# Patient Record
Sex: FEMALE | Race: WHITE | NOT HISPANIC OR LATINO | Employment: UNEMPLOYED | URBAN - METROPOLITAN AREA
[De-identification: names, ages, dates, MRNs, and addresses within clinical notes are randomized per-mention and may not be internally consistent; named-entity substitution may affect disease eponyms.]

---

## 2017-01-30 ENCOUNTER — TELEPHONE (OUTPATIENT)
Dept: FAMILY MEDICINE | Facility: CLINIC | Age: 52
End: 2017-01-30

## 2017-01-30 NOTE — TELEPHONE ENCOUNTER
Reason for Call:  Other call back    Detailed comments: Pt would like to know if where she can get the MRI results read. Please contact pt regarding this. THanks!    Phone Number Patient can be reached at: Cell number on file:    Telephone Information:   Mobile [phone]       Best Time: Anytime    Can we leave a detailed message on this number? YES    Call taken on 1/30/2017 at 10:16 AM by Tiny Gamez

## 2017-01-30 NOTE — TELEPHONE ENCOUNTER
Writer notes last MRI 9/30/2016 - results show read and reviewed with patient - no outside orders observed in media    Unknown as to what orders patient is referring to     Writer called patient left non detailed message requesting return call to clinic and ask to speak with nurse    Laura Vázquez RN

## 2017-03-07 ENCOUNTER — OFFICE VISIT (OUTPATIENT)
Dept: FAMILY MEDICINE | Facility: CLINIC | Age: 52
End: 2017-03-07
Payer: COMMERCIAL

## 2017-03-07 VITALS
WEIGHT: 131 LBS | SYSTOLIC BLOOD PRESSURE: 102 MMHG | TEMPERATURE: 98.2 F | BODY MASS INDEX: 23.21 KG/M2 | DIASTOLIC BLOOD PRESSURE: 66 MMHG | HEIGHT: 63 IN | OXYGEN SATURATION: 96 % | HEART RATE: 75 BPM

## 2017-03-07 DIAGNOSIS — R68.89 FLU-LIKE SYMPTOMS: ICD-10-CM

## 2017-03-07 DIAGNOSIS — J10.1 INFLUENZA B: ICD-10-CM

## 2017-03-07 DIAGNOSIS — J10.1 INFLUENZA A: Primary | ICD-10-CM

## 2017-03-07 LAB
FLUAV+FLUBV AG SPEC QL: ABNORMAL
FLUAV+FLUBV AG SPEC QL: ABNORMAL
SPECIMEN SOURCE: ABNORMAL

## 2017-03-07 PROCEDURE — 87804 INFLUENZA ASSAY W/OPTIC: CPT | Performed by: FAMILY MEDICINE

## 2017-03-07 PROCEDURE — 99207 ZZC FOR CODING REVIEW: CPT | Performed by: FAMILY MEDICINE

## 2017-03-07 PROCEDURE — 87633 RESP VIRUS 12-25 TARGETS: CPT | Performed by: FAMILY MEDICINE

## 2017-03-07 PROCEDURE — 99213 OFFICE O/P EST LOW 20 MIN: CPT | Performed by: FAMILY MEDICINE

## 2017-03-07 RX ORDER — IBUPROFEN 800 MG/1
800 TABLET, FILM COATED ORAL EVERY 8 HOURS PRN
Qty: 60 TABLET | Refills: 0 | Status: CANCELLED | OUTPATIENT
Start: 2017-03-07

## 2017-03-07 RX ORDER — OSELTAMIVIR PHOSPHATE 75 MG/1
75 CAPSULE ORAL 2 TIMES DAILY
Qty: 10 CAPSULE | Refills: 0 | Status: SHIPPED | OUTPATIENT
Start: 2017-03-07 | End: 2017-05-31

## 2017-03-07 NOTE — NURSING NOTE
"Chief Complaint   Patient presents with     Flu       Initial /66 (BP Location: Left arm, Cuff Size: Adult Regular)  Pulse 75  Temp 98.2  F (36.8  C) (Oral)  Ht 5' 3\" (1.6 m)  Wt 131 lb (59.4 kg)  LMP 05/14/2008  SpO2 96%  Breastfeeding? No  BMI 23.21 kg/m2 Estimated body mass index is 23.21 kg/(m^2) as calculated from the following:    Height as of this encounter: 5' 3\" (1.6 m).    Weight as of this encounter: 131 lb (59.4 kg).  Medication Reconciliation: complete.  BEST Parr      "

## 2017-03-07 NOTE — LETTER
[facility]   [address], [place]  [address]  [phone]      March 10, 2017      Eunice SANCHEZ Gama  [address] [facility] [place] [facility] [address]              Dear Ms. Malloy,    I am writing to share your lab results from your recent visit at our office. The inconclusive test for influenza was followed up by a culture. That came back negative (no influenza). Regardless, I still recommend you complete the course of tamiflu. Hope you are starting to feel better.  Please find the results below.  If you have any questions regarding these test results let me know.    Results for orders placed or performed in visit on 03/07/17   Influenza A/B antigen   Result Value Ref Range    Influenza A/B Agn Specimen Nasopharyngeal     Influenza A Inconclusive (A) NEG    Influenza B (A) NEG     Inconclusive   Test results must be correlated with clinical data. If necessary, results   should be confirmed by a molecular assay or viral culture.     Respiratory Virus Panel by PCR   Result Value Ref Range    Respiratory Virus Source Nasopharyngeal     Influenza A Negative NEG    Influenza A, H1 Negative NEG    Influenza A, H3 Negative NEG    Influenza A 2009 H1N1 Negative NEG    Influenza B Negative NEG    Respiratory Syncytial Virus A Negative NEG    Respiratory Syncytial Virus B Negative NEG    Parainfluenza Virus 1 Negative NEG    Parainfluenza Virus 2 Negative NEG    Parainfluenza Virus 3 Negative NEG    Human Metapneumovirus Negative NEG    Human Rhinovirus Negative NEG    Adenovirus Species B/E Negative NEG    Adenovirus Species C Negative NEG    Respiratory Virus Comment       The [employer] Respiratory Viral Panel (RVP) is a qualitative, multiplex,   diagnostic test for simultaneous detection and identification of multiple   respiratory viral nucleic acids in individuals exhibiting signs and symptoms of   respiratory infection. It uses innovative eSensor technology to provide   sensitive and specific  respiratory virus detection.   The test detects Influenza A (H1 and H3), Influenza A 2009 H1N1, Influenza B,   Respiratory Syncytial Virus A, Respiratory Syncytial Virus B, Parainfluenza   Virus (1, 2 and 3), Human Metapneumovirus, Human Rhinovirus (HRV), Adenovirus   B/E and Adenovirus C.   The assay has received FDA approval for the testing of nasopharyngeal (NP)   swabs only. The Infect[facility] at [facility], [place], has validated the performance   characteristics of the [employer] Respiratory Viral Panel for NP swabs, bronchial   alveolar lavage/wash, nasopharyngeal aspirate/wash and nasal wash.             Sincerely,    Tad Vyas MD/nr

## 2017-03-07 NOTE — LETTER
[address] [address]  Phone: [phone]    March 7, 2017        Eunice Malloy  [address] [address]          To whom it may concern:    RE: Eunice Malloy    Patient was seen and treated today at our clinic. I recommend she not return to work until symptoms resolve, anticipate resolution no sooner than 3/13/2017.    Please contact me for questions or concerns.      Sincerely,        Tad Vyas MD

## 2017-03-07 NOTE — MR AVS SNAPSHOT
After Visit Summary   3/7/2017    Eunice Malloy    MRN: [id number]           Patient Information     Date Of Birth          1965        Visit Information        Provider Department      3/7/2017 2:00 PM Tad Charles MD [facility]        Today's Diagnoses     Influenza A    -  1    Influenza B        Flu-like symptoms           Follow-ups after your visit        Your next 10 appointments already scheduled     Mar 10, 2017 12:30 PM CST   LAB with  [facility]   [facility]    [address] [place] [id number] [phone]           Patient must bring picture ID.  Patient should be prepared to give a urine specimen  Please do not eat 10-12 hours before your appointment if you are coming in fasting for labs on lipids, cholesterol, or glucose (sugar).  Pregnant women should follow their Care Team instructions. Water with medications is okay. Do not drink coffee or other fluids.   If you have concerns about taking  your medications, please ask at office or if scheduling via [employer], send a message by clicking on Secure Messaging, Message Your Care Team.              Who to contact     If you have questions or need follow up information about today's clinic visit or your schedule please contact [facility] directly at [phone].  Normal or non-critical lab and imaging results will be communicated to you by MyChart, letter or phone within 4 business days after the clinic has received the results. If you do not hear from us within 7 days, please contact the clinic through [employer] or phone. If you have a critical or abnormal lab result, we will notify you by phone as soon as possible.  Submit refill requests through [employer] or call your pharmacy and they will forward the refill request to us. Please allow 3 business days for your refill to be completed.          Additional Information About Your  "Visit        [email or web address] Information     [employer] lets you send messages to your doctor, view your test results, renew your prescriptions, schedule appointments and more. To sign up, go to [email or web address] . Click on \"Log in\" on the left side of the screen, which will take you to the Welcome page. Then click on \"Sign up Now\" on the right side of the page.     You will be asked to enter the access code listed below, as well as some personal information. Please follow the directions to create your username and password.     Your access code is: [email or web address]  Expires: 2017 11:00 AM     Your access code will  in 90 days. If you need help or a new code, please call your [place] clinic or [phone].        Care EveryWhere ID     This is your Care EveryWhere ID. This could be used by other organizations to access your [place] medical records  [id number]        Your Vitals Were     Pulse Temperature Height Last Period Pulse Oximetry Breastfeeding?    75 98.2  F (36.8  C) (Oral) 5' 3\" (1.6 m) 2008 96% No    BMI (Body Mass Index)                   23.21 kg/m2            Blood Pressure from Last 3 Encounters:   17 102/66   16 116/64   16 109/67    Weight from Last 3 Encounters:   17 131 lb (59.4 kg)   16 129 lb (58.5 kg)   16 124 lb (56.2 kg)              We Performed the Following     Influenza A/B antigen     Respiratory Virus Panel by PCR          Today's Medication Changes          These changes are accurate as of: 3/7/17 11:59 PM.  If you have any questions, ask your nurse or doctor.               Start taking these medicines.        Dose/Directions    oseltamivir 75 MG capsule   Commonly known as:  TAMIFLU   Used for:  Influenza A, Influenza B   Started by:  Tad Charles MD        Dose:  75 mg   Take 1 capsule (75 mg) by mouth 2 times daily   Quantity:  10 capsule   Refills:  0            Where to get your medicines      These medications were " sent to [place], [place] - [address]  [address], [facility] [address]     Phone:  [phone]     ibuprofen 800 MG tablet         These medications were sent to [facility] - [place], [place] - [address]  [address], [place] [address]     Phone:  [phone]     oseltamivir 75 MG capsule                Primary Care Provider Office Phone # Fax #    Alyse Mcclendon [phone]       [facility] [address]  [facility] [address]        Thank you!     Thank you for choosing [facility]  for your care. Our goal is always to provide you with excellent care. Hearing back from our patients is one way we can continue to improve our services. Please take a few minutes to complete the written survey that you may receive in the mail after your visit with us. Thank you!             Your Updated Medication List - Protect others around you: Learn how to safely use, store and throw away your medicines at www.disposemymeds.org.          This list is accurate as of: 3/7/17 11:59 PM.  Always use your most recent med list.                   Brand Name Dispense Instructions for use    cyclobenzaprine 5 MG tablet    FLEXERIL    30 tablet    Take 1 tablet (5 mg) by mouth nightly as needed for muscle spasms       diphenhydrAMINE 25 MG tablet    BENADRYL    60 tablet    Take 1-2 tablets (25-50 mg) by mouth every 6 hours as needed for itching or allergies Do not take with Flexeril.       ibuprofen 800 MG tablet    ADVIL/MOTRIN    60 tablet    Take 1 tablet (800 mg) by mouth every 8 hours as needed for moderate pain       levothyroxine 100 MCG tablet    SYNTHROID/LEVOTHROID    90 tablet    Take 1 tablet by mouth every other day. Alternate between 100 mcg and 88 mcg tablets.       MULTIVITAL PO      Take 1 tablet by mouth       oseltamivir 75 MG capsule    TAMIFLU    10 capsule    Take 1 capsule (75 mg) by mouth 2 times daily

## 2017-03-07 NOTE — PROGRESS NOTES
"  SUBJECTIVE:                                                    Eunice Malloy is a 51 year old female who presents to clinic today for the following health issues:      RESPIRATORY SYMPTOMS      Duration: Started late Sunday night     Description  nasal congestion, rhinorrhea, cough, fever, headache and body aches     Severity: moderate    Accompanying signs and symptoms: None    History (predisposing factors):  none    Precipitating or alleviating factors: None    Therapies tried and outcome:  Ibuprofen      Mostly body aches and chills/fever.    Cough is not productive.    She has a new rash on her right abdomen, it is not tender - been around for a couple of weeks.    EXAM:  /66 (BP Location: Left arm, Cuff Size: Adult Regular)  Pulse 75  Temp 98.2  F (36.8  C) (Oral)  Ht 5' 3\" (1.6 m)  Wt 131 lb (59.4 kg)  LMP 05/14/2008  SpO2 96%  Breastfeeding? No  BMI 23.21 kg/m2  Constitutional: tired appearing  EENT: PERRL, TM's clear bilaterally, oropharynx without erythema, no anterior cervical lymphadenopathy   Cardiovascular: RRR. No murmurs   Respiratory: Clear to auscultation   Skin: erythematous patch on right lower abdomen, no ulceration or excoriation.    Results for orders placed or performed in visit on 03/07/17   Influenza A/B antigen   Result Value Ref Range    Influenza A/B Agn Specimen Nasopharyngeal     Influenza A Inconclusive (A) NEG    Influenza B (A) NEG     Inconclusive   Test results must be correlated with clinical data. If necessary, results   should be confirmed by a molecular assay or viral culture.         ASSESSMENT    ICD-10-CM    1. Influenza A J10.1 oseltamivir (TAMIFLU) 75 MG capsule   2. Influenza B J10.1 oseltamivir (TAMIFLU) 75 MG capsule   3. Flu-like symptoms R68.89 Influenza A/B antigen     Respiratory Virus Panel by PCR      Plan:  Anticipate improvement,  Continue symptom care.    Tad Vyas MD  Family Medicine Physician     "

## 2017-03-08 LAB
FLUAV H1 2009 PAND RNA SPEC QL NAA+PROBE: NEGATIVE
FLUAV H1 RNA SPEC QL NAA+PROBE: NEGATIVE
FLUAV H3 RNA SPEC QL NAA+PROBE: NEGATIVE
FLUAV RNA SPEC QL NAA+PROBE: NEGATIVE
FLUBV RNA SPEC QL NAA+PROBE: NEGATIVE
HADV DNA SPEC QL NAA+PROBE: NEGATIVE
HADV DNA SPEC QL NAA+PROBE: NEGATIVE
HMPV RNA SPEC QL NAA+PROBE: NEGATIVE
HPIV1 RNA SPEC QL NAA+PROBE: NEGATIVE
HPIV2 RNA SPEC QL NAA+PROBE: NEGATIVE
HPIV3 RNA SPEC QL NAA+PROBE: NEGATIVE
MICROBIOLOGIST REVIEW: NORMAL
RHINOVIRUS RNA SPEC QL NAA+PROBE: NEGATIVE
RSV RNA SPEC QL NAA+PROBE: NEGATIVE
RSV RNA SPEC QL NAA+PROBE: NEGATIVE
SPECIMEN SOURCE: NORMAL

## 2017-03-10 DIAGNOSIS — M54.50 CHRONIC BILATERAL LOW BACK PAIN WITHOUT SCIATICA: ICD-10-CM

## 2017-03-10 DIAGNOSIS — G89.29 CHRONIC BILATERAL LOW BACK PAIN WITHOUT SCIATICA: ICD-10-CM

## 2017-03-10 PROCEDURE — 84460 ALANINE AMINO (ALT) (SGPT): CPT | Performed by: FAMILY MEDICINE

## 2017-03-10 PROCEDURE — 36415 COLL VENOUS BLD VENIPUNCTURE: CPT | Performed by: FAMILY MEDICINE

## 2017-03-10 PROCEDURE — 84450 TRANSFERASE (AST) (SGOT): CPT | Performed by: FAMILY MEDICINE

## 2017-03-10 NOTE — LETTER
[facility]   [address] [place] [address]  [phone]      March 14, 2017      Eunice M Gama  [address] [facility] [address]          Dear Ms. Malloy,    The results of your recent lab tests were within normal limits. Enclosed is a copy of these results.  If you have any further questions or problems, please contact our office.    Results for orders placed or performed in visit on 03/10/17   **ALT FUTURE anytime   Result Value Ref Range    ALT 28 0 - 50 U/L   AST   Result Value Ref Range    AST 17 0 - 45 U/L         Sincerely,      Alyse Mcclendon MD/nr

## 2017-03-11 LAB
ALT SERPL W P-5'-P-CCNC: 28 U/L (ref 0–50)
AST SERPL W P-5'-P-CCNC: 17 U/L (ref 0–45)

## 2017-03-13 DIAGNOSIS — E03.9 HYPOTHYROIDISM, UNSPECIFIED TYPE: ICD-10-CM

## 2017-03-14 RX ORDER — LEVOTHYROXINE SODIUM 100 UG/1
TABLET ORAL
Qty: 30 TABLET | Refills: 0 | Status: SHIPPED | OUTPATIENT
Start: 2017-03-14 | End: 2017-03-14

## 2017-03-14 RX ORDER — LEVOTHYROXINE SODIUM 100 UG/1
100 TABLET ORAL DAILY
Qty: 30 TABLET | Refills: 0 | Status: SHIPPED | OUTPATIENT
Start: 2017-03-14 | End: 2017-04-27

## 2017-03-14 NOTE — TELEPHONE ENCOUNTER
Routing refill request to provider for review/approval because:  Labs out of range:  Last tsh was 13.8 on 9/28/2016

## 2017-03-14 NOTE — TELEPHONE ENCOUNTER
She had been off her medication and is due for TSH recheck. Please schedule. Alyse Mcclendon M.D.

## 2017-03-20 NOTE — TELEPHONE ENCOUNTER
--  --Please call patient  and ask her to schedule a lab only for thyroid labs.  A refill order for below medication  was sent to the pharmacy for limited refill because she is due for thyroid lab. Thank you.

## 2017-04-14 ENCOUNTER — TELEPHONE (OUTPATIENT)
Dept: FAMILY MEDICINE | Facility: CLINIC | Age: 52
End: 2017-04-14

## 2017-04-14 NOTE — TELEPHONE ENCOUNTER
Yest, pt got swollen glands.  Mouth -is white on inside of mouth.  Fatigued.  Fever yest.  Rec being seen in UC.  Pt agreed.  JUAN DAVID Sanchez

## 2017-04-27 ENCOUNTER — OFFICE VISIT (OUTPATIENT)
Dept: FAMILY MEDICINE | Facility: CLINIC | Age: 52
End: 2017-04-27
Payer: COMMERCIAL

## 2017-04-27 VITALS
HEART RATE: 65 BPM | OXYGEN SATURATION: 98 % | BODY MASS INDEX: 23.03 KG/M2 | DIASTOLIC BLOOD PRESSURE: 68 MMHG | SYSTOLIC BLOOD PRESSURE: 114 MMHG | RESPIRATION RATE: 12 BRPM | WEIGHT: 130 LBS

## 2017-04-27 DIAGNOSIS — E03.9 HYPOTHYROIDISM, UNSPECIFIED TYPE: ICD-10-CM

## 2017-04-27 DIAGNOSIS — D17.30 LIPOMA OF SKIN AND SUBCUTANEOUS TISSUE: Primary | ICD-10-CM

## 2017-04-27 DIAGNOSIS — M54.50 LEFT-SIDED LOW BACK PAIN WITHOUT SCIATICA, UNSPECIFIED CHRONICITY: ICD-10-CM

## 2017-04-27 DIAGNOSIS — R23.9 SKIN CHANGE: ICD-10-CM

## 2017-04-27 PROCEDURE — 99214 OFFICE O/P EST MOD 30 MIN: CPT | Performed by: FAMILY MEDICINE

## 2017-04-27 RX ORDER — LEVOTHYROXINE SODIUM 100 UG/1
100 TABLET ORAL DAILY
Qty: 90 TABLET | Refills: 1 | Status: SHIPPED | OUTPATIENT
Start: 2017-04-27 | End: 2018-02-26

## 2017-04-27 RX ORDER — HYDROCODONE BITARTRATE AND ACETAMINOPHEN 5; 325 MG/1; MG/1
1 TABLET ORAL EVERY 6 HOURS PRN
Qty: 10 TABLET | Refills: 0 | Status: SHIPPED | OUTPATIENT
Start: 2017-04-27 | End: 2018-01-07

## 2017-04-27 NOTE — MR AVS SNAPSHOT
After Visit Summary   4/27/2017    Eunice Malloy    MRN: [id number]           Patient Information     Date Of Birth          1965        Visit Information        Provider Department      4/27/2017 4:00 PM Alyse Mcclendon MD [facility]        Today's Diagnoses     Lipoma of skin and subcutaneous tissue    -  1    Hypothyroidism, unspecified type        Skin change        Left-sided low back pain without sciatica, unspecified chronicity          Care Instructions    1. Schedule with dermatology for a skin check.  2. Schedule with general surgery for lipoma removal.  3. Start taking levothyroxine 100 mcg daily.  4. Schedule a lab draw in 6-8 weeks.      When You Have Low Back Pain    Caring for Your Back  You are not alone.    Low back pain is very common. Nearly half of all adults have low back pain in any given year. The good news is that back pain is rarely a danger to your health. Most people can manage their back pain on their own and about half of them start feeling better within 2 weeks. In 9 out of 10 cases, low back pain goes away or no longer limits daily activity within 6 weeks.     Your outlook is good!    Your symptoms tell us that your low back pain is most likely not a danger to you. Most of the time we do not know the exact cause of low back pain, even if you see a doctor or have an MRI. However, treatment can still work without knowing the cause of the pain. Less than 1 in 100 people need surgery for their back pain.     What can I do about my low back pain?     There are three things you can do to ease low back pain and help it go away.    Use heat or cold packs.    Take medicine as directed.    Use positions, movements and exercises.     Using heat or cold packs    Try cold packs or gentle heat to ease your pain. Use whichever gives you the most relief. Apply the cold pack or heat for 15 minutes at a time, as often as needed.    Taking medicine      If your doctor  has prescribed medicine, be sure to follow the directions.    If you take over-the-counter medicine, read and follow the directions.    Talk to your doctor if you have any questions.     Using positions, movements and exercises    Research tells us that moving your joints and muscles can help you recover from back pain. Such activity should be simple and gentle. Use the positions in the photos as well as walking to help relieve your pain. Try taking a short walk every 3 to 4 hours during the day. Walk for a few minutes inside your home or take longer walks outside, on a treadmill or at a mall. Slowly increase the amount of time you walk. Expect discomfort when you begin, but it should lessen as your back starts to heal. When your back feels better, walk daily to keep your back and body healthy.    Finding a comfortable position    When your back pain is new, certain positions will ease your pain. Gently try each of the positions below until you find one that is helpful. Once you find a position of comfort, use it as often as you like when you are resting. You will recover faster if you combine rest with activity.         Lie on your back with your legs bent. You can do this by placing a pillow under your knees. Or you may lie on the floor and rest your lower legs on the seat of a chair.       Lie on your side with your knees bent, and place a pillow between your knees.       Lie on your stomach over pillows.      When should I call my doctor?    Your back pain should improve over the first couple of weeks. As it improves, you should be able to return to your normal activities. But call your doctor if:    You have a sudden change in your ability to control your bladder or bowels.    You feel tingling in your groin or legs.    The pain spreads down your leg and into your foot.    Your toes, feet or leg muscles feel weak.    You feel generally unwell or sick.    Your pain does not get better or gets worse.      If you  are deaf or hard of hearing, please let us know. We provide many free services including sign language interpreters,oral interpreters, TTYs, telephone amplifiers, note takers and written materials.    For informational purposes only. Not to replace the advice of your health care provider. Copyright   2013 [facility]. All rights reserved. [employer] 412453 - Rev 06/14.          Follow-ups after your visit        Additional Services     DERMATOLOGY REFERRAL       Your provider has referred you to: FMG: [facility] [place] [phone]   [email or web address]  FHN: [facility] [phone]   [email or web address]  N: [facility] [place] [phone]  [email or web address]  N: [facility] [place] [phone]   [email or web address]  [place] [phone]   [email or web address]  [facility] [place] [phone]   [email or web address]    Please be aware that coverage of these services is subject to the terms and limitations of your health insurance plan.  Call member services at your health plan with any benefit or coverage questions.      Please bring the following with you to your appointment:    (1) Any X-Rays, CTs or MRIs which have been performed.  Contact the facility where they were done to arrange for  prior to your scheduled appointment.    (2) List of current medications  (3) This referral request   (4) Any documents/labs given to you for this referral            GENERAL SURG ADULT REFERRAL       Your provider has referred you to: [facility]: [facility] [phone]   [email or web address]  [facility]: [facility] [phone]   [email or web address]    Please be aware  "that coverage of these services is subject to the terms and limitations of your health insurance plan.  Call member services at your health plan with any benefit or coverage questions.      Please bring the following with you to your appointment:    (1) Any X-Rays, CTs or MRIs which have been performed.  Contact the facility where they were done to arrange for  prior to your scheduled appointment.   (2) List of current medications   (3) This referral request   (4) Any documents/labs given to you for this referral                  Future tests that were ordered for you today     Open Future Orders        Priority Expected Expires Ordered    TSH with free T4 reflex Routine 6/8/2017 4/27/2018 4/27/2017            Who to contact     If you have questions or need follow up information about today's clinic visit or your schedule please contact [facility] directly at [phone].  Normal or non-critical lab and imaging results will be communicated to you by MyChart, letter or phone within 4 business days after the clinic has received the results. If you do not hear from us within 7 days, please contact the clinic through MyChart or phone. If you have a critical or abnormal lab result, we will notify you by phone as soon as possible.  Submit refill requests through [employer] or call your pharmacy and they will forward the refill request to us. Please allow 3 business days for your refill to be completed.          Additional Information About Your Visit        [employer] Information     [employer] lets you send messages to your doctor, view your test results, renew your prescriptions, schedule appointments and more. To sign up, go to [email or web address] . Click on \"Log in\" on the left side of the screen, which will take you to the Welcome page. Then click on \"Sign up Now\" on the right side of the page.     You will be asked to enter the access code listed below, as well as some personal information. " Please follow the directions to create your username and password.     Your access code is: [email or web address]  Expires: 2017 12:00 PM     Your access code will  in 90 days. If you need help or a new code, please call your [facility] clinic or [phone].        Care EveryWhere ID     This is your Care EveryWhere ID. This could be used by other organizations to access your [facility] medical records  [id number]        Your Vitals Were     Pulse Respirations Last Period Pulse Oximetry BMI (Body Mass Index)       65 12 2008 98% 23.03 kg/m2        Blood Pressure from Last 3 Encounters:   17 114/68   17 102/66   16 116/64    Weight from Last 3 Encounters:   17 130 lb (59 kg)   17 131 lb (59.4 kg)   16 129 lb (58.5 kg)              We Performed the Following     DERMATOLOGY REFERRAL     GENERAL SURG ADULT REFERRAL          Today's Medication Changes          These changes are accurate as of: 17  4:14 PM.  If you have any questions, ask your nurse or doctor.               Start taking these medicines.        Dose/Directions    HYDROcodone-acetaminophen 5-325 MG per tablet   Commonly known as:  NORCO   Used for:  Left-sided low back pain without sciatica, unspecified chronicity   Started by:  Alyse Mcclendon MD        Dose:  1 tablet   Take 1 tablet by mouth every 6 hours as needed   Quantity:  10 tablet   Refills:  0            Where to get your medicines      These medications were sent to [facility] [address]  [address] [facility] [address]     Phone:  [phone]     levothyroxine 100 MCG tablet         Some of these will need a paper prescription and others can be bought over the counter.  Ask your nurse if you have questions.     Bring a paper prescription for each of these medications     HYDROcodone-acetaminophen 5-325 MG per tablet                Primary Care Provider Office Phone # Fax #    Alyse Mcclendon MD  [phone]       [facility] [address]  [facility] [address]        Thank you!     Thank you for choosing [facility]  for your care. Our goal is always to provide you with excellent care. Hearing back from our patients is one way we can continue to improve our services. Please take a few minutes to complete the written survey that you may receive in the mail after your visit with us. Thank you!             Your Updated Medication List - Protect others around you: Learn how to safely use, store and throw away your medicines at www.disposemymeds.org.          This list is accurate as of: 4/27/17  4:14 PM.  Always use your most recent med list.                   Brand Name Dispense Instructions for use    cyclobenzaprine 5 MG tablet    FLEXERIL    30 tablet    Take 1 tablet (5 mg) by mouth nightly as needed for muscle spasms       diphenhydrAMINE 25 MG tablet    BENADRYL    60 tablet    Take 1-2 tablets (25-50 mg) by mouth every 6 hours as needed for itching or allergies Do not take with Flexeril.       HYDROcodone-acetaminophen 5-325 MG per tablet    NORCO    10 tablet    Take 1 tablet by mouth every 6 hours as needed       ibuprofen 800 MG tablet    ADVIL/MOTRIN    60 tablet    Take 1 tablet (800 mg) by mouth every 8 hours as needed for moderate pain       levothyroxine 100 MCG tablet    SYNTHROID/LEVOTHROID    90 tablet    Take 1 tablet (100 mcg) by mouth daily       MULTIVITAL PO      Take 1 tablet by mouth       oseltamivir 75 MG capsule    TAMIFLU    10 capsule    Take 1 capsule (75 mg) by mouth 2 times daily

## 2017-04-27 NOTE — NURSING NOTE
"Chief Complaint   Patient presents with     Thyroid Problem       Initial /68  Pulse 65  Resp 12  Wt 130 lb (59 kg)  LMP 05/14/2008  SpO2 98%  BMI 23.03 kg/m2 Estimated body mass index is 23.03 kg/(m^2) as calculated from the following:    Height as of 3/7/17: 5' 3\" (1.6 m).    Weight as of this encounter: 130 lb (59 kg).  Medication Reconciliation: complete     Micheline Jorge MA     "

## 2017-04-27 NOTE — PROGRESS NOTES
SUBJECTIVE:                                                    Eunice Malloy is a 51 year old female who presents to clinic today for the following health issues:    Hypothyroidism Follow-up      Since last visit, patient describes the following symptoms: Weight stable, no hair loss, no skin changes, no constipation, no loose stools       Amount of exercise or physical activity: 1 day/week for an average of 15-30 minutes    Problems taking medications regularly: No    Medication side effects: none    Diet: regular (no restrictions)      Patient denies any symptoms of hypothyroidism. She has been watching her diet. Has not been taking levothyroxine for roughly a year now.      Her back issues have flared up. She would like to consult general surgery for the lipomas. No longer controlled with Ibuprofen. Previously saw PT but felt it was a waste of time. She did the at home exercises and continues to do yoga. Patient has not tried massage or acupuncture. Requesting Vicodin for pain relief.     There is a spot on her abdomen that she's been monitoring with progressive enlargement.       Problem list and histories reviewed & adjusted, as indicated.  Additional history: as documented    Patient Active Problem List   Diagnosis     CARDIOVASCULAR SCREENING; LDL GOAL LESS THAN 160     Hypothyroidism     Cold sore     Anxiety     Shoulder pain     Labral tear of shoulder     Microscopic hematuria     Low back pain     Loss of hair     SI (sacroiliac) joint dysfunction and pain - BILATERAL     Abnormal finding on MRI of brain     Past Surgical History:   Procedure Laterality Date     HC EXCIS PRIMARY GANGLION WRIST       TONSILLECTOMY  as child       Social History   Substance Use Topics     Smoking status: Current Some Day Smoker     Years: 15.00     Types: Cigarettes     Smokeless tobacco: Never Used      Comment: 1 pack per week     Alcohol use 0.0 oz/week     0 Standard drinks or equivalent per week      Comment: rare      History reviewed. No pertinent family history.      Current Outpatient Prescriptions   Medication Sig Dispense Refill     levothyroxine (SYNTHROID/LEVOTHROID) 100 MCG tablet Take 1 tablet (100 mcg) by mouth daily 30 tablet 0     ibuprofen (ADVIL/MOTRIN) 800 MG tablet Take 1 tablet (800 mg) by mouth every 8 hours as needed for moderate pain 60 tablet 0     oseltamivir (TAMIFLU) 75 MG capsule Take 1 capsule (75 mg) by mouth 2 times daily 10 capsule 0     cyclobenzaprine (FLEXERIL) 5 MG tablet Take 1 tablet (5 mg) by mouth nightly as needed for muscle spasms 30 tablet 3     diphenhydrAMINE (BENADRYL) 25 MG tablet Take 1-2 tablets (25-50 mg) by mouth every 6 hours as needed for itching or allergies Do not take with Flexeril. 60 tablet 0     Multiple Vitamins-Minerals (MULTIVITAL PO) Take 1 tablet by mouth       Allergies   Allergen Reactions     Sulfa Drugs Rash     Recent Labs   Lab Test  03/10/17   1114  09/28/16   1423  07/11/16   1657  09/14/15   0420  05/04/15   1020  05/04/15   1019   07/07/11   0855   12/31/09   1210   LDL   --    --    --    --    --   99   --   148*   --   108   HDL   --    --    --    --    --   101   --   85   --   89   TRIG   --    --    --    --    --   69   --   120   --   80   ALT  28   --    --   39  41   --    < >   --    < >   --    CR   --   0.70   --   0.76  0.81   --    < >   --    < >   --    GFRESTIMATED   --   87   --   80  75   --    < >   --    < >   --    GFRESTBLACK   --   >90   GFR Calc     --   >90   GFR Calc    >90   GFR Calc     --    < >   --    < >   --    POTASSIUM   --   4.3   --   3.9  3.5   --    < >   --    < >   --    TSH   --   13.18*  24.34*   --    --   91.59*   < >  4.07   < >  4.03    < > = values in this interval not displayed.      BP Readings from Last 3 Encounters:   04/27/17 114/68   03/07/17 102/66   09/28/16 116/64    Wt Readings from Last 3 Encounters:   04/27/17 59 kg (130 lb)   03/07/17 59.4 kg (131  lb)   09/28/16 58.5 kg (129 lb)        Reviewed and updated as needed this visit by clinical staff  Reviewed and updated as needed this visit by Provider    ROS:  See above.    This document serves as a record of the services and decisions personally performed and made by Alyse Mcclendon MD. It was created on his/her behalf by Lynette Gomez, trained medical scribe. The creation of this document is based the provider's statements to the medical scribes.    Scribe Lynette Gomez, April 27, 2017  OBJECTIVE:                                                    /68  Pulse 65  Resp 12  Wt 59 kg (130 lb)  LMP 05/14/2008  SpO2 98%  BMI 23.03 kg/m2  Body mass index is 23.03 kg/(m^2).  GENERAL: healthy, alert and no distress  SKIN: small, smooth round, moveable nodules lower back      Diagnostic Test Results: none      ASSESSMENT/PLAN:                                                    1. Hypothyroidism, unspecified type  Last TSH was 13.18. She reports today that she has not taken levothyroxine in over a year. She will restart levothyroxine 100 mcg and schedule a TSH lab draw in 6-8 weeks.  - levothyroxine (SYNTHROID/LEVOTHROID) 100 MCG tablet; Take 1 tablet (100 mcg) by mouth daily  Dispense: 90 tablet; Refill: 1  - TSH with free T4 reflex; Future    2. Lipomas of skin and subcutaneous tissue  She has lipomas in her back that she feels are contributing to her back pain and would like them removed. She will schedule with general surgery for removal.  - GENERAL SURG ADULT REFERRAL    3. Skin change  She will schedule with dermatology for a routine skin check.  - DERMATOLOGY REFERRAL    4. Left-sided low back pain without sciatica, unspecified chronicity  Her back issues are flaring. She previously did PT without relief and declines seeing a physical therapist again. Ibuprofen is no longer working to control her pain and she requests Vicodin. Prescribed 10 tablets of NORCO for her to use as need for pain.  Discussed that narcotics are not indicated for long term pain control and that we need to try other measures to resolve the pain.  - HYDROcodone-acetaminophen (NORCO) 5-325 MG per tablet; Take 1 tablet by mouth every 6 hours as needed  Dispense: 10 tablet; Refill: 0      Patient Instructions   1. Schedule with dermatology for a skin check.  2. Schedule with general surgery for lipoma removal.  3. Start taking levothyroxine 100 mcg daily.  4. Schedule a lab draw in 6-8 weeks.      When You Have Low Back Pain    Caring for Your Back  You are not alone.    Low back pain is very common. Nearly half of all adults have low back pain in any given year. The good news is that back pain is rarely a danger to your health. Most people can manage their back pain on their own and about half of them start feeling better within 2 weeks. In 9 out of 10 cases, low back pain goes away or no longer limits daily activity within 6 weeks.     Your outlook is good!    Your symptoms tell us that your low back pain is most likely not a danger to you. Most of the time we do not know the exact cause of low back pain, even if you see a doctor or have an MRI. However, treatment can still work without knowing the cause of the pain. Less than 1 in 100 people need surgery for their back pain.     What can I do about my low back pain?     There are three things you can do to ease low back pain and help it go away.    Use heat or cold packs.    Take medicine as directed.    Use positions, movements and exercises.     Using heat or cold packs    Try cold packs or gentle heat to ease your pain. Use whichever gives you the most relief. Apply the cold pack or heat for 15 minutes at a time, as often as needed.    Taking medicine      If your doctor has prescribed medicine, be sure to follow the directions.    If you take over-the-counter medicine, read and follow the directions.    Talk to your doctor if you have any questions.     Using positions,  movements and exercises    Research tells us that moving your joints and muscles can help you recover from back pain. Such activity should be simple and gentle. Use the positions in the photos as well as walking to help relieve your pain. Try taking a short walk every 3 to 4 hours during the day. Walk for a few minutes inside your home or take longer walks outside, on a treadmill or at a mall. Slowly increase the amount of time you walk. Expect discomfort when you begin, but it should lessen as your back starts to heal. When your back feels better, walk daily to keep your back and body healthy.    Finding a comfortable position    When your back pain is new, certain positions will ease your pain. Gently try each of the positions below until you find one that is helpful. Once you find a position of comfort, use it as often as you like when you are resting. You will recover faster if you combine rest with activity.         Lie on your back with your legs bent. You can do this by placing a pillow under your knees. Or you may lie on the floor and rest your lower legs on the seat of a chair.       Lie on your side with your knees bent, and place a pillow between your knees.       Lie on your stomach over pillows.      When should I call my doctor?    Your back pain should improve over the first couple of weeks. As it improves, you should be able to return to your normal activities. But call your doctor if:    You have a sudden change in your ability to control your bladder or bowels.    You feel tingling in your groin or legs.    The pain spreads down your leg and into your foot.    Your toes, feet or leg muscles feel weak.    You feel generally unwell or sick.    Your pain does not get better or gets worse.      If you are deaf or hard of hearing, please let us know. We provide many free services including sign language interpreters,oral interpreters, TTYs, telephone amplifiers, note takers and written materials.    For  informational purposes only. Not to replace the advice of your health care provider. Copyright   2013 [facility]. All rights reserved. [employer] 150441 - Rev 06/14.        The information in this document, created by the medical scribe for me, accurately reflects the services I personally performed and the decisions made by me. I have reviewed and approved this document for accuracy. 04/27/17    Alyse Mcclendon MD  [facility]

## 2017-05-12 ENCOUNTER — OFFICE VISIT (OUTPATIENT)
Dept: FAMILY MEDICINE | Facility: CLINIC | Age: 52
End: 2017-05-12
Payer: COMMERCIAL

## 2017-05-12 ENCOUNTER — TELEPHONE (OUTPATIENT)
Dept: FAMILY MEDICINE | Facility: CLINIC | Age: 52
End: 2017-05-12

## 2017-05-12 VITALS
HEART RATE: 60 BPM | RESPIRATION RATE: 15 BRPM | DIASTOLIC BLOOD PRESSURE: 70 MMHG | TEMPERATURE: 98.5 F | OXYGEN SATURATION: 98 % | SYSTOLIC BLOOD PRESSURE: 102 MMHG

## 2017-05-12 DIAGNOSIS — R30.0 DYSURIA: ICD-10-CM

## 2017-05-12 DIAGNOSIS — R10.84 ABDOMINAL PAIN, GENERALIZED: Primary | ICD-10-CM

## 2017-05-12 LAB
ALBUMIN UR-MCNC: NEGATIVE MG/DL
APPEARANCE UR: CLEAR
BILIRUB UR QL STRIP: NEGATIVE
COLOR UR AUTO: YELLOW
GLUCOSE UR STRIP-MCNC: NEGATIVE MG/DL
HGB UR QL STRIP: ABNORMAL
KETONES UR STRIP-MCNC: NEGATIVE MG/DL
LEUKOCYTE ESTERASE UR QL STRIP: NEGATIVE
NITRATE UR QL: NEGATIVE
NON-SQ EPI CELLS #/AREA URNS LPF: NORMAL /LPF
PH UR STRIP: 6.5 PH (ref 5–7)
RBC #/AREA URNS AUTO: NORMAL /HPF (ref 0–2)
SP GR UR STRIP: 1.01 (ref 1–1.03)
URN SPEC COLLECT METH UR: ABNORMAL
UROBILINOGEN UR STRIP-ACNC: 0.2 EU/DL (ref 0.2–1)
WBC #/AREA URNS AUTO: NORMAL /HPF (ref 0–2)

## 2017-05-12 PROCEDURE — 99213 OFFICE O/P EST LOW 20 MIN: CPT | Performed by: PHYSICIAN ASSISTANT

## 2017-05-12 PROCEDURE — 81001 URINALYSIS AUTO W/SCOPE: CPT | Performed by: PHYSICIAN ASSISTANT

## 2017-05-12 RX ORDER — CIPROFLOXACIN 500 MG/1
500 TABLET, FILM COATED ORAL 2 TIMES DAILY
Qty: 6 TABLET | Refills: 0 | Status: SHIPPED | OUTPATIENT
Start: 2017-05-12 | End: 2017-05-31

## 2017-05-12 NOTE — MR AVS SNAPSHOT
After Visit Summary   5/12/2017    Eunice Malloy    MRN: [id number]           Patient Information     Date Of Birth          1965        Visit Information        Provider Department      5/12/2017 3:40 PM Jessica Frost PA-C [facility]        Today's Diagnoses     Abdominal pain, generalized    -  1    Dysuria          Care Instructions    Presumptive treatment for UTI with possible traveler's diarrhea (cipro two times a day x 3 days) as well sent to pharmacy.    The 'BRAT' diet is suggested: Bread, rice, applesauce, toast, ie, bland carbohydrates.  Avoid high protein, high fat (ie, meat, cheese, dairy products) fried foods and spicy foods until you are able to tolerate carbohydrates.  Frequent, small amounts; don't overdo, go slowly! Progress diet as tolerated as symptoms nikki.   Drink plenty of fluids!!!  Over the counter cold remedies that have zinc and Vitamin C may help you feel better sooner.    Recommend trial of a daily probiotic agent; some common options include: Align, Culturelle, Digestive Advantage, Florastor, Activia yogurt, or Kefir.  Choose one agent (or a comparable generic OTC formulation) that is affordable/palatable and use it daily for at least 3-6 months to determine its baseline effect.  Encouraged 20 billion units per day for probiotic dosage (5-10 billion units for children).  May use over the counter peptobismal if needed, don't be alarmed if it turns your stool black.    Trial of zantac (ranitidine), Prilosec (omeprazole), Nexium (esomeprazole) or other acid reflex type medicines encouraged as well.    Call if bloody stools, persistent diarrhea, vomiting, fever or abdominal pain.     Consider cbc and other blood work and/or abdominal sonogram if no improvement with above over the weekend.        Follow-ups after your visit        Your next 10 appointments already scheduled     Jul 14, 2017  2:30 PM CDT   LAB with HW LAB   [facility]  "[facility] ([facility])    [address] [place] [id number] [phone]           Patient must bring picture ID.  Patient should be prepared to give a urine specimen  Please do not eat 10-12 hours before your appointment if you are coming in fasting for labs on lipids, cholesterol, or glucose (sugar).  Pregnant women should follow their Care Team instructions. Water with medications is okay. Do not drink coffee or other fluids.   If you have concerns about taking  your medications, please ask at office or if scheduling via Yuanfen~Flowâ„¢, send a message by clicking on Secure Messaging, Message Your Care Team.              Who to contact     If you have questions or need follow up information about today's clinic visit or your schedule please contact [facility] directly at [phone].  Normal or non-critical lab and imaging results will be communicated to you by SecretSaleshart, letter or phone within 4 business days after the clinic has received the results. If you do not hear from us within 7 days, please contact the clinic through [employer] or phone. If you have a critical or abnormal lab result, we will notify you by phone as soon as possible.  Submit refill requests through Yuanfen~Flowâ„¢ or call your pharmacy and they will forward the refill request to us. Please allow 3 business days for your refill to be completed.          Additional Information About Your Visit        Yuanfen~Flowâ„¢ Information     Yuanfen~Flowâ„¢ lets you send messages to your doctor, view your test results, renew your prescriptions, schedule appointments and more. To sign up, go to [email or web address] . Click on \"Log in\" on the left side of the screen, which will take you to the Welcome page. Then click on \"Sign up Now\" on the right side of the page.     You will be asked to enter the access code listed below, as well as some personal information. Please follow the directions to create your username and password.     Your " access code is: [email or web address]  Expires: 2017 12:00 PM     Your access code will  in 90 days. If you need help or a new code, please call your [place] clinic or [phone].        Care EveryWhere ID     This is your Care EveryWhere ID. This could be used by other organizations to access your [place] medical records  [id number]        Your Vitals Were     Pulse Temperature Respirations Last Period Pulse Oximetry       60 98.5  F (36.9  C) (Oral) 15 2008 98%        Blood Pressure from Last 3 Encounters:   17 102/70   17 114/68   17 102/66    Weight from Last 3 Encounters:   17 130 lb (59 kg)   17 131 lb (59.4 kg)   16 129 lb (58.5 kg)              We Performed the Following     UA reflex to Microscopic and Culture     Urine Microscopic          Today's Medication Changes          These changes are accurate as of: 17  4:16 PM.  If you have any questions, ask your nurse or doctor.               Start taking these medicines.        Dose/Directions    ciprofloxacin 500 MG tablet   Commonly known as:  CIPRO   Used for:  Dysuria, Abdominal pain, generalized   Started by:  Jessica Frost PA-C        Dose:  500 mg   Take 1 tablet (500 mg) by mouth 2 times daily   Quantity:  6 tablet   Refills:  0            Where to get your medicines      These medications were sent to [facility] [address]  [address], [facility] [address]     Phone:  [phone]     ciprofloxacin 500 MG tablet                Primary Care Provider Office Phone # Fax #    Alyse Mcclendon [phone]       [facility] [address]  [facility] [address]        Thank you!     Thank you for choosing [facility]  for your care. Our goal is always to provide you with excellent care. Hearing back from our patients is one way we can continue to improve our services. Please take a few minutes to complete the  written survey that you may receive in the mail after your visit with us. Thank you!             Your Updated Medication List - Protect others around you: Learn how to safely use, store and throw away your medicines at www.disposemymeds.org.          This list is accurate as of: 5/12/17  4:16 PM.  Always use your most recent med list.                   Brand Name Dispense Instructions for use    ciprofloxacin 500 MG tablet    CIPRO    6 tablet    Take 1 tablet (500 mg) by mouth 2 times daily       cyclobenzaprine 5 MG tablet    FLEXERIL    30 tablet    Take 1 tablet (5 mg) by mouth nightly as needed for muscle spasms       diphenhydrAMINE 25 MG tablet    BENADRYL    60 tablet    Take 1-2 tablets (25-50 mg) by mouth every 6 hours as needed for itching or allergies Do not take with Flexeril.       HYDROcodone-acetaminophen 5-325 MG per tablet    NORCO    10 tablet    Take 1 tablet by mouth every 6 hours as needed       ibuprofen 800 MG tablet    ADVIL/MOTRIN    60 tablet    Take 1 tablet (800 mg) by mouth every 8 hours as needed for moderate pain       levothyroxine 100 MCG tablet    SYNTHROID/LEVOTHROID    90 tablet    Take 1 tablet (100 mcg) by mouth daily       MULTIVITAL PO      Take 1 tablet by mouth       oseltamivir 75 MG capsule    TAMIFLU    10 capsule    Take 1 capsule (75 mg) by mouth 2 times daily

## 2017-05-12 NOTE — TELEPHONE ENCOUNTER
Patient reports symptoms started on Tuesday     Stomach pain     Decreased appetite    Has to urinate every 10 to 15 minutes    Fevers early part of the week    Some diarrhea and some formed stools    Denies nausea and vomiting    Feels weak and tired    Offered appointment for this afternoon with Dr Payan but patient not sure she can make it to the clinic (ride concerns)    Gave patient the [facility] ours for this evening and the weekend    If symptoms worsen may need to go to the ER    Patient agreed with plan.  Sapna Posada RN

## 2017-05-12 NOTE — PROGRESS NOTES
SUBJECTIVE:                                                    Eunice Malloy is a 51 year old female who presents to clinic today for the following health issues:      Abdominal Pain      Duration: 1 week    Description (location/character/radiation): low abdominal       Associated flank pain: both low back    Intensity:  moderate    Accompanying signs and symptoms:        Fever/Chills: YES-both       Gas/Bloating: YES       Nausea/vomitting: YES- nausea       Diarrhea: YES- and hard stool       Dysuria or Hematuria: no-frequency      History (previous similar pain/trauma/previous testing): none    Precipitating or alleviating factors:       Pain worse with eating/BM/urination: urinating       Pain relieved by BM: no     Therapies tried and outcome: griffin, ibuprofen 800, water-does not help    LMP:  not applicable    Not sexually active, no std/hiv/pregnancy concerns.           Problem list and histories reviewed & adjusted, as indicated.  Additional history: as documented    Patient Active Problem List   Diagnosis     CARDIOVASCULAR SCREENING; LDL GOAL LESS THAN 160     Hypothyroidism     Cold sore     Anxiety     Shoulder pain     Labral tear of shoulder     Microscopic hematuria     Low back pain     Loss of hair     SI (sacroiliac) joint dysfunction and pain - BILATERAL     Abnormal finding on MRI of brain     Past Surgical History:   Procedure Laterality Date     HC EXCIS PRIMARY GANGLION WRIST       TONSILLECTOMY  as child       Social History   Substance Use Topics     Smoking status: Current Some Day Smoker     Years: 15.00     Types: Cigarettes     Smokeless tobacco: Never Used      Comment: 1 pack per week     Alcohol use 0.0 oz/week     0 Standard drinks or equivalent per week      Comment: rare     History reviewed. No pertinent family history.      Current Outpatient Prescriptions   Medication Sig Dispense Refill     ciprofloxacin (CIPRO) 500 MG tablet Take 1 tablet (500 mg) by mouth 2 times daily 6  tablet 0     levothyroxine (SYNTHROID/LEVOTHROID) 100 MCG tablet Take 1 tablet (100 mcg) by mouth daily 90 tablet 1     HYDROcodone-acetaminophen (NORCO) 5-325 MG per tablet Take 1 tablet by mouth every 6 hours as needed 10 tablet 0     ibuprofen (ADVIL/MOTRIN) 800 MG tablet Take 1 tablet (800 mg) by mouth every 8 hours as needed for moderate pain 60 tablet 0     oseltamivir (TAMIFLU) 75 MG capsule Take 1 capsule (75 mg) by mouth 2 times daily 10 capsule 0     cyclobenzaprine (FLEXERIL) 5 MG tablet Take 1 tablet (5 mg) by mouth nightly as needed for muscle spasms 30 tablet 3     diphenhydrAMINE (BENADRYL) 25 MG tablet Take 1-2 tablets (25-50 mg) by mouth every 6 hours as needed for itching or allergies Do not take with Flexeril. 60 tablet 0     Multiple Vitamins-Minerals (MULTIVITAL PO) Take 1 tablet by mouth       Allergies   Allergen Reactions     Sulfa Drugs Rash       Reviewed and updated as needed this visit by clinical staff  Tobacco  Allergies  Med Hx  Surg Hx  Fam Hx  Soc Hx      Reviewed and updated as needed this visit by Provider         ROS:  Constitutional, HEENT, cardiovascular, pulmonary, gi and gu systems are negative, except as otherwise noted.    OBJECTIVE:                                                    /70 (BP Location: Left arm, Patient Position: Chair, Cuff Size: Adult Regular)  Pulse 60  Temp 98.5  F (36.9  C) (Oral)  Resp 15  LMP 05/14/2008  SpO2 98%  There is no height or weight on file to calculate BMI.  GENERAL: healthy, alert and no distress  RESP: lungs clear to auscultation - no rales, rhonchi or wheezes  CV: regular rate and rhythm, normal S1 S2, no S3 or S4, no murmur, click or rub, no peripheral edema and peripheral pulses strong  ABDOMEN: soft, slight tenderness to deep palpation in suprapubic area, no hepatosplenomegaly, no masses and bowel sounds normal  MS: no gross musculoskeletal defects noted, no edema  PSYCH: mentation appears normal, affect  normal/bright    Diagnostic Test Results:  Results for orders placed or performed in visit on 05/12/17   UA reflex to Microscopic and Culture   Result Value Ref Range    Color Urine Yellow     Appearance Urine Clear     Glucose Urine Negative NEG mg/dL    Bilirubin Urine Negative NEG    Ketones Urine Negative NEG mg/dL    Specific Gravity Urine 1.010 1.003 - 1.035    Blood Urine Trace (A) NEG    pH Urine 6.5 5.0 - 7.0 pH    Protein Albumin Urine Negative NEG mg/dL    Urobilinogen Urine 0.2 0.2 - 1.0 EU/dL    Nitrite Urine Negative NEG    Leukocyte Esterase Urine Negative NEG    Source Midstream Urine    Urine Microscopic   Result Value Ref Range    WBC Urine O - 2 0 - 2 /HPF    RBC Urine O - 2 0 - 2 /HPF    Squamous Epithelial /LPF Urine Few FEW /LPF         ASSESSMENT/PLAN:                                                        ICD-10-CM    1. Abdominal pain, generalized R10.84 UA reflex to Microscopic and Culture     Urine Microscopic     ciprofloxacin (CIPRO) 500 MG tablet   2. Dysuria R30.0 ciprofloxacin (CIPRO) 500 MG tablet       Patient Instructions   Presumptive treatment for UTI with possible traveler's diarrhea (cipro two times a day x 3 days) as well sent to pharmacy.    The 'BRAT' diet is suggested: Bread, rice, applesauce, toast, ie, bland carbohydrates.  Avoid high protein, high fat (ie, meat, cheese, dairy products) fried foods and spicy foods until you are able to tolerate carbohydrates.  Frequent, small amounts; don't overdo, go slowly! Progress diet as tolerated as symptoms nikki.   Drink plenty of fluids!!!  Over the counter cold remedies that have zinc and Vitamin C may help you feel better sooner.    Recommend trial of a daily probiotic agent; some common options include: Align, Culturelle, Digestive Advantage, Florastor, Activia yogurt, or Kefir.  Choose one agent (or a comparable generic OTC formulation) that is affordable/palatable and use it daily for at least 3-6 months to determine its  baseline effect.  Encouraged 20 billion units per day for probiotic dosage (5-10 billion units for children).  May use over the counter peptobismal if needed, don't be alarmed if it turns your stool black.    Trial of zantac (ranitidine), Prilosec (omeprazole), Nexium (esomeprazole) or other acid reflex type medicines encouraged as well.    Call if bloody stools, persistent diarrhea, vomiting, fever or abdominal pain.     Consider cbc and other blood work and/or abdominal sonogram if no improvement with above over the weekend.      Jessica Frost PA-C  [facility]

## 2017-05-12 NOTE — NURSING NOTE
"Chief Complaint   Patient presents with     Abdominal Pain       Initial /70 (BP Location: Left arm, Patient Position: Chair, Cuff Size: Adult Regular)  Pulse 60  Temp 98.5  F (36.9  C) (Oral)  Resp 15  LMP 05/14/2008  SpO2 98% Estimated body mass index is 23.03 kg/(m^2) as calculated from the following:    Height as of 3/7/17: 5' 3\" (1.6 m).    Weight as of 4/27/17: 130 lb (59 kg).  Medication Reconciliation: complete     Pt declined wgt    Noelle Gamez CMA  "

## 2017-05-12 NOTE — PATIENT INSTRUCTIONS
Presumptive treatment for UTI with possible traveler's diarrhea (cipro two times a day x 3 days) as well sent to pharmacy.    The 'BRAT' diet is suggested: Bread, rice, applesauce, toast, ie, bland carbohydrates.  Avoid high protein, high fat (ie, meat, cheese, dairy products) fried foods and spicy foods until you are able to tolerate carbohydrates.  Frequent, small amounts; don't overdo, go slowly! Progress diet as tolerated as symptoms nikki.   Drink plenty of fluids!!!  Over the counter cold remedies that have zinc and Vitamin C may help you feel better sooner.    Recommend trial of a daily probiotic agent; some common options include: Align, Culturelle, Digestive Advantage, Florastor, Activia yogurt, or Kefir.  Choose one agent (or a comparable generic OTC formulation) that is affordable/palatable and use it daily for at least 3-6 months to determine its baseline effect.  Encouraged 20 billion units per day for probiotic dosage (5-10 billion units for children).  May use over the counter peptobismal if needed, don't be alarmed if it turns your stool black.    Trial of zantac (ranitidine), Prilosec (omeprazole), Nexium (esomeprazole) or other acid reflex type medicines encouraged as well.    Call if bloody stools, persistent diarrhea, vomiting, fever or abdominal pain.     Consider cbc and other blood work and/or abdominal sonogram if no improvement with above over the weekend.

## 2017-05-12 NOTE — TELEPHONE ENCOUNTER
Patient reports below symptoms. Patient called back for appt. Appointment placed with Conor at 3:45pm.    Thanks! Elysia Calderón RN

## 2017-05-23 DIAGNOSIS — G89.29 CHRONIC BILATERAL LOW BACK PAIN WITHOUT SCIATICA: ICD-10-CM

## 2017-05-23 DIAGNOSIS — M54.50 CHRONIC BILATERAL LOW BACK PAIN WITHOUT SCIATICA: ICD-10-CM

## 2017-05-24 RX ORDER — IBUPROFEN 800 MG/1
800 TABLET, FILM COATED ORAL EVERY 8 HOURS PRN
Qty: 60 TABLET | Refills: 1 | Status: SHIPPED | OUTPATIENT
Start: 2017-05-24 | End: 2017-09-05

## 2017-05-24 NOTE — TELEPHONE ENCOUNTER
Medication Detail      Disp Refills Start End LATRICE   ibuprofen (ADVIL/MOTRIN) 800 MG tablet 60 tablet 0 3/9/2017  No   Sig: Take 1 tablet (800 mg) by mouth every 8 hours as needed for moderate pain       Last Office Visit with [facility], P or [facility] prescribing provider: 5/12/17       Creatinine   Date Value Ref Range Status   09/28/2016 0.70 0.52 - 1.04 mg/dL Final     Lab Results   Component Value Date    AST 17 03/10/2017     Lab Results   Component Value Date    ALT 28 03/10/2017     BP Readings from Last 3 Encounters:   05/12/17 102/70   04/27/17 114/68   03/07/17 102/66

## 2017-05-31 ENCOUNTER — OFFICE VISIT (OUTPATIENT)
Dept: FAMILY MEDICINE | Facility: CLINIC | Age: 52
End: 2017-05-31
Payer: COMMERCIAL

## 2017-05-31 VITALS
BODY MASS INDEX: 22.85 KG/M2 | RESPIRATION RATE: 16 BRPM | OXYGEN SATURATION: 97 % | DIASTOLIC BLOOD PRESSURE: 64 MMHG | WEIGHT: 129 LBS | HEART RATE: 64 BPM | SYSTOLIC BLOOD PRESSURE: 110 MMHG | TEMPERATURE: 99.2 F

## 2017-05-31 DIAGNOSIS — R90.89 ABNORMAL FINDING ON MRI OF BRAIN: ICD-10-CM

## 2017-05-31 DIAGNOSIS — M25.511 ACUTE PAIN OF RIGHT SHOULDER: Primary | ICD-10-CM

## 2017-05-31 PROCEDURE — 99214 OFFICE O/P EST MOD 30 MIN: CPT | Performed by: NURSE PRACTITIONER

## 2017-05-31 PROCEDURE — 93000 ELECTROCARDIOGRAM COMPLETE: CPT | Performed by: NURSE PRACTITIONER

## 2017-05-31 NOTE — MR AVS SNAPSHOT
After Visit Summary   5/31/2017    Eunice Malloy    MRN: [id number]           Patient Information     Date Of Birth          1965        Visit Information        Provider Department      5/31/2017 3:20 PM Vida Ortiz APRN CNP [facility]        Today's Diagnoses     Acute pain of right shoulder    -  1    Abnormal finding on MRI of brain          Care Instructions    1. EKG today reassuringly normal.  Unsure what caused shoulder pain.  Continue ibuprofen, rest, and ice.  If not improving in 1 week recommend physical therapy, call me for referral.  2.  Call [phone] to schedule follow up MRI of brain  3.  Try 1% hydrocortisone cream twice a day for up to 2 weeks for rash          Follow-ups after your visit        Your next 10 appointments already scheduled     Jul 14, 2017  2:30 PM CDT   LAB with  LAB   [facility] ([facility])    [address] [place] [id number] [phone]           Patient must bring picture ID.  Patient should be prepared to give a urine specimen  Please do not eat 10-12 hours before your appointment if you are coming in fasting for labs on lipids, cholesterol, or glucose (sugar).  Pregnant women should follow their Care Team instructions. Water with medications is okay. Do not drink coffee or other fluids.   If you have concerns about taking  your medications, please ask at office or if scheduling via [employer], send a message by clicking on Secure Messaging, Message Your Care Team.              Future tests that were ordered for you today     Open Future Orders        Priority Expected Expires Ordered    MR Brain w/o & w Contrast Routine  5/31/2018 5/31/2017            Who to contact     If you have questions or need follow up information about today's clinic visit or your schedule please contact [facility] directly at [phone].  Normal or non-critical lab and imaging results  "will be communicated to you by MyChart, letter or phone within 4 business days after the clinic has received the results. If you do not hear from us within 7 days, please contact the clinic through Valopaa or phone. If you have a critical or abnormal lab result, we will notify you by phone as soon as possible.  Submit refill requests through Valopaa or call your pharmacy and they will forward the refill request to us. Please allow 3 business days for your refill to be completed.          Additional Information About Your Visit        Valopaa Information     Valopaa lets you send messages to your doctor, view your test results, renew your prescriptions, schedule appointments and more. To sign up, go to [email or web address] . Click on \"Log in\" on the left side of the screen, which will take you to the Welcome page. Then click on \"Sign up Now\" on the right side of the page.     You will be asked to enter the access code listed below, as well as some personal information. Please follow the directions to create your username and password.     Your access code is: [email or web address]  Expires: 2017 12:00 PM     Your access code will  in 90 days. If you need help or a new code, please call your [place] clinic or [phone].        Care EveryWhere ID     This is your Care EveryWhere ID. This could be used by other organizations to access your [place] medical records  [id number]        Your Vitals Were     Pulse Temperature Respirations Last Period Pulse Oximetry BMI (Body Mass Index)    64 99.2  F (37.3  C) (Oral) 16 2008 97% 22.85 kg/m2       Blood Pressure from Last 3 Encounters:   17 110/64   17 102/70   17 114/68    Weight from Last 3 Encounters:   17 129 lb (58.5 kg)   17 130 lb (59 kg)   17 131 lb (59.4 kg)              We Performed the Following     EKG 12-lead complete w/read - Clinics        Primary Care Provider Office Phone # Fax #    Alyse Mcclendon MD " [phone]       [facility] [address]  [facility] [address]        Thank you!     Thank you for choosing [facility]  for your care. Our goal is always to provide you with excellent care. Hearing back from our patients is one way we can continue to improve our services. Please take a few minutes to complete the written survey that you may receive in the mail after your visit with us. Thank you!             Your Updated Medication List - Protect others around you: Learn how to safely use, store and throw away your medicines at www.disposemymeds.org.          This list is accurate as of: 5/31/17  3:36 PM.  Always use your most recent med list.                   Brand Name Dispense Instructions for use    diphenhydrAMINE 25 MG tablet    BENADRYL    60 tablet    Take 1-2 tablets (25-50 mg) by mouth every 6 hours as needed for itching or allergies Do not take with Flexeril.       HYDROcodone-acetaminophen 5-325 MG per tablet    NORCO    10 tablet    Take 1 tablet by mouth every 6 hours as needed       ibuprofen 800 MG tablet    ADVIL/MOTRIN    60 tablet    Take 1 tablet (800 mg) by mouth every 8 hours as needed for moderate pain       levothyroxine 100 MCG tablet    SYNTHROID/LEVOTHROID    90 tablet    Take 1 tablet (100 mcg) by mouth daily       MULTIVITAL PO      Take 1 tablet by mouth

## 2017-05-31 NOTE — PROGRESS NOTES
SUBJECTIVE:                                                    Eunice Malloy is a 51 year old female who presents to clinic today for the following health issues:      Musculoskeletal problem/pain      Duration: yesterday    Description  Location: right shoulder into posterior neck, right temple    Intensity:  moderate    Accompanying signs and symptoms: lack of appetite, tinnitus, itchy rash on chest    History  Previous similar problem: no   Previous evaluation:  none    Precipitating or alleviating factors:  Trauma or overuse: no   Aggravating factors include: none    Therapies tried and outcome: massage and NSAID - ibuprofen, peppermint oil, vicks       Right shoulder pain started out of nowhere last night.  No overuse or acute injury.  Tried peppermint oil and vicks vapor rub.  woke up in the morning and left ear was ringing.  Right shoulder pain radiates to right temporal head.  Ringing was so loud she couldn't sleep, was mild this morning and on and off throughout the day.    Denies dizziness or hearing change.      Right shoulder pain is constant, feels really sore.  Posterior shoulder.  No chest pain, shortness of breath, dizziness.  No hx prior injury.  2 nights ago had numbness/tingling to right arm, thought just sleeping on it funny, no paresthesia since then.  No weakness of right arm.  Mild neck pain.  No history of chronic neck pain.  Did a lot of gardening last weekend.  No headache. Did feel fatigued yesterday and today.  Woke up in a sweat this morning.  No other episodes of diaphoresis.      No prior chronic tinnitus.  No family hx CAD.      Rash to chest, noticed it a few weeks ago.  It is sometimes itchy.  No recent sunburn.        Pt evaluated for episodes of syncope, dizziness, and tinnitus 9/2016.  Head imaging revealed enhancing foci are seen in the skull, one in the left occipital bone measuring 9 mm and another in the left parietal bone measuring 10 mm.  Per radiologist could be  hemangioma but cant r/o metastatic disease. Recommended repeating MRI in 6 months to reassess.    Reports has not had reoccurance of these symptms.    Patient Active Problem List   Diagnosis     CARDIOVASCULAR SCREENING; LDL GOAL LESS THAN 160     Hypothyroidism     Cold sore     Anxiety     Shoulder pain     Labral tear of shoulder     Microscopic hematuria     Low back pain     Loss of hair     SI (sacroiliac) joint dysfunction and pain - BILATERAL     Abnormal finding on MRI of brain     Past Surgical History:   Procedure Laterality Date     HC EXCIS PRIMARY GANGLION WRIST       TONSILLECTOMY  as child       Social History   Substance Use Topics     Smoking status: Current Some Day Smoker     Years: 15.00     Types: Cigarettes     Smokeless tobacco: Never Used      Comment: 1 pack per week     Alcohol use 0.0 oz/week     0 Standard drinks or equivalent per week      Comment: rare     History reviewed. No pertinent family history.      Current Outpatient Prescriptions   Medication Sig Dispense Refill     ibuprofen (ADVIL/MOTRIN) 800 MG tablet Take 1 tablet (800 mg) by mouth every 8 hours as needed for moderate pain 60 tablet 1     levothyroxine (SYNTHROID/LEVOTHROID) 100 MCG tablet Take 1 tablet (100 mcg) by mouth daily 90 tablet 1     HYDROcodone-acetaminophen (NORCO) 5-325 MG per tablet Take 1 tablet by mouth every 6 hours as needed 10 tablet 0     Multiple Vitamins-Minerals (MULTIVITAL PO) Take 1 tablet by mouth       diphenhydrAMINE (BENADRYL) 25 MG tablet Take 1-2 tablets (25-50 mg) by mouth every 6 hours as needed for itching or allergies Do not take with Flexeril. (Patient not taking: Reported on 5/31/2017) 60 tablet 0       ROS:  Const, CV, Resp, MSK, neuro, Integ as above, otherwise negative       OBJECTIVE:                                                    /64 (BP Location: Left arm, Patient Position: Chair, Cuff Size: Adult Regular)  Pulse 64  Temp 99.2  F (37.3  C) (Oral)  Resp 16  Wt 129 lb  (58.5 kg)  LMP 05/14/2008  SpO2 97%  BMI 22.85 kg/m2   GENERAL APPEARANCE: healthy, alert and no distress  EYES: Eyes grossly normal to inspection and conjunctivae and sclerae normal  RESP: lungs clear to auscultation - no rales, rhonchi or wheezes  CV: regular rates and rhythm, normal S1 S2, no S3 or S4 and no murmur, click or rub  ORTHO: Cervical Spine Exam: Inspection: normal cervical lordosis  Tender:  none  Non-tender:  spinous processes, left paracervical muscles, right paracervical muscles  Range of Motion:  Full ROM of cervical spine    Inspection:  Shoulders appear symmetrical, no asymmetry, discoloration, swelling, bruising, or obvious deformity.    Palpation:  No pain with palpation of the SC joint, clavicle, AC joint, or proximal bicep tendon.  Pain with palpation superior scapula  Active ROM:  Full active abduction, external, and internal rotation bilaterally.    Strength:  Right push off: 5/5, Right external rotation: 5/5, R internal rotation: 5/5  Left push off: 5/5, Left external rotation: 5/5, L internal rotation, 5/5  Neer: negative bilaterally.  Elias: negative bilaterally.  Empty Can's test: 5/5 bilaterally without pain.    PSYCH: mentation appears normal and affect normal/bright     EKG today shows sinus bradycardia.  No signs of ischemia.     ASSESSMENT/PLAN:                                                    (M25.511) Acute pain of right shoulder  (primary encounter diagnosis)  Comment: unclear cause, no hx overuse or injury.  Considered anginal equivalent but reassuring normal EKG and pt without significant risk factors  Plan: EKG 12-lead complete w/read - Clinics        Recommend NSAIDs, ice, rest, if not improving by Friday she will call for physical therapy referral    (R90.89) Abnormal finding on MRI of brain  Comment: Head imaging 9/2016 revealed enhancing foci are seen in the skull, one in the left occipital bone measuring 9 mm and another in the left parietal bone measuring 10 mm.   Per radiologist could be hemangioma but cant r/o metastatic disease  Plan: MR Brain w/o & w Contrast        Schedule follow up MRI        See Patient Instructions    Vida Ortiz, CNP  [facility]    Patient Instructions   1. EKG today reassuringly normal.  Unsure what caused shoulder pain.  Continue ibuprofen, rest, and ice.  If not improving in 1 week recommend physical therapy, call me for referral.  2.  Call [phone] to schedule follow up MRI of brain  3.  Try 1% hydrocortisone cream twice a day for up to 2 weeks for rash

## 2017-05-31 NOTE — PATIENT INSTRUCTIONS
1. EKG today reassuringly normal.  Unsure what caused shoulder pain.  Continue ibuprofen, rest, and ice.  If not improving in 1 week recommend physical therapy, call me for referral.  2.  Call [phone] to schedule follow up MRI of brain  3.  Try 1% hydrocortisone cream twice a day for up to 2 weeks for rash

## 2017-05-31 NOTE — NURSING NOTE
"Chief Complaint   Patient presents with     Tinnitus     Musculoskeletal Problem       Initial /64 (BP Location: Left arm, Patient Position: Chair, Cuff Size: Adult Regular)  Pulse 64  Temp 99.2  F (37.3  C) (Oral)  Resp 16  Wt 129 lb (58.5 kg)  LMP 05/14/2008  SpO2 97%  BMI 22.85 kg/m2 Estimated body mass index is 22.85 kg/(m^2) as calculated from the following:    Height as of 3/7/17: 5' 3\" (1.6 m).    Weight as of this encounter: 129 lb (58.5 kg).  Medication Reconciliation: complete     Samia Scott, CMA    "

## 2017-06-06 ENCOUNTER — TELEPHONE (OUTPATIENT)
Dept: FAMILY MEDICINE | Facility: CLINIC | Age: 52
End: 2017-06-06

## 2017-06-06 NOTE — LETTER
[facility]   [address] [place]   [address]  [phone]    June 6, 2017      Re: Eunice Malloy  [address]  [address] [place] [id number]              To whom it may concern,    Eunice Malloy is a patient in my practice.  Please supply a foot stool for patient to help with ergonomics while standing and to help prevent muscle spasms in her back.            Sincerely,    Alyse Mcclendon MD/tb

## 2017-06-06 NOTE — TELEPHONE ENCOUNTER
Reason for Call:  LETTER OF MEDICAL NECESSITY    Detailed comments:  PT HAS MUSCLE MASSES IN LOWER BACK. PT'S EMPLOYER WILL PAY FOR FOOT STOOL FOR PT TO BE ERGONOMICALLY CORRECT AT HER WORK STATION IF DR. SERNA WOULD WRITE A LETTER OF MEDICAL NECESSITY.  PLEASE FAX THE LETTER OF MEDICAL NECESSITY TO [facility] ATTN HERB MENON [phone] FAX.    Phone Number Patient can be reached at: Home number on file [phone] (home)    Best Time: ANY TIME    Can we leave a detailed message on this number? YES    Call taken on 6/6/2017 at 3:38 PM by Lidia Ochoa

## 2017-06-22 ENCOUNTER — OFFICE VISIT (OUTPATIENT)
Dept: URGENT CARE | Facility: URGENT CARE | Age: 52
End: 2017-06-22
Payer: COMMERCIAL

## 2017-06-22 ENCOUNTER — TELEPHONE (OUTPATIENT)
Dept: FAMILY MEDICINE | Facility: CLINIC | Age: 52
End: 2017-06-22

## 2017-06-22 VITALS
HEART RATE: 58 BPM | BODY MASS INDEX: 22.98 KG/M2 | OXYGEN SATURATION: 99 % | SYSTOLIC BLOOD PRESSURE: 120 MMHG | DIASTOLIC BLOOD PRESSURE: 76 MMHG | TEMPERATURE: 98.4 F | WEIGHT: 129.7 LBS

## 2017-06-22 DIAGNOSIS — M54.50 BILATERAL LOW BACK PAIN WITHOUT SCIATICA, UNSPECIFIED CHRONICITY: ICD-10-CM

## 2017-06-22 DIAGNOSIS — R52 BODY ACHES: Primary | ICD-10-CM

## 2017-06-22 DIAGNOSIS — R68.83 CHILLS (WITHOUT FEVER): ICD-10-CM

## 2017-06-22 LAB
ALBUMIN SERPL-MCNC: 3.9 G/DL (ref 3.4–5)
ALBUMIN UR-MCNC: NEGATIVE MG/DL
ALP SERPL-CCNC: 154 U/L (ref 40–150)
ALT SERPL W P-5'-P-CCNC: 37 U/L (ref 0–50)
ANION GAP SERPL CALCULATED.3IONS-SCNC: 8 MMOL/L (ref 3–14)
APPEARANCE UR: CLEAR
AST SERPL W P-5'-P-CCNC: 23 U/L (ref 0–45)
BACTERIA #/AREA URNS HPF: ABNORMAL /HPF
BASOPHILS # BLD AUTO: 0 10E9/L (ref 0–0.2)
BASOPHILS NFR BLD AUTO: 0.3 %
BILIRUB SERPL-MCNC: 0.6 MG/DL (ref 0.2–1.3)
BILIRUB UR QL STRIP: NEGATIVE
BUN SERPL-MCNC: 15 MG/DL (ref 7–30)
CALCIUM SERPL-MCNC: 9.5 MG/DL (ref 8.5–10.1)
CHLORIDE SERPL-SCNC: 104 MMOL/L (ref 94–109)
CK SERPL-CCNC: 68 U/L (ref 30–225)
CO2 SERPL-SCNC: 28 MMOL/L (ref 20–32)
COLOR UR AUTO: YELLOW
CREAT SERPL-MCNC: 0.72 MG/DL (ref 0.52–1.04)
DIFFERENTIAL METHOD BLD: NORMAL
EOSINOPHIL # BLD AUTO: 0.2 10E9/L (ref 0–0.7)
EOSINOPHIL NFR BLD AUTO: 1.9 %
ERYTHROCYTE [DISTWIDTH] IN BLOOD BY AUTOMATED COUNT: 13.8 % (ref 10–15)
ERYTHROCYTE [SEDIMENTATION RATE] IN BLOOD BY WESTERGREN METHOD: 16 MM/H (ref 0–30)
GFR SERPL CREATININE-BSD FRML MDRD: 85 ML/MIN/1.7M2
GLUCOSE SERPL-MCNC: 102 MG/DL (ref 70–99)
GLUCOSE UR STRIP-MCNC: NEGATIVE MG/DL
HCT VFR BLD AUTO: 43 % (ref 35–47)
HGB BLD-MCNC: 13.7 G/DL (ref 11.7–15.7)
HGB UR QL STRIP: ABNORMAL
KETONES UR STRIP-MCNC: NEGATIVE MG/DL
LEUKOCYTE ESTERASE UR QL STRIP: NEGATIVE
LYMPHOCYTES # BLD AUTO: 2.4 10E9/L (ref 0.8–5.3)
LYMPHOCYTES NFR BLD AUTO: 30.2 %
MCH RBC QN AUTO: 30.3 PG (ref 26.5–33)
MCHC RBC AUTO-ENTMCNC: 31.9 G/DL (ref 31.5–36.5)
MCV RBC AUTO: 95 FL (ref 78–100)
MONOCYTES # BLD AUTO: 0.7 10E9/L (ref 0–1.3)
MONOCYTES NFR BLD AUTO: 8.8 %
NEUTROPHILS # BLD AUTO: 4.6 10E9/L (ref 1.6–8.3)
NEUTROPHILS NFR BLD AUTO: 58.8 %
NITRATE UR QL: NEGATIVE
NON-SQ EPI CELLS #/AREA URNS LPF: ABNORMAL /LPF
PH UR STRIP: 6.5 PH (ref 5–7)
PLATELET # BLD AUTO: 298 10E9/L (ref 150–450)
POTASSIUM SERPL-SCNC: 4.2 MMOL/L (ref 3.4–5.3)
PROT SERPL-MCNC: 8.1 G/DL (ref 6.8–8.8)
RBC # BLD AUTO: 4.52 10E12/L (ref 3.8–5.2)
RBC #/AREA URNS AUTO: ABNORMAL /HPF (ref 0–2)
SODIUM SERPL-SCNC: 140 MMOL/L (ref 133–144)
SP GR UR STRIP: <=1.005 (ref 1–1.03)
URN SPEC COLLECT METH UR: ABNORMAL
UROBILINOGEN UR STRIP-ACNC: 0.2 EU/DL (ref 0.2–1)
WBC # BLD AUTO: 7.9 10E9/L (ref 4–11)
WBC #/AREA URNS AUTO: ABNORMAL /HPF (ref 0–2)

## 2017-06-22 PROCEDURE — 36415 COLL VENOUS BLD VENIPUNCTURE: CPT | Performed by: PHYSICIAN ASSISTANT

## 2017-06-22 PROCEDURE — 99214 OFFICE O/P EST MOD 30 MIN: CPT | Performed by: PHYSICIAN ASSISTANT

## 2017-06-22 PROCEDURE — 82550 ASSAY OF CK (CPK): CPT | Performed by: PHYSICIAN ASSISTANT

## 2017-06-22 PROCEDURE — 87086 URINE CULTURE/COLONY COUNT: CPT | Performed by: PHYSICIAN ASSISTANT

## 2017-06-22 PROCEDURE — 81001 URINALYSIS AUTO W/SCOPE: CPT | Performed by: PHYSICIAN ASSISTANT

## 2017-06-22 PROCEDURE — 85652 RBC SED RATE AUTOMATED: CPT | Performed by: PHYSICIAN ASSISTANT

## 2017-06-22 PROCEDURE — 80053 COMPREHEN METABOLIC PANEL: CPT | Performed by: PHYSICIAN ASSISTANT

## 2017-06-22 PROCEDURE — 85025 COMPLETE CBC W/AUTO DIFF WBC: CPT | Performed by: PHYSICIAN ASSISTANT

## 2017-06-22 RX ORDER — TRAMADOL HYDROCHLORIDE 50 MG/1
50-100 TABLET ORAL EVERY 6 HOURS PRN
Qty: 20 TABLET | Refills: 0 | Status: SHIPPED | OUTPATIENT
Start: 2017-06-22 | End: 2018-02-26

## 2017-06-22 NOTE — TELEPHONE ENCOUNTER
"Called patient who stated:  1. Body aches, fatigue, decreased appetite and \"slight\" fevers for 2 days  2. Temperature around 100 degrees F  3. Did not go to work yesterday nor today  4. Denied: cough, nausea, vomiting, diarrhea  5. Might have constipation from Ibuprofen  6. No new medications    Writer recommended office visit for evaluation.  Offered appt in clinic tomorrow as no openings today.    Patient stated she would like to be seen today.  Writer offered to transfer patient to [facility] to assist with scheduling at another [facility] or Urgent Care would be an option.  Reviewed [place] and [place] locations open now or [place] location open at 5:00 PM today.    Patient verbalized understanding and stated she would go to Urgent Care today.  Patient asked writer what these symptoms could be caused by and writer informed patient writer is not able to diagnose based on phone conversation, needs provider assessment.    LIZETH VincentN, RN    "

## 2017-06-22 NOTE — MR AVS SNAPSHOT
After Visit Summary   6/22/2017    Eunice Malloy    MRN: [id number]           Patient Information     Date Of Birth          1965        Visit Information        Provider Department      6/22/2017 2:10 PM Tushar Tabares, GRAEME [facility]        Today's Diagnoses     Body aches    -  1    Bilateral low back pain without sciatica, unspecified chronicity        Chills (without fever)           Follow-ups after your visit        Your next 10 appointments already scheduled     Jul 14, 2017  2:30 PM CDT   [facility]    [address] [place] [id number] [phone]           Patient must bring picture ID.  Patient should be prepared to give a urine specimen  Please do not eat 10-12 hours before your appointment if you are coming in fasting for labs on lipids, cholesterol, or glucose (sugar).  Pregnant women should follow their Care Team instructions. Water with medications is okay. Do not drink coffee or other fluids.   If you have concerns about taking  your medications, please ask at office or if scheduling via [employer], send a message by clicking on Secure Messaging, Message Your Care Team.              Who to contact     If you have questions or need follow up information about today's clinic visit or your schedule please contact [facility] directly at [phone].  Normal or non-critical lab and imaging results will be communicated to you by MyChart, letter or phone within 4 business days after the clinic has received the results. If you do not hear from us within 7 days, please contact the clinic through [employer] or phone. If you have a critical or abnormal lab result, we will notify you by phone as soon as possible.  Submit refill requests through [employer] or call your pharmacy and they will forward the refill request to us. Please allow 3 business days for your  "refill to be completed.          Additional Information About Your Visit        [employer] Information     [employer] lets you send messages to your doctor, view your test results, renew your prescriptions, schedule appointments and more. To sign up, go to [email or web address] . Click on \"Log in\" on the left side of the screen, which will take you to the Welcome page. Then click on \"Sign up Now\" on the right side of the page.     You will be asked to enter the access code listed below, as well as some personal information. Please follow the directions to create your username and password.     Your access code is: [email or web address]  Expires: 2017  3:51 PM     Your access code will  in 90 days. If you need help or a new code, please call your [place] clinic or [phone].        Care EveryWhere ID     This is your Care EveryWhere ID. This could be used by other organizations to access your [place] medical records  [id number]        Your Vitals Were     Pulse Temperature Last Period Pulse Oximetry BMI (Body Mass Index)       58 98.4  F (36.9  C) (Oral) 2008 99% 22.98 kg/m2        Blood Pressure from Last 3 Encounters:   17 120/76   17 110/64   17 102/70    Weight from Last 3 Encounters:   17 129 lb 11.2 oz (58.8 kg)   17 129 lb (58.5 kg)   17 130 lb (59 kg)              We Performed the Following     CBC with platelets differential     CK total     Comprehensive metabolic panel     Erythrocyte sedimentation rate auto     UA with Microscopic reflex to Culture     Urine Culture Aerobic Bacterial          Today's Medication Changes          These changes are accurate as of: 17 11:59 PM.  If you have any questions, ask your nurse or doctor.               Start taking these medicines.        Dose/Directions    traMADol 50 MG tablet   Commonly known as:  ULTRAM   Used for:  Body aches, Bilateral low back pain without sciatica, unspecified chronicity   Started by:  " Tushar Tabares, GRAEME        Dose:   mg   Take 1-2 tablets ( mg) by mouth every 6 hours as needed for pain   Quantity:  20 tablet   Refills:  0            Where to get your medicines      Some of these will need a paper prescription and others can be bought over the counter.  Ask your nurse if you have questions.     Bring a paper prescription for each of these medications     traMADol 50 MG tablet                Primary Care Provider Office Phone # Fax #    Alyse Mcclendon [phone]       [facility] [address] [facility] [address]        Equal Access to Services     [facility] HospitalDENISE : Hadii aad ku hadasho Soomaali, waaxda luqadaha, qaybta kaalmada adedayneyada, luis manuel mccoy . So [facility] [phone].    ATENCIÓN: Si habla español, tiene a link disposición servicios gratuitos de asistencia lingüística. [facility] [phone].    We comply with applicable federal civil rights laws and [place] laws. We do not discriminate on the basis of race, color, national origin, age, disability sex, sexual orientation or gender identity.            Thank you!     Thank you for choosing [facility]  for your care. Our goal is always to provide you with excellent care. Hearing back from our patients is one way we can continue to improve our services. Please take a few minutes to complete the written survey that you may receive in the mail after your visit with us. Thank you!             Your Updated Medication List - Protect others around you: Learn how to safely use, store and throw away your medicines at www.disposemymeds.org.          This list is accurate as of: 6/22/17 11:59 PM.  Always use your most recent med list.                   Brand Name Dispense Instructions for use Diagnosis    diphenhydrAMINE 25 MG tablet    BENADRYL    60 tablet    Take 1-2 tablets (25-50 mg) by mouth every 6 hours as needed for itching or allergies Do not  take with Flexeril.    Rash       HYDROcodone-acetaminophen 5-325 MG per tablet    NORCO    10 tablet    Take 1 tablet by mouth every 6 hours as needed    Left-sided low back pain without sciatica, unspecified chronicity       ibuprofen 800 MG tablet    ADVIL/MOTRIN    60 tablet    Take 1 tablet (800 mg) by mouth every 8 hours as needed for moderate pain    Chronic bilateral low back pain without sciatica       levothyroxine 100 MCG tablet    SYNTHROID/LEVOTHROID    90 tablet    Take 1 tablet (100 mcg) by mouth daily    Hypothyroidism, unspecified type       MULTIVITAL PO      Take 1 tablet by mouth        traMADol 50 MG tablet    ULTRAM    20 tablet    Take 1-2 tablets ( mg) by mouth every 6 hours as needed for pain    Body aches, Bilateral low back pain without sciatica, unspecified chronicity

## 2017-06-22 NOTE — NURSING NOTE
"Chief Complaint   Patient presents with     Generalized Body Aches     pain all over the body, low grade fevers, loss of appetite and fatigue x 2 days       Initial /76 (BP Location: Right arm, Patient Position: Chair, Cuff Size: Adult Regular)  Pulse 58  Temp 98.4  F (36.9  C) (Oral)  Wt 129 lb 11.2 oz (58.8 kg)  LMP 05/14/2008  SpO2 99%  BMI 22.98 kg/m2 Estimated body mass index is 22.98 kg/(m^2) as calculated from the following:    Height as of 3/7/17: 5' 3\" (1.6 m).    Weight as of this encounter: 129 lb 11.2 oz (58.8 kg).  Medication Reconciliation: complete    "

## 2017-06-22 NOTE — TELEPHONE ENCOUNTER
Reason for call:  Patient reporting a symptom    Symptom or request: Weak, tired, slight on and off fever    Have you been treated for this before? No    Additional comments: Patient is requesting a call back from a nurse to discuss the symptoms above that she is concerned about. Please follow up. Thanks!    Phone Number patient can be reached at:  Home number on file [phone] (home)    Best Time:  Any    Can we leave a detailed message on this number:  YES    Call taken on 6/22/2017 at 12:44 PM by Cecy Tenorio

## 2017-06-24 LAB
BACTERIA SPEC CULT: NORMAL
MICRO REPORT STATUS: NORMAL
SPECIMEN SOURCE: NORMAL

## 2017-06-26 NOTE — PROGRESS NOTES
SUBJECTIVE  HPI: Eunice Malloy is a 51 year old female who presents for evaluation of back pain  Symptoms began 2 day(s) ago, have been onset gradual and are unchanged.  Pain is located in the low back bilateral region, with radiation to does not radiate, and are at worst a 5 on a scale of 1-10.  Recent injury:none recalled by the patient  Personal hx of back pain is recurrent self limited episodes of low back pain in the past.  Pain is exacerbated by: changing position.  Pain is relieved by: heat and ice   Associated sx include: denies, fecal incontinence (Direct patient to the ER), lower extremity numbness bilateral and urinary incontinence (Diret patient to the ER).  Red flag symptoms: negative, fever, chills, night sweats.    Past Medical History:   Diagnosis Date     Anxiety 4/11/2013     Cold sore 8/15/2011     Hypothyroidism 12/9/2010     Tobacco abuse      Allergies   Allergen Reactions     Sulfa Drugs Rash     Social History   Substance Use Topics     Smoking status: Current Some Day Smoker     Years: 15.00     Types: Cigarettes     Smokeless tobacco: Never Used      Comment: 1 pack per week     Alcohol use 0.0 oz/week     0 Standard drinks or equivalent per week      Comment: rare       ROS:  CONSTITUTIONAL:NEGATIVE for fever, chills, change in weight  INTEGUMENTARY/SKIN: NEGATIVE for worrisome rashes, moles or lesions  GI: NEGATIVE for nausea, abdominal pain, heartburn, or change in bowel habits  : normal menstrual cycles  MUSCULOSKELETAL: POSITIVE  for lower back pain, tenderness, spasms  NEURO: NEGATIVE for weakness, dizziness or paresthesias    OBJECTIVE:  /76 (BP Location: Right arm, Patient Position: Chair, Cuff Size: Adult Regular)  Pulse 58  Temp 98.4  F (36.9  C) (Oral)  Wt 129 lb 11.2 oz (58.8 kg)  LMP 05/14/2008  SpO2 99%  BMI 22.98 kg/m2  Back examination: Back symmetric, no curvature. ROM normal. No CVA tenderness., positive findings: loss of lumbar lordosis, tenderness to  palpation lumbar region  STRAIGHT leg raise test: negative  GENERAL APPEARANCE: healthy, alert and no distress  RESP: lungs clear to auscultation - no rales, rhonchi or wheezes  CV: regular rates and rhythm, normal S1 S2, no murmur noted  ABDOMEN:  soft, nontender, no HSM or masses and bowel sounds normal  NEURO: Normal strength and tone with no weakness or sensory deficit noted, reflexes normal   SKIN: no suspicious lesions or rashes    Results for orders placed or performed in visit on 06/22/17   Erythrocyte sedimentation rate auto   Result Value Ref Range    Sed Rate 16 0 - 30 mm/h   UA with Microscopic reflex to Culture   Result Value Ref Range    Color Urine Yellow     Appearance Urine Clear     Glucose Urine Negative NEG mg/dL    Bilirubin Urine Negative NEG    Ketones Urine Negative NEG mg/dL    Specific Gravity Urine <=1.005 1.003 - 1.035    pH Urine 6.5 5.0 - 7.0 pH    Protein Albumin Urine Negative NEG mg/dL    Urobilinogen Urine 0.2 0.2 - 1.0 EU/dL    Nitrite Urine Negative NEG    Blood Urine Small (A) NEG    Leukocyte Esterase Urine Negative NEG    Source Midstream Urine     WBC Urine O - 2 0 - 2 /HPF    RBC Urine O - 2 0 - 2 /HPF    Squamous Epithelial /LPF Urine Few FEW /LPF    Bacteria Urine Few (A) NEG /HPF   CBC with platelets differential   Result Value Ref Range    WBC 7.9 4.0 - 11.0 10e9/L    RBC Count 4.52 3.8 - 5.2 10e12/L    Hemoglobin 13.7 11.7 - 15.7 g/dL    Hematocrit 43.0 35.0 - 47.0 %    MCV 95 78 - 100 fl    MCH 30.3 26.5 - 33.0 pg    MCHC 31.9 31.5 - 36.5 g/dL    RDW 13.8 10.0 - 15.0 %    Platelet Count 298 150 - 450 10e9/L    Diff Method Automated Method     % Neutrophils 58.8 %    % Lymphocytes 30.2 %    % Monocytes 8.8 %    % Eosinophils 1.9 %    % Basophils 0.3 %    Absolute Neutrophil 4.6 1.6 - 8.3 10e9/L    Absolute Lymphocytes 2.4 0.8 - 5.3 10e9/L    Absolute Monocytes 0.7 0.0 - 1.3 10e9/L    Absolute Eosinophils 0.2 0.0 - 0.7 10e9/L    Absolute Basophils 0.0 0.0 - 0.2 10e9/L    Comprehensive metabolic panel   Result Value Ref Range    Sodium 140 133 - 144 mmol/L    Potassium 4.2 3.4 - 5.3 mmol/L    Chloride 104 94 - 109 mmol/L    Carbon Dioxide 28 20 - 32 mmol/L    Anion Gap 8 3 - 14 mmol/L    Glucose 102 (H) 70 - 99 mg/dL    Urea Nitrogen 15 7 - 30 mg/dL    Creatinine 0.72 0.52 - 1.04 mg/dL    GFR Estimate 85 >60 mL/min/1.7m2    GFR Estimate If Black >90   GFR Calc   >60 mL/min/1.7m2    Calcium 9.5 8.5 - 10.1 mg/dL    Bilirubin Total 0.6 0.2 - 1.3 mg/dL    Albumin 3.9 3.4 - 5.0 g/dL    Protein Total 8.1 6.8 - 8.8 g/dL    Alkaline Phosphatase 154 (H) 40 - 150 U/L    ALT 37 0 - 50 U/L    AST 23 0 - 45 U/L   CK total   Result Value Ref Range    CK Total 68 30 - 225 U/L   Urine Culture Aerobic Bacterial   Result Value Ref Range    Specimen Description Midstream Urine     Culture Micro <10,000 colonies/mL urogenital jose     Micro Report Status FINAL 06/24/2017        ASSESSMENT/IMPRESSION/PLAN:      ICD-10-CM    1. Body aches R52 Erythrocyte sedimentation rate auto     UA with Microscopic reflex to Culture     CBC with platelets differential     Comprehensive metabolic panel     CK total     traMADol (ULTRAM) 50 MG tablet     Urine Culture Aerobic Bacterial   2. Bilateral low back pain without sciatica, unspecified chronicity M54.5 Erythrocyte sedimentation rate auto     UA with Microscopic reflex to Culture     CBC with platelets differential     Comprehensive metabolic panel     CK total     traMADol (ULTRAM) 50 MG tablet   3. Chills (without fever) R68.83 Urine Culture Aerobic Bacterial           EDUCATION      1.  Continue stretching and strengthening exercises.       2.  Continue prn heat or ice application.    Orders Placed This Encounter     Erythrocyte sedimentation rate auto     UA with Microscopic reflex to Culture     CBC with platelets differential     Comprehensive metabolic panel     CK total     traMADol (ULTRAM) 50 MG tablet

## 2017-09-05 ENCOUNTER — OFFICE VISIT (OUTPATIENT)
Dept: FAMILY MEDICINE | Facility: CLINIC | Age: 52
End: 2017-09-05
Payer: COMMERCIAL

## 2017-09-05 ENCOUNTER — RADIANT APPOINTMENT (OUTPATIENT)
Dept: GENERAL RADIOLOGY | Facility: CLINIC | Age: 52
End: 2017-09-05
Attending: FAMILY MEDICINE
Payer: COMMERCIAL

## 2017-09-05 VITALS
BODY MASS INDEX: 23.12 KG/M2 | WEIGHT: 130.5 LBS | SYSTOLIC BLOOD PRESSURE: 111 MMHG | HEART RATE: 78 BPM | DIASTOLIC BLOOD PRESSURE: 69 MMHG | OXYGEN SATURATION: 96 % | HEIGHT: 63 IN | TEMPERATURE: 98.1 F

## 2017-09-05 DIAGNOSIS — R07.0 THROAT PAIN: Primary | ICD-10-CM

## 2017-09-05 DIAGNOSIS — M54.50 CHRONIC BILATERAL LOW BACK PAIN WITHOUT SCIATICA: ICD-10-CM

## 2017-09-05 DIAGNOSIS — R05.9 COUGH: ICD-10-CM

## 2017-09-05 DIAGNOSIS — G89.29 CHRONIC BILATERAL LOW BACK PAIN WITHOUT SCIATICA: ICD-10-CM

## 2017-09-05 LAB
DEPRECATED S PYO AG THROAT QL EIA: NORMAL
HETEROPH AB SER QL: NEGATIVE
SPECIMEN SOURCE: NORMAL

## 2017-09-05 PROCEDURE — 99214 OFFICE O/P EST MOD 30 MIN: CPT | Performed by: FAMILY MEDICINE

## 2017-09-05 PROCEDURE — 71020 XR CHEST 2 VW: CPT

## 2017-09-05 PROCEDURE — 36415 COLL VENOUS BLD VENIPUNCTURE: CPT | Performed by: FAMILY MEDICINE

## 2017-09-05 PROCEDURE — 87081 CULTURE SCREEN ONLY: CPT | Performed by: FAMILY MEDICINE

## 2017-09-05 PROCEDURE — 86308 HETEROPHILE ANTIBODY SCREEN: CPT | Performed by: FAMILY MEDICINE

## 2017-09-05 PROCEDURE — 87880 STREP A ASSAY W/OPTIC: CPT | Performed by: FAMILY MEDICINE

## 2017-09-05 PROCEDURE — 84443 ASSAY THYROID STIM HORMONE: CPT | Performed by: FAMILY MEDICINE

## 2017-09-05 RX ORDER — IBUPROFEN 800 MG/1
800 TABLET, FILM COATED ORAL EVERY 8 HOURS PRN
Qty: 60 TABLET | Refills: 0 | Status: SHIPPED | OUTPATIENT
Start: 2017-09-05 | End: 2017-12-08

## 2017-09-05 NOTE — LETTER
September 8, 2017      Eunice Malloy  [address] [facility] [address]        Dear Eunice,    Here are your results for your recent xray, which was normal. Please call or message me if you have questions or concerns.     Results for orders placed or performed in visit on 09/05/17   XR Chest 2 Views    Narrative    CHEST TWO VIEWS   9/5/2017 12:30 PM     HISTORY: Cough.    COMPARISON: 4/13/2014      Impression    IMPRESSION: Cardiac and mediastinal silhouettes are within normal  limits. Lungs and costophrenic angles are clear. No pneumothorax. No  acute osseous abnormality.    QUENTIN SARAVIA, DO               Sincerely,        Loyda Payan MD/mikey

## 2017-09-05 NOTE — NURSING NOTE
"Chief Complaint   Patient presents with     Generalized Body Aches       Initial /69 (BP Location: Right arm, Patient Position: Chair, Cuff Size: Adult Regular)  Pulse 78  Temp 98.1  F (36.7  C) (Tympanic)  Ht 5' 3\" (1.6 m)  Wt 130 lb 8 oz (59.2 kg)  LMP 05/14/2008  SpO2 96%  BMI 23.12 kg/m2 Estimated body mass index is 23.12 kg/(m^2) as calculated from the following:    Height as of this encounter: 5' 3\" (1.6 m).    Weight as of this encounter: 130 lb 8 oz (59.2 kg).  Medication Reconciliation: complete     Haydee Pickett MA      "

## 2017-09-05 NOTE — MR AVS SNAPSHOT
"              After Visit Summary   2017    Eunice Malloy    MRN: [id number]           Patient Information     Date Of Birth          1965        Visit Information        Provider Department      2017 9:20 AM Madison Payan MD [facility]        Today's Diagnoses     Throat pain    -  1    Cough        Chronic bilateral low back pain without sciatica           Follow-ups after your visit        Who to contact     If you have questions or need follow up information about today's clinic visit or your schedule please contact [facility] directly at [phone].  Normal or non-critical lab and imaging results will be communicated to you by Push Computinghart, letter or phone within 4 business days after the clinic has received the results. If you do not hear from us within 7 days, please contact the clinic through Push Computinghart or phone. If you have a critical or abnormal lab result, we will notify you by phone as soon as possible.  Submit refill requests through Startpack or call your pharmacy and they will forward the refill request to us. Please allow 3 business days for your refill to be completed.          Additional Information About Your Visit        MyChart Information     Startpack lets you send messages to your doctor, view your test results, renew your prescriptions, schedule appointments and more. To sign up, go to [email or web address] . Click on \"Log in\" on the left side of the screen, which will take you to the Welcome page. Then click on \"Sign up Now\" on the right side of the page.     You will be asked to enter the access code listed below, as well as some personal information. Please follow the directions to create your username and password.     Your access code is: [email or web address]  Expires: 2017  3:51 PM     Your access code will  in 90 days. If you need help or a new code, please call your [facility] or [phone].        Care EveryWhere ID     This is " "your Care EveryWhere ID. This could be used by other organizations to access your [facility] medical records  [id number]        Your Vitals Were     Pulse Temperature Height Last Period Pulse Oximetry BMI (Body Mass Index)    78 98.1  F (36.7  C) (Tympanic) 5' 3\" (1.6 m) 05/14/2008 96% 23.12 kg/m2       Blood Pressure from Last 3 Encounters:   09/05/17 111/69   06/22/17 120/76   05/31/17 110/64    Weight from Last 3 Encounters:   09/05/17 130 lb 8 oz (59.2 kg)   06/22/17 129 lb 11.2 oz (58.8 kg)   05/31/17 129 lb (58.5 kg)              We Performed the Following     Beta strep group A culture     Mononucleosis screen     Strep, Rapid Screen          Where to get your medicines      These medications were sent to [facility] - [place], [place] - [address]  [address], [facility] [address]     Phone:  [phone]     ibuprofen 800 MG tablet          Primary Care Provider Office Phone # Fax #    Alyse Mcclendon [phone]       [address]  [facility] [address]        Equal Access to Services     ISAIAS PADILLA : Jimi serranoo Sojames, waaxda luqadaha, qaybta kaalmada adeegyada, luis manuel arcos. So [facility] [phone].    ATENCIÓN: Si habla español, tiene a link disposición servicios gratuitos de asistencia lingüística. Llame al [phone].    We comply with applicable federal civil rights laws and [place] laws. We do not discriminate on the basis of race, color, national origin, age, disability sex, sexual orientation or gender identity.            Thank you!     Thank you for choosing [facility]  for your care. Our goal is always to provide you with excellent care. Hearing back from our patients is one way we can continue to improve our services. Please take a few minutes to complete the written survey that you may receive in the mail after your visit with us. Thank you!             Your Updated Medication List - Protect " others around you: Learn how to safely use, store and throw away your medicines at www.disposemymeds.org.          This list is accurate as of: 9/5/17 11:59 AM.  Always use your most recent med list.                   Brand Name Dispense Instructions for use Diagnosis    diphenhydrAMINE 25 MG tablet    BENADRYL    60 tablet    Take 1-2 tablets (25-50 mg) by mouth every 6 hours as needed for itching or allergies Do not take with Flexeril.    Rash       HYDROcodone-acetaminophen 5-325 MG per tablet    NORCO    10 tablet    Take 1 tablet by mouth every 6 hours as needed    Left-sided low back pain without sciatica, unspecified chronicity       ibuprofen 800 MG tablet    ADVIL/MOTRIN    60 tablet    Take 1 tablet (800 mg) by mouth every 8 hours as needed for moderate pain    Chronic bilateral low back pain without sciatica       levothyroxine 100 MCG tablet    SYNTHROID/LEVOTHROID    90 tablet    Take 1 tablet (100 mcg) by mouth daily    Hypothyroidism, unspecified type       MULTIVITAL PO      Take 1 tablet by mouth        traMADol 50 MG tablet    ULTRAM    20 tablet    Take 1-2 tablets ( mg) by mouth every 6 hours as needed for pain    Body aches, Bilateral low back pain without sciatica, unspecified chronicity

## 2017-09-05 NOTE — LETTER
September 5, 2017        To Whom It May Concern,        Please excuse Eunice Gama from work due to illness.           Sincerely,        Madison Payan MD

## 2017-09-05 NOTE — PROGRESS NOTES
"  SUBJECTIVE:   Eunice Malloy is a 51 year old female who presents to clinic today for the following health issues:      On Saturday pt had chills and kept waking up due to sweats. Yesterday thought she was feeling better. Last night pt again had the chills. She has diffuse myalgias. Certain areas are pulsating. Pt has been coughing up a thick mucous. When she coughs, she has headache. She endorses sore throat, ear pain, nasal congestion, raspy and constipated. She denies fever, facial pain, shortness of breath, chest pain, abdominal pain, dysuria or hematuria. She has been taking cough drops, Ibuprofen 800 mg PRN and melatonin. [place] No recent travel. No sick contacts. Pt does smoke, hasn't been smoking much due to symptoms. Normal appetite and oral intake.       Problem list and histories reviewed & adjusted, as indicated.  Additional history: as documented    Labs reviewed in EPIC    Reviewed and updated as needed this visit by clinical staffTobacco  Allergies  Meds  Med Hx  Surg Hx  Fam Hx  Soc Hx      Reviewed and updated as needed this visit by Provider         ROS:  Constitutional, HEENT, cardiovascular, pulmonary, gi and gu systems are negative, except as otherwise noted.      OBJECTIVE:   /69 (BP Location: Right arm, Patient Position: Chair, Cuff Size: Adult Regular)  Pulse 78  Temp 98.1  F (36.7  C) (Tympanic)  Ht 5' 3\" (1.6 m)  Wt 130 lb 8 oz (59.2 kg)  LMP 05/14/2008  SpO2 96%  BMI 23.12 kg/m2  Body mass index is 23.12 kg/(m^2).  GENERAL: healthy, alert and no distress  EYES: Eyes grossly normal to inspection  HENT: ear canals and TM's normal, nose and mouth without ulcers or lesions  NECK: + bilateral cervical adenopathy  RESP: lungs clear to auscultation - no rales, rhonchi or wheezes  CV: regular rate and rhythm, normal S1 S2    Diagnostic Test Results:  Results for orders placed or performed in visit on 09/05/17 (from the past 24 hour(s))   Mononucleosis " screen   Result Value Ref Range    Mononucleosis Screen Negative NEG^Negative   Strep, Rapid Screen   Result Value Ref Range    Specimen Description Throat     Rapid Strep A Screen       NEGATIVE: No Group A streptococcal antigen detected by immunoassay, await culture report.       ASSESSMENT/PLAN:     1. Throat pain  - d/d included strep vs mono; exam not c/w abscess   - Strep, Rapid Screen negative   - Mononucleosis screen negative   - Beta strep group A culture pending   - recommended continued rest, fluids, ibuprofen PRN  - Follow if symptoms worsen or fail to improve.    2. Cough  - due to symptoms concern for pneumonia, CXR was ordered which showed no opacity   - XR Chest 2 Views; Future  - continue symptomatic tx above  - Follow if symptoms worsen or fail to improve.    3. Chronic bilateral low back pain without sciatica  - refilled   - ibuprofen (ADVIL/MOTRIN) 800 MG tablet; Take 1 tablet (800 mg) by mouth every 8 hours as needed for moderate pain  Dispense: 60 tablet; Refill: 0      Deqa Nayan Payan MD  [facility]

## 2017-09-05 NOTE — LETTER
September 7, 2017      Eunice LAURA Gama  [address] [facility] [address]        Dear ,    We are writing to inform you of your test results.    Here are your results for your recent labs. Your throat culture was negative which means you do not have streptococcal pharyngitis. Please call or message me if you have questions or concerns.     Resulted Orders   Strep, Rapid Screen   Result Value Ref Range    Specimen Description Throat     Rapid Strep A Screen       NEGATIVE: No Group A streptococcal antigen detected by immunoassay, await culture report.   Mononucleosis screen   Result Value Ref Range    Mononucleosis Screen Negative NEG^Negative   Beta strep group A culture   Result Value Ref Range    Specimen Description Throat     Culture Micro No beta hemolytic Streptococcus Group A isolated        If you have any questions or concerns, please call the clinic at the number listed above.   Sincerely,  Madison Payan MD/nr

## 2017-09-06 LAB
BACTERIA SPEC CULT: NORMAL
SPECIMEN SOURCE: NORMAL

## 2017-09-06 NOTE — PROGRESS NOTES
Please send the letter to the patient with the following.       Here are your results for your recent labs. Your throat culture was negative which means you do not have streptococcal pharyngitis. Please call or message me if you have questions or concerns.

## 2017-09-07 ENCOUNTER — OFFICE VISIT (OUTPATIENT)
Dept: FAMILY MEDICINE | Facility: CLINIC | Age: 52
End: 2017-09-07
Payer: COMMERCIAL

## 2017-09-07 VITALS
HEART RATE: 60 BPM | DIASTOLIC BLOOD PRESSURE: 69 MMHG | SYSTOLIC BLOOD PRESSURE: 116 MMHG | TEMPERATURE: 98.2 F | WEIGHT: 132 LBS | OXYGEN SATURATION: 98 % | BODY MASS INDEX: 23.38 KG/M2 | RESPIRATION RATE: 12 BRPM

## 2017-09-07 DIAGNOSIS — L98.9 SKIN LESION: ICD-10-CM

## 2017-09-07 DIAGNOSIS — J06.9 UPPER RESPIRATORY TRACT INFECTION, UNSPECIFIED TYPE: Primary | ICD-10-CM

## 2017-09-07 DIAGNOSIS — E03.9 HYPOTHYROIDISM, UNSPECIFIED TYPE: ICD-10-CM

## 2017-09-07 PROCEDURE — 99213 OFFICE O/P EST LOW 20 MIN: CPT | Performed by: FAMILY MEDICINE

## 2017-09-07 NOTE — MR AVS SNAPSHOT
After Visit Summary   9/7/2017    Eunice Malloy    MRN: [id number]           Patient Information     Date Of Birth          1965        Visit Information        Provider Department      9/7/2017 2:20 PM Alyse Mcclendon MD [facility]        Today's Diagnoses     Throat pain    -  1    Cough        Chills        Skin lesion          Care Instructions    1. You can alternate Tylenol (acetaminophen) and Ibuprofen. You can take 1000 mg of Tylenol up to three times a day as needed. Make sure to take Ibuprofen with food.        Information from Your Family Doctor  Treating the Common Cold in Adults  Am [facility] Physician. 2012 Jul 15;86(2):online.related article on treatment of the common cold in children and adults.  What do I do if I have a cold?  Most colds don't cause serious illness and will get better over time. Cold symptoms in adults can be treated with some over-the-counter medicines. Talk to your doctor about what is best for you.  What over-the-counter treatments are helpful in adults?  ? Choosing an over-the-counter medicine that contains an antihistamine and a decongestant may help you cough less and breath better through your nose. Cough medicines such as dextromethorphan (one brand: Robitussin) and guaifenesin (one brand: Mucinex) may help some people.  ? If you have a headache or body aches, pain medicines such as ibuprofen (one brand: Advil) can help. The pain medicine naproxen (one brand: Aleve) also may be used for cough.  ? Herbal products, such as Echinacea purpurea, Pelargonium sidoides (geranium) extract (one brand: Umcka Coldcare), and Andrographis paniculata (one brand: Kalmcold), may reduce cold symptoms.  ? Zinc taken in the first 24 hours of cold symptoms may reduce how many days you have a cold, and you may also get fewer symptoms. You can take one lozenge every two hours while awake for as long as you have cold symptoms. But, they may give you a bad taste in your  mouth or upset your stomach. Zinc nose sprays should not be used.  What treatments are not helpful in adults?  ? Antibiotics  ? Antihistamines without decongestants  ? Codeine  ? Echinacea angustifolia  ? Saline nasal spray  ? Vitamin C  This handout is provided to you by your family doctor and the American Academy of Family Physicians. Other health-related information is available from the AAFP online at http://familydoctor.org. ??This information provides a general overview and may not apply to everyone. Talk to your family doctor to find out if this information applies to you and to get more information on this subject.  2012 by the American Academy of Family Physicians.?This content is owned by the AAFP. A person viewing it online may make one printout of the material and may use that printout only for his or her personal, non-commercial reference. This material may not otherwise be downloaded, copied, printed, stored, transmitted or reproduced in any medium, whether now known or later invented, except as authorized in writing by the AAFP. Contact afpserv@aafp.org for copyright questions and/or permission requests.            Follow-ups after your visit        Additional Services     DERMATOLOGY REFERRAL       Your provider has referred you to: [facility]: [facility] [phone]   [email or web address]  [facility]: Dermatology Clinic [facility] [phone]   [email or web address]  [facility]: [facility] Dermatology [facility] [phone]   [email or web address]  [facility]: [place] [facility] [phone]  [email or web address]  [facility] [phone]   [email or web address]    Please be aware that coverage of these services is subject to the terms and limitations of your health insurance plan.  Call member services at your health plan with any  "benefit or coverage questions.      Please bring the following with you to your appointment:    (1) Any X-Rays, CTs or MRIs which have been performed.  Contact the facility where they were done to arrange for  prior to your scheduled appointment.    (2) List of current medications  (3) This referral request   (4) Any documents/labs given to you for this referral                  Who to contact     If you have questions or need follow up information about today's clinic visit or your schedule please contact [facility] directly at [phone].  Normal or non-critical lab and imaging results will be communicated to you by For Your Imaginationhart, letter or phone within 4 business days after the clinic has received the results. If you do not hear from us within 7 days, please contact the clinic through For Your Imaginationhart or phone. If you have a critical or abnormal lab result, we will notify you by phone as soon as possible.  Submit refill requests through OptMed or call your pharmacy and they will forward the refill request to us. Please allow 3 business days for your refill to be completed.          Additional Information About Your Visit        For Your ImaginationharProactive Comfort Information     OptMed lets you send messages to your doctor, view your test results, renew your prescriptions, schedule appointments and more. To sign up, go to [email or web address] . Click on \"Log in\" on the left side of the screen, which will take you to the Welcome page. Then click on \"Sign up Now\" on the right side of the page.     You will be asked to enter the access code listed below, as well as some personal information. Please follow the directions to create your username and password.     Your access code is: [email or web address]  Expires: 2017  3:51 PM     Your access code will  in 90 days. If you need help or a new code, please call your [facility] or [phone].        Care EveryWhere ID     This is your Care EveryWhere ID. This could be used " by other organizations to access your [facility] medical records  [id number]        Your Vitals Were     Pulse Temperature Respirations Last Period Pulse Oximetry BMI (Body Mass Index)    60 98.2  F (36.8  C) (Oral) 12 05/14/2008 98% 23.38 kg/m2       Blood Pressure from Last 3 Encounters:   09/07/17 116/69   09/05/17 111/69   06/22/17 120/76    Weight from Last 3 Encounters:   09/07/17 132 lb (59.9 kg)   09/05/17 130 lb 8 oz (59.2 kg)   06/22/17 129 lb 11.2 oz (58.8 kg)              We Performed the Following     DERMATOLOGY REFERRAL        Primary Care Provider Office Phone # Fax #    Alyse Mcclendon [phone] [address]  [facility] [address]        Equal Access to Services     CECILIA [facility] CenterDENISE : Hadii pratik olson hadasho Soomaali, waaxda luqadaha, qaybta kaalmada adedayneyada, luis manuel mccoy . So [facility] [phone].    ATENCIÓN: Si habla español, tiene a link disposición servicios gratuitos de asistencia lingüística. Llame al [phone].    We comply with applicable federal civil rights laws and [place] laws. We do not discriminate on the basis of race, color, national origin, age, disability sex, sexual orientation or gender identity.            Thank you!     Thank you for choosing [facility]  for your care. Our goal is always to provide you with excellent care. Hearing back from our patients is one way we can continue to improve our services. Please take a few minutes to complete the written survey that you may receive in the mail after your visit with us. Thank you!             Your Updated Medication List - Protect others around you: Learn how to safely use, store and throw away your medicines at www.disposemymeds.org.          This list is accurate as of: 9/7/17  2:47 PM.  Always use your most recent med list.                   Brand Name Dispense Instructions for use Diagnosis    diphenhydrAMINE 25 MG tablet    BENADRYL    60 tablet    Take 1-2  tablets (25-50 mg) by mouth every 6 hours as needed for itching or allergies Do not take with Flexeril.    Rash       HYDROcodone-acetaminophen 5-325 MG per tablet    NORCO    10 tablet    Take 1 tablet by mouth every 6 hours as needed    Left-sided low back pain without sciatica, unspecified chronicity       ibuprofen 800 MG tablet    ADVIL/MOTRIN    60 tablet    Take 1 tablet (800 mg) by mouth every 8 hours as needed for moderate pain    Chronic bilateral low back pain without sciatica       levothyroxine 100 MCG tablet    SYNTHROID/LEVOTHROID    90 tablet    Take 1 tablet (100 mcg) by mouth daily    Hypothyroidism, unspecified type       MULTIVITAL PO      Take 1 tablet by mouth        traMADol 50 MG tablet    ULTRAM    20 tablet    Take 1-2 tablets ( mg) by mouth every 6 hours as needed for pain    Body aches, Bilateral low back pain without sciatica, unspecified chronicity

## 2017-09-07 NOTE — PROGRESS NOTES
"  SUBJECTIVE:   Eunice Malloy is a 51 year old female who presents to clinic today for the following health issues:    Patient presents to clinic today to follow up from appointment on 9/5 with worsening symptoms.     Clinic on 9/5/17 Dr. Payan:  \"HPI: On Saturday pt had chills and kept waking up due to sweats. Yesterday thought she was feeling better. Last night pt again had the chills. She has diffuse myalgias. Certain areas are pulsating. Pt has been coughing up a thick mucous. When she coughs, she has headache. She endorses sore throat, ear pain, nasal congestion, raspy and constipated. She denies fever, facial pain, shortness of breath, chest pain, abdominal pain, dysuria or hematuria. She has been taking cough drops, Ibuprofen 800 mg PRN and melatonin. [place] No recent travel. No sick contacts. Pt does smoke, hasn't been smoking much due to symptoms. Normal appetite and oral intake.   A&P:  1. Throat pain  - d/d included strep vs mono; exam not c/w abscess   - Strep, Rapid Screen negative   - Mononucleosis screen negative   - Beta strep group A culture pending   - recommended continued rest, fluids, ibuprofen PRN  - Follow if symptoms worsen or fail to improve.  2. Cough  - due to symptoms concern for pneumonia, CXR was ordered which showed no opacity   - XR Chest 2 Views; Future  - continue symptomatic tx above  - Follow if symptoms worsen or fail to improve.  3. Chronic bilateral low back pain without sciatica  - refilled   - ibuprofen (ADVIL/MOTRIN) 800 MG tablet; Take 1 tablet (800 mg) by mouth every 8 hours as needed for moderate pain  Dispense: 60 tablet; Refill: 0\"      Symptoms started Saturday night with the chills. She night sweats and woke herself up throughout the night sleep talking. Monday night she continued to wake up in night sweats. Tuesday morning she had chills, developed a productive cough, phlegm, and felt miserable. She was seen in clinic Tuesday. Since she " continues to cough up productive thick white phlegm. Now she has soreness in her ears. She has generalized body aches. When coughing she has pain in the back of her head. Yesterday she woke up with a thin coating on her tongue and noticed a swollen bump on her tongue. Patient purchased Mucinex DM to help with her congestion. She is taking Ibuprofen 800 mg every 6 hours.    She was seen in urgent care on 6/22/17 and was prescribed tramadol for her back pain.       Problem list and histories reviewed & adjusted, as indicated.  Additional history: as documented  Patient Active Problem List   Diagnosis     CARDIOVASCULAR SCREENING; LDL GOAL LESS THAN 160     Hypothyroidism     Cold sore     Anxiety     Shoulder pain     Labral tear of shoulder     Microscopic hematuria     Low back pain     Loss of hair     SI (sacroiliac) joint dysfunction and pain - BILATERAL     Abnormal finding on MRI of brain     Past Surgical History:   Procedure Laterality Date     HC EXCIS PRIMARY GANGLION WRIST       TONSILLECTOMY  as child       Social History   Substance Use Topics     Smoking status: Current Some Day Smoker     Years: 15.00     Types: Cigarettes     Smokeless tobacco: Never Used      Comment: 1 pack per week     Alcohol use 0.0 oz/week     0 Standard drinks or equivalent per week      Comment: rare     History reviewed. No pertinent family history.      Current Outpatient Prescriptions   Medication Sig Dispense Refill     ibuprofen (ADVIL/MOTRIN) 800 MG tablet Take 1 tablet (800 mg) by mouth every 8 hours as needed for moderate pain 60 tablet 0     traMADol (ULTRAM) 50 MG tablet Take 1-2 tablets ( mg) by mouth every 6 hours as needed for pain 20 tablet 0     levothyroxine (SYNTHROID/LEVOTHROID) 100 MCG tablet Take 1 tablet (100 mcg) by mouth daily 90 tablet 1     HYDROcodone-acetaminophen (NORCO) 5-325 MG per tablet Take 1 tablet by mouth every 6 hours as needed 10 tablet 0     diphenhydrAMINE (BENADRYL) 25 MG tablet  Take 1-2 tablets (25-50 mg) by mouth every 6 hours as needed for itching or allergies Do not take with Flexeril. 60 tablet 0     Multiple Vitamins-Minerals (MULTIVITAL PO) Take 1 tablet by mouth       Allergies   Allergen Reactions     Sulfa Drugs Rash     Recent Labs   Lab Test  06/22/17   1425  03/10/17   1114  09/28/16   1423  07/11/16   1657  09/14/15   0420   05/04/15   1019   07/07/11   0855   12/31/09   1210   LDL   --    --    --    --    --    --   99   --   148*   --   108   HDL   --    --    --    --    --    --   101   --   85   --   89   TRIG   --    --    --    --    --    --   69   --   120   --   80   ALT  37  28   --    --   39   < >   --    < >   --    < >   --    CR  0.72   --   0.70   --   0.76   < >   --    < >   --    < >   --    GFRESTIMATED  85   --   87   --   80   < >   --    < >   --    < >   --    GFRESTBLACK  >90   GFR Calc     --   >90   GFR Calc     --   >90   GFR Calc     < >   --    < >   --    < >   --    POTASSIUM  4.2   --   4.3   --   3.9   < >   --    < >   --    < >   --    TSH   --    --   13.18*  24.34*   --    --   91.59*   < >  4.07   < >  4.03    < > = values in this interval not displayed.      BP Readings from Last 3 Encounters:   09/07/17 116/69   09/05/17 111/69   06/22/17 120/76    Wt Readings from Last 3 Encounters:   09/07/17 59.9 kg (132 lb)   09/05/17 59.2 kg (130 lb 8 oz)   06/22/17 58.8 kg (129 lb 11.2 oz)        Reviewed and updated as needed this visit by clinical staffTobacco  Allergies  Meds  Med Hx  Surg Hx  Fam Hx  Soc Hx      Reviewed and updated as needed this visit by Provider         ROS:  See above.     This document serves as a record of the services and decisions personally performed and made by Alyse Mcclendon MD. It was created on his/her behalf by Lynette Gomez, trained medical scribe. The creation of this document is based the provider's statements to the medical scribes.    Vish Ojeda  Jason, September 7, 2017  OBJECTIVE:   /69  Pulse 60  Temp 98.2  F (36.8  C) (Oral)  Resp 12  Wt 59.9 kg (132 lb)  LMP 05/14/2008  SpO2 98%  BMI 23.38 kg/m2  Body mass index is 23.38 kg/(m^2).  GENERAL: healthy, alert and no distress  HENT: ear canals and TM's normal, nose and mouth without ulcers or lesions  NECK: no adenopathy, no asymmetry, masses, or scars and thyroid normal to palpation  RESP: lungs clear to auscultation - no rales, rhonchi or wheezes  CV: regular rate and rhythm, normal S1 S2, no S3 or S4, no murmur, click or rub  PSYCH: mentation appears normal, affect normal/bright    Diagnostic Test Results:  Results for orders placed or performed in visit on 09/05/17   XR Chest 2 Views    Narrative    CHEST TWO VIEWS   9/5/2017 12:30 PM     HISTORY: Cough.    COMPARISON: 4/13/2014      Impression    IMPRESSION: Cardiac and mediastinal silhouettes are within normal  limits. Lungs and costophrenic angles are clear. No pneumothorax. No  acute osseous abnormality.    QUENTIN SARAVIA,       ASSESSMENT/PLAN:   1. URI  likely viral. Rapid strep was negative on 9/5. Pt was tested for mono also on 9/5 but sx had only been present for 3 days so if concern for mono were to persist would recommend recheck at or after two weeks of illness. Per pt instructions.      4. Skin lesion   Mentioned at the end of visit. Skin exam not completed. She requested referral to dermatology  - DERMATOLOGY REFERRAL    5. Hypothyroidism, unspecified type  Last TSH was elevated. Now taking her LT4 consistently.  Continues levothyroxine 100 mcg.   - TSH with free T4 reflex      Patient Instructions   1. You can alternate Tylenol (acetaminophen) and Ibuprofen. You can take 1000 mg of Tylenol up to three times a day as needed. Make sure to take Ibuprofen with food.        Information from Your Family Doctor  Treating the Common Cold in Adults  Am [facility] Physician. 2012 Jul 15;86(2):online.related article on treatment of the  common cold in children and adults.  What do I do if I have a cold?  Most colds don't cause serious illness and will get better over time. Cold symptoms in adults can be treated with some over-the-counter medicines. Talk to your doctor about what is best for you.  What over-the-counter treatments are helpful in adults?  ? Choosing an over-the-counter medicine that contains an antihistamine and a decongestant may help you cough less and breath better through your nose. Cough medicines such as dextromethorphan (one brand: Robitussin) and guaifenesin (one brand: Mucinex) may help some people.  ? If you have a headache or body aches, pain medicines such as ibuprofen (one brand: Advil) can help. The pain medicine naproxen (one brand: Aleve) also may be used for cough.  ? Herbal products, such as Echinacea purpurea, Pelargonium sidoides (geranium) extract (one brand: Umcka Coldcare), and Andrographis paniculata (one brand: Kalmcold), may reduce cold symptoms.  ? Zinc taken in the first 24 hours of cold symptoms may reduce how many days you have a cold, and you may also get fewer symptoms. You can take one lozenge every two hours while awake for as long as you have cold symptoms. But, they may give you a bad taste in your mouth or upset your stomach. Zinc nose sprays should not be used.  What treatments are not helpful in adults?  ? Antibiotics  ? Antihistamines without decongestants  ? Codeine  ? Echinacea angustifolia  ? Saline nasal spray  ? Vitamin C  This handout is provided to you by your family doctor and the American Academy [employer]. Other health-related information is available from the AAFP online at http://familydoctor.org. ??This information provides a general overview and may not apply to everyone. Talk to your family doctor to find out if this information applies to you and to get more information on this subject.  2012 by the American Academy [employer].?This content is owned by the  AAFP. A person viewing it online may make one printout of the material and may use that printout only for his or her personal, non-commercial reference. This material may not otherwise be downloaded, copied, printed, stored, transmitted or reproduced in any medium, whether now known or later invented, except as authorized in writing by the AAFP. Contact [email or web address] for copyright questions and/or permission requests.        The information in this document, created by the medical scribe for me, accurately reflects the services I personally performed and the decisions made by me. I have reviewed and approved this document for accuracy. 09/07/17    Alyse Mcclendon MD  [facility]

## 2017-09-07 NOTE — NURSING NOTE
"Chief Complaint   Patient presents with     RECHECK       Initial /69  Pulse 60  Temp 98.2  F (36.8  C) (Oral)  Resp 12  Wt 132 lb (59.9 kg)  LMP 05/14/2008  SpO2 98%  BMI 23.38 kg/m2 Estimated body mass index is 23.38 kg/(m^2) as calculated from the following:    Height as of 9/5/17: 5' 3\" (1.6 m).    Weight as of this encounter: 132 lb (59.9 kg).  Medication Reconciliation: complete       Micheline Jorge MA     "

## 2017-09-07 NOTE — PATIENT INSTRUCTIONS
1. You can alternate Tylenol (acetaminophen) and Ibuprofen. You can take 1000 mg of Tylenol up to three times a day as needed. Make sure to take Ibuprofen with food.        Information from Your Family Doctor  Treating the Common Cold in Adults  Am [facility] Physician. 2012 Jul 15;86(2):online.related article on treatment of the common cold in children and adults.  What do I do if I have a cold?  Most colds don't cause serious illness and will get better over time. Cold symptoms in adults can be treated with some over-the-counter medicines. Talk to your doctor about what is best for you.  What over-the-counter treatments are helpful in adults?  ? Choosing an over-the-counter medicine that contains an antihistamine and a decongestant may help you cough less and breath better through your nose. Cough medicines such as dextromethorphan (one brand: Robitussin) and guaifenesin (one brand: Mucinex) may help some people.  ? If you have a headache or body aches, pain medicines such as ibuprofen (one brand: Advil) can help. The pain medicine naproxen (one brand: Aleve) also may be used for cough.  ? Herbal products, such as Echinacea purpurea, Pelargonium sidoides (geranium) extract (one brand: Umcka Coldcare), and Andrographis paniculata (one brand: Kalmcold), may reduce cold symptoms.  ? Zinc taken in the first 24 hours of cold symptoms may reduce how many days you have a cold, and you may also get fewer symptoms. You can take one lozenge every two hours while awake for as long as you have cold symptoms. But, they may give you a bad taste in your mouth or upset your stomach. Zinc nose sprays should not be used.  What treatments are not helpful in adults?  ? Antibiotics  ? Antihistamines without decongestants  ? Codeine  ? Echinacea angustifolia  ? Saline nasal spray  ? Vitamin C  This handout is provided to you by your family doctor and the American Academy [employer]icians. Other health-related information is available from  the AAFP online at http://familydoctor.org. ??This information provides a general overview and may not apply to everyone. Talk to your family doctor to find out if this information applies to you and to get more information on this subject.  2012 by the American Academy [employer].?This content is owned by the AAFP. A person viewing it online may make one printout of the material and may use that printout only for his or her personal, non-commercial reference. This material may not otherwise be downloaded, copied, printed, stored, transmitted or reproduced in any medium, whether now known or later invented, except as authorized in writing by the AAFP. Contact [email or web address] for copyright questions and/or permission requests.

## 2017-09-07 NOTE — LETTER
September 11, 2017      Eunice Malloy  [address] [address]        Dear e,    We are writing to inform you of your test results.    Normal results.     Resulted Orders   TSH with free T4 reflex   Result Value Ref Range    TSH 0.85 0.40 - 4.00 mU/L       If you have any questions or concerns, please call the clinic at the number listed above.       Sincerely,        Alyse Mcclendon MD/nr

## 2017-09-08 LAB — TSH SERPL DL<=0.005 MIU/L-ACNC: 0.85 MU/L (ref 0.4–4)

## 2017-09-08 NOTE — PROGRESS NOTES
Please send the letter to the patient with the following.         Here are your results for your recent xray, which was normal. Please call or message me if you have questions or concerns.

## 2017-09-21 ENCOUNTER — OFFICE VISIT (OUTPATIENT)
Dept: FAMILY MEDICINE | Facility: CLINIC | Age: 52
End: 2017-09-21
Payer: COMMERCIAL

## 2017-09-21 ENCOUNTER — TELEPHONE (OUTPATIENT)
Dept: FAMILY MEDICINE | Facility: CLINIC | Age: 52
End: 2017-09-21

## 2017-09-21 VITALS
DIASTOLIC BLOOD PRESSURE: 75 MMHG | HEART RATE: 58 BPM | RESPIRATION RATE: 10 BRPM | WEIGHT: 133 LBS | TEMPERATURE: 98.4 F | BODY MASS INDEX: 23.56 KG/M2 | SYSTOLIC BLOOD PRESSURE: 114 MMHG

## 2017-09-21 DIAGNOSIS — R19.8 TONGUE SYMPTOM: ICD-10-CM

## 2017-09-21 DIAGNOSIS — E03.9 HYPOTHYROIDISM, UNSPECIFIED TYPE: ICD-10-CM

## 2017-09-21 DIAGNOSIS — R53.83 FATIGUE, UNSPECIFIED TYPE: Primary | ICD-10-CM

## 2017-09-21 DIAGNOSIS — F41.9 ANXIETY: ICD-10-CM

## 2017-09-21 DIAGNOSIS — Z72.0 TOBACCO USE: ICD-10-CM

## 2017-09-21 DIAGNOSIS — R52 BODY ACHES: ICD-10-CM

## 2017-09-21 LAB
BASOPHILS # BLD AUTO: 0 10E9/L (ref 0–0.2)
BASOPHILS NFR BLD AUTO: 0.4 %
DIFFERENTIAL METHOD BLD: NORMAL
EOSINOPHIL # BLD AUTO: 0.2 10E9/L (ref 0–0.7)
EOSINOPHIL NFR BLD AUTO: 1.9 %
ERYTHROCYTE [DISTWIDTH] IN BLOOD BY AUTOMATED COUNT: 14.1 % (ref 10–15)
HCT VFR BLD AUTO: 41.2 % (ref 35–47)
HETEROPH AB SER QL: NEGATIVE
HGB BLD-MCNC: 13.3 G/DL (ref 11.7–15.7)
LYMPHOCYTES # BLD AUTO: 2.8 10E9/L (ref 0.8–5.3)
LYMPHOCYTES NFR BLD AUTO: 33.4 %
MCH RBC QN AUTO: 29.8 PG (ref 26.5–33)
MCHC RBC AUTO-ENTMCNC: 32.3 G/DL (ref 31.5–36.5)
MCV RBC AUTO: 92 FL (ref 78–100)
MONOCYTES # BLD AUTO: 0.7 10E9/L (ref 0–1.3)
MONOCYTES NFR BLD AUTO: 8.1 %
NEUTROPHILS # BLD AUTO: 4.7 10E9/L (ref 1.6–8.3)
NEUTROPHILS NFR BLD AUTO: 56.2 %
PLATELET # BLD AUTO: 360 10E9/L (ref 150–450)
RBC # BLD AUTO: 4.47 10E12/L (ref 3.8–5.2)
WBC # BLD AUTO: 8.4 10E9/L (ref 4–11)

## 2017-09-21 PROCEDURE — 82728 ASSAY OF FERRITIN: CPT | Performed by: FAMILY MEDICINE

## 2017-09-21 PROCEDURE — 83550 IRON BINDING TEST: CPT | Performed by: FAMILY MEDICINE

## 2017-09-21 PROCEDURE — 83540 ASSAY OF IRON: CPT | Performed by: FAMILY MEDICINE

## 2017-09-21 PROCEDURE — 82306 VITAMIN D 25 HYDROXY: CPT | Performed by: FAMILY MEDICINE

## 2017-09-21 PROCEDURE — 85025 COMPLETE CBC W/AUTO DIFF WBC: CPT | Performed by: FAMILY MEDICINE

## 2017-09-21 PROCEDURE — 36415 COLL VENOUS BLD VENIPUNCTURE: CPT | Performed by: FAMILY MEDICINE

## 2017-09-21 PROCEDURE — 80053 COMPREHEN METABOLIC PANEL: CPT | Performed by: FAMILY MEDICINE

## 2017-09-21 PROCEDURE — 86308 HETEROPHILE ANTIBODY SCREEN: CPT | Performed by: FAMILY MEDICINE

## 2017-09-21 PROCEDURE — 99214 OFFICE O/P EST MOD 30 MIN: CPT | Performed by: FAMILY MEDICINE

## 2017-09-21 RX ORDER — NICOTINE 21 MG/24HR
1 PATCH, TRANSDERMAL 24 HOURS TRANSDERMAL EVERY 24 HOURS
Qty: 30 PATCH | Refills: 1 | Status: SHIPPED | OUTPATIENT
Start: 2017-09-21 | End: 2018-01-07 | Stop reason: DRUGHIGH

## 2017-09-21 NOTE — MR AVS SNAPSHOT
After Visit Summary   9/21/2017    Eunice Malloy    MRN: [id number]           Patient Information     Date Of Birth          1965        Visit Information        Provider Department      9/21/2017 3:40 PM Alyse Mcclendon MD [facility][note 3]        Today's Diagnoses     Fatigue, unspecified type    -  1    Body aches        Tongue symptom        Hypothyroidism, unspecified type        Anxiety        Tobacco use          Care Instructions    1) We will do some lab work today to look for causes for your fatigue and feeling unwell.  2) I will let you know if these show a possible cause  3) If you continue to feel unwell over the next two weeks, follow up with me in clinic  4) Consider trying an allergy medication like claritin 10mg daily over-the-counter  5) Work on quitting smoking with the nicoderm patches.   Patient Education    Nicotine Inhalation vapour, liquid    Nicotine Nasal spray, solution    Nicotine Polacrilex Chewing-gum, medicated    Nicotine Polacrilex Oral lozenge    Nicotine Transdermal patch - 16 hour    Nicotine Transdermal patch - 24 hour  Nicotine Transdermal patch - 24 hour  What is this medicine?  NICOTINE (MIMA oh teen) helps people stop smoking. The patches replace the nicotine found in cigarettes and help to decrease withdrawal effects. They are most effective when used in combination with a stop-smoking program.  This medicine may be used for other purposes; ask your health care provider or pharmacist if you have questions.  What should I tell my health care provider before I take this medicine?  They need to know if you have any of these conditions:    diabetes    heart disease, angina, irregular heartbeat or previous heart attack    high blood pressure    lung disease, including asthma    overactive thyroid    pheochromocytoma    seizures or a history of seizures    skin problems, like eczema    stomach problems or ulcers    an unusual or allergic reaction to  nicotine, adhesives, other medicines, foods, dyes, or preservatives    pregnant or trying to get pregnant    breast-feeding  How should I use this medicine?  This medicine is for use on the skin. Follow the directions that come with the patches. Find an area of skin on your upper arm, chest, or back that is clean, dry, greaseless, undamaged and hairless. Wash hands with plain soap and water. Do not use anything that contains aloe, lanolin or glycerin as these may prevent the patch from sticking. Dry thoroughly. Remove the patch from the sealed pouch. Do not try to cut or trim the patch. Using your palm, press the patch firmly in place for 10 seconds to make sure that there is good contact with your skin. After applying the patch, wash your hands. Change the patch every day, keeping to a regular schedule. When you apply a new patch, use a new area of skin. Wait at least 1 week before using the same area again.  Talk to your pediatrician regarding the use of this medicine in children. Special care may be needed.  Overdosage: If you think you have taken too much of this medicine contact a poison control center or emergency room at once.  NOTE: This medicine is only for you. Do not share this medicine with others.  What if I miss a dose?  If you forget to replace a patch, use it as soon as you can. Only use one patch at a time and do not leave on the skin for longer than directed. If a patch falls off, you can replace it, but keep to your schedule and remove the patch at the right time.  What may interact with this medicine?    medicines for asthma    medicines for blood pressure    medicines for mental depression  This list may not describe all possible interactions. Give your health care provider a list of all the medicines, herbs, non-prescription drugs, or dietary supplements you use. Also tell them if you smoke, drink alcohol, or use illegal drugs. Some items may interact with your medicine.  What should I watch  for while using this medicine?  You should begin using the nicotine patch the day you stop smoking. It is okay if you do not succeed at your attempt to quit and have a cigarette. You can still continue your quit attempt and keep using the product as directed. Just throw away your cigarettes and get back to your quit plan.  You can keep the patch in place during swimming, bathing, and showering. If your patch falls off during these activities, replace it.  When you first apply the patch, your skin may itch or burn. This should go away soon. When you remove a patch, the skin may look red, but this should only last for a few days. Call your doctor or health care professional if skin redness does not go away after 4 days, if your skin swells, or if you get a rash.  If you are a diabetic and you quit smoking, the effects of insulin may be increased and you may need to reduce your insulin dose. Check with your doctor or health care professional about how you should adjust your insulin dose.  If you are going to have a magnetic resonance imaging (MRI) procedure, tell your MRI technician if you have this patch on your body. It must be removed before a MRI.  What side effects may I notice from receiving this medicine?  Side effects that you should report to your doctor or health care professional as soon as possible:    allergic reactions like skin rash, itching or hives, swelling of the face, lips, or tongue    breathing problems    changes in hearing    changes in vision    chest pain    cold sweats    confusion    fast, irregular heartbeat    feeling faint or lightheaded, falls    headache    increased saliva    skin redness that lasts more than 4 days    stomach pain    signs and symptoms of nicotine overdose like nausea; vomiting; dizziness; weakness; and rapid heartbeat  Side effects that usually do not require medical attention (report to your doctor or health care professional if they continue or are  "bothersome):    diarrhea    dry mouth    hiccups    irritability    nervousness or restlessness    trouble sleeping or vivid dreams  This list may not describe all possible side effects. Call your doctor for medical advice about side effects. You may report side effects to FDA at 2-639-CTU-0953.  Where should I keep my medicine?  Keep out of the reach of children.  Store at room temperature between 20 and 25 degrees C (68 and 77 degrees F). Protect from heat and light. Store in manufacturers packaging until ready to use. Throw away unused medicine after the expiration date. When you remove a patch, fold with sticky sides together; put in an empty opened pouch and throw away.  NOTE:This sheet is a summary. It may not cover all possible information. If you have questions about this medicine, talk to your doctor, pharmacist, or health care provider. Copyright  2016 Gold Standard                Follow-ups after your visit        Who to contact     If you have questions or need follow up information about today's clinic visit or your schedule please contact [facility] directly at [phone].  Normal or non-critical lab and imaging results will be communicated to you by [employer], letter or phone within 4 business days after the clinic has received the results. If you do not hear from us within 7 days, please contact the clinic through [employer] or phone. If you have a critical or abnormal lab result, we will notify you by phone as soon as possible.  Submit refill requests through [employer] or call your pharmacy and they will forward the refill request to us. Please allow 3 business days for your refill to be completed.          Additional Information About Your Visit        [employer] Information     [employer] lets you send messages to your doctor, view your test results, renew your prescriptions, schedule appointments and more. To sign up, go to [email or web address] . Click on \"Log in\" on the left side of the " "screen, which will take you to the Welcome page. Then click on \"Sign up Now\" on the right side of the page.     You will be asked to enter the access code listed below, as well as some personal information. Please follow the directions to create your username and password.     Your access code is: [email or web address]  Expires: 2017  3:51 PM     Your access code will  in 90 days. If you need help or a new code, please call your [facility] clinic or [phone].        Care EveryWhere ID     This is your Care EveryWhere ID. This could be used by other organizations to access your [facility] medical records  [id number]        Your Vitals Were     Pulse Temperature Respirations Last Period BMI (Body Mass Index)       58 98.4  F (36.9  C) (Oral) 10 2008 23.56 kg/m2        Blood Pressure from Last 3 Encounters:   17 114/75   17 116/69   17 111/69    Weight from Last 3 Encounters:   17 133 lb (60.3 kg)   17 132 lb (59.9 kg)   17 130 lb 8 oz (59.2 kg)              We Performed the Following     CBC with platelets and differential     Comprehensive metabolic panel (BMP + Alb, Alk Phos, ALT, AST, Total. Bili, TP)     Ferritin     Iron and iron binding capacity     Mononucleosis screen     Vitamin D Deficiency          Today's Medication Changes          These changes are accurate as of: 17  4:02 PM.  If you have any questions, ask your nurse or doctor.               Start taking these medicines.        Dose/Directions    * nicotine 14 MG/24HR 24 hr patch   Commonly known as:  NICODERM CQ   Used for:  Tobacco use   Started by:  Alyse Mcclendon MD        Dose:  1 patch   Place 1 patch onto the skin every 24 hours   Quantity:  30 patch   Refills:  1       * nicotine 7 MG/24HR 24 hr patch   Commonly known as:  NICODERM CQ   Used for:  Tobacco use   Started by:  Alyse Mcclendon MD        Dose:  1 patch   Place 1 patch onto the skin every 24 hours   Quantity:  30 patch   Refills:  " 1       * Notice:  This list has 2 medication(s) that are the same as other medications prescribed for you. Read the directions carefully, and ask your doctor or other care provider to review them with you.         Where to get your medicines      These medications were sent to [facility] [place], [place] - [address]  [address], [facility] [address]     Phone:  [phone]     nicotine 14 MG/24HR 24 hr patch    nicotine 7 MG/24HR 24 hr patch                Primary Care Provider Office Phone # Fax #    Alyse Mcclendon [phone]       [address]  [facility] [address]        Equal Access to Services     CECILIA PADILLA : Hadii pratik Hearn, waaxda lilliana, qaybta kaalmada federico, luis manuel arcos. So [facility] [phone].    ATENCIÓN: Si habla español, tiene a link disposición servicios gratuitos de asistencia lingüística. Llame al [phone].    We comply with applicable federal civil rights laws and [place] laws. We do not discriminate on the basis of race, color, national origin, age, disability sex, sexual orientation or gender identity.            Thank you!     Thank you for choosing [facility]  for your care. Our goal is always to provide you with excellent care. Hearing back from our patients is one way we can continue to improve our services. Please take a few minutes to complete the written survey that you may receive in the mail after your visit with us. Thank you!             Your Updated Medication List - Protect others around you: Learn how to safely use, store and throw away your medicines at www.disposemymeds.org.          This list is accurate as of: 9/21/17  4:02 PM.  Always use your most recent med list.                   Brand Name Dispense Instructions for use Diagnosis    diphenhydrAMINE 25 MG tablet    BENADRYL    60 tablet    Take 1-2 tablets (25-50 mg) by mouth every 6 hours as needed for  itching or allergies Do not take with Flexeril.    Rash       HYDROcodone-acetaminophen 5-325 MG per tablet    NORCO    10 tablet    Take 1 tablet by mouth every 6 hours as needed    Left-sided low back pain without sciatica, unspecified chronicity       ibuprofen 800 MG tablet    ADVIL/MOTRIN    60 tablet    Take 1 tablet (800 mg) by mouth every 8 hours as needed for moderate pain    Chronic bilateral low back pain without sciatica       levothyroxine 100 MCG tablet    SYNTHROID/LEVOTHROID    90 tablet    Take 1 tablet (100 mcg) by mouth daily    Hypothyroidism, unspecified type       MULTIVITAL PO      Take 1 tablet by mouth        * nicotine 14 MG/24HR 24 hr patch    NICODERM CQ    30 patch    Place 1 patch onto the skin every 24 hours    Tobacco use       * nicotine 7 MG/24HR 24 hr patch    NICODERM CQ    30 patch    Place 1 patch onto the skin every 24 hours    Tobacco use       traMADol 50 MG tablet    ULTRAM    20 tablet    Take 1-2 tablets ( mg) by mouth every 6 hours as needed for pain    Body aches, Bilateral low back pain without sciatica, unspecified chronicity       * Notice:  This list has 2 medication(s) that are the same as other medications prescribed for you. Read the directions carefully, and ask your doctor or other care provider to review them with you.

## 2017-09-21 NOTE — PROGRESS NOTES
"  SUBJECTIVE:   Eunice Malloy is a 51 year old female who presents to clinic today for the following health issues:    Patient presents to clinic today for fatigue.     Clinic on 9/7/17:  \"1. URI  likely viral. Rapid strep was negative on 9/5. Pt was tested for mono also on 9/5 but sx had only been present for 3 days so if concern for mono were to persist would recommend recheck at or after two weeks of illness. Per pt instructions.   4. Skin lesion   Mentioned at the end of visit. Skin exam not completed. She requested referral to dermatology  - DERMATOLOGY REFERRAL  5. Hypothyroidism, unspecified type  Last TSH was elevated. Now taking her LT4 consistently.  Continues levothyroxine 100 mcg.\"    Phone encounter today:  \"Patient states she was seen a couple weeks ago and tested for strep and mono both of which came back negative  States her throat is no longer sore, but her tongue has been swollen for the past couple days and she is still very fatigued.  Due to the new symptom of the tongue swelling and fatigue worsening, recommend office evaluation  Patient is NOT having difficulty breathing  Appointment with PCP scheduled for today.  Sapna Posada RN\"      She returned to work last week feeling ok, but not normal. Tuesday night she had a headache and stomach ache, going to bed right when she got home form work. Wednesday and again today she called into work. Her eyes are watering, she feels generalized weakness, and run down. She is still getting body aches in her upper stomach, legs, and arms. Denies sore throat. She has been taking mucinex and cough syrup, which relieved her cough. She is still slightly congested with some phlegm but this has been improving. Her tongue still feels swollen and has white patches. Her mouth feels dry despite drinking fluids. Her couhg is improving and she denies shortness of breath or feeling wheezy. Generalized weakness or lack of energy, fatigue, and occasional intermittent body " aches are her most bothersome symptoms. Denies GI issues.    She would like to quit smoking by the end of October. Right now she smokes 2-3 packs a week.      Problem list and histories reviewed & adjusted, as indicated.  Additional history: as documented  Patient Active Problem List   Diagnosis     CARDIOVASCULAR SCREENING; LDL GOAL LESS THAN 160     Hypothyroidism     Cold sore     Anxiety     Shoulder pain     Labral tear of shoulder     Microscopic hematuria     Low back pain     Loss of hair     SI (sacroiliac) joint dysfunction and pain - BILATERAL     Abnormal finding on MRI of brain     Past Surgical History:   Procedure Laterality Date     HC EXCIS PRIMARY GANGLION WRIST       TONSILLECTOMY  as child       Social History   Substance Use Topics     Smoking status: Current Some Day Smoker     Years: 15.00     Types: Cigarettes     Smokeless tobacco: Never Used      Comment: 1 pack per week     Alcohol use 0.0 oz/week     0 Standard drinks or equivalent per week      Comment: rare     History reviewed. No pertinent family history.      Current Outpatient Prescriptions   Medication Sig Dispense Refill     ibuprofen (ADVIL/MOTRIN) 800 MG tablet Take 1 tablet (800 mg) by mouth every 8 hours as needed for moderate pain 60 tablet 0     traMADol (ULTRAM) 50 MG tablet Take 1-2 tablets ( mg) by mouth every 6 hours as needed for pain 20 tablet 0     levothyroxine (SYNTHROID/LEVOTHROID) 100 MCG tablet Take 1 tablet (100 mcg) by mouth daily 90 tablet 1     HYDROcodone-acetaminophen (NORCO) 5-325 MG per tablet Take 1 tablet by mouth every 6 hours as needed 10 tablet 0     diphenhydrAMINE (BENADRYL) 25 MG tablet Take 1-2 tablets (25-50 mg) by mouth every 6 hours as needed for itching or allergies Do not take with Flexeril. 60 tablet 0     Multiple Vitamins-Minerals (MULTIVITAL PO) Take 1 tablet by mouth       Allergies   Allergen Reactions     Sulfa Drugs Rash     Recent Labs   Lab Test  09/05/17   0920  06/22/17    1425  03/10/17   1114  09/28/16   1423   09/14/15   0420   05/04/15   1019   07/07/11   0855   12/31/09   1210   LDL   --    --    --    --    --    --    --   99   --   148*   --   108   HDL   --    --    --    --    --    --    --   101   --   85   --   89   TRIG   --    --    --    --    --    --    --   69   --   120   --   80   ALT   --   37  28   --    --   39   < >   --    < >   --    < >   --    CR   --   0.72   --   0.70   --   0.76   < >   --    < >   --    < >   --    GFRESTIMATED   --   85   --   87   --   80   < >   --    < >   --    < >   --    GFRESTBLACK   --   >90   GFR Calc     --   >90   GFR Calc     --   >90   GFR Calc     < >   --    < >   --    < >   --    POTASSIUM   --   4.2   --   4.3   --   3.9   < >   --    < >   --    < >   --    TSH  0.85   --    --   13.18*   < >   --    --   91.59*   < >  4.07   < >  4.03    < > = values in this interval not displayed.      BP Readings from Last 3 Encounters:   09/21/17 114/75   09/07/17 116/69   09/05/17 111/69    Wt Readings from Last 3 Encounters:   09/21/17 60.3 kg (133 lb)   09/07/17 59.9 kg (132 lb)   09/05/17 59.2 kg (130 lb 8 oz)        Reviewed and updated as needed this visit by clinical staffTobacco  Allergies  Meds  Med Hx  Surg Hx  Fam Hx  Soc Hx      Reviewed and updated as needed this visit by Provider         ROS:  See above.    This document serves as a record of the services and decisions personally performed and made by Alyse Mcclendon MD. It was created on his/her behalf by Lynette Gomez, trained medical scribe. The creation of this document is based the provider's statements to the medical scribes.    Scribe Lynette Gomez, September 21, 2017  OBJECTIVE:     /75  Pulse 58  Temp 98.4  F (36.9  C) (Oral)  Resp 10  Wt 60.3 kg (133 lb)  LMP 05/14/2008  BMI 23.56 kg/m2  Body mass index is 23.56 kg/(m^2).  GENERAL: healthy, alert and no distress  HENT: ear canals and  TM's normal, nose and mouth without ulcers or lesions  NECK: no adenopathy, no asymmetry, masses, or scars and thyroid normal to palpation  RESP: lungs clear to auscultation - no rales or rhonchi. Occasional wheeze heard  CV: regular rate and rhythm, normal S1 S2, no S3 or S4, no murmur, click or rub, no peripheral edema   ABDOMEN: soft, tenderness to palpation in the RUQ and RLQ, no hepatosplenomegaly, no masses  PSYCH: mentation appears normal, affect normal/bright    Diagnostic Test Results:  No results found for this or any previous visit (from the past 24 hour(s)).     ASSESSMENT/PLAN:   1. Fatigue, unspecified type  unclear etiology with uncertain prognosis, requires further workup. Rapid strep and mono test were both negative on 9/5. She continues to have generalized weakness, fatigue, and body aches. It is reasonable to recheck mono as she has been feeling unwell for the past 2+ weeks now, though it is still unlikely at her age. Discussed this is a symptom with many potential causes and may simply be due to recent ?sequential viral illness. She reports some history of allergies and given some low level persistent nasal congestion I suggested she could try claritin. She will f/u with me in 2 weeks if not improving.   - Mononucleosis screen  - CBC with platelets and differential  - Comprehensive metabolic panel (BMP + Alb, Alk Phos, ALT, AST, Total. Bili, TP)  - Iron and iron binding capacity  - Ferritin  - Vitamin D Deficiency    2. Body aches  See above. As above  - Mononucleosis screen  - CBC with platelets and differential  - Comprehensive metabolic panel (BMP + Alb, Alk Phos, ALT, AST, Total. Bili, TP)  - Iron and iron binding capacity  - Ferritin  - Vitamin D Deficiency    3. Tongue symptom  She reports a sense that her tongue feels swollen at times and when she wakes up, but it is normal on exam today.     4. Hypothyroidism, unspecified type  Stable. Continues on levothyroxine 100 mcg daily.     5.  Anxiety  Increased worry about physical symptoms.     6. Tobacco use  Pt would like to quit smoking by the end of October. She would like to start using Nicotine patches.  - nicotine (NICODERM CQ) 14 MG/24HR 24 hr patch; Place 1 patch onto the skin every 24 hours  Dispense: 30 patch; Refill: 1  - nicotine (NICODERM CQ) 7 MG/24HR 24 hr patch; Place 1 patch onto the skin every 24 hours  Dispense: 30 patch; Refill: 1      Patient Instructions   1) We will do some lab work today to look for causes for your fatigue and feeling unwell.  2) I will let you know if these show a possible cause  3) If you continue to feel unwell over the next week, follow up with me in clinic  4) Consider trying an allergy medication like claritin 10mg daily over-the-counter      The information in this document, created by the medical scribe for me, accurately reflects the services I personally performed and the decisions made by me. I have reviewed and approved this document for accuracy. 09/21/17    Alyse Mcclendon MD  [facility]

## 2017-09-21 NOTE — PATIENT INSTRUCTIONS
1) We will do some lab work today to look for causes for your fatigue and feeling unwell.  2) I will let you know if these show a possible cause  3) If you continue to feel unwell over the next two weeks, follow up with me in clinic  4) Consider trying an allergy medication like claritin 10mg daily over-the-counter  5) Work on quitting smoking with the nicoderm patches.   Patient Education    Nicotine Inhalation vapour, liquid    Nicotine Nasal spray, solution    Nicotine Polacrilex Chewing-gum, medicated    Nicotine Polacrilex Oral lozenge    Nicotine Transdermal patch - 16 hour    Nicotine Transdermal patch - 24 hour  Nicotine Transdermal patch - 24 hour  What is this medicine?  NICOTINE (MIMA oh teen) helps people stop smoking. The patches replace the nicotine found in cigarettes and help to decrease withdrawal effects. They are most effective when used in combination with a stop-smoking program.  This medicine may be used for other purposes; ask your health care provider or pharmacist if you have questions.  What should I tell my health care provider before I take this medicine?  They need to know if you have any of these conditions:    diabetes    heart disease, angina, irregular heartbeat or previous heart attack    high blood pressure    lung disease, including asthma    overactive thyroid    pheochromocytoma    seizures or a history of seizures    skin problems, like eczema    stomach problems or ulcers    an unusual or allergic reaction to nicotine, adhesives, other medicines, foods, dyes, or preservatives    pregnant or trying to get pregnant    breast-feeding  How should I use this medicine?  This medicine is for use on the skin. Follow the directions that come with the patches. Find an area of skin on your upper arm, chest, or back that is clean, dry, greaseless, undamaged and hairless. Wash hands with plain soap and water. Do not use anything that contains aloe, lanolin or glycerin as these may prevent  the patch from sticking. Dry thoroughly. Remove the patch from the sealed pouch. Do not try to cut or trim the patch. Using your palm, press the patch firmly in place for 10 seconds to make sure that there is good contact with your skin. After applying the patch, wash your hands. Change the patch every day, keeping to a regular schedule. When you apply a new patch, use a new area of skin. Wait at least 1 week before using the same area again.  Talk to your pediatrician regarding the use of this medicine in children. Special care may be needed.  Overdosage: If you think you have taken too much of this medicine contact a poison control center or emergency room at once.  NOTE: This medicine is only for you. Do not share this medicine with others.  What if I miss a dose?  If you forget to replace a patch, use it as soon as you can. Only use one patch at a time and do not leave on the skin for longer than directed. If a patch falls off, you can replace it, but keep to your schedule and remove the patch at the right time.  What may interact with this medicine?    medicines for asthma    medicines for blood pressure    medicines for mental depression  This list may not describe all possible interactions. Give your health care provider a list of all the medicines, herbs, non-prescription drugs, or dietary supplements you use. Also tell them if you smoke, drink alcohol, or use illegal drugs. Some items may interact with your medicine.  What should I watch for while using this medicine?  You should begin using the nicotine patch the day you stop smoking. It is okay if you do not succeed at your attempt to quit and have a cigarette. You can still continue your quit attempt and keep using the product as directed. Just throw away your cigarettes and get back to your quit plan.  You can keep the patch in place during swimming, bathing, and showering. If your patch falls off during these activities, replace it.  When you first  apply the patch, your skin may itch or burn. This should go away soon. When you remove a patch, the skin may look red, but this should only last for a few days. Call your doctor or health care professional if skin redness does not go away after 4 days, if your skin swells, or if you get a rash.  If you are a diabetic and you quit smoking, the effects of insulin may be increased and you may need to reduce your insulin dose. Check with your doctor or health care professional about how you should adjust your insulin dose.  If you are going to have a magnetic resonance imaging (MRI) procedure, tell your MRI technician if you have this patch on your body. It must be removed before a MRI.  What side effects may I notice from receiving this medicine?  Side effects that you should report to your doctor or health care professional as soon as possible:    allergic reactions like skin rash, itching or hives, swelling of the face, lips, or tongue    breathing problems    changes in hearing    changes in vision    chest pain    cold sweats    confusion    fast, irregular heartbeat    feeling faint or lightheaded, falls    headache    increased saliva    skin redness that lasts more than 4 days    stomach pain    signs and symptoms of nicotine overdose like nausea; vomiting; dizziness; weakness; and rapid heartbeat  Side effects that usually do not require medical attention (report to your doctor or health care professional if they continue or are bothersome):    diarrhea    dry mouth    hiccups    irritability    nervousness or restlessness    trouble sleeping or vivid dreams  This list may not describe all possible side effects. Call your doctor for medical advice about side effects. You may report side effects to FDA at 6-281-FDA-1305.  Where should I keep my medicine?  Keep out of the reach of children.  Store at room temperature between 20 and 25 degrees C (68 and 77 degrees F). Protect from heat and light. Store in  manufacturers packaging until ready to use. Throw away unused medicine after the expiration date. When you remove a patch, fold with sticky sides together; put in an empty opened pouch and throw away.  NOTE:This sheet is a summary. It may not cover all possible information. If you have questions about this medicine, talk to your doctor, pharmacist, or health care provider. Copyright  2016 Gold Standard

## 2017-09-21 NOTE — LETTER
September 26, 2017      Eunice LAURA Gama  [address] [address]        Dear ,    We are writing to inform you of your test results.    Your results have returned. They do not give us clear evidence of a cause for your symptoms.     Your vitamin D level is normal.    Your mononucleosis test is normal.    Your iron tests are normal.     Your blood counts are normal.      The testing of your blood sugar, kidney function, liver function and electrolytes was normal for not fasting except for your alkaline phosphatase, which was slightly high. This is a test that tests the liver, but also can be high for other reasons including simply that you were not fasting. This should be rechecked fasting if you continue to feel unwell.     Resulted Orders   Mononucleosis screen   Result Value Ref Range    Mononucleosis Screen Negative NEG^Negative   CBC with platelets and differential   Result Value Ref Range    WBC 8.4 4.0 - 11.0 10e9/L    RBC Count 4.47 3.8 - 5.2 10e12/L    Hemoglobin 13.3 11.7 - 15.7 g/dL    Hematocrit 41.2 35.0 - 47.0 %    MCV 92 78 - 100 fl    MCH 29.8 26.5 - 33.0 pg    MCHC 32.3 31.5 - 36.5 g/dL    RDW 14.1 10.0 - 15.0 %    Platelet Count 360 150 - 450 10e9/L    Diff Method Automated Method     % Neutrophils 56.2 %    % Lymphocytes 33.4 %    % Monocytes 8.1 %    % Eosinophils 1.9 %    % Basophils 0.4 %    Absolute Neutrophil 4.7 1.6 - 8.3 10e9/L    Absolute Lymphocytes 2.8 0.8 - 5.3 10e9/L    Absolute Monocytes 0.7 0.0 - 1.3 10e9/L    Absolute Eosinophils 0.2 0.0 - 0.7 10e9/L    Absolute Basophils 0.0 0.0 - 0.2 10e9/L   Comprehensive metabolic panel (BMP + Alb, Alk Phos, ALT, AST, Total. Bili, TP)   Result Value Ref Range    Sodium 137 133 - 144 mmol/L    Potassium 4.0 3.4 - 5.3 mmol/L    Chloride 101 94 - 109 mmol/L    Carbon Dioxide 25 20 - 32 mmol/L    Anion Gap 11 3 - 14 mmol/L    Glucose 101 (H) 70 - 99 mg/dL      Comment:      Non Fasting    Urea Nitrogen 17 7 - 30 mg/dL     Creatinine 0.73 0.52 - 1.04 mg/dL    GFR Estimate 83 >60 mL/min/1.7m2      Comment:      Non  GFR Calc    GFR Estimate If Black >90 >60 mL/min/1.7m2      Comment:       GFR Calc    Calcium 9.4 8.5 - 10.1 mg/dL    Bilirubin Total 0.4 0.2 - 1.3 mg/dL    Albumin 4.1 3.4 - 5.0 g/dL    Protein Total 8.6 6.8 - 8.8 g/dL    Alkaline Phosphatase 164 (H) 40 - 150 U/L    ALT 48 0 - 50 U/L    AST 23 0 - 45 U/L   Iron and iron binding capacity   Result Value Ref Range    Iron 82 35 - 180 ug/dL    Iron Binding Cap 346 240 - 430 ug/dL    Iron Saturation Index 24 15 - 46 %   Ferritin   Result Value Ref Range    Ferritin 233 8 - 252 ng/mL   Vitamin D Deficiency   Result Value Ref Range    Vitamin D Deficiency screening 53 20 - 75 ug/L      Comment:      Season, race, dietary intake, and treatment affect the concentration of   25-hydroxy-Vitamin D. Values may decrease during winter months and increase   during summer months. Values 20-29 ug/L may indicate Vitamin D insufficiency   and values <20 ug/L may indicate Vitamin D deficiency.  Vitamin D determination is routinely performed by an immunoassay specific for   25 hydroxyvitamin D3.  If an individual is on vitamin D2 (ergocalciferol)   supplementation, please specify 25 OH vitamin D2 and D3 level determination by   LCMSMS test [id number].         If you have any questions or concerns, please call the clinic at the number listed above.       Sincerely,        Alyse Mcclendon MD/nr

## 2017-09-21 NOTE — TELEPHONE ENCOUNTER
Patient states she was seen a couple weeks ago and tested for strep and mono both of which came back negative  States her throat is no longer sore, but her tongue has been swollen for the past couple days and she is still very fatigued.  Due to the new symptom of the tongue swelling and fatigue worsening, recommend office evaluation  Patient is NOT having difficulty breathing  Appointment with PCP scheduled for today.  Sapna Posada RN

## 2017-09-21 NOTE — NURSING NOTE
"Chief Complaint   Patient presents with     Fatigue       Initial /75  Pulse 58  Temp 98.4  F (36.9  C) (Oral)  Resp 10  Wt 133 lb (60.3 kg)  LMP 05/14/2008  BMI 23.56 kg/m2 Estimated body mass index is 23.56 kg/(m^2) as calculated from the following:    Height as of 9/5/17: 5' 3\" (1.6 m).    Weight as of this encounter: 133 lb (60.3 kg).  Medication Reconciliation: complete     Micheline Jorge MA     "

## 2017-09-22 LAB
ALBUMIN SERPL-MCNC: 4.1 G/DL (ref 3.4–5)
ALP SERPL-CCNC: 164 U/L (ref 40–150)
ALT SERPL W P-5'-P-CCNC: 48 U/L (ref 0–50)
ANION GAP SERPL CALCULATED.3IONS-SCNC: 11 MMOL/L (ref 3–14)
AST SERPL W P-5'-P-CCNC: 23 U/L (ref 0–45)
BILIRUB SERPL-MCNC: 0.4 MG/DL (ref 0.2–1.3)
BUN SERPL-MCNC: 17 MG/DL (ref 7–30)
CALCIUM SERPL-MCNC: 9.4 MG/DL (ref 8.5–10.1)
CHLORIDE SERPL-SCNC: 101 MMOL/L (ref 94–109)
CO2 SERPL-SCNC: 25 MMOL/L (ref 20–32)
CREAT SERPL-MCNC: 0.73 MG/DL (ref 0.52–1.04)
FERRITIN SERPL-MCNC: 233 NG/ML (ref 8–252)
GFR SERPL CREATININE-BSD FRML MDRD: 83 ML/MIN/1.7M2
GLUCOSE SERPL-MCNC: 101 MG/DL (ref 70–99)
IRON SATN MFR SERPL: 24 % (ref 15–46)
IRON SERPL-MCNC: 82 UG/DL (ref 35–180)
POTASSIUM SERPL-SCNC: 4 MMOL/L (ref 3.4–5.3)
PROT SERPL-MCNC: 8.6 G/DL (ref 6.8–8.8)
SODIUM SERPL-SCNC: 137 MMOL/L (ref 133–144)
TIBC SERPL-MCNC: 346 UG/DL (ref 240–430)

## 2017-09-25 LAB — DEPRECATED CALCIDIOL+CALCIFEROL SERPL-MC: 53 UG/L (ref 20–75)

## 2017-12-08 DIAGNOSIS — G89.29 CHRONIC BILATERAL LOW BACK PAIN WITHOUT SCIATICA: ICD-10-CM

## 2017-12-08 DIAGNOSIS — M54.50 CHRONIC BILATERAL LOW BACK PAIN WITHOUT SCIATICA: ICD-10-CM

## 2017-12-08 NOTE — TELEPHONE ENCOUNTER
Medication Detail      Disp Refills Start End LATRICE   ibuprofen (ADVIL/MOTRIN) 800 MG tablet 60 tablet 0 9/5/2017  No   Sig: Take 1 tablet (800 mg) by mouth every 8 hours as needed for moderate pain   Class: E-Prescribe   Route: Oral   Order: [id number]   E-Prescribing Status: Receipt confirmed by pharmacy (9/5/2017 11:32 AM CDT)     LOV:9/21/2017

## 2017-12-11 RX ORDER — IBUPROFEN 800 MG/1
TABLET, FILM COATED ORAL
Qty: 60 TABLET | Refills: 8 | Status: SHIPPED | OUTPATIENT
Start: 2017-12-11 | End: 2019-06-17

## 2017-12-11 NOTE — TELEPHONE ENCOUNTER
Prescription approved per [facility] Refill Protocol.  JUAN DAVID Sanchez

## 2018-01-07 ENCOUNTER — OFFICE VISIT (OUTPATIENT)
Dept: URGENT CARE | Facility: URGENT CARE | Age: 53
End: 2018-01-07
Payer: COMMERCIAL

## 2018-01-07 VITALS
DIASTOLIC BLOOD PRESSURE: 60 MMHG | SYSTOLIC BLOOD PRESSURE: 110 MMHG | HEART RATE: 85 BPM | WEIGHT: 134.3 LBS | TEMPERATURE: 97.7 F | RESPIRATION RATE: 20 BRPM | BODY MASS INDEX: 23.79 KG/M2

## 2018-01-07 DIAGNOSIS — G47.00 INSOMNIA, UNSPECIFIED TYPE: ICD-10-CM

## 2018-01-07 DIAGNOSIS — L29.9 ITCHING: ICD-10-CM

## 2018-01-07 DIAGNOSIS — L50.9 HIVES: Primary | ICD-10-CM

## 2018-01-07 PROCEDURE — 99213 OFFICE O/P EST LOW 20 MIN: CPT | Performed by: PHYSICIAN ASSISTANT

## 2018-01-07 RX ORDER — METHYLPREDNISOLONE 4 MG
TABLET, DOSE PACK ORAL
Qty: 21 TABLET | Refills: 0 | Status: SHIPPED | OUTPATIENT
Start: 2018-01-07 | End: 2018-02-26

## 2018-01-07 RX ORDER — HYDROXYZINE HYDROCHLORIDE 25 MG/1
25-50 TABLET, FILM COATED ORAL
Qty: 30 TABLET | Refills: 0 | Status: SHIPPED | OUTPATIENT
Start: 2018-01-07 | End: 2018-10-17

## 2018-01-07 RX ORDER — GUAIFENESIN AND PSEUDOEPHEDRINE HCL 1200; 120 MG/1; MG/1
TABLET, EXTENDED RELEASE ORAL PRN
COMMUNITY
End: 2018-02-26

## 2018-01-07 RX ORDER — CETIRIZINE HYDROCHLORIDE 10 MG/1
10 TABLET ORAL EVERY EVENING
Qty: 90 TABLET | Refills: 1 | Status: SHIPPED | OUTPATIENT
Start: 2018-01-07 | End: 2018-02-26

## 2018-01-07 NOTE — MR AVS SNAPSHOT
"              After Visit Summary   2018    Eunice Malloy    MRN: [id number]           Patient Information     Date Of Birth          1965        Visit Information        Provider Department      2018 9:25 AM Tushar Tabares PA-C [facility]        Today's Diagnoses     Hives    -  1    Itching        Insomnia, unspecified type           Follow-ups after your visit        Who to contact     If you have questions or need follow up information about today's clinic visit or your schedule please contact [facility] directly at [phone].  Normal or non-critical lab and imaging results will be communicated to you by LocalBanyahart, letter or phone within 4 business days after the clinic has received the results. If you do not hear from us within 7 days, please contact the clinic through LocalBanyahart or phone. If you have a critical or abnormal lab result, we will notify you by phone as soon as possible.  Submit refill requests through Veveo or call your pharmacy and they will forward the refill request to us. Please allow 3 business days for your refill to be completed.          Additional Information About Your Visit        MyChart Information     Veveo lets you send messages to your doctor, view your test results, renew your prescriptions, schedule appointments and more. To sign up, go to [email or web address] . Click on \"Log in\" on the left side of the screen, which will take you to the Welcome page. Then click on \"Sign up Now\" on the right side of the page.     You will be asked to enter the access code listed below, as well as some personal information. Please follow the directions to create your username and password.     Your access code is: [email or web address]  Expires: 2018 10:37 AM     Your access code will  in 90 days. If you need help or a new code, please call your [place] clinic or [phone].        Care EveryWhere ID     This is your " Care EveryWhere ID. This could be used by other organizations to access your [place] medical records  [id number]        Your Vitals Were     Pulse Temperature Respirations Last Period BMI (Body Mass Index)       85 97.7  F (36.5  C) 20 05/14/2008 23.79 kg/m2        Blood Pressure from Last 3 Encounters:   01/07/18 110/60   09/21/17 114/75   09/07/17 116/69    Weight from Last 3 Encounters:   01/07/18 134 lb 4.8 oz (60.9 kg)   09/21/17 133 lb (60.3 kg)   09/07/17 132 lb (59.9 kg)              Today, you had the following     No orders found for display         Today's Medication Changes          These changes are accurate as of: 1/7/18 10:37 AM.  If you have any questions, ask your nurse or doctor.               Start taking these medicines.        Dose/Directions    cetirizine 10 MG tablet   Commonly known as:  zyrTEC   Used for:  Hives, Itching   Started by:  Tushar Tabares PA-C        Dose:  10 mg   Take 1 tablet (10 mg) by mouth every evening   Quantity:  90 tablet   Refills:  1       hydrOXYzine 25 MG tablet   Commonly known as:  ATARAX   Used for:  Insomnia, unspecified type   Started by:  Tushar Tabares PA-C        Dose:  25-50 mg   Take 1-2 tablets (25-50 mg) by mouth nightly as needed (insomnia)   Quantity:  30 tablet   Refills:  0       methylPREDNISolone 4 MG tablet   Commonly known as:  MEDROL DOSEPAK   Used for:  Hives, Itching   Started by:  Tushar Tabares PA-C        Follow package instructions   Quantity:  21 tablet   Refills:  0         These medicines have changed or have updated prescriptions.        Dose/Directions    nicotine 7 MG/24HR 24 hr patch   Commonly known as:  NICODERM CQ   This may have changed:  Another medication with the same name was removed. Continue taking this medication, and follow the directions you see here.   Used for:  Tobacco use   Changed by:  Alyse Mcclendon MD        Dose:  1 patch   Place 1 patch onto the skin every 24 hours   Quantity:  30 patch   Refills:  1             Where to get your medicines      These medications were sent to [id number] - [place], [place] - [address] AT [facility] [address]  [address], [facility] [address]    Hours:  24-hours Phone:  [phone]     cetirizine 10 MG tablet    hydrOXYzine 25 MG tablet    methylPREDNISolone 4 MG tablet                Primary Care Provider Office Phone # Fax #    Alyse Mcclendon [phone] [address]  [facility] [address]        Equal Access to Services     [facility]: Hadii aad ku hadasho Soomaali, waaxda luqadaha, qaybta kaalmada adeegyada, waxay idiin hayaan adeeg khaixa mccoy . So [facility] [phone].    ATENCIÓN: Si habla español, tiene a link disposición servicios gratuitos de asistencia lingüística. [facility] [phone].    We comply with applicable federal civil rights laws and [place] laws. We do not discriminate on the basis of race, color, national origin, age, disability, sex, sexual orientation, or gender identity.            Thank you!     Thank you for choosing [facility]  for your care. Our goal is always to provide you with excellent care. Hearing back from our patients is one way we can continue to improve our services. Please take a few minutes to complete the written survey that you may receive in the mail after your visit with us. Thank you!             Your Updated Medication List - Protect others around you: Learn how to safely use, store and throw away your medicines at www.disposemymeds.org.          This list is accurate as of: 1/7/18 10:37 AM.  Always use your most recent med list.                   Brand Name Dispense Instructions for use Diagnosis    cetirizine 10 MG tablet    zyrTEC    90 tablet    Take 1 tablet (10 mg) by mouth every evening    Hives, Itching       diphenhydrAMINE 25 MG tablet    BENADRYL    60 tablet    Take 1-2 tablets (25-50 mg) by mouth every 6 hours as needed for  itching or allergies Do not take with Flexeril.    Rash       hydrOXYzine 25 MG tablet    ATARAX    30 tablet    Take 1-2 tablets (25-50 mg) by mouth nightly as needed (insomnia)    Insomnia, unspecified type       ibuprofen 800 MG tablet    ADVIL/MOTRIN    60 tablet    TAKE ONE TABLET BY MOUTH EVERY 8 HOURS AS NEEDED FOR MODERATE PAIN    Chronic bilateral low back pain without sciatica       levothyroxine 100 MCG tablet    SYNTHROID/LEVOTHROID    90 tablet    Take 1 tablet (100 mcg) by mouth daily    Hypothyroidism, unspecified type       methylPREDNISolone 4 MG tablet    MEDROL DOSEPAK    21 tablet    Follow package instructions    Hives, Itching       MUCINEX D MAX STRENGTH 120-1200 MG Tb12   Generic drug:  PseudoePHEDrine-guaiFENesin      Take by mouth as needed for congestion        MULTIVITAL PO      Take 1 tablet by mouth        nicotine 7 MG/24HR 24 hr patch    NICODERM CQ    30 patch    Place 1 patch onto the skin every 24 hours    Tobacco use       traMADol 50 MG tablet    ULTRAM    20 tablet    Take 1-2 tablets ( mg) by mouth every 6 hours as needed for pain    Body aches, Bilateral low back pain without sciatica, unspecified chronicity

## 2018-01-07 NOTE — PROGRESS NOTES
SUBJECTIVE:   Eunice Malloy is a 52 year old female presenting with a chief complaint of hives, itching.  Onset of symptoms was 1 week .  Course of illness is ongoing.    Severity moderate  Current and Associated symptoms: hives, itching  Treatment measures tried include none.  Predisposing factors include none.    Past Medical History:   Diagnosis Date     Anxiety 4/11/2013     Cold sore 8/15/2011     Hypothyroidism 12/9/2010     Tobacco abuse         Allergies   Allergen Reactions     Sulfa Drugs Rash         Social History   Substance Use Topics     Smoking status: Current Some Day Smoker     Years: 15.00     Types: Cigarettes     Smokeless tobacco: Never Used      Comment: 1 pack per week     Alcohol use 0.0 oz/week     0 Standard drinks or equivalent per week      Comment: rare       ROS:  CONSTITUTIONAL:NEGATIVE for fever, chills, change in weight  INTEGUMENTARY/SKIN: POSITIVE for generalized itching, rash  ENT/MOUTH: NEGATIVE for ear, mouth and throat problems  RESP:NEGATIVE for significant cough or SOB  CV: NEGATIVE for chest pain, palpitations or peripheral edema  GI: NEGATIVE for nausea, abdominal pain, heartburn, or change in bowel habits  MUSCULOSKELETAL: NEGATIVE for significant arthralgias or myalgia  NEURO: NEGATIVE for weakness, dizziness or paresthesias    OBJECTIVE  :/60  Pulse 85  Temp 97.7  F (36.5  C)  Resp 20  Wt 134 lb 4.8 oz (60.9 kg)  LMP 05/14/2008  BMI 23.79 kg/m2  GENERAL APPEARANCE: healthy, alert and no distress  EYES: EOMI,  PERRL, conjunctiva clear  HENT: ear canals and TM's normal.  Nose and mouth without ulcers, erythema or lesions  NECK: supple, nontender, no lymphadenopathy  RESP: lungs clear to auscultation - no rales, rhonchi or wheezes  CV: regular rates and rhythm, normal S1 S2, no murmur noted  NEURO: Normal strength and tone, sensory exam grossly normal,  normal speech and mentation  SKIN: Positive for generalized itching      ASSESSMENT/PLAN      ICD-10-CM    1.  Hives L50.9 methylPREDNISolone (MEDROL DOSEPAK) 4 MG tablet     cetirizine (ZYRTEC) 10 MG tablet   2. Itching L29.9 methylPREDNISolone (MEDROL DOSEPAK) 4 MG tablet     cetirizine (ZYRTEC) 10 MG tablet   3. Insomnia, unspecified type G47.00 hydrOXYzine (ATARAX) 25 MG tablet       Stay cool, cold pack on itchy areas  Follow up with PCP as needed  See orders in Epic

## 2018-01-25 ENCOUNTER — HOSPITAL ENCOUNTER (OUTPATIENT)
Dept: MAMMOGRAPHY | Facility: CLINIC | Age: 53
Discharge: HOME | End: 2018-01-25
Attending: FAMILY MEDICINE | Admitting: FAMILY MEDICINE
Payer: COMMERCIAL

## 2018-01-25 DIAGNOSIS — Z12.31 VISIT FOR SCREENING MAMMOGRAM: ICD-10-CM

## 2018-01-25 PROCEDURE — 77067 SCR MAMMO BI INCL CAD: CPT

## 2018-02-07 ENCOUNTER — OFFICE VISIT (OUTPATIENT)
Dept: FAMILY MEDICINE | Facility: CLINIC | Age: 53
End: 2018-02-07
Payer: MEDICAID

## 2018-02-07 ENCOUNTER — TELEPHONE (OUTPATIENT)
Dept: FAMILY MEDICINE | Facility: CLINIC | Age: 53
End: 2018-02-07

## 2018-02-07 VITALS
RESPIRATION RATE: 14 BRPM | OXYGEN SATURATION: 97 % | HEART RATE: 65 BPM | DIASTOLIC BLOOD PRESSURE: 72 MMHG | SYSTOLIC BLOOD PRESSURE: 114 MMHG | TEMPERATURE: 98 F | WEIGHT: 135 LBS | BODY MASS INDEX: 23.91 KG/M2

## 2018-02-07 DIAGNOSIS — R51.9 NONINTRACTABLE HEADACHE, UNSPECIFIED CHRONICITY PATTERN, UNSPECIFIED HEADACHE TYPE: ICD-10-CM

## 2018-02-07 DIAGNOSIS — D18.00 HEMANGIOMA: ICD-10-CM

## 2018-02-07 DIAGNOSIS — R42 VERTIGO: Primary | ICD-10-CM

## 2018-02-07 LAB
ERYTHROCYTE [DISTWIDTH] IN BLOOD BY AUTOMATED COUNT: 13.9 % (ref 10–15)
HCT VFR BLD AUTO: 41.2 % (ref 35–47)
HGB BLD-MCNC: 13.4 G/DL (ref 11.7–15.7)
MCH RBC QN AUTO: 30.2 PG (ref 26.5–33)
MCHC RBC AUTO-ENTMCNC: 32.5 G/DL (ref 31.5–36.5)
MCV RBC AUTO: 93 FL (ref 78–100)
PLATELET # BLD AUTO: 306 10E9/L (ref 150–450)
RBC # BLD AUTO: 4.43 10E12/L (ref 3.8–5.2)
WBC # BLD AUTO: 8.7 10E9/L (ref 4–11)

## 2018-02-07 PROCEDURE — 99214 OFFICE O/P EST MOD 30 MIN: CPT | Performed by: FAMILY MEDICINE

## 2018-02-07 PROCEDURE — 36415 COLL VENOUS BLD VENIPUNCTURE: CPT | Performed by: FAMILY MEDICINE

## 2018-02-07 PROCEDURE — 85027 COMPLETE CBC AUTOMATED: CPT | Performed by: FAMILY MEDICINE

## 2018-02-07 PROCEDURE — 80053 COMPREHEN METABOLIC PANEL: CPT | Performed by: FAMILY MEDICINE

## 2018-02-07 NOTE — PATIENT INSTRUCTIONS
Please do not drive until the MRI results are back  For headache you can alternate Ibuprofen with Tylenol 500 to 1000 mg every 8 hours, maximum daily dose is 3, 000 mg   Follow if symptoms worsen or fail to improve.

## 2018-02-07 NOTE — TELEPHONE ENCOUNTER
"S-(situation): Feels \"like when you get really drunk and you get the spins, this is how I feel now\" This first started \" a long time ago\", had a MRI . Slight headache , \"my head feels like it is spinning.\"  This started 1 1/2 to 2 years. Not really worse recently but did have this feeling when she turned her head in the middle of the night. At an appt today and had to sit down \"and that spinning thing happened again\" This happens when she changes . Sx worse this week. Wasn't bothering her for months. She has had sinus congestoin the last =couple weeks. Has a little headache, just took ibuprogen     B-(background): had had MRI in the past \"they saw something really mild\" \"they didn't know and I was suppose to have a follow up\"     A-(assessment): needs assessment     R-(recommendations): appt given this afternoon in [facility]    Desire Banks, RN, BSN         "

## 2018-02-07 NOTE — MR AVS SNAPSHOT
After Visit Summary   2/7/2018    Eunice Malloy    MRN: [id number]           Patient Information     Date Of Birth          1965        Visit Information        Provider Department      2/7/2018 3:00 PM Madison Payan MD [facility]        Today's Diagnoses     Vertigo    -  1      Care Instructions    Please do not drive until the MRI results are back  For headache you can alternate Ibuprofen with Tylenol 500 to 1000 mg every 8 hours, maximum daily dose is 3, 000 mg   Follow if symptoms worsen or fail to improve.          Follow-ups after your visit        Your next 10 appointments already scheduled     Feb 08, 2018  3:00 PM CST   (Arrive by 2:45 PM)   MR BRAIN W/O & W CONTRAST with URMR2   [facility], [place], [facility] ([facility])    [address] [place] [id number] [phone]           Take your medicines as usual, unless your doctor tells you not to. Bring a list of your current medicines to your exam (including vitamins, minerals and over-the-counter drugs).  You may or may not receive intravenous (IV) contrast for this exam pending the discretion of the Radiologist.  You do not need to do anything special to prepare.  The MRI machine uses a strong magnet. Please wear clothes without metal (snaps, zippers). A sweatsuit works well, or we may give you a hospital gown.  Please remove any body piercings and hair extensions before you arrive. You will also remove watches, jewelry, hairpins, wallets, dentures, partial dental plates and hearing aids. You may wear contact lenses, and you may be able to wear your rings. We have a safe place to keep your personal items, but it is safer to leave them at home.  **IMPORTANT** THE INSTRUCTIONS BELOW ARE ONLY FOR THOSE PATIENTS WHO HAVE BEEN PRESCRIBED SEDATION OR GENERAL ANESTHESIA DURING THEIR MRI PROCEDURE:  IF YOUR DOCTOR PRESCRIBED ORAL SEDATION (take medicine to help you  relax during your exam):   You must get the medicine from your doctor (oral medication) before you arrive. Bring the medicine to the exam. Do not take it at home. You ll be told when to take it upon arriving for your exam.   Arrive one hour early. Bring someone who can take you home after the test. Your medicine will make you sleepy. After the exam, you may not drive, take a bus or take a taxi by yourself.  IF YOUR DOCTOR PRESCRIBED IV SEDATION:   Arrive one hour early. Bring someone who can take you home after the test. Your medicine will make you sleepy. After the exam, you may not drive, take a bus or take a taxi by yourself.   No eating 6 hours before your exam. You may have clear liquids up until 4 hours before your exam. (Clear liquids include water, clear tea, black coffee and fruit juice without pulp.)  IF YOUR DOCTOR PRESCRIBED ANESTHESIA (be asleep for your exam):   Arrive 1 1/2 hours early. Bring someone who can take you home after the test. You may not drive, take a bus or take a taxi by yourself.   No eating 8 hours before your exam. You may have clear liquids up until 4 hours before your exam. (Clear liquids include water, clear tea, black coffee and fruit juice without pulp.)   You will spend four to five hours in the recovery room.  Please call the Imaging Department at your exam site with any questions.              Who to contact     If you have questions or need follow up information about today's clinic visit or your schedule please contact [facility] directly at [phone].  Normal or non-critical lab and imaging results will be communicated to you by MyChart, letter or phone within 4 business days after the clinic has received the results. If you do not hear from us within 7 days, please contact the clinic through 8hand[employer] or phone. If you have a critical or abnormal lab result, we will notify you by phone as soon as possible.  Submit refill requests through [employer]ionic or call your  "pharmacy and they will forward the refill request to us. Please allow 3 business days for your refill to be completed.          Additional Information About Your Visit        MyChart Information     [email or web address] lets you send messages to your doctor, view your test results, renew your prescriptions, schedule appointments and more. To sign up, go to [email or web address] . Click on \"Log in\" on the left side of the screen, which will take you to the Welcome page. Then click on \"Sign up Now\" on the right side of the page.     You will be asked to enter the access code listed below, as well as some personal information. Please follow the directions to create your username and password.     Your access code is: [email or web address]  Expires: 2018 10:37 AM     Your access code will  in 90 days. If you need help or a new code, please call your [place] clinic or [phone].        Care EveryWhere ID     This is your [facility] EveryWhere ID. This could be used by other organizations to access your [place] medical records  [id number]        Your Vitals Were     Pulse Temperature Respirations Last Period Pulse Oximetry BMI (Body Mass Index)    65 98  F (36.7  C) (Oral) 14 2008 97% 23.91 kg/m2       Blood Pressure from Last 3 Encounters:   18 114/72   18 110/60   17 114/75    Weight from Last 3 Encounters:   18 135 lb (61.2 kg)   18 134 lb 4.8 oz (60.9 kg)   17 133 lb (60.3 kg)              We Performed the Following     CBC with platelets     Comprehensive metabolic panel (BMP + Alb, Alk Phos, ALT, AST, Total. Bili, TP)        Primary Care Provider Office Phone # Fax #    Alyse Mcclendon [phone] [address] [facility] [address]        Equal Access to Services     [facility] RANDY : Jimi Hearn, silviano tijerina, luis manuel be . [facility] [phone].    ATENCIÓN: Si harjit chong " link disposición servicios gratuitos de asistencia lingüística. Raquel rhodes [phone].    We comply with applicable federal civil rights laws and [place] laws. We do not discriminate on the basis of race, color, national origin, age, disability, sex, sexual orientation, or gender identity.            Thank you!     Thank you for choosing [facility]  for your care. Our goal is always to provide you with excellent care. Hearing back from our patients is one way we can continue to improve our services. Please take a few minutes to complete the written survey that you may receive in the mail after your visit with us. Thank you!             Your Updated Medication List - Protect others around you: Learn how to safely use, store and throw away your medicines at www.disposemymeds.org.          This list is accurate as of 2/7/18  3:59 PM.  Always use your most recent med list.                   Brand Name Dispense Instructions for use Diagnosis    cetirizine 10 MG tablet    zyrTEC    90 tablet    Take 1 tablet (10 mg) by mouth every evening    Hives, Itching       diphenhydrAMINE 25 MG tablet    BENADRYL    60 tablet    Take 1-2 tablets (25-50 mg) by mouth every 6 hours as needed for itching or allergies Do not take with Flexeril.    Rash       hydrOXYzine 25 MG tablet    ATARAX    30 tablet    Take 1-2 tablets (25-50 mg) by mouth nightly as needed (insomnia)    Insomnia, unspecified type       ibuprofen 800 MG tablet    ADVIL/MOTRIN    60 tablet    TAKE ONE TABLET BY MOUTH EVERY 8 HOURS AS NEEDED FOR MODERATE PAIN    Chronic bilateral low back pain without sciatica       levothyroxine 100 MCG tablet    SYNTHROID/LEVOTHROID    90 tablet    Take 1 tablet (100 mcg) by mouth daily    Hypothyroidism, unspecified type       methylPREDNISolone 4 MG tablet    MEDROL DOSEPAK    21 tablet    Follow package instructions    Hives, Itching       MUCINEX D MAX STRENGTH 120-1200 MG Tb12   Generic drug:   PseudoePHEDrine-guaiFENesin      Take by mouth as needed for congestion        MULTIVITAL PO      Take 1 tablet by mouth        nicotine 7 MG/24HR 24 hr patch    NICODERM CQ    30 patch    Place 1 patch onto the skin every 24 hours    Tobacco use       traMADol 50 MG tablet    ULTRAM    20 tablet    Take 1-2 tablets ( mg) by mouth every 6 hours as needed for pain    Body aches, Bilateral low back pain without sciatica, unspecified chronicity

## 2018-02-07 NOTE — PROGRESS NOTES
SUBJECTIVE:   Eunice Malloy is a 52 year old female who presents to clinic today for the following health issues:      Dizziness      Duration: started last week     Description   Feeling faint:  YES  Feeling like the surroundings are moving: no   Loss of consciousness or falls: no     Accompanying signs and symptoms:   Nausea/vomitting: no   Palpitations: YES, one day and thought she over exerted herself, symptoms improved when she rested  Weakness in arms or legs: no   Vision or speech changes: no   Ringing in ears (Tinnitus): Yes, the other day but improved   Hearing loss related to dizziness: no   Other (fevers/chills/sweating/dyspnea): Yes, the other day she was at her friends appt and she thought she was having a hot flash. She stood outside for a minute and felt better. Symptoms felt similar to a hot flash.     History (similar episodes/head trauma/previous evaluation/recent bleeding): 1.5 years ago had feeling like this and had a MRI done.    Precipitating or alleviating factors (new meds/chemicals): Started taking a new sleeping pill   Worse with activity/head movement: no - worse while laying down and goes to roll over.    Therapies tried and outcome: ibuprofen 800 somewhat effective      Right now she feels drained. She has a slight headache. Headache started today, initially frontal, now located on the occiput region, mild, no aggravating factors and mildly relieved with Ibuprofen 800 mg. She has some nasal congestion in the morning and spiting up a lot of phelgm in the morning. Pt is trying to quit smoking. Last week she had ear pain and stopped with pt pinching it. Then again yesterday she had ear pain. No sinus pressure.  Today she had an episode while she was an appt. She laid her head down and felt that her head was turning in waves. She felt that she might faint. She leaned against the wall and improved.     Problem list and histories reviewed & adjusted, as indicated.  Additional history: as  documented    Labs reviewed in EPIC    Reviewed and updated as needed this visit by clinical staff       Reviewed and updated as needed this visit by Provider         ROS:  Constitutional, HEENT, cardiovascular, pulmonary, gi and gu systems are negative, except as otherwise noted.    OBJECTIVE:     /72  Pulse 65  Temp 98  F (36.7  C) (Oral)  Resp 14  Wt 135 lb (61.2 kg)  LMP 05/14/2008  SpO2 97%  BMI 23.91 kg/m2  Body mass index is 23.91 kg/(m^2).  GENERAL: healthy, alert and no distress  EYES: Eyes grossly normal to inspection, PERRL and conjunctivae and sclerae normal  HENT: nose and mouth without ulcers or lesions  NECK: no adenopathy, no asymmetry, masses, or scars and thyroid normal to palpation  RESP: lungs clear to auscultation - no rales, rhonchi or wheezes  CV: regular rate and rhythm, normal S1 S2  NEURO: CN II-XII inact, Normal strength and tone, mentation intact and speech normal      Diagnostic Test Results.:  MR brain (09/16) Impression:  1, Head MRI demonstrates no acute intracranial pathology or focal  abnormality.  2. Head MRV demonstrates patent major dural and deep venous sinuses  intracranially.  3. 2 nonspecific foci of enhancement in the occipital bone on the left  end in the parietal bone on the right. The could represent small  hemangiomas with metastatic disease difficult to exclude.. No prior  imaging is available. If outside imaging is available, this could be  helpful for further evaluation. Otherwise, follow-up examination could  be considered to confirm stability.    ASSESSMENT/PLAN:     1. Vertigo: a/s with headache  - CBC with platelets  - Comprehensive metabolic panel (BMP + Alb, Alk Phos, ALT, AST, Total. Bili, TP)  - MRI previous ordered for follow up for lesions seen on 09/16 imaging   - Please do not drive until the MRI results are back  - For headache you can alternate Ibuprofen with Tylenol 500 to 1000 mg every 8 hours, maximum daily dose is 3, 000 mg   - Follow if  symptoms worsen or fail to improve.    2. Hemangioma  - MR Brain w/o Contrast; Future    Addendum - MRI results    IMPRESSION:    1. No evidence of acute infarct, mass, hemorrhage, or herniation.  2. Unchanged size of enhancing lesions in the right parietal bone and  left occipital bone which likely represent hemangiomas given their  stability. No new suspicious calvarial lesions.    Discussed case with Dr. Pastor - as lesions are stable, we can continue to monitor. Repeat MRI in one year. Order placed.   In the meanwhile, pt would need to be seen in clinic if she has any concerning symptoms.     Madison Payan MD  [facility]

## 2018-02-08 ENCOUNTER — HOSPITAL ENCOUNTER (OUTPATIENT)
Dept: MRI IMAGING | Facility: CLINIC | Age: 53
Discharge: HOME | End: 2018-02-08
Attending: NURSE PRACTITIONER | Admitting: NURSE PRACTITIONER
Payer: MEDICAID

## 2018-02-08 LAB
ALBUMIN SERPL-MCNC: 4 G/DL (ref 3.4–5)
ALP SERPL-CCNC: 148 U/L (ref 40–150)
ALT SERPL W P-5'-P-CCNC: 27 U/L (ref 0–50)
ANION GAP SERPL CALCULATED.3IONS-SCNC: 7 MMOL/L (ref 3–14)
AST SERPL W P-5'-P-CCNC: 19 U/L (ref 0–45)
BILIRUB SERPL-MCNC: 0.3 MG/DL (ref 0.2–1.3)
BUN SERPL-MCNC: 18 MG/DL (ref 7–30)
CALCIUM SERPL-MCNC: 9.2 MG/DL (ref 8.5–10.1)
CHLORIDE SERPL-SCNC: 105 MMOL/L (ref 94–109)
CO2 SERPL-SCNC: 26 MMOL/L (ref 20–32)
CREAT SERPL-MCNC: 0.64 MG/DL (ref 0.52–1.04)
GFR SERPL CREATININE-BSD FRML MDRD: >90 ML/MIN/1.7M2
GLUCOSE SERPL-MCNC: 103 MG/DL (ref 70–99)
POTASSIUM SERPL-SCNC: 4.2 MMOL/L (ref 3.4–5.3)
PROT SERPL-MCNC: 7.9 G/DL (ref 6.8–8.8)
SODIUM SERPL-SCNC: 138 MMOL/L (ref 133–144)

## 2018-02-08 PROCEDURE — 25000128 H RX IP 250 OP 636: Performed by: NURSE PRACTITIONER

## 2018-02-08 PROCEDURE — A9585 GADOBUTROL INJECTION: HCPCS | Performed by: NURSE PRACTITIONER

## 2018-02-08 PROCEDURE — 70553 MRI BRAIN STEM W/O & W/DYE: CPT

## 2018-02-08 RX ORDER — GADOBUTROL 604.72 MG/ML
7.5 INJECTION INTRAVENOUS ONCE
Status: COMPLETED | OUTPATIENT
Start: 2018-02-08 | End: 2018-02-08

## 2018-02-08 RX ADMIN — GADOBUTROL 6 ML: 604.72 INJECTION INTRAVENOUS at 14:50

## 2018-02-08 ASSESSMENT — PATIENT HEALTH QUESTIONNAIRE - PHQ9: SUM OF ALL RESPONSES TO PHQ QUESTIONS 1-9: 8

## 2018-02-09 ENCOUNTER — TELEPHONE (OUTPATIENT)
Dept: FAMILY MEDICINE | Facility: CLINIC | Age: 53
End: 2018-02-09

## 2018-02-09 NOTE — TELEPHONE ENCOUNTER
Headaches have tapered down.  Vertigo continues to occur but not as bad as before.  Will expect to hear next week after Dr Payan discusses with Dr Pastor.  Dr Pastor is in office Tuesday and Wednesday here.  Sapna Posada RN

## 2018-02-09 NOTE — TELEPHONE ENCOUNTER
RN, can you please let pt know that lab work was normal. MRI showed brain lesions are stable. Likely representing hemangiomas, which are a collection of blood vessels. I will to talk to Dr. Pastor on Monday to discuss further management.  DM        IMPRESSION:    1. No evidence of acute infarct, mass, hemorrhage, or herniation.  2. Unchanged size of enhancing lesions in the right parietal bone and  left occipital bone which likely represent hemangiomas given their  stability. No new suspicious calvarial lesions.

## 2018-02-22 NOTE — TELEPHONE ENCOUNTER
Discussed case with Dr. Pastor - as lesions are stable, we can continue to monitor. Repeat MRI in one year. Order placed.   In the meanwhile, pt would need to be seen in clinic if she has any concerning symptoms.   DM

## 2018-02-22 NOTE — TELEPHONE ENCOUNTER
Left voice mail asking patient to call back and ask to speak with triage nurse for a message from .    Viry Nassar RN

## 2018-02-26 ENCOUNTER — TELEPHONE (OUTPATIENT)
Dept: FAMILY MEDICINE | Facility: CLINIC | Age: 53
End: 2018-02-26

## 2018-02-26 ENCOUNTER — OFFICE VISIT (OUTPATIENT)
Dept: FAMILY MEDICINE | Facility: CLINIC | Age: 53
End: 2018-02-26
Payer: MEDICAID

## 2018-02-26 VITALS
WEIGHT: 132.75 LBS | BODY MASS INDEX: 24.43 KG/M2 | OXYGEN SATURATION: 96 % | HEART RATE: 69 BPM | DIASTOLIC BLOOD PRESSURE: 68 MMHG | RESPIRATION RATE: 19 BRPM | HEIGHT: 62 IN | TEMPERATURE: 97.8 F | SYSTOLIC BLOOD PRESSURE: 117 MMHG

## 2018-02-26 DIAGNOSIS — D18.00 HEMANGIOMA: ICD-10-CM

## 2018-02-26 DIAGNOSIS — B00.1 COLD SORE: ICD-10-CM

## 2018-02-26 DIAGNOSIS — R42 VERTIGO: Primary | ICD-10-CM

## 2018-02-26 DIAGNOSIS — D22.9 ATYPICAL MOLE: ICD-10-CM

## 2018-02-26 DIAGNOSIS — E03.9 HYPOTHYROIDISM, UNSPECIFIED TYPE: ICD-10-CM

## 2018-02-26 PROCEDURE — 99214 OFFICE O/P EST MOD 30 MIN: CPT | Performed by: FAMILY MEDICINE

## 2018-02-26 RX ORDER — LEVOTHYROXINE SODIUM 100 UG/1
100 TABLET ORAL DAILY
Qty: 90 TABLET | Refills: 1 | Status: SHIPPED | OUTPATIENT
Start: 2018-02-26 | End: 2018-08-15

## 2018-02-26 RX ORDER — ACYCLOVIR 50 MG/G
CREAM TOPICAL
Qty: 60 G | Refills: 2 | Status: SHIPPED | OUTPATIENT
Start: 2018-02-26 | End: 2018-05-10

## 2018-02-26 NOTE — PROGRESS NOTES
"  SUBJECTIVE:   Eunice Malloy is a 52 year old female who presents to clinic today for the following health issues:    Update: Two days ago she got up to go to work, had the spins really bad. Got up slowly. She got into the shower and started to feel like she was spinning fast. Had to sit down. She rinsed off and rolled into bed. She does not remember if it was because she was turning over, but it was the most severe dizziness she had every had. The world was moving in front of her and she felt nauseous and vomited. She had a bloody nose recently. Felt stuffed up and phlegmy. Dry house.     She reports that she has seen a specialist, but we do not have records. She saw neurology. She had testing for her vision and they looked in her ears. She believes she did an EEG and she said it was normal. She went to another clinic and had some additional testing. They did hearing test. She was told she needed additional testing and she did not go due to the cost.         HPI from 2/7/18:   \"  Dizziness      Duration: started last week     Description   Feeling faint:  YES  Feeling like the surroundings are moving: no   Loss of consciousness or falls: no     Accompanying signs and symptoms:   Nausea/vomitting: no   Palpitations: YES, one day and thought she over exerted herself, symptoms improved when she rested  Weakness in arms or legs: no   Vision or speech changes: no   Ringing in ears (Tinnitus): Yes, the other day but improved   Hearing loss related to dizziness: no   Other (fevers/chills/sweating/dyspnea): Yes, the other day she was at her friends appt and she thought she was having a hot flash. She stood outside for a minute and felt better. Symptoms felt similar to a hot flash.     History (similar episodes/head trauma/previous evaluation/recent bleeding): 1.5 years ago had feeling like this and had a MRI done.    Precipitating or alleviating factors (new meds/chemicals): Started taking a new sleeping pill   Worse with " "activity/head movement: no - worse while laying down and goes to roll over.    Therapies tried and outcome: ibuprofen 800 somewhat effective      Right now she feels drained. She has a slight headache. Headache started today, initially frontal, now located on the occiput region, mild, no aggravating factors and mildly relieved with Ibuprofen 800 mg. She has some nasal congestion in the morning and spiting up a lot of phelgm in the morning. Pt is trying to quit smoking. Last week she had ear pain and stopped with pt pinching it. Then again yesterday she had ear pain. No sinus pressure.  Today she had an episode while she was an appt. She laid her head down and felt that her head was turning in waves. She felt that she might faint. She leaned against the wall and improved. \"    Problem list and histories reviewed & adjusted, as indicated.  Additional history: as documented    Labs reviewed in EPIC    Reviewed and updated as needed this visit by clinical staff  Tobacco  Allergies  Meds  Med Hx  Surg Hx  Fam Hx  Soc Hx      Reviewed and updated as needed this visit by Provider         ROS:  As above     OBJECTIVE:     /68  Pulse 69  Temp 97.8  F (36.6  C) (Oral)  Resp 19  Ht 5' 2\" (1.575 m)  Wt 132 lb 12 oz (60.2 kg)  LMP 05/14/2008  SpO2 96%  BMI 24.28 kg/m2  Body mass index is 24.28 kg/(m^2).  GENERAL: healthy, alert and no distress  EYES: Eyes grossly normal to inspection, PERRL and conjunctivae and sclerae normal  HENT: nose and mouth without ulcers or lesions  NECK: no adenopathy, no asymmetry, masses, or scars and thyroid normal to palpation  RESP: lungs clear to auscultation - no rales, rhonchi or wheezes  CV: regular rate and rhythm, normal S1 S2  NEURO: CN II-XII inact, Normal strength and tone, mentation intact and speech normal      Diagnostic Test Results.:  MR brain (09/16) Impression:  1, Head MRI demonstrates no acute intracranial pathology or focal  abnormality.  2. Head MRV " demonstrates patent major dural and deep venous sinuses  intracranially.  3. 2 nonspecific foci of enhancement in the occipital bone on the left  end in the parietal bone on the right. The could represent small  hemangiomas with metastatic disease difficult to exclude.. No prior  imaging is available. If outside imaging is available, this could be  helpful for further evaluation. Otherwise, follow-up examination could  be considered to confirm stability.  Results for orders placed or performed in visit on 02/07/18   CBC with platelets   Result Value Ref Range    WBC 8.7 4.0 - 11.0 10e9/L    RBC Count 4.43 3.8 - 5.2 10e12/L    Hemoglobin 13.4 11.7 - 15.7 g/dL    Hematocrit 41.2 35.0 - 47.0 %    MCV 93 78 - 100 fl    MCH 30.2 26.5 - 33.0 pg    MCHC 32.5 31.5 - 36.5 g/dL    RDW 13.9 10.0 - 15.0 %    Platelet Count 306 150 - 450 10e9/L   Comprehensive metabolic panel (BMP + Alb, Alk Phos, ALT, AST, Total. Bili, TP)   Result Value Ref Range    Sodium 138 133 - 144 mmol/L    Potassium 4.2 3.4 - 5.3 mmol/L    Chloride 105 94 - 109 mmol/L    Carbon Dioxide 26 20 - 32 mmol/L    Anion Gap 7 3 - 14 mmol/L    Glucose 103 (H) 70 - 99 mg/dL    Urea Nitrogen 18 7 - 30 mg/dL    Creatinine 0.64 0.52 - 1.04 mg/dL    GFR Estimate >90 >60 mL/min/1.7m2    GFR Estimate If Black >90 >60 mL/min/1.7m2    Calcium 9.2 8.5 - 10.1 mg/dL    Bilirubin Total 0.3 0.2 - 1.3 mg/dL    Albumin 4.0 3.4 - 5.0 g/dL    Protein Total 7.9 6.8 - 8.8 g/dL    Alkaline Phosphatase 148 40 - 150 U/L    ALT 27 0 - 50 U/L    AST 19 0 - 45 U/L    MRI BRAIN WITHOUT AND WITH CONTRAST  2/8/2018 3:53 PM     HISTORY: Follow-up enhancing foci are seen in the skull.     TECHNIQUE: Multiplanar, multisequence MRI of the brain without and  with 6mL Gadavist IV.     COMPARISON: Brain MR 9/30/2016.     FINDINGS: There is no evidence of hemorrhage, mass, acute infarct, or  anomaly. The brain parenchyma, ventricles and subarachnoid spaces  appear normal.      There is no abnormal  intracranial enhancement.      Enhancing T2 hyperintense lesions within the right parietal bone and  left occipital bone appear unchanged in size since prior and probably  represent hemangiomas given their stability. No new suspicious  calvarial lesions. The visualized paranasal sinuses and mastoid air  cells appear clear. The arteries at the base of the brain and the  dural venous sinuses appear patent.         IMPRESSION:    1. No evidence of acute infarct, mass, hemorrhage, or herniation.  2. Unchanged size of enhancing lesions in the right parietal bone and  left occipital bone which likely represent hemangiomas given their  stability. No new suspicious calvarial lesions.     VICKY ANGULO MD    ASSESSMENT/PLAN:     ASSESSMENT / PLAN:  (R42) Vertigo  (primary encounter diagnosis)  Comment:  Has seen neurology and ENT in the past. ENT had recommended additional testing, but she did not complete at that time, now has recurrent vertigo  Plan: OTOLARYNGOLOGY REFERRAL she will schedule with ENT     (D18.00) Hemangioma  Comment: stable  Plan: repeat MRI in one year     (E03.9) Hypothyroidism, unspecified type  Comment: stable  Plan: levothyroxine (SYNTHROID/LEVOTHROID) 100 MCG         tablet        Refills given today     (B00.1) Cold sore  Comment: no recent episodes, but she would like cream on hand if she needs it   Plan: acyclovir (ZOVIRAX) 5 % cream             (D22.9) Atypical mole  Comment: growing in size, center of chest between breasts  Plan: she will schedule with Dr. Bull to discuss removal.        Madison Payan MD  [facility]

## 2018-02-26 NOTE — PATIENT INSTRUCTIONS
Schedule with ENT for your vertigo.  Remember to  you refills.   You could try meclizine for your vertigo.

## 2018-02-26 NOTE — MR AVS SNAPSHOT
After Visit Summary   2/26/2018    Eunice Malloy    MRN: [id number]           Patient Information     Date Of Birth          1965        Visit Information        Provider Department      2/26/2018 4:40 PM Alyse Mcclendon MD [facility]        Today's Diagnoses     Vertigo    -  1    Hemangioma        Hypothyroidism, unspecified type        Cold sore          Care Instructions    Schedule with ENT for your vertigo.  Remember to  you refills.   You could try meclizine for your vertigo.             Follow-ups after your visit        Additional Services     OTOLARYNGOLOGY REFERRAL       Your provider has referred you to: [facility] [place] [phone]  [email or web address]  [facility]: [facility] [phone]   [email or web address]  N: [facility] [phone] [email or web address]    Please be aware that coverage of these services is subject to the terms and limitations of your health insurance plan.  Call member services at your health plan with any benefit or coverage questions.      Please bring the following with you to your appointment:    (1) Any X-Rays, CTs or MRIs which have been performed.  Contact the facility where they were done to arrange for  prior to your scheduled appointment.   (2) List of current medications  (3) This referral request   (4) Any documents/labs given to you for this referral                  Who to contact     If you have questions or need follow up information about today's clinic visit or your schedule please contact [facility] directly at [phone].  Normal or non-critical lab and imaging results will be communicated to you by MyChart, letter or phone within 4 business days after the clinic has received the  "results. If you do not hear from us within 7 days, please contact the clinic through [employer] or phone. If you have a critical or abnormal lab result, we will notify you by phone as soon as possible.  Submit refill requests through [employer] or call your pharmacy and they will forward the refill request to us. Please allow 3 business days for your refill to be completed.          Additional Information About Your Visit        [employer] Information     [employer] lets you send messages to your doctor, view your test results, renew your prescriptions, schedule appointments and more. To sign up, go to [email or web address] . Click on \"Log in\" on the left side of the screen, which will take you to the Welcome page. Then click on \"Sign up Now\" on the right side of the page.     You will be asked to enter the access code listed below, as well as some personal information. Please follow the directions to create your username and password.     Your access code is: [email or web address]  Expires: 2018 10:37 AM     Your access code will  in 90 days. If you need help or a new code, please call your [facility] clinic or [phone].        Care EveryWhere ID     This is your Care EveryWhere ID. This could be used by other organizations to access your [facility] medical records  [id number]        Your Vitals Were     Pulse Temperature Respirations Height Last Period Pulse Oximetry    69 97.8  F (36.6  C) (Oral) 19 5' 2\" (1.575 m) 2008 96%    BMI (Body Mass Index)                   24.28 kg/m2            Blood Pressure from Last 3 Encounters:   18 117/68   18 114/72   18 110/60    Weight from Last 3 Encounters:   18 132 lb 12 oz (60.2 kg)   18 135 lb (61.2 kg)   18 134 lb 4.8 oz (60.9 kg)              We Performed the Following     OTOLARYNGOLOGY REFERRAL          Today's Medication Changes          These changes are accurate as of 18  4:51 PM.  If you have any questions, ask your nurse or " doctor.               Start taking these medicines.        Dose/Directions    acyclovir 5 % cream   Commonly known as:  ZOVIRAX   Used for:  Cold sore   Started by:  Alyse Mcclendon MD        Apply  topically 3 times daily as needed (for outbreak).   Quantity:  60 g   Refills:  2            Where to get your medicines      These medications were sent to [facility] [place], [place] - [address]  [address], [facility] [address]     Phone:  [phone]     acyclovir 5 % cream    levothyroxine 100 MCG tablet                Primary Care Provider Office Phone # Fax #    Alyse Mcclendon [phone]       [address]  [facility] [address]        Equal Access to Services     CECILIA [facility] Center[facility]ISE : Hadii pratik olson hadasho Soomaali, waaxda luqadaha, qaybta kaalmada adeegyada, luis manuel mccoy . So [facility] [phone].    ATENCIÓN: Si habla español, tiene a link disposición servicios gratuitos de asistencia lingüística. [facility] [phone].    We comply with applicable federal civil rights laws and [place] laws. We do not discriminate on the basis of race, color, national origin, age, disability, sex, sexual orientation, or gender identity.            Thank you!     Thank you for choosing [facility]  for your care. Our goal is always to provide you with excellent care. Hearing back from our patients is one way we can continue to improve our services. Please take a few minutes to complete the written survey that you may receive in the mail after your visit with us. Thank you!             Your Updated Medication List - Protect others around you: Learn how to safely use, store and throw away your medicines at www.disposemymeds.org.          This list is accurate as of 2/26/18  4:51 PM.  Always use your most recent med list.                   Brand Name Dispense Instructions for use Diagnosis    acyclovir 5 % cream    ZOVIRAX    60 g    Apply  topically  3 times daily as needed (for outbreak).    Cold sore       hydrOXYzine 25 MG tablet    ATARAX    30 tablet    Take 1-2 tablets (25-50 mg) by mouth nightly as needed (insomnia)    Insomnia, unspecified type       ibuprofen 800 MG tablet    ADVIL/MOTRIN    60 tablet    TAKE ONE TABLET BY MOUTH EVERY 8 HOURS AS NEEDED FOR MODERATE PAIN    Chronic bilateral low back pain without sciatica       levothyroxine 100 MCG tablet    SYNTHROID/LEVOTHROID    90 tablet    Take 1 tablet (100 mcg) by mouth daily    Hypothyroidism, unspecified type       MULTIVITAL PO      Take 1 tablet by mouth        nicotine 7 MG/24HR 24 hr patch    NICODERM CQ    30 patch    Place 1 patch onto the skin every 24 hours    Tobacco use

## 2018-02-26 NOTE — TELEPHONE ENCOUNTER
"Patient reporting vertigo symptoms on Saturday that occurred all day. Patient had a hard time standing due to the feeling of spinning and fear of falling. Patient stated \" It was scary.\"      Patient was seen by Dr. Payan on 2/7/2018 for this issue. MRI completed and wnl.  Patient states was seen by Neurology.pt given home advice on how to manage symptoms.     Patient would like to discuss medication options.       Pot scheduled with PCP for possible medication for patient to better control symptoms. Patient in agreement with plan and verbalizes understanding.   Thanks!   Elysia Calderón RN    "

## 2018-02-28 ENCOUNTER — OFFICE VISIT (OUTPATIENT)
Dept: FAMILY MEDICINE | Facility: CLINIC | Age: 53
End: 2018-02-28
Payer: MEDICAID

## 2018-02-28 VITALS
HEIGHT: 62 IN | BODY MASS INDEX: 24.52 KG/M2 | WEIGHT: 133.25 LBS | OXYGEN SATURATION: 97 % | HEART RATE: 78 BPM | TEMPERATURE: 98 F | SYSTOLIC BLOOD PRESSURE: 127 MMHG | DIASTOLIC BLOOD PRESSURE: 85 MMHG

## 2018-02-28 DIAGNOSIS — Z23 NEED FOR PROPHYLACTIC VACCINATION AND INOCULATION AGAINST INFLUENZA: ICD-10-CM

## 2018-02-28 DIAGNOSIS — D22.9 ATYPICAL MOLE: Primary | ICD-10-CM

## 2018-02-28 PROCEDURE — 11101 HC BIOPSY SKIN/SUBQ/MUC MEM, EACH ADDTL LESION: CPT | Performed by: FAMILY MEDICINE

## 2018-02-28 PROCEDURE — 88305 TISSUE EXAM BY PATHOLOGIST: CPT | Performed by: FAMILY MEDICINE

## 2018-02-28 PROCEDURE — 90686 IIV4 VACC NO PRSV 0.5 ML IM: CPT | Performed by: FAMILY MEDICINE

## 2018-02-28 PROCEDURE — 90471 IMMUNIZATION ADMIN: CPT | Performed by: FAMILY MEDICINE

## 2018-02-28 PROCEDURE — 99207 ZZC DROP WITH A PROCEDURE: CPT | Performed by: FAMILY MEDICINE

## 2018-02-28 NOTE — MR AVS SNAPSHOT
"              After Visit Summary   2018    Eunice Malloy    MRN: [id number]           Patient Information     Date Of Birth          1965        Visit Information        Provider Department      2018 9:20 AM Trent Bull MD [facility]        Today's Diagnoses     Atypical mole    -  1    Need for prophylactic vaccination and inoculation against influenza           Follow-ups after your visit        Who to contact     If you have questions or need follow up information about today's clinic visit or your schedule please contact [facility] directly at [phone].  Normal or non-critical lab and imaging results will be communicated to you by [employer], letter or phone within 4 business days after the clinic has received the results. If you do not hear from us within 7 days, please contact the clinic through [employer] or phone. If you have a critical or abnormal lab result, we will notify you by phone as soon as possible.  Submit refill requests through [employer] or call your pharmacy and they will forward the refill request to us. Please allow 3 business days for your refill to be completed.          Additional Information About Your Visit        MyChart Information     [employer] lets you send messages to your doctor, view your test results, renew your prescriptions, schedule appointments and more. To sign up, go to [email or web address] . Click on \"Log in\" on the left side of the screen, which will take you to the Welcome page. Then click on \"Sign up Now\" on the right side of the page.     You will be asked to enter the access code listed below, as well as some personal information. Please follow the directions to create your username and password.     Your access code is: [email or web address]  Expires: 2018 10:37 AM     Your access code will  in 90 days. If you need help or a new code, please call your [facility] or [phone].        Care EveryWhere ID  " "   This is your Care EveryWhere ID. This could be used by other organizations to access your [facility] medical records  [id number]        Your Vitals Were     Pulse Temperature Height Last Period Pulse Oximetry BMI (Body Mass Index)    78 98  F (36.7  C) (Oral) 5' 2\" (1.575 m) 05/14/2008 97% 24.37 kg/m2       Blood Pressure from Last 3 Encounters:   02/28/18 127/85   02/26/18 117/68   02/07/18 114/72    Weight from Last 3 Encounters:   02/28/18 133 lb 4 oz (60.4 kg)   02/26/18 132 lb 12 oz (60.2 kg)   02/07/18 135 lb (61.2 kg)              We Performed the Following     BIOPSY SKIN/SUBQ/MUC MEM, EACH ADDTL LESION     FLU VAC, SPLIT VIRUS IM > 3 YO (QUADRIVALENT) [86813]     Surgical pathology exam     Vaccine Administration, Initial [15650]        Primary Care Provider Office Phone # Fax #    Alyse Mcclendon [phone] [address] [place] [address]        Equal Access to Services     [facility] : Hadii pratik Hearn, waaxda lilliana, qaybta aylin caballero, luis manuel mccoy . So [facility] [phone].    ATENCIÓN: Si habla español, tiene a link disposición servicios gratuitos de asistencia lingüística. [facility] [phone].    We comply with applicable federal civil rights laws and [place] laws. We do not discriminate on the basis of race, color, national origin, age, disability, sex, sexual orientation, or gender identity.            Thank you!     Thank you for choosing [facility]  for your care. Our goal is always to provide you with excellent care. Hearing back from our patients is one way we can continue to improve our services. Please take a few minutes to complete the written survey that you may receive in the mail after your visit with us. Thank you!             Your Updated Medication List - Protect others around you: Learn how to safely use, store and throw away your medicines at www.disposemymeds.org.          This list is " accurate as of 2/28/18  2:46 PM.  Always use your most recent med list.                   Brand Name Dispense Instructions for use Diagnosis    acyclovir 5 % cream    ZOVIRAX    60 g    Apply  topically 3 times daily as needed (for outbreak).    Cold sore       hydrOXYzine 25 MG tablet    ATARAX    30 tablet    Take 1-2 tablets (25-50 mg) by mouth nightly as needed (insomnia)    Insomnia, unspecified type       ibuprofen 800 MG tablet    ADVIL/MOTRIN    60 tablet    TAKE ONE TABLET BY MOUTH EVERY 8 HOURS AS NEEDED FOR MODERATE PAIN    Chronic bilateral low back pain without sciatica       levothyroxine 100 MCG tablet    SYNTHROID/LEVOTHROID    90 tablet    Take 1 tablet (100 mcg) by mouth daily    Hypothyroidism, unspecified type       MULTIVITAL PO      Take 1 tablet by mouth        nicotine 7 MG/24HR 24 hr patch    NICODERM CQ    30 patch    Place 1 patch onto the skin every 24 hours    Tobacco use

## 2018-02-28 NOTE — NURSING NOTE
"Chief Complaint   Patient presents with     Lesion Removal       Initial /85 (Cuff Size: Adult Regular)  Pulse 78  Temp 98  F (36.7  C) (Oral)  Ht 5' 2\" (1.575 m)  Wt 133 lb 4 oz (60.4 kg)  LMP 05/14/2008  SpO2 97%  BMI 24.37 kg/m2 Estimated body mass index is 24.37 kg/(m^2) as calculated from the following:    Height as of this encounter: 5' 2\" (1.575 m).    Weight as of this encounter: 133 lb 4 oz (60.4 kg).  Medication Reconciliation: complete     Debora Prieto, NIKOLAS      "

## 2018-02-28 NOTE — PROGRESS NOTES
"  SUBJECTIVE:   Eunice Malloy is a 52 year old female who presents to clinic today for the following health issues:      MoleS)      Onset: long time    Description (location/number): 1, looked like a freckle but has got big and has grown out, color was brown but has turned red.     Accompanying signs and symptoms: Painful: no    History: prior moles: no    Therapies tried and outcome: oil did not help       No personal or family history of skin cancer.     Current mole is growing for years.     Problem list and histories reviewed & adjusted, as indicated.  Additional history: as documented    Labs reviewed in EPIC    Reviewed and updated as needed this visit by clinical staff  Tobacco  Allergies  Meds  Med Hx  Surg Hx  Fam Hx  Soc Hx      Reviewed and updated as needed this visit by Provider        /85 (Cuff Size: Adult Regular)  Pulse 78  Temp 98  F (36.7  C) (Oral)  Ht 5' 2\" (1.575 m)  Wt 133 lb 4 oz (60.4 kg)  LMP 05/14/2008  SpO2 97%  BMI 24.37 kg/m2  Skin - just below xyphisternal joint area around 4 mm x 4 mm brownish red mole present.    Risk and benefit of procedure discussed. Discussed need of excisional biopsy if pathology report is abnormal. Patient understood and agreed. We discussed complications of procedure and written consent signed.  Area was cleaned with alcohol and with anaesthetized with 1%lidocaine with epi. After adequate anaesthesia, lesion was shaved in typical fashion with derma blade. Bleeding was stopped with pressure, drysol. Dressing with topical antibiotics applied. Patient tolerated procedure well. EBL minimal.        (D22.9) Atypical mole  (primary encounter diagnosis)  Comment: I suspect large hemangioma. She wishes to have it removed. We talked about shave biopsy, complications, need for excision if abnormal. See procedure above. After procedure instructions given .  Plan: BIOPSY SKIN/SUBQ/MUC MEM, EACH ADDTL LESION,         Surgical pathology exam         "     (Z23) Need for prophylactic vaccination and inoculation against influenza  Comment:    Plan: FLU VAC, SPLIT VIRUS IM > 3 YO (QUADRIVALENT)         [32962], Vaccine Administration, Initial         [51425]                 Injectable Influenza Immunization Documentation    1.  Is the person to be vaccinated sick today?   No    2. Does the person to be vaccinated have an allergy to a component   of the vaccine?   No  Egg Allergy Algorithm Link    3. Has the person to be vaccinated ever had a serious reaction   to influenza vaccine in the past?   No    4. Has the person to be vaccinated ever had Guillain-Barré syndrome?   No    Form completed by Debora Prieto [facility]

## 2018-03-02 ENCOUNTER — TELEPHONE (OUTPATIENT)
Dept: FAMILY MEDICINE | Facility: CLINIC | Age: 53
End: 2018-03-02

## 2018-03-02 LAB — COPATH REPORT: NORMAL

## 2018-03-02 NOTE — TELEPHONE ENCOUNTER
Surgical pathology exam   Status:  Final result   Visible to patient:  No (Not Released) Dx:  Atypical mole Order: [id number]       Notes Recorded by Trent Bull MD on 3/2/2018 at 10:09 AM  Good news!  As I suspected and as I mentioned in the clinic your biopsy results shows hemangioma which is a noncancerous skin mole and it does not need any further treatment       Left message to call back and ask to speak with an available triage nurse.        LIZETH SingletaryN, RN

## 2018-03-05 NOTE — TELEPHONE ENCOUNTER
Left message to call back and ask to speak with an available triage nurse.  WILLIAM Wallace, LIZETHN, RN

## 2018-04-23 ENCOUNTER — TELEPHONE (OUTPATIENT)
Dept: FAMILY MEDICINE | Facility: CLINIC | Age: 53
End: 2018-04-23

## 2018-04-23 DIAGNOSIS — D17.30 LIPOMA OF SKIN AND SUBCUTANEOUS TISSUE: Primary | ICD-10-CM

## 2018-04-23 NOTE — TELEPHONE ENCOUNTER
Reason for Call:  Other Information on referral     Detailed comments: Patient saw PCP on unknown day or year, PCP reffered patient to a surgeon to remove a mass in back. She needs the information again and would like a RN to call her.    Phone Number Patient can be reached at: Home number on file [phone] (home)    Best Time: Any     Can we leave a detailed message on this number? YES    Call taken on 4/23/2018 at 11:24 AM by Demetrio Espinosa

## 2018-04-23 NOTE — TELEPHONE ENCOUNTER
Writer reviewed 4/27/17 office visit note.    General surgery referral ordered at that office visit.    Writer called patient who stated:  1. Pain has returned in her back and believes it is related to lipomas  2. Did not schedule with general surgery previously because the pain resolved  3. Would like updated referral ordered by PCP, as referral from last year almost over 1 year old    Referral pended.    Dr. Mcclendon-Please review and sign if agree.    Thank you!  LIZETH SingletaryN, RN

## 2018-04-23 NOTE — TELEPHONE ENCOUNTER
TC  --Please let patient know that updated referral is in place and signed by House.    Thank you,  Viry Nassar RN

## 2018-04-24 ENCOUNTER — TELEPHONE (OUTPATIENT)
Dept: SURGERY | Facility: CLINIC | Age: 53
End: 2018-04-24

## 2018-04-24 NOTE — TELEPHONE ENCOUNTER
Patient called to schedule a clinic consult. She let me know that her lipomas are painful and she would like to meet with someone to see what can be done to remove them. I confirmed time, date, location and provider for her clinic appointment with Dr. Viera on 04/27/2018 at 8:00 am.

## 2018-04-24 NOTE — TELEPHONE ENCOUNTER
Pt calling again about surgery referral. Gave pt the three surgical clinics listed in the 4/23/2018 General Surgery referral.

## 2018-04-26 NOTE — TELEPHONE ENCOUNTER
Due to patient's insurance coverage, consult will not be covered at the [facility] if referred here by a [facility] clinic. Financial counseling will contact patient to discuss. Jaime Covarrubias notified about this issue.

## 2018-04-26 NOTE — PROGRESS NOTES
SUBJECTIVE:   Eunice Malloy is a 52 year old female who presents to clinic today for the following health issues:    Back Pain       Duration: 2 weeks        Specific cause: none    Description:   Location of pain: low back right  Character of pain: sharp when moving, constant soreness in between  Pain radiation: radiates up right side back, indu right hip  New numbness or weakness in legs, not attributed to pain:  no     Intensity: Currently 6/10, At its worst 10/10    History:   Pain interferes with job: Not applicable, supposed to be working assignments  History of back problems: yes  Any previous MRI or X-rays: Yes- at [facility].  Date multiple  Sees a specialist for back pain:  No  Therapies tried without relief: IBU 800mg not helping this episode    Alleviating factors:   Improved by: none this episode, in the past a medication starting with Ty helped      Precipitating factors:  Worsened by: Walking and movement    Functional and Psychosocial Screen (Syd STarT Back):      Not performed today    Current episode back pain ongoing several weeks.  Pain is to low right back and radiates into hip.  Pain is constant, worse with forward flexion, getting up from seated position, going up stairs.  Pain is not radiating into leg.  No paresthesias of leg.  She took ibuprofen 800mg three times a day without relief.  Has done physical therapy in the past, does yoga regularly but cant due to the pain.  Is doing some mild stretching.      Seeing general surgery for lipomas in her back, hoping to get them removed, feels they are contributing to back pain.      Hx of chronic back pain with periodic exacerbations, last treated 4/2017 with norco.  MRI lumbar spine 2015 with multilevel DDD and bulging L4-5 and L5-S1.    Patient Active Problem List   Diagnosis     CARDIOVASCULAR SCREENING; LDL GOAL LESS THAN 160     Hypothyroidism     Cold sore     Anxiety     Shoulder pain     Labral tear of shoulder     Microscopic hematuria      Low back pain     Loss of hair     SI (sacroiliac) joint dysfunction and pain - BILATERAL     Abnormal finding on MRI of brain     Past Surgical History:   Procedure Laterality Date     HC EXCIS PRIMARY GANGLION WRIST       TONSILLECTOMY  as child       Social History   Substance Use Topics     Smoking status: Current Some Day Smoker     Years: 15.00     Types: Cigarettes     Smokeless tobacco: Never Used      Comment: 1 pack per week     Alcohol use 0.0 oz/week     0 Standard drinks or equivalent per week      Comment: rare     History reviewed. No pertinent family history.      Current Outpatient Prescriptions   Medication Sig Dispense Refill     acyclovir (ZOVIRAX) 5 % cream Apply  topically 3 times daily as needed (for outbreak). 60 g 2     cyclobenzaprine (FLEXERIL) 10 MG tablet Take 0.5-1 tablets (5-10 mg) by mouth 3 times daily as needed for muscle spasms 30 tablet 1     HYDROcodone-acetaminophen (NORCO) 5-325 MG per tablet Take 1-2 tablets by mouth every 8 hours as needed for pain 10 tablet 0     hydrOXYzine (ATARAX) 25 MG tablet Take 1-2 tablets (25-50 mg) by mouth nightly as needed (insomnia) 30 tablet 0     ibuprofen (ADVIL/MOTRIN) 800 MG tablet TAKE ONE TABLET BY MOUTH EVERY 8 HOURS AS NEEDED FOR MODERATE PAIN 60 tablet 8     levothyroxine (SYNTHROID/LEVOTHROID) 100 MCG tablet Take 1 tablet (100 mcg) by mouth daily 90 tablet 1     Multiple Vitamins-Minerals (MULTIVITAL PO) Take 1 tablet by mouth       naproxen (NAPROSYN) 500 MG tablet Take 1 tablet (500 mg) by mouth 2 times daily as needed for moderate pain 30 tablet 1     nicotine (NICODERM CQ) 7 MG/24HR 24 hr patch Place 1 patch onto the skin every 24 hours (Patient not taking: Reported on 2/28/2018) 30 patch 1       ROS:  MSK, Neuro as above, otherwise negative       OBJECTIVE:                                                    /62 (BP Location: Left arm, Patient Position: Chair, Cuff Size: Adult Regular)  Pulse 60  Temp 98.2  F (36.8  C)  (Oral)  Resp 16  Wt 134 lb (60.8 kg)  LMP 05/14/2008  SpO2 99%  BMI 24.51 kg/m2   GENERAL APPEARANCE: healthy, alert and no distress  EYES: Eyes grossly normal to inspection and conjunctivae and sclerae normal  RESP: respirations nonlabored   ORTHO: Lumber/Thoracic Spine Exam: Tender:  right para lumbar muscles  Non-tender:  thoracic spinous processes, lumbar spinous processes, left para lumbar muscles  Range of Motion:  Limited flexion, full extension, lateral bend, and bilateral rotation with some discomfort   Strength:  able to heel walk, able to toe walk           MR LUMBAR SPINE WITHOUT CONTRAST 6/5/2015 6:13 PM      HISTORY: Lumbago.        TECHNIQUE: Sagittal T1 and STIR. Sagittal T2 and axial dual-echo T2.      FINDINGS: Five lumbar vertebrae are assumed. Disc dehydration  throughout. High signal posterior annular fissuring at L5-S1.  Incidentally noted hemangioma within T12.     Findings by specific level:     There is minimal to mild multilevel facet degenerative change.     L4-5: Disc bulging without significant stenosis.     L5-S1: Disc bulging without central stenosis. Moderate bilateral  foraminal stenosis.     No disc herniation or stenosis is seen at the other lumbar levels.     IMPRESSION  IMPRESSION:  1. Mild multilevel degenerative changes.  2. Disc bulging at L4-5 and L5-S1. Moderate bilateral foraminal  stenosis at L5-S1.     ASSESSMENT/PLAN:                                                    (M54.5,  G89.29) Chronic bilateral low back pain without sciatica  (primary encounter diagnosis)  (M62.830) Back muscle spasm  Comment: hx of recurrent acute self limited exacerbations, requesting norco which she has received for these flares in the past.  No suspicious activity on MNPMP  Plan: naproxen (NAPROSYN) 500 MG tablet  HYDROcodone-acetaminophen (NORCO) 5-325 MG per         tablet, cyclobenzaprine (FLEXERIL) 10 MG tablet        Treat acute flare with naproxen, flexeril, and norco.  Discussed  drowsiness with flexeirl and noroc, she wont drive after taking.  discussed limited supply of norco only, not good long term med.  If not improving will need more comprehensive management plan likely including physical therapy and spine specialist    (D17.30) Lipoma of skin and subcutaneous tissue  Comment: pt feels these are cause of pain and plans to meet with gen surgery to discuss removal   Plan: discussed lipomas are superficial and likely not the cause of pain, she will discuss with her surgeon          See Patient Instructions    Vida Ortiz, [facility]    Patient Instructions   1.  For back pain start naproxen twice a day for up to 30 days.  Flexeril (muscle relaxer) 1/2-1 tab three times a day, watch for drowsiness.  Limited supply of norco (vicodin), dont drive after taking it, not a good long term medication for pain    If not improving in the next week may need anish comprehensive management plan including physical therapy and seeing spine specialist.

## 2018-04-27 ENCOUNTER — TELEPHONE (OUTPATIENT)
Dept: SURGERY | Facility: CLINIC | Age: 53
End: 2018-04-27

## 2018-04-27 ENCOUNTER — OFFICE VISIT (OUTPATIENT)
Dept: FAMILY MEDICINE | Facility: CLINIC | Age: 53
End: 2018-04-27
Payer: COMMERCIAL

## 2018-04-27 VITALS
TEMPERATURE: 98.2 F | OXYGEN SATURATION: 99 % | HEART RATE: 60 BPM | RESPIRATION RATE: 16 BRPM | DIASTOLIC BLOOD PRESSURE: 62 MMHG | SYSTOLIC BLOOD PRESSURE: 104 MMHG | WEIGHT: 134 LBS | BODY MASS INDEX: 24.51 KG/M2

## 2018-04-27 DIAGNOSIS — M62.830 BACK MUSCLE SPASM: ICD-10-CM

## 2018-04-27 DIAGNOSIS — M54.50 CHRONIC BILATERAL LOW BACK PAIN WITHOUT SCIATICA: Primary | ICD-10-CM

## 2018-04-27 DIAGNOSIS — D17.30 LIPOMA OF SKIN AND SUBCUTANEOUS TISSUE: ICD-10-CM

## 2018-04-27 DIAGNOSIS — G89.29 CHRONIC BILATERAL LOW BACK PAIN WITHOUT SCIATICA: Primary | ICD-10-CM

## 2018-04-27 PROCEDURE — 99213 OFFICE O/P EST LOW 20 MIN: CPT | Performed by: NURSE PRACTITIONER

## 2018-04-27 RX ORDER — NAPROXEN 500 MG/1
500 TABLET ORAL 2 TIMES DAILY PRN
Qty: 30 TABLET | Refills: 1 | Status: SHIPPED | OUTPATIENT
Start: 2018-04-27 | End: 2018-10-17

## 2018-04-27 RX ORDER — CYCLOBENZAPRINE HCL 10 MG
5-10 TABLET ORAL 3 TIMES DAILY PRN
Qty: 30 TABLET | Refills: 1 | Status: SHIPPED | OUTPATIENT
Start: 2018-04-27 | End: 2018-10-17

## 2018-04-27 RX ORDER — HYDROCODONE BITARTRATE AND ACETAMINOPHEN 5; 325 MG/1; MG/1
1-2 TABLET ORAL EVERY 8 HOURS PRN
Qty: 10 TABLET | Refills: 0 | Status: SHIPPED | OUTPATIENT
Start: 2018-04-27 | End: 2018-08-15

## 2018-04-27 RX ORDER — IBUPROFEN 800 MG/1
TABLET, FILM COATED ORAL
Qty: 60 TABLET | Refills: 8 | Status: CANCELLED | OUTPATIENT
Start: 2018-04-27

## 2018-04-27 NOTE — PATIENT INSTRUCTIONS
1.  For back pain start naproxen twice a day for up to 30 days.  Flexeril (muscle relaxer) 1/2-1 tab three times a day, watch for drowsiness.  Limited supply of norco (vicodin), dont drive after taking it, not a good long term medication for pain    If not improving in the next week may need anish comprehensive management plan including physical therapy and seeing spine specialist.

## 2018-04-27 NOTE — TELEPHONE ENCOUNTER
Per verbal communication, patient was scheduled for a clinic consult with Dr. Benton on 05/04/2018. Patient should have new insurance as of 05/01/2018 that will cover consult. Patient requested e-mail confirmation. E-mail will be sent with the following note to [email or web address]:    Michael Trujilloee LAURA Carrione,    As discussed on the phone you are scheduled for a clinic appointment with a general surgeon. Your appointment is with Dr. Yaakov Benton on 05/04/2018 at 11:00 AM. This appointment is a consultation only. Please feel free to come with a list of questions to ask Dr. Yaakov Benton. Our clinic is located at:      [facility]-4th floor(4K)  [address] [place] [address]      You will need to check in by 10:45 AM. We do offer  parking at our clinic at a flat rate of $6.00 per day. I would recommend using this service since parking can be limited around our facility.     If you have any questions or concerns regarding this information, please contact feel free to contact me personally at [phone].        Thank you,    Ailyn Ward  January-op Coordinator  [facility]  [phone]

## 2018-04-27 NOTE — MR AVS SNAPSHOT
"              After Visit Summary   4/27/2018    Eunice Malloy    MRN: [id number]           Patient Information     Date Of Birth          1965        Visit Information        Provider Department      4/27/2018 8:20 AM Vida Ortiz APRN CNP [facility]        Today's Diagnoses     Chronic bilateral low back pain without sciatica        Back muscle spasm          Care Instructions    1.  For back pain start naproxen twice a day for up to 30 days.  Flexeril (muscle relaxer) 1/2-1 tab three times a day, watch for drowsiness.  Limited supply of norco (vicodin), dont drive after taking it, not a good long term medication for pain    If not improving in the next week may need anish comprehensive management plan including physical therapy and seeing spine specialist.            Follow-ups after your visit        Who to contact     If you have questions or need follow up information about today's clinic visit or your schedule please contact [facility] directly at [phone].  Normal or non-critical lab and imaging results will be communicated to you by DraftDayhart, letter or phone within 4 business days after the clinic has received the results. If you do not hear from us within 7 days, please contact the clinic through [employer] or phone. If you have a critical or abnormal lab result, we will notify you by phone as soon as possible.  Submit refill requests through Shopo or call your pharmacy and they will forward the refill request to us. Please allow 3 business days for your refill to be completed.          Additional Information About Your Visit        [facility] [place] Information     Shopo lets you send messages to your doctor, view your test results, renew your prescriptions, schedule appointments and more. To sign up, go to [email or web address] . Click on \"Log in\" on the left side of the screen, which will take you to the Welcome page. Then click on \"Sign up Now\" on the right side " of the page.     You will be asked to enter the access code listed below, as well as some personal information. Please follow the directions to create your username and password.     Your access code is: [email or web address]  Expires: 2018  6:30 AM     Your access code will  in 90 days. If you need help or a new code, please call your [place] clinic or [phone].        Care EveryWhere ID     This is your Care EveryWhere ID. This could be used by other organizations to access your [place] medical records  [id number]        Your Vitals Were     Pulse Temperature Respirations Last Period Pulse Oximetry BMI (Body Mass Index)    60 98.2  F (36.8  C) (Oral) 16 2008 99% 24.51 kg/m2       Blood Pressure from Last 3 Encounters:   18 104/62   18 127/85   18 117/68    Weight from Last 3 Encounters:   18 134 lb (60.8 kg)   18 133 lb 4 oz (60.4 kg)   18 132 lb 12 oz (60.2 kg)              Today, you had the following     No orders found for display         Today's Medication Changes          These changes are accurate as of 18  8:53 AM.  If you have any questions, ask your nurse or doctor.               Start taking these medicines.        Dose/Directions    cyclobenzaprine 10 MG tablet   Commonly known as:  FLEXERIL   Used for:  Back muscle spasm   Started by:  Vida Ortiz APRN CNP        Dose:  5-10 mg   Take 0.5-1 tablets (5-10 mg) by mouth 3 times daily as needed for muscle spasms   Quantity:  30 tablet   Refills:  1       HYDROcodone-acetaminophen 5-325 MG per tablet   Commonly known as:  NORCO   Used for:  Back muscle spasm   Started by:  Vida Ortiz APRN CNP        Dose:  1-2 tablet   Take 1-2 tablets by mouth every 8 hours as needed for pain   Quantity:  10 tablet   Refills:  0       naproxen 500 MG tablet   Commonly known as:  NAPROSYN   Used for:  Chronic bilateral low back pain without sciatica   Started by:  Vida Ortiz APRN  CNP        Dose:  500 mg   Take 1 tablet (500 mg) by mouth 2 times daily as needed for moderate pain   Quantity:  30 tablet   Refills:  1            Where to get your medicines      These medications were sent to [facility] [place], [place] - [address]  [address], [facility] [address]     Phone:  [phone]     cyclobenzaprine 10 MG tablet    naproxen 500 MG tablet         Some of these will need a paper prescription and others can be bought over the counter.  Ask your nurse if you have questions.     Bring a paper prescription for each of these medications     HYDROcodone-acetaminophen 5-325 MG per tablet               Information about OPIOIDS     PRESCRIPTION OPIOIDS: WHAT YOU NEED TO KNOW   You have a prescription for an opioid (narcotic) pain medicine. Opioids can cause addiction. If you have a history of chemical dependency of any type, you are at a higher risk of becoming addicted to opioids. Only take this medicine after all other options have been tried. Take it for as short a time and as few doses as possible.     Do not:    Drive. If you drive while taking these medicines, you could be arrested for driving under the influence (DUI).    Operate heavy machinery    Do any other dangerous activities while taking these medicines.     Drink any alcohol while taking these medicines.      Take with any other medicines that contain acetaminophen. Read all labels carefully. Look for the word  acetaminophen  or  Tylenol.  Ask your pharmacist if you have questions or are unsure.    Store your pills in a secure place, locked if possible. We will not replace any lost or stolen medicine. If you don t finish your medicine, please throw away (dispose) as directed by your pharmacist. The [place] Pollution Control Agency has more information about safe disposal: https://www.pca.state.[place]/living-green/managing-unwanted-medications    All opioids tend to cause constipation. Drink plenty of  water and eat foods that have a lot of fiber, such as fruits, vegetables, prune juice, apple juice and high-fiber cereal. Take a laxative (Miralax, milk of magnesia, Colace, Senna) if you don t move your bowels at least every other day.          Primary Care Provider Office Phone # Fax #    Alyse Mcclendon [phone] [address]  [facility] [place] [address]        Equal Access to Services     ISAIAS PADILLA : Hadii aad ku hadasho Soomaali, waaxda luqadaha, qaybta kaalmada adeegyada, waxay idiin hayaan adeeg kharash la'ambrosio . So [facility] [phone].    ATENCIÓN: Si habla español, tiene a link disposición servicios gratuitos de asistencia lingüística. [facility] [phone].    We comply with applicable federal civil rights laws and [place] laws. We do not discriminate on the basis of race, color, national origin, age, disability, sex, sexual orientation, or gender identity.            Thank you!     Thank you for choosing [facility]  for your care. Our goal is always to provide you with excellent care. Hearing back from our patients is one way we can continue to improve our services. Please take a few minutes to complete the written survey that you may receive in the mail after your visit with us. Thank you!             Your Updated Medication List - Protect others around you: Learn how to safely use, store and throw away your medicines at www.disposemymeds.org.          This list is accurate as of 4/27/18  8:53 AM.  Always use your most recent med list.                   Brand Name Dispense Instructions for use Diagnosis    acyclovir 5 % cream    ZOVIRAX    60 g    Apply  topically 3 times daily as needed (for outbreak).    Cold sore       cyclobenzaprine 10 MG tablet    FLEXERIL    30 tablet    Take 0.5-1 tablets (5-10 mg) by mouth 3 times daily as needed for muscle spasms    Back muscle spasm       HYDROcodone-acetaminophen 5-325 MG per tablet    NORCO    10 tablet    Take 1-2  tablets by mouth every 8 hours as needed for pain    Back muscle spasm       hydrOXYzine 25 MG tablet    ATARAX    30 tablet    Take 1-2 tablets (25-50 mg) by mouth nightly as needed (insomnia)    Insomnia, unspecified type       ibuprofen 800 MG tablet    ADVIL/MOTRIN    60 tablet    TAKE ONE TABLET BY MOUTH EVERY 8 HOURS AS NEEDED FOR MODERATE PAIN    Chronic bilateral low back pain without sciatica       levothyroxine 100 MCG tablet    SYNTHROID/LEVOTHROID    90 tablet    Take 1 tablet (100 mcg) by mouth daily    Hypothyroidism, unspecified type       MULTIVITAL PO      Take 1 tablet by mouth        naproxen 500 MG tablet    NAPROSYN    30 tablet    Take 1 tablet (500 mg) by mouth 2 times daily as needed for moderate pain    Chronic bilateral low back pain without sciatica       nicotine 7 MG/24HR 24 hr patch    NICODERM CQ    30 patch    Place 1 patch onto the skin every 24 hours    Tobacco use

## 2018-04-30 ENCOUNTER — TELEPHONE (OUTPATIENT)
Dept: SURGERY | Facility: CLINIC | Age: 53
End: 2018-04-30

## 2018-04-30 NOTE — TELEPHONE ENCOUNTER
----- Message from Yaakov Benton MD sent at 4/30/2018 11:03 AM CDT -----  Regarding: nisa  Please reschedule this friday patient with Torfi.  Lipomas even if painful are not an emergency, can wait to see Torfi no matter how far out he is.  thanks

## 2018-04-30 NOTE — TELEPHONE ENCOUNTER
Per task, the patient was called and a message was left with a consult appointment for her to see Dr. Song on 05/10/2018.

## 2018-05-09 ENCOUNTER — TELEPHONE (OUTPATIENT)
Dept: SURGERY | Facility: CLINIC | Age: 53
End: 2018-05-09

## 2018-05-09 NOTE — TELEPHONE ENCOUNTER
Pre Visit Call and Assessment    Date of call:  05/09/2018    Phone numbers:  Home number on file [phone] (home)    Reached patient/confirmed appointment:  Yes  Patient care team/Primary provider:  Alyse Mcclendon  Referred to:  Dr. Sary Song    Reason for visit:  Lipoma consult

## 2018-05-10 ENCOUNTER — OFFICE VISIT (OUTPATIENT)
Dept: SURGERY | Facility: CLINIC | Age: 53
End: 2018-05-10
Payer: COMMERCIAL

## 2018-05-10 VITALS
TEMPERATURE: 98.3 F | BODY MASS INDEX: 24.48 KG/M2 | HEART RATE: 68 BPM | SYSTOLIC BLOOD PRESSURE: 117 MMHG | HEIGHT: 62 IN | DIASTOLIC BLOOD PRESSURE: 61 MMHG | WEIGHT: 133 LBS | OXYGEN SATURATION: 98 %

## 2018-05-10 DIAGNOSIS — D17.30 LIPOMA OF SKIN AND SUBCUTANEOUS TISSUE: Primary | ICD-10-CM

## 2018-05-10 ASSESSMENT — ENCOUNTER SYMPTOMS
MUSCLE CRAMPS: 0
JOINT SWELLING: 0
MYALGIAS: 1
NECK PAIN: 0
ARTHRALGIAS: 0
MUSCLE WEAKNESS: 0
BACK PAIN: 1
STIFFNESS: 0

## 2018-05-10 ASSESSMENT — PAIN SCALES - GENERAL: PAINLEVEL: MODERATE PAIN (5)

## 2018-05-10 NOTE — LETTER
5/10/2018     RE: Eunice Malloy  [address]  [facility] [address]     Dear Colleague,    Thank you for referring your patient, Eunice Malloy, to the [facility] GENERAL SURGERY at [facility]. Please see a copy of my visit note below.    General Surgery Clinic Note - New Patient Visit    NAME: Eunice Malloy,  52 year old female    PCP: Alyse Mcclendon  MRN:   [id number]      #: [phone]  Date: May 10, 2018    Chief Complaint: lipomas    History of Present Illness: Eunice Malloy is a 52 year old female who presents to the clinic with chronic low back pain and lipomas    Past Medical History: History in Epic reviewed with the patient:  Past Medical History:   Diagnosis Date     Anxiety 4/11/2013     Cold sore 8/15/2011     Hypothyroidism 12/9/2010     Tobacco abuse        Past Surgical History: History in Epic reviewed with the patient:  Past Surgical History:   Procedure Laterality Date     HC EXCIS PRIMARY GANGLION WRIST       TONSILLECTOMY  as child       Family History: History in Epic reviewed with the patient:  History reviewed. No pertinent family history.    Social History: History in Epic reviewed with the patient:  Social History     Social History     Marital status: Single     Spouse name: N/A     Number of children: N/A     Years of education: N/A     Occupational History     Not on file.     Social History Main Topics     Smoking status: Current Some Day Smoker     Years: 15.00     Types: Cigarettes     Smokeless tobacco: Never Used      Comment: 1 pack per week     Alcohol use 0.0 oz/week     0 Standard drinks or equivalent per week      Comment: rare     Drug use: No     Sexual activity: No     Other Topics Concern     Parent/Sibling W/ Cabg, Mi Or Angioplasty Before 65f 55m? No     Social History Narrative    Balanced Diet - Yes    Osteoporosis Prevention Measures - Dairy servings per day: 1    Regular Exercise -  Yes Describe walking three miles a day     Dental Exam - YES - Date: once per year    Eye Exam - NO    Self Breast Exam - Yes    Abuse: Current or Past (Physical, Sexual or Emotional)- No    Do you feel safe in your environment - Yes    Guns stored in the home - No    Sunscreen used - Yes    Seatbelts used - Yes    Lipids -  UNKNOWN pt states never been tested    Glucose -  UNKNOWN    Colon Cancer Screening - No    Hemoccults - NO    Pap Test -  YES - Date: within a year    Do you have any concerns about STD's -  No    Mammography - NO, last Mammo was 2 years ago    DEXA - NO    Immunizations reviewed and up to date - NO, unsure about last TD.    Kennedy VEGA MA               Allergies:     Allergies   Allergen Reactions     Sulfa Drugs Rash       Outpatient Medications:  Outpatient Encounter Prescriptions as of 5/10/2018   Medication Sig Dispense Refill     cyclobenzaprine (FLEXERIL) 10 MG tablet Take 0.5-1 tablets (5-10 mg) by mouth 3 times daily as needed for muscle spasms 30 tablet 1     HYDROcodone-acetaminophen (NORCO) 5-325 MG per tablet Take 1-2 tablets by mouth every 8 hours as needed for pain 10 tablet 0     hydrOXYzine (ATARAX) 25 MG tablet Take 1-2 tablets (25-50 mg) by mouth nightly as needed (insomnia) 30 tablet 0     ibuprofen (ADVIL/MOTRIN) 800 MG tablet TAKE ONE TABLET BY MOUTH EVERY 8 HOURS AS NEEDED FOR MODERATE PAIN 60 tablet 8     levothyroxine (SYNTHROID/LEVOTHROID) 100 MCG tablet Take 1 tablet (100 mcg) by mouth daily 90 tablet 1     Multiple Vitamins-Minerals (MULTIVITAL PO) Take 1 tablet by mouth       naproxen (NAPROSYN) 500 MG tablet Take 1 tablet (500 mg) by mouth 2 times daily as needed for moderate pain 30 tablet 1     [DISCONTINUED] acyclovir (ZOVIRAX) 5 % cream Apply  topically 3 times daily as needed (for outbreak). 60 g 2     [DISCONTINUED] nicotine (NICODERM CQ) 7 MG/24HR 24 hr patch Place 1 patch onto the skin every 24 hours (Patient not taking: Reported on 2/28/2018) 30 patch 1     No facility-administered encounter  "medications on file as of 5/10/2018.        ROS: 10 systems reviewed and all are negative except as above.Answers for HPI/ROS submitted by the patient on 5/10/2018   General Symptoms: No  Skin Symptoms: No  HENT Symptoms: No  EYE SYMPTOMS: No  HEART SYMPTOMS: No  LUNG SYMPTOMS: No  INTESTINAL SYMPTOMS: No  URINARY SYMPTOMS: No  GYNECOLOGIC SYMPTOMS: No  BREAST SYMPTOMS: No  SKELETAL SYMPTOMS: Yes  BLOOD SYMPTOMS: No  NERVOUS SYSTEM SYMPTOMS: No  MENTAL HEALTH SYMPTOMS: No  Back pain: Yes  Muscle aches: Yes  Neck pain: No  Swollen joints: No  Joint pain: No  Bone pain: No  Muscle cramps: No  Muscle weakness: No  Joint stiffness: No  Bone fracture: No      EXAM:  /61  Pulse 68  Temp 98.3  F (36.8  C) (Oral)  Ht 5' 2\"  Wt 133 lb  LMP 05/14/2008  SpO2 98%  BMI 24.33 kg/m2  Psych: Normal affect  Neuro: No gross focal deficits noted  Head:Normocephalic, atraumatic  Eyes: Non icteric  Neck:supple  Heart: Regular rate and rhythm  Lungs: non-labored, quiet respiration  Abdomen: flat  Back. She has some superficial rubbery masses consistent with lipomas  Extremities: No obvious deformities    A/P: Eunice Malloy is a 52 year old female with probable lipomas, but a main concern of low back pain.  She thinks they are related, but I see an old MRI showing bulging lumbar discs that is more likely the culprit.  I recommend she return to her PCP and consider FU on the discs instead.  I can remove the lipomas anytime she likes, but they will not solve the pain issue.  She does not desire removal at this time.      Sary Song MD FACS  Associate Professor of Surgery  Pager [phone]     "

## 2018-05-10 NOTE — NURSING NOTE
"Chief Complaint   Patient presents with     Lipoma     New patient consult, painful lipomas.        Vitals:    05/10/18 1113   BP: 117/61   Pulse: 68   Temp: 98.3  F (36.8  C)   TempSrc: Oral   SpO2: 98%   Weight: 133 lb   Height: 5' 2\"       Body mass index is 24.33 kg/(m^2).    Elias PABLO LPN            "

## 2018-06-04 NOTE — PROGRESS NOTES
General Surgery Clinic Note - New Patient Visit    NAME: Eunice Malloy,  52 year old female    PCP: Alyse Mcclendon  MRN:   [id number]      #: [phone]  Date: May 10, 2018    Chief Complaint: lipomas    History of Present Illness: Eunice Malloy is a 52 year old female who presents to the clinic with chronic low back pain and lipomas    Past Medical History: History in Epic reviewed with the patient:  Past Medical History:   Diagnosis Date     Anxiety 4/11/2013     Cold sore 8/15/2011     Hypothyroidism 12/9/2010     Tobacco abuse        Past Surgical History: History in Epic reviewed with the patient:  Past Surgical History:   Procedure Laterality Date     HC EXCIS PRIMARY GANGLION WRIST       TONSILLECTOMY  as child       Family History: History in Epic reviewed with the patient:  History reviewed. No pertinent family history.    Social History: History in Epic reviewed with the patient:  Social History     Social History     Marital status: Single     Spouse name: N/A     Number of children: N/A     Years of education: N/A     Occupational History     Not on file.     Social History Main Topics     Smoking status: Current Some Day Smoker     Years: 15.00     Types: Cigarettes     Smokeless tobacco: Never Used      Comment: 1 pack per week     Alcohol use 0.0 oz/week     0 Standard drinks or equivalent per week      Comment: rare     Drug use: No     Sexual activity: No     Other Topics Concern     Parent/Sibling W/ Cabg, Mi Or Angioplasty Before 65f 55m? No     Social History Narrative    Balanced Diet - Yes    Osteoporosis Prevention Measures - Dairy servings per day: 1    Regular Exercise -  Yes Describe walking three miles a day    Dental Exam - YES - Date: once per year    Eye Exam - NO    Self Breast Exam - Yes    Abuse: Current or Past (Physical, Sexual or Emotional)- No    Do you feel safe in your environment - Yes    Guns stored in the home - No    Sunscreen used - Yes    Seatbelts used - Yes     Lipids -  UNKNOWN pt states never been tested    Glucose -  UNKNOWN    Colon Cancer Screening - No    Hemoccults - NO    Pap Test -  YES - Date: within a year    Do you have any concerns about STD's -  No    Mammography - NO, last Mammo was 2 years ago    DEXA - NO    Immunizations reviewed and up to date - NO, unsure about last TD.    Kennedy VEGA MA               Allergies:     Allergies   Allergen Reactions     Sulfa Drugs Rash       Outpatient Medications:  Outpatient Encounter Prescriptions as of 5/10/2018   Medication Sig Dispense Refill     cyclobenzaprine (FLEXERIL) 10 MG tablet Take 0.5-1 tablets (5-10 mg) by mouth 3 times daily as needed for muscle spasms 30 tablet 1     HYDROcodone-acetaminophen (NORCO) 5-325 MG per tablet Take 1-2 tablets by mouth every 8 hours as needed for pain 10 tablet 0     hydrOXYzine (ATARAX) 25 MG tablet Take 1-2 tablets (25-50 mg) by mouth nightly as needed (insomnia) 30 tablet 0     ibuprofen (ADVIL/MOTRIN) 800 MG tablet TAKE ONE TABLET BY MOUTH EVERY 8 HOURS AS NEEDED FOR MODERATE PAIN 60 tablet 8     levothyroxine (SYNTHROID/LEVOTHROID) 100 MCG tablet Take 1 tablet (100 mcg) by mouth daily 90 tablet 1     Multiple Vitamins-Minerals (MULTIVITAL PO) Take 1 tablet by mouth       naproxen (NAPROSYN) 500 MG tablet Take 1 tablet (500 mg) by mouth 2 times daily as needed for moderate pain 30 tablet 1     [DISCONTINUED] acyclovir (ZOVIRAX) 5 % cream Apply  topically 3 times daily as needed (for outbreak). 60 g 2     [DISCONTINUED] nicotine (NICODERM CQ) 7 MG/24HR 24 hr patch Place 1 patch onto the skin every 24 hours (Patient not taking: Reported on 2/28/2018) 30 patch 1     No facility-administered encounter medications on file as of 5/10/2018.        ROS: 10 systems reviewed and all are negative except as above.Answers for HPI/ROS submitted by the patient on 5/10/2018   General Symptoms: No  Skin Symptoms: No  HENT Symptoms: No  EYE SYMPTOMS: No  HEART SYMPTOMS: No  LUNG SYMPTOMS:  "No  INTESTINAL SYMPTOMS: No  URINARY SYMPTOMS: No  GYNECOLOGIC SYMPTOMS: No  BREAST SYMPTOMS: No  SKELETAL SYMPTOMS: Yes  BLOOD SYMPTOMS: No  NERVOUS SYSTEM SYMPTOMS: No  MENTAL HEALTH SYMPTOMS: No  Back pain: Yes  Muscle aches: Yes  Neck pain: No  Swollen joints: No  Joint pain: No  Bone pain: No  Muscle cramps: No  Muscle weakness: No  Joint stiffness: No  Bone fracture: No      EXAM:  /61  Pulse 68  Temp 98.3  F (36.8  C) (Oral)  Ht 5' 2\"  Wt 133 lb  LMP 05/14/2008  SpO2 98%  BMI 24.33 kg/m2  Psych: Normal affect  Neuro: No gross focal deficits noted  Head:Normocephalic, atraumatic  Eyes: Non icteric  Neck:supple  Heart: Regular rate and rhythm  Lungs: non-labored, quiet respiration  Abdomen: flat  Back. She has some superficial rubbery masses consistent with lipomas  Extremities: No obvious deformities    A/P: Eunice Malloy is a 52 year old female with probable lipomas, but a main concern of low back pain.  She thinks they are related, but I see an old MRI showing bulging lumbar discs that is more likely the culprit.  I recommend she return to her PCP and consider FU on the discs instead.  I can remove the lipomas anytime she likes, but they will not solve the pain issue.  She does not desire removal at this time.      Sary Song MD FACS  Associate Professor of Surgery  Pager [phone]     "

## 2018-08-08 DIAGNOSIS — M62.830 BACK MUSCLE SPASM: ICD-10-CM

## 2018-08-08 NOTE — TELEPHONE ENCOUNTER
Refill of this controlled substance is declined. She should be seen to discuss pain plan. I would not recommend hydrocodone for regular use and it is generally not recommended for back pain. Alyse Mcclendon M.D.

## 2018-08-08 NOTE — TELEPHONE ENCOUNTER
[place]      Last Written Prescription Date:  4/27/18  Last Fill Quantity: 10,   # refills: 0  Last Office Visit: 4/27/18  Future Office visit:       Routing refill request to provider for review/approval because:  Drug not on the [facility], [facility] or [facility] refill protocol or controlled substance

## 2018-08-09 RX ORDER — HYDROCODONE BITARTRATE AND ACETAMINOPHEN 5; 325 MG/1; MG/1
1-2 TABLET ORAL EVERY 8 HOURS PRN
Qty: 10 TABLET | Refills: 0 | OUTPATIENT
Start: 2018-08-09

## 2018-08-09 NOTE — TELEPHONE ENCOUNTER
Left message on machine to call back.   Ask to speak to an RN, let them know it's a return call.    Leave a number and time that you can be reached.   Sapna Posada RN

## 2018-08-09 NOTE — TELEPHONE ENCOUNTER
Patient informed appointment required.  Appointment scheduled for 8/15/18 with Dr Mcclendon.  Sapna Posada, JUAN DAVID

## 2018-08-15 ENCOUNTER — OFFICE VISIT (OUTPATIENT)
Dept: FAMILY MEDICINE | Facility: CLINIC | Age: 53
End: 2018-08-15
Payer: COMMERCIAL

## 2018-08-15 VITALS — TEMPERATURE: 98.9 F | HEART RATE: 54 BPM | OXYGEN SATURATION: 97 %

## 2018-08-15 DIAGNOSIS — B00.1 COLD SORE: ICD-10-CM

## 2018-08-15 DIAGNOSIS — E03.9 HYPOTHYROIDISM, UNSPECIFIED TYPE: ICD-10-CM

## 2018-08-15 DIAGNOSIS — Z72.0 TOBACCO USE: ICD-10-CM

## 2018-08-15 DIAGNOSIS — M54.5 LOW BACK PAIN, UNSPECIFIED BACK PAIN LATERALITY, UNSPECIFIED CHRONICITY, WITH SCIATICA PRESENCE UNSPECIFIED: Primary | ICD-10-CM

## 2018-08-15 PROCEDURE — 84443 ASSAY THYROID STIM HORMONE: CPT | Performed by: FAMILY MEDICINE

## 2018-08-15 PROCEDURE — 84439 ASSAY OF FREE THYROXINE: CPT | Performed by: FAMILY MEDICINE

## 2018-08-15 PROCEDURE — 99214 OFFICE O/P EST MOD 30 MIN: CPT | Performed by: FAMILY MEDICINE

## 2018-08-15 PROCEDURE — 36415 COLL VENOUS BLD VENIPUNCTURE: CPT | Performed by: FAMILY MEDICINE

## 2018-08-15 RX ORDER — VARENICLINE TARTRATE 1 MG/1
1 TABLET, FILM COATED ORAL 2 TIMES DAILY
Qty: 56 TABLET | Refills: 2 | Status: SHIPPED | OUTPATIENT
Start: 2018-08-15 | End: 2018-10-02

## 2018-08-15 RX ORDER — ACYCLOVIR 50 MG/G
CREAM TOPICAL
Qty: 60 G | Refills: 2 | Status: SHIPPED | OUTPATIENT
Start: 2018-08-15 | End: 2018-10-17

## 2018-08-15 RX ORDER — LEVOTHYROXINE SODIUM 100 UG/1
100 TABLET ORAL DAILY
Qty: 90 TABLET | Refills: 1 | Status: SHIPPED | OUTPATIENT
Start: 2018-08-15 | End: 2019-02-26

## 2018-08-15 NOTE — PROGRESS NOTES
SUBJECTIVE:   Eunice Malloy is a 52 year old female who presents to clinic today for the following health issues:    Back Pain       Duration: a month flare,         Specific cause: none is unsure     Description:   Location of pain: low back right  Character of pain: sharp, cramping and constant  Pain radiation:none  New numbness or weakness in legs, not attributed to pain:  no     Intensity: Currently 7-8/10    History:   Pain interferes with job: YES, she had to quit a job because it interfered with it because it hurt to stand for long periods  History of back problems: bulging disk and other back problems   Any previous MRI or X-rays: Yes- at [place].    Sees a specialist for back pain:  No  Therapies tried without relief: nothing     Alleviating factors:   Improved by: ypga, Physical Therapy and stretch      Precipitating factors:  Worsened by: Lifting, Standing and Sitting (depending on how she sits)     Functional and Psychosocial Screen (Syd STarT Back):      Not performed today     the 800mg of ibuprofen isn't working,she wants to try the Vicodin that she wants to renew, she also has a couple of anti inflammatory ones that she wants to renew. Similar pattern to past pain, but it hurts more. More over the right low back.         Accompanying Signs & Symptoms:  Risk of Fracture:  None  Risk of Cauda Equina:  None  Risk of Infection:  None  Risk of Cancer:  None  Risk of Ankylosing Spondylitis:  Onset at age <35, male, AND morning back stiffness. no          Problem list and histories reviewed & adjusted, as indicated.  Additional history: as documented    Patient Active Problem List   Diagnosis     CARDIOVASCULAR SCREENING; LDL GOAL LESS THAN 160     Hypothyroidism     Cold sore     Anxiety     Shoulder pain     Labral tear of shoulder     Microscopic hematuria     Low back pain     Loss of hair     SI (sacroiliac) joint dysfunction and pain - BILATERAL     Abnormal finding on MRI of brain     Past  Surgical History:   Procedure Laterality Date     HC EXCIS PRIMARY GANGLION WRIST       TONSILLECTOMY  as child       Social History   Substance Use Topics     Smoking status: Current Some Day Smoker     Years: 15.00     Types: Cigarettes     Smokeless tobacco: Never Used      Comment: 1 pack per week     Alcohol use 0.0 oz/week     0 Standard drinks or equivalent per week      Comment: rare     No family history on file.      Current Outpatient Prescriptions   Medication Sig Dispense Refill     cyclobenzaprine (FLEXERIL) 10 MG tablet Take 0.5-1 tablets (5-10 mg) by mouth 3 times daily as needed for muscle spasms 30 tablet 1     HYDROcodone-acetaminophen (NORCO) 5-325 MG per tablet Take 1-2 tablets by mouth every 8 hours as needed for pain 10 tablet 0     hydrOXYzine (ATARAX) 25 MG tablet Take 1-2 tablets (25-50 mg) by mouth nightly as needed (insomnia) 30 tablet 0     ibuprofen (ADVIL/MOTRIN) 800 MG tablet TAKE ONE TABLET BY MOUTH EVERY 8 HOURS AS NEEDED FOR MODERATE PAIN 60 tablet 8     levothyroxine (SYNTHROID/LEVOTHROID) 100 MCG tablet Take 1 tablet (100 mcg) by mouth daily 90 tablet 1     Multiple Vitamins-Minerals (MULTIVITAL PO) Take 1 tablet by mouth       naproxen (NAPROSYN) 500 MG tablet Take 1 tablet (500 mg) by mouth 2 times daily as needed for moderate pain 30 tablet 1     Allergies   Allergen Reactions     Sulfa Drugs Rash     Recent Labs   Lab Test  02/07/18   1605  09/21/17   1600  09/05/17   0920  06/22/17   1425   09/28/16   1423   05/04/15   1019   07/07/11   0855   LDL   --    --    --    --    --    --    --   99   --   148*   HDL   --    --    --    --    --    --    --   101   --   85   TRIG   --    --    --    --    --    --    --   69   --   120   ALT  27  48   --   37   < >   --    < >   --    < >   --    CR  0.64  0.73   --   0.72   --   0.70   < >   --    < >   --    GFRESTIMATED  >90  83   --   85   --   87   < >   --    < >   --    GFRESTBLACK  >90  >90   --   >90   GFR  "Calc     --   >90   GFR Calc     < >   --    < >   --    POTASSIUM  4.2  4.0   --   4.2   --   4.3   < >   --    < >   --    TSH   --    --   0.85   --    --   13.18*   < >  91.59*   < >  4.07    < > = values in this interval not displayed.      BP Readings from Last 3 Encounters:   08/15/18 (P) 114/62   05/10/18 117/61   04/27/18 104/62    Wt Readings from Last 3 Encounters:   08/15/18 (P) 133 lb (60.3 kg)   05/10/18 133 lb (60.3 kg)   04/27/18 134 lb (60.8 kg)                 Reviewed and updated as needed this visit by clinical staff       Reviewed and updated as needed this visit by Provider         ROS:  As above     OBJECTIVE:     BP (P) 114/62  Ht (P) 5' 2\" (1.575 m)  Wt (P) 133 lb (60.3 kg)  LMP 05/14/2008  BMI (P) 24.33 kg/m2  Body mass index is 24.33 kg/(m^2) (pended).  GENERAL: healthy, alert and no distress  Comprehensive back pain exam:  Tenderness of right low back musculature, Range of motion not limited by pain, Lower extremity strength functional and equal on both sides, Lower extremity reflexes within normal limits bilaterally, Lower extremity sensation normal and equal on both sides and Straight leg raise negative bilaterally    Diagnostic Test Results:  none     ASSESSMENT/PLAN:          1. Low back pain, unspecified back pain laterality, unspecified chronicity, with sciatica presence unspecified   1 month flare of low back pain. She will schedule with PT. I declined her request for refill of vicodin and discussed with her recommendation for PT and avoidance of opioid medication for back pain. Gave home care instructions. She has ibuprofen, which she uses for back pain as well, but has not been getting significant relief.   - [facility]    2. Hypothyroidism, unspecified type  Due for repeat tsh   - levothyroxine (SYNTHROID/LEVOTHROID) 100 MCG tablet; Take 1 tablet (100 mcg) by mouth daily  Dispense: 90 tablet; Refill: 1  - TSH with free T4 " reflex    3. Cold sore   she requests refill of her zovirax today   - acyclovir (ZOVIRAX) 5 % cream; Apply  topically 3 times daily as needed (for outbreak).  Dispense: 60 g; Refill: 2      4. Tobacco use  She requests chantix today for smoking cessation. Smoking 3 packs per week. She has used chantix in the past with good success and tolerated it well.   - varenicline (CHANTIX) 1 MG tablet; Take 1 tablet (1 mg) by mouth 2 times daily  Dispense: 56 tablet; Refill: 2     Patient Instructions   When You Have Low Back Pain    Caring for Your Back  You are not alone.    Low back pain is very common. Nearly half of all adults have low back pain in any given year. The good news is that back pain is rarely a danger to your health. Most people can manage their back pain on their own and about half of them start feeling better within 2 weeks. In 9 out of 10 cases, low back pain goes away or no longer limits daily activity within 6 weeks.     Your outlook is good!    Your symptoms tell us that your low back pain is most likely not a danger to you. Most of the time we do not know the exact cause of low back pain, even if you see a doctor or have an MRI. However, treatment can still work without knowing the cause of the pain. Less than 1 in 100 people need surgery for their back pain.     What can I do about my low back pain?     There are three things you can do to ease low back pain and help it go away.    Use heat or cold packs.    Take medicine as directed.    Use positions, movements and exercises.     Using heat or cold packs    Try cold packs or gentle heat to ease your pain. Use whichever gives you the most relief. Apply the cold pack or heat for 15 minutes at a time, as often as needed.    Taking medicine      If your doctor has prescribed medicine, be sure to follow the directions.    If you take over-the-counter medicine, read and follow the directions.    Talk to your doctor if you have any questions.     Using  positions, movements and exercises    Research tells us that moving your joints and muscles can help you recover from back pain. Such activity should be simple and gentle. Use the positions in the photos as well as walking to help relieve your pain. Try taking a short walk every 3 to 4 hours during the day. Walk for a few minutes inside your home or take longer walks outside, on a treadmill or at a mall. Slowly increase the amount of time you walk. Expect discomfort when you begin, but it should lessen as your back starts to heal. When your back feels better, walk daily to keep your back and body healthy.    Finding a comfortable position    When your back pain is new, certain positions will ease your pain. Gently try each of the positions below until you find one that is helpful. Once you find a position of comfort, use it as often as you like when you are resting. You will recover faster if you combine rest with activity.         Lie on your back with your legs bent. You can do this by placing a pillow under your knees. Or you may lie on the floor and rest your lower legs on the seat of a chair.       Lie on your side with your knees bent, and place a pillow between your knees.       Lie on your stomach over pillows.      When should I call my doctor?    Your back pain should improve over the first couple of weeks. As it improves, you should be able to return to your normal activities. But call your doctor if:    You have a sudden change in your ability to control your bladder or bowels.    You feel tingling in your groin or legs.    The pain spreads down your leg and into your foot.    Your toes, feet or leg muscles feel weak.    You feel generally unwell or sick.    Your pain does not get better or gets worse.      If you are deaf or hard of hearing, please let us know. We provide many free services including sign language interpreters,oral interpreters, TTYs, telephone amplifiers, note takers and written  materials.    For informational purposes only. Not to replace the advice of your health care provider. Copyright   2013 [facility]. All rights reserved. [employer] 664389 - Rev 06/14.       Alyse Mcclendon MD   [facility]

## 2018-08-15 NOTE — MR AVS SNAPSHOT
After Visit Summary   8/15/2018    Eunice Malloy    MRN: [id number]           Patient Information     Date Of Birth          1965        Visit Information        Provider Department      8/15/2018 10:00 AM Alyse Mcclendon MD [facility]        Today's Diagnoses     Low back pain, unspecified back pain laterality, unspecified chronicity, with sciatica presence unspecified    -  1    Hypothyroidism, unspecified type          Care Instructions    When You Have Low Back Pain    Caring for Your Back  You are not alone.    Low back pain is very common. Nearly half of all adults have low back pain in any given year. The good news is that back pain is rarely a danger to your health. Most people can manage their back pain on their own and about half of them start feeling better within 2 weeks. In 9 out of 10 cases, low back pain goes away or no longer limits daily activity within 6 weeks.     Your outlook is good!    Your symptoms tell us that your low back pain is most likely not a danger to you. Most of the time we do not know the exact cause of low back pain, even if you see a doctor or have an MRI. However, treatment can still work without knowing the cause of the pain. Less than 1 in 100 people need surgery for their back pain.     What can I do about my low back pain?     There are three things you can do to ease low back pain and help it go away.    Use heat or cold packs.    Take medicine as directed.    Use positions, movements and exercises.     Using heat or cold packs    Try cold packs or gentle heat to ease your pain. Use whichever gives you the most relief. Apply the cold pack or heat for 15 minutes at a time, as often as needed.    Taking medicine      If your doctor has prescribed medicine, be sure to follow the directions.    If you take over-the-counter medicine, read and follow the directions.    Talk to your doctor if you have any questions.     Using positions, movements  and exercises    Research tells us that moving your joints and muscles can help you recover from back pain. Such activity should be simple and gentle. Use the positions in the photos as well as walking to help relieve your pain. Try taking a short walk every 3 to 4 hours during the day. Walk for a few minutes inside your home or take longer walks outside, on a treadmill or at a mall. Slowly increase the amount of time you walk. Expect discomfort when you begin, but it should lessen as your back starts to heal. When your back feels better, walk daily to keep your back and body healthy.    Finding a comfortable position    When your back pain is new, certain positions will ease your pain. Gently try each of the positions below until you find one that is helpful. Once you find a position of comfort, use it as often as you like when you are resting. You will recover faster if you combine rest with activity.         Lie on your back with your legs bent. You can do this by placing a pillow under your knees. Or you may lie on the floor and rest your lower legs on the seat of a chair.       Lie on your side with your knees bent, and place a pillow between your knees.       Lie on your stomach over pillows.      When should I call my doctor?    Your back pain should improve over the first couple of weeks. As it improves, you should be able to return to your normal activities. But call your doctor if:    You have a sudden change in your ability to control your bladder or bowels.    You feel tingling in your groin or legs.    The pain spreads down your leg and into your foot.    Your toes, feet or leg muscles feel weak.    You feel generally unwell or sick.    Your pain does not get better or gets worse.      If you are deaf or hard of hearing, please let us know. We provide many free services including sign language interpreters,oral interpreters, TTYs, telephone amplifiers, note takers and written materials.    For  informational purposes only. Not to replace the advice of your health care provider. Copyright   2013 [facility]. All rights reserved. [employer] [id number] - Rev 06/14.           Follow-ups after your visit        Additional Services     [facility] PT, HAND, AND CHIROPRACTIC REFERRAL       **This order will print in the [facility] Scheduling Office**    Physical Therapy, Hand Therapy and Chiropractic Care are available through:    *[facility]  *[facility]  *[facility]    Call one number to schedule at any of the above locations: [phone].    Your provider has referred you to: Physical Therapy at [facility] or [facility]    Indication/Reason for Referral: low back pain   Onset of Illness: years with flare for the past month   Therapy Orders: Evaluate and Treat  Special Programs: None  Special Request: None    Syd Velarde      Additional Comments for the Therapist or Chiropractor:      Please be aware that coverage of these services is subject to the terms and limitations of your health insurance plan.  Call member services at your health plan with any benefit or coverage questions.      Please bring the following to your appointment:    *Your personal calendar for scheduling future appointments  *Comfortable clothing                  Who to contact     If you have questions or need follow up information about today's clinic visit or your schedule please contact [facility] directly at [phone].  Normal or non-critical lab and imaging results will be communicated to you by MyChart, letter or phone within 4 business days after the clinic has received the results. If you do not hear from us within 7 days, please contact the clinic through MyChart or phone. If you have a critical or abnormal lab result, we will notify you by phone as soon as possible.  Submit refill requests through [employer] or call your pharmacy and they will forward the refill request to  us. Please allow 3 business days for your refill to be completed.          Additional Information About Your Visit        Care EveryWhere ID     This is your Care EveryWhere ID. This could be used by other organizations to access your [place] medical records  [id number]        Your Vitals Were     Last Period                   05/14/2008            Blood Pressure from Last 3 Encounters:   08/15/18 (P) 114/62   05/10/18 117/61   04/27/18 104/62    Weight from Last 3 Encounters:   08/15/18 (P) 133 lb (60.3 kg)   05/10/18 133 lb (60.3 kg)   04/27/18 134 lb (60.8 kg)              We Performed the Following     [facility] PT, HAND, AND CHIROPRACTIC REFERRAL     TSH with free T4 reflex          Where to get your medicines      These medications were sent to [facility] [address] [place] [address] AT [address] [address]    Hours:  24-hours Phone:  [phone]     levothyroxine 100 MCG tablet          Primary Care Provider Office Phone # Fax #    Alyse Mcclendon [phone] [address]  [facility] [address]        Equal Access to Services     ISAIAS PADILLA AH: Hadii pratik ku hadasho Soomaali, waaxda luqadaha, qaybta kaalmada adeegyada, luis manuel arcos. So [facility] [phone].    ATENCIÓN: Si habla español, tiene a link disposición servicios gratuitos de asistencia lingüística. Llame al [phone].    We comply with applicable federal civil rights laws and [place] laws. We do not discriminate on the basis of race, color, national origin, age, disability, sex, sexual orientation, or gender identity.            Thank you!     Thank you for choosing [facility]  for your care. Our goal is always to provide you with excellent care. Hearing back from our patients is one way we can continue to improve our services. Please take a few minutes to complete the written survey that you may receive  in the mail after your visit with us. Thank you!             Your Updated Medication List - Protect others around you: Learn how to safely use, store and throw away your medicines at www.disposemymeds.org.          This list is accurate as of 8/15/18 10:52 AM.  Always use your most recent med list.                   Brand Name Dispense Instructions for use Diagnosis    cyclobenzaprine 10 MG tablet    FLEXERIL    30 tablet    Take 0.5-1 tablets (5-10 mg) by mouth 3 times daily as needed for muscle spasms    Back muscle spasm       hydrOXYzine 25 MG tablet    ATARAX    30 tablet    Take 1-2 tablets (25-50 mg) by mouth nightly as needed (insomnia)    Insomnia, unspecified type       ibuprofen 800 MG tablet    ADVIL/MOTRIN    60 tablet    TAKE ONE TABLET BY MOUTH EVERY 8 HOURS AS NEEDED FOR MODERATE PAIN    Chronic bilateral low back pain without sciatica       levothyroxine 100 MCG tablet    SYNTHROID/LEVOTHROID    90 tablet    Take 1 tablet (100 mcg) by mouth daily    Hypothyroidism, unspecified type       MULTIVITAL PO      Take 1 tablet by mouth        naproxen 500 MG tablet    NAPROSYN    30 tablet    Take 1 tablet (500 mg) by mouth 2 times daily as needed for moderate pain    Chronic bilateral low back pain without sciatica

## 2018-08-15 NOTE — PATIENT INSTRUCTIONS
When You Have Low Back Pain    Caring for Your Back  You are not alone.    Low back pain is very common. Nearly half of all adults have low back pain in any given year. The good news is that back pain is rarely a danger to your health. Most people can manage their back pain on their own and about half of them start feeling better within 2 weeks. In 9 out of 10 cases, low back pain goes away or no longer limits daily activity within 6 weeks.     Your outlook is good!    Your symptoms tell us that your low back pain is most likely not a danger to you. Most of the time we do not know the exact cause of low back pain, even if you see a doctor or have an MRI. However, treatment can still work without knowing the cause of the pain. Less than 1 in 100 people need surgery for their back pain.     What can I do about my low back pain?     There are three things you can do to ease low back pain and help it go away.    Use heat or cold packs.    Take medicine as directed.    Use positions, movements and exercises.     Using heat or cold packs    Try cold packs or gentle heat to ease your pain. Use whichever gives you the most relief. Apply the cold pack or heat for 15 minutes at a time, as often as needed.    Taking medicine      If your doctor has prescribed medicine, be sure to follow the directions.    If you take over-the-counter medicine, read and follow the directions.    Talk to your doctor if you have any questions.     Using positions, movements and exercises    Research tells us that moving your joints and muscles can help you recover from back pain. Such activity should be simple and gentle. Use the positions in the photos as well as walking to help relieve your pain. Try taking a short walk every 3 to 4 hours during the day. Walk for a few minutes inside your home or take longer walks outside, on a treadmill or at a mall. Slowly increase the amount of time you walk. Expect discomfort when you begin, but it should  lessen as your back starts to heal. When your back feels better, walk daily to keep your back and body healthy.    Finding a comfortable position    When your back pain is new, certain positions will ease your pain. Gently try each of the positions below until you find one that is helpful. Once you find a position of comfort, use it as often as you like when you are resting. You will recover faster if you combine rest with activity.         Lie on your back with your legs bent. You can do this by placing a pillow under your knees. Or you may lie on the floor and rest your lower legs on the seat of a chair.       Lie on your side with your knees bent, and place a pillow between your knees.       Lie on your stomach over pillows.      When should I call my doctor?    Your back pain should improve over the first couple of weeks. As it improves, you should be able to return to your normal activities. But call your doctor if:    You have a sudden change in your ability to control your bladder or bowels.    You feel tingling in your groin or legs.    The pain spreads down your leg and into your foot.    Your toes, feet or leg muscles feel weak.    You feel generally unwell or sick.    Your pain does not get better or gets worse.      If you are deaf or hard of hearing, please let us know. We provide many free services including sign language interpreters,oral interpreters, TTYs, telephone amplifiers, note takers and written materials.    For informational purposes only. Not to replace the advice of your health care provider. Copyright   2013 [facility]. All rights reserved. [employer] [id number] - Rev 06/14.

## 2018-08-16 ENCOUNTER — TELEPHONE (OUTPATIENT)
Dept: FAMILY MEDICINE | Facility: CLINIC | Age: 53
End: 2018-08-16

## 2018-08-16 DIAGNOSIS — E03.9 HYPOTHYROIDISM, UNSPECIFIED TYPE: ICD-10-CM

## 2018-08-16 LAB
T4 FREE SERPL-MCNC: 0.66 NG/DL (ref 0.76–1.46)
TSH SERPL DL<=0.005 MIU/L-ACNC: 12.05 MU/L (ref 0.4–4)

## 2018-08-16 NOTE — TELEPHONE ENCOUNTER
Left voice mail asking patient to call back and ask to speak with triage nurse.    Kelly Winters, RN, BSN

## 2018-08-16 NOTE — TELEPHONE ENCOUNTER
"It looks like synthroid 100 mcg sent yesterday - so advise patient to pick this up and restart; encouraged lab only for repeat lab in 8-10 weeks after restarting this medicine.  Thanks  Jessica \"Iron\" GRAEME Frost   "

## 2018-08-16 NOTE — TELEPHONE ENCOUNTER
1) Patient reports she has npt been taking any thyroid medication for about the last month.  Please send prescription to our pharmacy.    2) Patient was not able to  the Chantix that was prescribed because her cost was going to be $400.  She will call her insurance to see what they will cover.  Sapna Posada RN

## 2018-08-16 NOTE — TELEPHONE ENCOUNTER
RN -- please let pt know that the thyroid test was abnormal, suggesting pt needs a higher dose of levothyroxine. Pt is currently on 100mcg daily.  Please confirm that she has really been taking her medication daily for the past 6-8 weeks. If she has and if pt would like to increase, I would recommend levothyroxine 112mcg daily and return for lab visit in 6-8 weeks for TSH. Please place orders if needed.  Alyse Mcclendon M.D.     Results for orders placed or performed in visit on 08/15/18   TSH with free T4 reflex   Result Value Ref Range    TSH 12.05 (H) 0.40 - 4.00 mU/L   T4 free   Result Value Ref Range    T4 Free 0.66 (L) 0.76 - 1.46 ng/dL

## 2018-08-21 ENCOUNTER — THERAPY VISIT (OUTPATIENT)
Dept: PHYSICAL THERAPY | Facility: CLINIC | Age: 53
End: 2018-08-21
Payer: COMMERCIAL

## 2018-08-21 DIAGNOSIS — M54.50 LUMBAGO: Primary | ICD-10-CM

## 2018-08-21 PROCEDURE — 97110 THERAPEUTIC EXERCISES: CPT | Mod: GP | Performed by: PHYSICAL THERAPIST

## 2018-08-21 PROCEDURE — 97161 PT EVAL LOW COMPLEX 20 MIN: CPT | Mod: GP | Performed by: PHYSICAL THERAPIST

## 2018-08-21 PROCEDURE — 97530 THERAPEUTIC ACTIVITIES: CPT | Mod: GP | Performed by: PHYSICAL THERAPIST

## 2018-08-21 NOTE — TELEPHONE ENCOUNTER
Pt came in today and asked if House can give her a high dose based on her labs. She would like it to be sent to this pharmacy. I let her know what the message was and she said it was wrong. You can call her back on [phone]. Please Advise thank you.

## 2018-08-21 NOTE — MR AVS SNAPSHOT
After Visit Summary   8/21/2018    Eunice Malloy    MRN: [id number]           Patient Information     Date Of Birth          1965        Visit Information        Provider Department      8/21/2018 4:00 PM Dagoberto Hightower PT [facility]        Today's Diagnoses     Lumbago    -  1       Follow-ups after your visit        Your next 10 appointments already scheduled     Aug 28, 2018  3:20 PM CDT   [facility] with Dagoberto Hightower PT   [facility] ([facility])    [address] [place] [id number] [phone]              Who to contact     If you have questions or need follow up information about today's clinic visit or your schedule please contact [facility] directly at [phone].  Normal or non-critical lab and imaging results will be communicated to you by MyChart, letter or phone within 4 business days after the clinic has received the results. If you do not hear from us within 7 days, please contact the clinic through MyChart or phone. If you have a critical or abnormal lab result, we will notify you by phone as soon as possible.  Submit refill requests through [employer] or call your pharmacy and they will forward the refill request to us. Please allow 3 business days for your refill to be completed.          Additional Information About Your Visit        Care EveryWhere ID     This is your Care EveryWhere ID. This could be used by other organizations to access your [place] medical records  [id number]        Your Vitals Were     Last Period                   05/14/2008            Blood Pressure from Last 3 Encounters:   08/15/18 (P) 114/62   05/10/18 117/61   04/27/18 104/62    Weight from Last 3 Encounters:   08/15/18 (P) 60.3 kg (133 lb)   05/10/18 60.3 kg (133 lb)   04/27/18 60.8 kg (134 lb)              We Performed the Following     HC PT EVAL, LOW COMPLEXITY     [facility] INITIAL EVAL  REPORT     THERAPEUTIC ACTIVITIES     THERAPEUTIC EXERCISES        Primary Care Provider Office Phone # Fax #    Alyse Mcclendon [phone] [address]  [facility] [address]        Equal Access to Services     ISAIAS PADILLA : Jimi pratik olson zacho Soyifanali, waaxda luqadaha, qaybta kaalmada federico, luis manuel kaufmanambrosio arcos. So [facility] [phone].    ATENCIÓN: Si habla español, tiene a link disposición servicios gratuitos de asistencia lingüística. Llame al [phone].    We comply with applicable federal civil rights laws and [place] laws. We do not discriminate on the basis of race, color, national origin, age, disability, sex, sexual orientation, or gender identity.            Thank you!     Thank you for choosing [facility]  for your care. Our goal is always to provide you with excellent care. Hearing back from our patients is one way we can continue to improve our services. Please take a few minutes to complete the written survey that you may receive in the mail after your visit with us. Thank you!             Your Updated Medication List - Protect others around you: Learn how to safely use, store and throw away your medicines at www.disposemymeds.org.          This list is accurate as of 8/21/18  6:12 PM.  Always use your most recent med list.                   Brand Name Dispense Instructions for use Diagnosis    acyclovir 5 % cream    ZOVIRAX    60 g    Apply  topically 3 times daily as needed (for outbreak).    Cold sore       cyclobenzaprine 10 MG tablet    FLEXERIL    30 tablet    Take 0.5-1 tablets (5-10 mg) by mouth 3 times daily as needed for muscle spasms    Back muscle spasm       hydrOXYzine 25 MG tablet    ATARAX    30 tablet    Take 1-2 tablets (25-50 mg) by mouth nightly as needed (insomnia)    Insomnia, unspecified type       ibuprofen 800 MG tablet    ADVIL/MOTRIN    60 tablet    TAKE ONE TABLET BY MOUTH EVERY 8 HOURS AS  NEEDED FOR MODERATE PAIN    Chronic bilateral low back pain without sciatica       levothyroxine 100 MCG tablet    SYNTHROID/LEVOTHROID    90 tablet    Take 1 tablet (100 mcg) by mouth daily    Hypothyroidism, unspecified type       MULTIVITAL PO      Take 1 tablet by mouth        naproxen 500 MG tablet    NAPROSYN    30 tablet    Take 1 tablet (500 mg) by mouth 2 times daily as needed for moderate pain    Chronic bilateral low back pain without sciatica       varenicline 1 MG tablet    CHANTIX    56 tablet    Take 1 tablet (1 mg) by mouth 2 times daily    Tobacco use
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
## 2018-08-28 ENCOUNTER — THERAPY VISIT (OUTPATIENT)
Dept: PHYSICAL THERAPY | Facility: CLINIC | Age: 53
End: 2018-08-28
Payer: COMMERCIAL

## 2018-08-28 DIAGNOSIS — M54.50 LUMBAGO: ICD-10-CM

## 2018-08-28 PROCEDURE — 97110 THERAPEUTIC EXERCISES: CPT | Mod: GP | Performed by: PHYSICAL THERAPIST

## 2018-08-28 PROCEDURE — 97530 THERAPEUTIC ACTIVITIES: CPT | Mod: GP | Performed by: PHYSICAL THERAPIST

## 2018-10-02 ENCOUNTER — OFFICE VISIT (OUTPATIENT)
Dept: FAMILY MEDICINE | Facility: CLINIC | Age: 53
End: 2018-10-02
Payer: COMMERCIAL

## 2018-10-02 VITALS
SYSTOLIC BLOOD PRESSURE: 117 MMHG | HEART RATE: 63 BPM | DIASTOLIC BLOOD PRESSURE: 72 MMHG | BODY MASS INDEX: 24.51 KG/M2 | OXYGEN SATURATION: 99 % | TEMPERATURE: 98.3 F | WEIGHT: 134 LBS

## 2018-10-02 DIAGNOSIS — F43.23 ADJUSTMENT DISORDER WITH MIXED ANXIETY AND DEPRESSED MOOD: ICD-10-CM

## 2018-10-02 DIAGNOSIS — F17.208 NICOTINE DEPENDENCE WITH OTHER NICOTINE-INDUCED DISORDER, UNSPECIFIED NICOTINE PRODUCT TYPE: ICD-10-CM

## 2018-10-02 DIAGNOSIS — G47.00 INSOMNIA, UNSPECIFIED TYPE: Primary | ICD-10-CM

## 2018-10-02 PROCEDURE — 99214 OFFICE O/P EST MOD 30 MIN: CPT | Performed by: FAMILY MEDICINE

## 2018-10-02 RX ORDER — TRAZODONE HYDROCHLORIDE 50 MG/1
50-100 TABLET, FILM COATED ORAL
Qty: 60 TABLET | Refills: 0 | Status: SHIPPED | OUTPATIENT
Start: 2018-10-02 | End: 2018-10-17

## 2018-10-02 ASSESSMENT — ANXIETY QUESTIONNAIRES
IF YOU CHECKED OFF ANY PROBLEMS ON THIS QUESTIONNAIRE, HOW DIFFICULT HAVE THESE PROBLEMS MADE IT FOR YOU TO DO YOUR WORK, TAKE CARE OF THINGS AT HOME, OR GET ALONG WITH OTHER PEOPLE: EXTREMELY DIFFICULT
7. FEELING AFRAID AS IF SOMETHING AWFUL MIGHT HAPPEN: SEVERAL DAYS
2. NOT BEING ABLE TO STOP OR CONTROL WORRYING: NOT AT ALL
GAD7 TOTAL SCORE: 10
6. BECOMING EASILY ANNOYED OR IRRITABLE: NEARLY EVERY DAY
3. WORRYING TOO MUCH ABOUT DIFFERENT THINGS: NEARLY EVERY DAY
5. BEING SO RESTLESS THAT IT IS HARD TO SIT STILL: NOT AT ALL
1. FEELING NERVOUS, ANXIOUS, OR ON EDGE: MORE THAN HALF THE DAYS

## 2018-10-02 ASSESSMENT — PATIENT HEALTH QUESTIONNAIRE - PHQ9: 5. POOR APPETITE OR OVEREATING: SEVERAL DAYS

## 2018-10-02 NOTE — MR AVS SNAPSHOT
After Visit Summary   10/2/2018    Eunice Malloy    MRN: [id number]           Patient Information     Date Of Birth          1965        Visit Information        Provider Department      10/2/2018 2:20 PM Madison Payan MD [facility]        Today's Diagnoses     Insomnia, unspecified type    -  1    Nicotine dependence with other nicotine-induced disorder, unspecified nicotine product type        Adjustment disorder with mixed anxiety and depressed mood           Follow-ups after your visit        Who to contact     If you have questions or need follow up information about today's clinic visit or your schedule please contact [facility] directly at [phone].  Normal or non-critical lab and imaging results will be communicated to you by MyChart, letter or phone within 4 business days after the clinic has received the results. If you do not hear from us within 7 days, please contact the clinic through MyChart or phone. If you have a critical or abnormal lab result, we will notify you by phone as soon as possible.  Submit refill requests through [employer] or call your pharmacy and they will forward the refill request to us. Please allow 3 business days for your refill to be completed.          Additional Information About Your Visit        Care EveryWhere ID     This is your Care EveryWhere ID. This could be used by other organizations to access your [facility] medical records  [id number]        Your Vitals Were     Pulse Temperature Last Period Pulse Oximetry BMI (Body Mass Index)       63 98.3  F (36.8  C) (Oral) 05/14/2008 99% 24.51 kg/m2        Blood Pressure from Last 3 Encounters:   10/02/18 117/72   08/15/18 (P) 114/62   05/10/18 117/61    Weight from Last 3 Encounters:   10/02/18 134 lb (60.8 kg)   08/15/18 (P) 133 lb (60.3 kg)   05/10/18 133 lb (60.3 kg)              Today, you had the following     No orders found for display         Today's Medication  Changes          These changes are accurate as of 10/2/18 11:59 PM.  If you have any questions, ask your nurse or doctor.               Start taking these medicines.        Dose/Directions    traZODone 50 MG tablet   Commonly known as:  DESYREL   Used for:  Insomnia, unspecified type   Started by:  Madison Payan MD        Dose:   mg   Take 1-2 tablets ( mg) by mouth nightly as needed for sleep   Quantity:  60 tablet   Refills:  0       varenicline 0.5 MG X 11 & 1 MG X 42 tablet   Commonly known as:  CHANTIX STARTING MONTH PAK   Used for:  Nicotine dependence with other nicotine-induced disorder, unspecified nicotine product type   Replaces:  varenicline 1 MG tablet   Started by:  Madison Payan MD        Take 0.5 mg tab daily for 3 days, then 0.5 mg tab twice daily for 4 days, then 1 mg twice daily.   Quantity:  53 tablet   Refills:  0         Stop taking these medicines if you haven't already. Please contact your care team if you have questions.     varenicline 1 MG tablet   Commonly known as:  CHANTIX   Replaced by:  varenicline 0.5 MG X 11 & 1 MG X 42 tablet   Stopped by:  Madison Payan MD                Where to get your medicines      These medications were sent to [facility] [address]  [address] [facility] [address]     Phone:  [phone]     traZODone 50 MG tablet    varenicline 0.5 MG X 11 & 1 MG X 42 tablet                Primary Care Provider Office Phone # Fax #    Alyse Mcclendon [phone]       [address]  [facility] [address]        Equal Access to Services     [facility] : Hadkita Hearn, silviano tijerina, luis manuel be. [facility] [phone].    ATENCIÓN: Si habla español, tiene a link disposición servicios gratuitos de asistencia lingüística. Llame al [phone].    We comply with applicable federal civil rights laws and  [place] laws. We do not discriminate on the basis of race, color, national origin, age, disability, sex, sexual orientation, or gender identity.            Thank you!     Thank you for choosing [facility]  for your care. Our goal is always to provide you with excellent care. Hearing back from our patients is one way we can continue to improve our services. Please take a few minutes to complete the written survey that you may receive in the mail after your visit with us. Thank you!             Your Updated Medication List - Protect others around you: Learn how to safely use, store and throw away your medicines at www.disposemymeds.org.          This list is accurate as of 10/2/18 11:59 PM.  Always use your most recent med list.                   Brand Name Dispense Instructions for use Diagnosis    acyclovir 5 % cream    ZOVIRAX    60 g    Apply  topically 3 times daily as needed (for outbreak).    Cold sore       cyclobenzaprine 10 MG tablet    FLEXERIL    30 tablet    Take 0.5-1 tablets (5-10 mg) by mouth 3 times daily as needed for muscle spasms    Back muscle spasm       hydrOXYzine 25 MG tablet    ATARAX    30 tablet    Take 1-2 tablets (25-50 mg) by mouth nightly as needed (insomnia)    Insomnia, unspecified type       ibuprofen 800 MG tablet    ADVIL/MOTRIN    60 tablet    TAKE ONE TABLET BY MOUTH EVERY 8 HOURS AS NEEDED FOR MODERATE PAIN    Chronic bilateral low back pain without sciatica       levothyroxine 100 MCG tablet    SYNTHROID/LEVOTHROID    90 tablet    Take 1 tablet (100 mcg) by mouth daily    Hypothyroidism, unspecified type       MULTIVITAL PO      Take 1 tablet by mouth        naproxen 500 MG tablet    NAPROSYN    30 tablet    Take 1 tablet (500 mg) by mouth 2 times daily as needed for moderate pain    Chronic bilateral low back pain without sciatica       traZODone 50 MG tablet    DESYREL    60 tablet    Take 1-2 tablets ( mg) by mouth nightly as needed for sleep     Insomnia, unspecified type       varenicline 0.5 MG X 11 & 1 MG X 42 tablet    CHANTIX STARTING MONTH PAK    53 tablet    Take 0.5 mg tab daily for 3 days, then 0.5 mg tab twice daily for 4 days, then 1 mg twice daily.    Nicotine dependence with other nicotine-induced disorder, unspecified nicotine product type

## 2018-10-02 NOTE — PROGRESS NOTES
SUBJECTIVE:   Eunice Malloy is a 52 year old female who presents to clinic today for the following health issues:    She hasn't been sleeping for a few months.   She has been taking melatonin (10 mg) up to 5 tablets.   Two to three nights/week she is not sleeping. Due to lack of sleep, she is a zombie during the day. She is not taking naps during the day. She has difficulty falling asleep and staying asleep. She is worried about not working.   She recently lost her job due to not being able to focus during the day.   A few friends of her recommended asking for Ambien, ativan or valium.   She drinks around 2-3 cups of coffee in the morning. No soda or pop drinks.  This morning she was wondering if she was drugged. She hasn't had any outside food or drinks.   She wants to quit smoking and wants to restart chantix. She is currently smoking up to 4 packs/week. Previously chantix helped.   Recent loss of friends, declined therapy.       Problem list and histories reviewed & adjusted, as indicated.  Additional history: as documented      Reviewed and updated as needed this visit by clinical staff       Reviewed and updated as needed this visit by Provider         ROS:  Constitutional, HEENT, cardiovascular, pulmonary, gi and gu systems are negative, except as otherwise noted.    OBJECTIVE:     /72 (BP Location: Right arm, Patient Position: Sitting, Cuff Size: Adult Regular)  Pulse 63  Temp 98.3  F (36.8  C) (Oral)  Wt 134 lb (60.8 kg)  LMP 05/14/2008  SpO2 99%  BMI (P) 24.51 kg/m2  Body mass index is 24.51 kg/(m^2) (pended).  GENERAL: healthy, alert and no distress  EYES: Eyes grossly normal to inspection  HENT:  nose and mouth without ulcers or lesions  PSYCH: anxious, at times tearful     Diagnostic Test Results:  none     ASSESSMENT/PLAN:       ## Insomnia, unspecified type  - advised to stop melatonin as she is taking more then the recommended amount   - traZODone (DESYREL) 50 MG tablet; Take 1-2 tablets  ( mg) by mouth nightly as needed for sleep  Dispense: 60 tablet; Refill: 0  - advised to call if symptoms are not improving over the next few days     ## Nicotine dependence with other nicotine-induced disorder, unspecified nicotine product type  - advised to start within a few weeks after insomnia improves   - varenicline (CHANTIX STARTING MONTH PAK) 0.5 MG X 11 & 1 MG X 42 tablet; Take 0.5 mg tab daily for 3 days, then 0.5 mg tab twice daily for 4 days, then 1 mg twice daily.  Dispense: 53 tablet; Refill: 0    ## Adjustment disorder with mixed anxiety and depressed mood  PHQ-9 SCORE 2/7/2018 10/2/2018 10/2/2018   Total Score - - -   Total Score 8 7 7     HUONG-7 SCORE 1/17/2013 10/2/2018   Total Score 15 -   Total Score - 10     - Encouraged good sleep hygiene, exercise and healthy diet.   - Declined therapist referral.       Madison Payan MD  [facility]

## 2018-10-03 ENCOUNTER — TELEPHONE (OUTPATIENT)
Dept: FAMILY MEDICINE | Facility: CLINIC | Age: 53
End: 2018-10-03

## 2018-10-03 ASSESSMENT — ANXIETY QUESTIONNAIRES: GAD7 TOTAL SCORE: 10

## 2018-10-03 ASSESSMENT — PATIENT HEALTH QUESTIONNAIRE - PHQ9: SUM OF ALL RESPONSES TO PHQ QUESTIONS 1-9: 7

## 2018-10-03 NOTE — LETTER
[facility]  [address] [place] [id number] [phone]          October 10, 2018    Eunice Malloy                                                                                                                     [address] [facility] [address]            Dear Eunice,    We tried contacting you but were unable to reach you.    Dr. Mcclendon was notified the prior authorization for your Acyclovir 5% ointment was denied.    Dr. Mcclendon says your options are to either pay out of pocket for this medication or try one of these options, which is covered by your insurance:    -Acyclovir capsules, suspension or tablets  -Valacyclovir tablets    Please let the clinic know if you would like Dr. Mcclendon to prescribe any of the medication options covered by your insurance company.        Sincerely,         Your [facility] Care Team

## 2018-10-03 NOTE — TELEPHONE ENCOUNTER
Prior Authorization Retail Medication Request    Medication/Dose: Acyclovir 5% ointment   ICD code (if different than what is on RX):  B00.1  Previously Tried and Failed:  Not sure  Rationale:  Product not on formulary     Insurance Name:  [facility]  Insurance ID:  [id number]      Pharmacy Information (if different than what is on RX)  Name:  Jaime Covarrubias  Phone:  [phone]                  Thank You,  Jon Solis [facility]  On behalf of [facility]

## 2018-10-04 NOTE — TELEPHONE ENCOUNTER
PA Initiation    Medication: ACYCLOVIR 5% OINT  Insurance Company: [employer] - Phone [phone] Fax [phone]  Pharmacy Filling the Rx: [facility] [address] [place] [address] [address] AT [facility] & [address] [place] [address]  Filling Pharmacy Phone: [phone]  Filling Pharmacy Fax:    Start Date: 10/4/2018

## 2018-10-05 NOTE — TELEPHONE ENCOUNTER
PRIOR AUTHORIZATION DENIED    Medication: ACYCLOVIR 5% OINT - DENIED    Denial Date: 10/4/2018    Denial Rational: PT NEEDS TO TRY/FAIL 2 FORMULARY ALTERNATIVE MEDICATIONS ACYCLOVIR (CAPSULES, SUSPENSION, OR TABLETS) AND VALACYCLOVIR TABLETS.        Appeal Information:

## 2018-10-08 NOTE — TELEPHONE ENCOUNTER
Please call pt to let her know she can either pay out of pocket for ointment or change to listed alternatives and find out what she would like to do. Alyse Mcclendon M.D.

## 2018-10-08 NOTE — TELEPHONE ENCOUNTER
PA was denied. Please order alternative med with complete SIG or begin appeal process.     If you would like to appeal:   Create letter of medical necessity or    Compile supporting clinical documentation in EPIC Telephone encounter (TE).    Route TE to: [facility].

## 2018-10-17 ENCOUNTER — OFFICE VISIT (OUTPATIENT)
Dept: FAMILY MEDICINE | Facility: CLINIC | Age: 53
End: 2018-10-17
Payer: COMMERCIAL

## 2018-10-17 VITALS
SYSTOLIC BLOOD PRESSURE: 111 MMHG | BODY MASS INDEX: 24.74 KG/M2 | DIASTOLIC BLOOD PRESSURE: 69 MMHG | HEART RATE: 74 BPM | WEIGHT: 135.25 LBS | OXYGEN SATURATION: 92 %

## 2018-10-17 DIAGNOSIS — F43.23 ADJUSTMENT DISORDER WITH MIXED ANXIETY AND DEPRESSED MOOD: ICD-10-CM

## 2018-10-17 DIAGNOSIS — G47.00 INSOMNIA, UNSPECIFIED TYPE: Primary | ICD-10-CM

## 2018-10-17 DIAGNOSIS — E03.9 HYPOTHYROIDISM, UNSPECIFIED TYPE: ICD-10-CM

## 2018-10-17 DIAGNOSIS — Z23 NEED FOR PROPHYLACTIC VACCINATION AND INOCULATION AGAINST INFLUENZA: ICD-10-CM

## 2018-10-17 PROCEDURE — 90471 IMMUNIZATION ADMIN: CPT | Performed by: FAMILY MEDICINE

## 2018-10-17 PROCEDURE — 99214 OFFICE O/P EST MOD 30 MIN: CPT | Mod: 25 | Performed by: FAMILY MEDICINE

## 2018-10-17 PROCEDURE — 90682 RIV4 VACC RECOMBINANT DNA IM: CPT | Performed by: FAMILY MEDICINE

## 2018-10-17 RX ORDER — TRAZODONE HYDROCHLORIDE 50 MG/1
50-100 TABLET, FILM COATED ORAL
Qty: 60 TABLET | Refills: 0 | Status: CANCELLED | OUTPATIENT
Start: 2018-10-17

## 2018-10-17 RX ORDER — HYDROXYZINE HYDROCHLORIDE 25 MG/1
25-50 TABLET, FILM COATED ORAL
Qty: 60 TABLET | Refills: 1 | Status: SHIPPED | OUTPATIENT
Start: 2018-10-17 | End: 2020-05-28

## 2018-10-17 RX ORDER — VALACYCLOVIR HYDROCHLORIDE 1 G/1
2000 TABLET, FILM COATED ORAL 2 TIMES DAILY
Qty: 12 TABLET | Refills: 11 | Status: SHIPPED | OUTPATIENT
Start: 2018-10-17 | End: 2023-05-10

## 2018-10-17 NOTE — MR AVS SNAPSHOT
After Visit Summary   10/17/2018    Eunice Malloy    MRN: [id number]           Patient Information     Date Of Birth          1965        Visit Information        Provider Department      10/17/2018 8:20 AM Alyse Mcclendon MD [facility]        Today's Diagnoses     Insomnia, unspecified type    -  1    Hypothyroidism, unspecified type        Adjustment disorder with mixed anxiety and depressed mood        Need for prophylactic vaccination and inoculation against influenza          Care Instructions      1) You can start the hydroxyzine 25mg about 30 minutes before bedtime for sleep. DO NOT take more than one sleep medicine in the same night. Do not take the trazodone if you are taking this. Do not drive after taking this.   2) Restart your levothyroxine. Not taking this may also have been affecting your sleep. Take it in the morning on an empty stomach and not with any other medication. Wait 30-60 minutes before eating or drinking anything after taking this. We should recheck your thyroid test in 6 weeks.   3) See me in 6 weeks.    Treating Insomnia  Good sleeping habits are a key part of treatment. If needed, some medications may help you sleep better at first. Making healthy lifestyle changes and learning to relax can improve your sleep. Treating insomnia takes commitment, but trust that your efforts will pay off. Talk to your health care provider before taking any medication.    Healthy Lifestyle  Your lifestyle affects your health and your sleep. Here are some healthy habits:    Keep a regular sleep schedule. Go to bed and get up at the same time each day.    Exercise regularly. It may help you reduce stress. Avoid strenuous exercise for two to four hours before bedtime.    Avoid or limit naps.    Use your bed only for sleep and sex.    Don t spend too much time in bed trying to fall asleep. If you can t fall asleep, get up and do something until you become tired and  drowsy.    Avoid or limit caffeine and nicotine. They can keep you awake at night. Also avoid alcohol. It may help you fall asleep at first, but your sleep will not be restful.  Before Bedtime  To sleep better every night, try these tips:    Have a bedtime routine to let your body and mind know when it s time to sleep.    Going to bed should be relaxing so try to do only relaxing things around bedtime. Sleep will come sooner.    If your worries don t let you sleep, write them down in a diary. Then close it, and go to bed.    Make sure the room is not too hot or too cold. If it s not dark enough, an eye mask can help. If it s noisy, try using earplugs.  Learn to Relax  Stress, anxiety, and body tension may keep you awake at night. To unwind before bedtime, try reading a book, meditation, or yoga. Also, try the following:    Deep breathing. Sit or lie back in a chair. Take a slow, deep breath. Hold it for 5 counts. Then breathe out slowly through your mouth. Keep doing this until you feel relaxed.    Imagery. Think of the last fun trip you took. In your mind, walk through the trip from start to finish. Put as much detail into the memory as you can remember. It will help you relax.  Cognitive Behavioral Therapy (CBT)  CBT is the most effective treatment for long-term insomnia. It tries to address the underlying causes of your sleep problems, including your habits and how you think about sleep.   Individual Therapy  Alex Marshall, PhD  Insomnia   [facility]: [phone]    [facility]: [phone]  Group Therapy  Dates and times to be announced.  Online Programs    www.SHUTi.me (pronounced shut eye). There is a fee for this program. Enter the code  [place]  if you decide to enroll in this program.     www.sleepIO.com (pronounced sleep ee oh). There is a fee for this program. Enter the code  [place]  if you decide to enroll in this  program.  Suggested Resources  Insomnia Treatment Books:    Overcoming Insomnia by Eliot Petersen and Laine Orona (2008)    No More Sleepless Nights by Davide Gonzalez and Jackie Mendez (1996)    Say Jacqui to Insomnia by Lopez Romero (2009)    The Insomnia Workbook by Jacqueline Currie and Manuel Layton (2009)    The Insomnia Answer by Gonzales Abrams and Mono Mcclelland (2006)?  Stress Management and Relaxation Books:    The Relaxation and Stress Reduction Workbook by Ashley Lal, Hortensia Hoyos and Demetrio Sutton (2008)    Stress Management Workbook: Techniques and Self-Assessment Procedures by Jesenia Juarez and Jossue Gilliland (1997)    A Mindfulness-Based Stress Reduction Workbook by Hany Arboleda and Tess Arias (2010)    The Complete Stress Management Workbook by Daniel Bueno, Tad Amaya and Helder Hills (1996)    Assert Yourself by Cherelle Miller and Trung Miller (1977)  Relaxation Resources for Computer Download   These websites offer resources to help you relax. This list is for information only. [facility] is not responsible for the quality of services or the actions of any person or organization  Progressive Muscle Relaxation (PMR):    http://www.ThromboVision/progressive-muscle-relaxation-exercise.html    http://studentsupport.[facility]/counseling/resources/self-help/relaxation-and-stress-management/  Deep Breathing Exercises:    http://www.ThromboVision/breathing-awareness.html  Meditation:       www.GeoDigitalrantheartTrusted Insight    www.theBellhopsguided-meditation-site.com You may have to pay for some of these resources.  Guided Imagery:    http://www.ThromboVision/guided-imagery-scripts.html    http://[employer]/library/wmpsuobzpl-ltzggx-czmzqhz/  Counseling / Behavioral Health  [facility]  Visit www.[facility].org or call [phone] to find a clinic close to you.   This is not a prescription and these  resources are optional. You must pay for any costs when using these resources. Please ask your insurance carrier if you can be reimbursed for these resources. If so, you are responsible for sending the needed details to your insurance carrier. These resources may also be tax deductible as medical expenses. Check with your .  These programs and publications are not affiliated in any way with [facility].    1271-7370 [employer]. [address] [place] [address], [place] [address]. All rights reserved. This information is not intended as a substitute for professional medical care. Always follow your healthcare professional's instructions.  This information has been modified by your health care provider with permission from the publisher.                Follow-ups after your visit        Follow-up notes from your care team     Return in about 6 weeks (around 11/28/2018) for Follow up visit.      Your next 10 appointments already scheduled     Oct 23, 2018 11:00 AM CDT   Office Visit with Alyse Mcclendon MD   [facility] ([place] [id number]   [phone]           Bring a current list of meds and any records pertaining to this visit. For Physicals, please bring immunization records and any forms needing to be filled out. Please arrive 10 minutes early to complete paperwork.            Nov 26, 2018  9:40 AM CST   Office Visit with Alyse Mcclendon MD   [facility] ([facility])    [address] [place] [id number] [phone]           Bring a current list of meds and any records pertaining to this visit. For Physicals, please bring immunization records and any forms needing to be filled out. Please arrive 10 minutes early to complete paperwork.              Who to contact     If you have questions or need follow up information about today's clinic visit or your schedule please contact  [facility] directly at [phone].  Normal or non-critical lab and imaging results will be communicated to you by MyChart, letter or phone within 4 business days after the clinic has received the results. If you do not hear from us within 7 days, please contact the clinic through MyChart or phone. If you have a critical or abnormal lab result, we will notify you by phone as soon as possible.  Submit refill requests through Wuzzuft or call your pharmacy and they will forward the refill request to us. Please allow 3 business days for your refill to be completed.          Additional Information About Your Visit        Care EveryWhere ID     This is your Care EveryWhere ID. This could be used by other organizations to access your [facility] medical records  [id number]        Your Vitals Were     Pulse Last Period Pulse Oximetry BMI (Body Mass Index)          74 05/14/2008 92% 24.74 kg/m2         Blood Pressure from Last 3 Encounters:   10/17/18 111/69   10/02/18 117/72   08/15/18 (P) 114/62    Weight from Last 3 Encounters:   10/17/18 135 lb 4 oz (61.3 kg)   10/02/18 134 lb (60.8 kg)   08/15/18 (P) 133 lb (60.3 kg)              We Performed the Following     FLU VACCINE, (RIV4) RECOMBINANT HA  , IM (FluBlok, egg free) [16833]- >18 YRS (G recommended  50-64 YRS)     Vaccine Administration, Initial [87600]          Today's Medication Changes          These changes are accurate as of 10/17/18  9:20 AM.  If you have any questions, ask your nurse or doctor.               Start taking these medicines.        Dose/Directions    valACYclovir 1000 mg tablet   Commonly known as:  VALTREX   Used for:  Insomnia, unspecified type   Started by:  Alyse Mcclendon MD        Dose:  2000 mg   Take 2 tablets (2,000 mg) by mouth 2 times daily For one day.   Quantity:  12 tablet   Refills:  11         These medicines have changed or have updated prescriptions.        Dose/Directions    hydrOXYzine 25 MG tablet   Commonly  known as:  ATARAX   This may have changed:  additional instructions   Used for:  Insomnia, unspecified type   Changed by:  Alyse Mcclendon MD        Dose:  25-50 mg   Take 1-2 tablets (25-50 mg) by mouth nightly as needed (insomnia) Take about 30 minutes before bedtime.   Quantity:  60 tablet   Refills:  1         Stop taking these medicines if you haven't already. Please contact your care team if you have questions.     acyclovir 5 % cream   Commonly known as:  ZOVIRAX   Stopped by:  Alyse Mcclendon MD           cyclobenzaprine 10 MG tablet   Commonly known as:  FLEXERIL   Stopped by:  Alyse Mcclendon MD                Where to get your medicines      These medications were sent to [facility] [address]  [address] [facility] [address]     Phone:  [phone]     hydrOXYzine 25 MG tablet    valACYclovir 1000 mg tablet                Primary Care Provider Office Phone # Fax #    Alyse Mcclendon [phone]       [address]  [facility] [address]        Equal Access to Services     [facility]: Hadii pratik ku hadasho Sojames, waaxda luqadaha, qaybta kaalmada adeegyaradha, luis manuel mccoy . So [facility] [phone].    ATENCIÓN: Si habla español, tiene a link disposición servicios gratuitos de asistencia lingüística. Llame al [phone].    We comply with applicable federal civil rights laws and [place] laws. We do not discriminate on the basis of race, color, national origin, age, disability, sex, sexual orientation, or gender identity.            Thank you!     Thank you for choosing [facility]  for your care. Our goal is always to provide you with excellent care. Hearing back from our patients is one way we can continue to improve our services. Please take a few minutes to complete the written survey that you may receive in the mail after your visit with us. Thank you!             Your Updated Medication List  - Protect others around you: Learn how to safely use, store and throw away your medicines at www.disposemymeds.org.          This list is accurate as of 10/17/18  9:20 AM.  Always use your most recent med list.                   Brand Name Dispense Instructions for use Diagnosis    hydrOXYzine 25 MG tablet    ATARAX    60 tablet    Take 1-2 tablets (25-50 mg) by mouth nightly as needed (insomnia) Take about 30 minutes before bedtime.    Insomnia, unspecified type       ibuprofen 800 MG tablet    ADVIL/MOTRIN    60 tablet    TAKE ONE TABLET BY MOUTH EVERY 8 HOURS AS NEEDED FOR MODERATE PAIN    Chronic bilateral low back pain without sciatica       levothyroxine 100 MCG tablet    SYNTHROID/LEVOTHROID    90 tablet    Take 1 tablet (100 mcg) by mouth daily    Hypothyroidism, unspecified type       MULTIVITAL PO      Take 1 tablet by mouth        naproxen 500 MG tablet    NAPROSYN    30 tablet    Take 1 tablet (500 mg) by mouth 2 times daily as needed for moderate pain    Chronic bilateral low back pain without sciatica       traZODone 50 MG tablet    DESYREL    60 tablet    Take 1-2 tablets ( mg) by mouth nightly as needed for sleep    Insomnia, unspecified type       valACYclovir 1000 mg tablet    VALTREX    12 tablet    Take 2 tablets (2,000 mg) by mouth 2 times daily For one day.    Insomnia, unspecified type       varenicline 0.5 MG X 11 & 1 MG X 42 tablet    CHANTIX STARTING MONTH RICARDO    53 tablet    Take 0.5 mg tab daily for 3 days, then 0.5 mg tab twice daily for 4 days, then 1 mg twice daily.    Nicotine dependence with other nicotine-induced disorder, unspecified nicotine product type

## 2018-10-17 NOTE — PATIENT INSTRUCTIONS
1) You can start the hydroxyzine 25mg about 30 minutes before bedtime for sleep. DO NOT take more than one sleep medicine in the same night. Do not take the trazodone if you are taking this. Do not drive after taking this.   2) Restart your levothyroxine. Not taking this may also have been affecting your sleep. Take it in the morning on an empty stomach and not with any other medication. Wait 30-60 minutes before eating or drinking anything after taking this. We should recheck your thyroid test in 6 weeks.   3) See me in 6 weeks.    Treating Insomnia  Good sleeping habits are a key part of treatment. If needed, some medications may help you sleep better at first. Making healthy lifestyle changes and learning to relax can improve your sleep. Treating insomnia takes commitment, but trust that your efforts will pay off. Talk to your health care provider before taking any medication.    Healthy Lifestyle  Your lifestyle affects your health and your sleep. Here are some healthy habits:    Keep a regular sleep schedule. Go to bed and get up at the same time each day.    Exercise regularly. It may help you reduce stress. Avoid strenuous exercise for two to four hours before bedtime.    Avoid or limit naps.    Use your bed only for sleep and sex.    Don t spend too much time in bed trying to fall asleep. If you can t fall asleep, get up and do something until you become tired and drowsy.    Avoid or limit caffeine and nicotine. They can keep you awake at night. Also avoid alcohol. It may help you fall asleep at first, but your sleep will not be restful.  Before Bedtime  To sleep better every night, try these tips:    Have a bedtime routine to let your body and mind know when it s time to sleep.    Going to bed should be relaxing so try to do only relaxing things around bedtime. Sleep will come sooner.    If your worries don t let you sleep, write them down in a diary. Then close it, and go to bed.    Make sure the room is  not too hot or too cold. If it s not dark enough, an eye mask can help. If it s noisy, try using earplugs.  Learn to Relax  Stress, anxiety, and body tension may keep you awake at night. To unwind before bedtime, try reading a book, meditation, or yoga. Also, try the following:    Deep breathing. Sit or lie back in a chair. Take a slow, deep breath. Hold it for 5 counts. Then breathe out slowly through your mouth. Keep doing this until you feel relaxed.    Imagery. Think of the last fun trip you took. In your mind, walk through the trip from start to finish. Put as much detail into the memory as you can remember. It will help you relax.  Cognitive Behavioral Therapy (CBT)  CBT is the most effective treatment for long-term insomnia. It tries to address the underlying causes of your sleep problems, including your habits and how you think about sleep.   Individual Therapy  Alex Marshall, PhD  Insomnia   [facility]: [phone]    [facility]: [phone]  Group Therapy  Dates and times to be announced.  Online Programs    www.[employer] (pronounced shut eye). There is a fee for this program. Enter the code  [place]  if you decide to enroll in this program.     www.sleepIO.com (pronounced sleep ee oh). There is a fee for this program. Enter the code  [place]  if you decide to enroll in this program.  Suggested Resources  Insomnia Treatment Books:    Overcoming Insomnia by Eliot Petersen and Laine Orona (2008)    No More Sleepless Nights by Davide Gonzalez and Jackie Mendez (1996)    Say Jacqui to Insomnia by Lopez Romero (2009)    The Insomnia Workbook by Jacqueline Currie and Manuel Layton (2009)    The Insomnia Answer by Gonzales Abrams and Mono Mcclelland (2006)?  Stress Management and Relaxation Books:    The Relaxation and Stress Reduction Workbook by Ashley Lal, Hortensia Hoyos and Demetrio Sutton (2008)    Stress Management  Workbook: Techniques and Self-Assessment Procedures by Jesenia Juarez and Jossue Gilliland (1997)    A Mindfulness-Based Stress Reduction Workbook by Hany Arboleda and Tess Arias (2010)    The Complete Stress Management Workbook by Daniel Bueno, Tad Amaya and Helder [place] (1996)    Assert Yourself by Cherelle Miller and Trung Miller (1977)  Relaxation Resources for Computer Download   These websites offer resources to help you relax. This list is for information only. [facility] is not responsible for the quality of services or the actions of any person or organization  Progressive Muscle Relaxation (PMR):    http://www.Smokazon.com/progressive-muscle-relaxation-exercise.html    http://studentsupport.[facility][1]/counseling/resources/self-help/relaxation-and-stress-management/  Deep Breathing Exercises:    http://www.Smokazon.com/breathing-awareness.html  Meditation:       wwwHeartThis    www.BeeplguidedTWINLINXmeditation-site.[employer] You may have to pay for some of these resources.  Guided Imagery:    http://www.Smokazon.com/guided-imagery-scripts.html    http://[employer]/library/lyafuyeunf-jvbyjs-tinhowr/  Counseling / Behavioral Health  [facility]  Visit www.[facility].org or call [phone] to find a clinic close to you.   This is not a prescription and these resources are optional. You must pay for any costs when using these resources. Please ask your insurance carrier if you can be reimbursed for these resources. If so, you are responsible for sending the needed details to your insurance carrier. These resources may also be tax deductible as medical expenses. Check with your .  These programs and publications are not affiliated in any way with [facility].    5090-3632 [employer]. [address] [place] [address], [place] [address]. All rights reserved. This information is not intended as a substitute for  professional medical care. Always follow your healthcare professional's instructions.  This information has been modified by your health care provider with permission from the publisher.

## 2018-10-17 NOTE — PROGRESS NOTES
"  SUBJECTIVE:   Eunice Malloy is a 53 year old female who presents to clinic today for the following health issues:    ABDOMINAL PAIN     Onset: for a couple of weeks     Description:   Character: Cramping, feels like there is something in there moving around   Location: right lower quadrant  Radiation: None    Intensity: moderate    Progression of Symptoms:  intermittent    Accompanying Signs & Symptoms:  Fever/Chills?: YES  Gas/Bloating: no   Nausea: no   Vomitting: no   Diarrhea?: no   Constipation:no   Dysuria or Hematuria: yes, but she drinks a lot of liquids    History:   Trauma: no   Previous similar pain: no    Previous tests done: none    Precipitating factors:   Does the pain change with:     Food: no      BM: no     Urination: no     Alleviating factors:  Prescribed medication     Therapies Tried and outcome: nothing besides medication     LMP:  not applicable     She has not traveled outside the country. She wants to know if she can start the chantix next month.       {additional problems for provider to add:133488}    Problem list and histories reviewed & adjusted, as indicated.  Additional history: {NONE - AS DOCUMENTED:559489::\"as documented\"}    {HIST REVIEW/ LINKS 2:383115}    Reviewed and updated as needed this visit by clinical staff       Reviewed and updated as needed this visit by Provider         {PROVIDER CHARTING PREFERENCE:707785}    Injectable Influenza Immunization Documentation    1.  Is the person to be vaccinated sick today?   No    2. Does the person to be vaccinated have an allergy to a component   of the vaccine?   No  Egg Allergy Algorithm Link    3. Has the person to be vaccinated ever had a serious reaction   to influenza vaccine in the past?   No    4. Has the person to be vaccinated ever had Guillain-Barré syndrome?   No    Form completed by Chloe Roman MA         "

## 2018-10-17 NOTE — PROGRESS NOTES
SUBJECTIVE:   Eunice Malloy is a 53 year old female who presents to clinic today for the following health issues:   she is here for insomnia, which is her primary concern. Also has had some abdominal pain.   ABDOMINAL PAIN     Onset: for a couple of weeks     Description:   Character: Cramping, feels like there is something in there moving around   Location: right lower quadrant  Radiation: None    Intensity: moderate    Progression of Symptoms:  intermittent    Accompanying Signs & Symptoms:  Fever/Chills?: YES  Gas/Bloating: no   Nausea: no   Vomitting: no   Diarrhea?: no   Constipation:no   Dysuria or Hematuria: yes, but she drinks a lot of liquids    History:   Trauma: no   Previous similar pain: no    Previous tests done: none    Precipitating factors:   Does the pain change with:     Food: no      BM: no     Urination: no     Alleviating factors:  Prescribed medication     Therapies Tried and outcome: nothing besides medication     LMP:  not applicable     She has not traveled outside the country. She wants to know if she can start the chantix next month.       She has been staying up to read until 11pm and then tries to go to sleep. She notices that eating something heavy seems to help her go to sleep, but she does not want to do this as she is gaining weight. She tosses and turns and then it takes her up to 3 hours to get to sleep. That seems to be pretty consistent. Once she falls asleep, she will sleep maybe a couple of hours and then wake up again. She tries to go back to sleep again, but often does not. She recently lost her job because she was not able to concentrate and keep up at work. She was in training for a new job and could not concentrate during training.     She has taken the trazodone about an hour before sleep. It does not seem to help. She tried the 50mg tablet and that did not work, so she took the 100mg tablet.     Last night was a good night. She fell asleep at about 12:30 and woke up  "at about 5:30. Yesterday was so tired and felt like she could not concentrate when she was driving and almost turned the wrong way.     She has used hydroxyzine for insomnia and that    She will be starting a new job probably the 29th of October.     She has been more crabby with her roommate. She is very tired.     She has not been taking her levothyroxine. She just picked up the prescription a week ago and just took the first one last night with food.     Regarding her abdominal pain, it has been present on and off for the past 2 weeks.  It is a cramping, intermittent pain.  She does not notice a relationship with eating or certain foods.  She does not notice any particular thing that increases her pain.  She denies any changes in her stools, blood in the stool, or melena.  She denies any nausea or vomiting.  She denies any dysuria, hematuria, frequency, urgency.  The pain is mostly right lower to mid abdomen.  She denies any severe pain.  She denies any vaginal symptoms.  She has noticed some vaginal moistness, but does not feel it is a discharge.  She denies any vaginal itching or burning or pain.  She is not currently or recently sexually active.    Hypothyroidism Follow-up      Since last visit, patient describes the following symptoms: weight gain of 5-10 lbs, might be related to the medication       Amount of exercise or physical activity: 1 day/week for an average of 45-60 minutes    Problems taking medications regularly: Yes,  problems remembering to take    Medication side effects: muscle aches (arm, leg)     Diet: regular (no restrictions)    Insomnia--- she was advised to stop taking melatonin at her last visit as she had been taking much more than the recommended amount.  Trazodone was started.  She is tolerating medication well.        From clinic visit on 10/2/18:    \"She hasn't been sleeping for a few months.   She has been taking melatonin (10 mg) up to 5 tablets.   Two to three nights/week she is " "not sleeping. Due to lack of sleep, she is a zombie during the day. She is not taking naps during the day. She has difficulty falling asleep and staying asleep. She is worried about not working.   She recently lost her job due to not being able to focus during the day.   A few friends of her recommended asking for Ambien, ativan or valium.   She drinks around 2-3 cups of coffee in the morning. No soda or pop drinks.  This morning she was wondering if she was drugged. She hasn't had any outside food or drinks.   She wants to quit smoking and wants to restart chantix. She is currently smoking up to 4 packs/week. Previously chantix helped.   Recent loss of friends, declined therapy\"    Problem list and histories reviewed & adjusted, as indicated.  Additional history: as documented    Patient Active Problem List   Diagnosis     CARDIOVASCULAR SCREENING; LDL GOAL LESS THAN 160     Hypothyroidism     Cold sore     Anxiety     Shoulder pain     Labral tear of shoulder     Microscopic hematuria     Low back pain     Loss of hair     SI (sacroiliac) joint dysfunction and pain - BILATERAL     Abnormal finding on MRI of brain     Lumbago     Past Surgical History:   Procedure Laterality Date     HC EXCIS PRIMARY GANGLION WRIST       TONSILLECTOMY  as child       Social History   Substance Use Topics     Smoking status: Current Some Day Smoker     Years: 15.00     Types: Cigarettes     Smokeless tobacco: Never Used      Comment: 1 pack per week     Alcohol use 0.0 oz/week     0 Standard drinks or equivalent per week      Comment: rare     No family history on file.      Current Outpatient Prescriptions   Medication Sig Dispense Refill     hydrOXYzine (ATARAX) 25 MG tablet Take 1-2 tablets (25-50 mg) by mouth nightly as needed (insomnia) Take about 30 minutes before bedtime. 60 tablet 1     ibuprofen (ADVIL/MOTRIN) 800 MG tablet TAKE ONE TABLET BY MOUTH EVERY 8 HOURS AS NEEDED FOR MODERATE PAIN 60 tablet 8     levothyroxine " (SYNTHROID/LEVOTHROID) 100 MCG tablet Take 1 tablet (100 mcg) by mouth daily 90 tablet 1     Multiple Vitamins-Minerals (MULTIVITAL PO) Take 1 tablet by mouth       traZODone (DESYREL) 50 MG tablet Take 1-2 tablets ( mg) by mouth nightly as needed for sleep 60 tablet 0     valACYclovir (VALTREX) 1000 mg tablet Take 2 tablets (2,000 mg) by mouth 2 times daily For one day. 12 tablet 11     naproxen (NAPROSYN) 500 MG tablet Take 1 tablet (500 mg) by mouth 2 times daily as needed for moderate pain (Patient not taking: Reported on 10/2/2018) 30 tablet 1     varenicline (CHANTIX STARTING MONTH PAK) 0.5 MG X 11 & 1 MG X 42 tablet Take 0.5 mg tab daily for 3 days, then 0.5 mg tab twice daily for 4 days, then 1 mg twice daily. 53 tablet 0     Allergies   Allergen Reactions     Sulfa Drugs Rash     Recent Labs   Lab Test  08/15/18   1107  02/07/18   1605  09/21/17   1600  09/05/17   0920  06/22/17   1425   05/04/15   1019   07/07/11   0855   LDL   --    --    --    --    --    --   99   --   148*   HDL   --    --    --    --    --    --   101   --   85   TRIG   --    --    --    --    --    --   69   --   120   ALT   --   27  48   --   37   < >   --    < >   --    CR   --   0.64  0.73   --   0.72   < >   --    < >   --    GFRESTIMATED   --   >90  83   --   85   < >   --    < >   --    GFRESTBLACK   --   >90  >90   --   >90   GFR Calc     < >   --    < >   --    POTASSIUM   --   4.2  4.0   --   4.2   < >   --    < >   --    TSH  12.05*   --    --   0.85   --    < >  91.59*   < >  4.07    < > = values in this interval not displayed.      BP Readings from Last 3 Encounters:   10/17/18 111/69   10/02/18 117/72   08/15/18 (P) 114/62    Wt Readings from Last 3 Encounters:   10/17/18 135 lb 4 oz (61.3 kg)   10/02/18 134 lb (60.8 kg)   08/15/18 (P) 133 lb (60.3 kg)              Reviewed and updated as needed this visit by clinical staff  Tobacco  Allergies  Meds       Reviewed and updated as needed this visit  by Provider         ROS:  CONSTITUTIONAL: NEGATIVE for fever, chills, change in weight  As above     OBJECTIVE:     /69  Pulse 74  Wt 135 lb 4 oz (61.3 kg)  LMP 05/14/2008  SpO2 92%  BMI (P) 24.74 kg/m2  Body mass index is 24.74 kg/(m^2) (pended).  GENERAL: healthy, alert and no distress  ABDOMEN: soft, nontender, without hepatosplenomegaly or masses     Diagnostic Test Results:  Results for orders placed or performed in visit on 08/15/18   TSH with free T4 reflex   Result Value Ref Range    TSH 12.05 (H) 0.40 - 4.00 mU/L   T4 free   Result Value Ref Range    T4 Free 0.66 (L) 0.76 - 1.46 ng/dL       ASSESSMENT/PLAN:          1. Insomnia, unspecified type  She did not find the trazodone effective.  In the past, she found hydralazine helpful.  We will stop the trazodone for now and she will start hydralazine at bedtime.  I discussed importance of sleep hygiene with her and reviewed instructions for sleep hygiene.  In addition I recommended scheduling with a sleep therapist.  I would be very cautious about giving her other sedatives like Ambien or any benzos due to her history of taking medications in ways other than instructed.  I reiterated the importance of taking medications as instructed and not taking more than prescribed dose or combining medications unless specifically instructed.    2. Hypothyroidism, unspecified type  She has not been taking her levothyroxine as instructed.  She has not been on her levothyroxine for an unclear amount of time until she picked up her prescription prescription and took 1 dose yesterday at bedtime with food.  See patient instructions.  She will restart levothyroxine 100 mcg daily    3. Adjustment disorder with mixed anxiety and depressed mood  At her last visit she declined therapy    4.  Right abdominal pain  We had a very long discussion today about insomnia and did not have significant time to discuss her abdominal pain.  There are no red flag symptoms or need for  urgent evaluation today.  She will follow-up with me on Tuesday to focus on her abdominal cramps.  In the meantime she will try to notice whether there are particular triggers for her pain and will make note of timing and frequency of her pain, which she was unable to report today.      Patient Instructions     1) You can start the hydroxyzine 25mg about 30 minutes before bedtime for sleep. DO NOT take more than one sleep medicine in the same night. Do not take the trazodone if you are taking this. Do not drive after taking this.   2) Restart your levothyroxine. Not taking this may also have been affecting your sleep. Take it in the morning on an empty stomach and not with any other medication. Wait 30-60 minutes before eating or drinking anything after taking this. We should recheck your thyroid test in 6 weeks.   3) See me in 6 weeks.    Treating Insomnia  Good sleeping habits are a key part of treatment. If needed, some medications may help you sleep better at first. Making healthy lifestyle changes and learning to relax can improve your sleep. Treating insomnia takes commitment, but trust that your efforts will pay off. Talk to your health care provider before taking any medication.    Healthy Lifestyle  Your lifestyle affects your health and your sleep. Here are some healthy habits:    Keep a regular sleep schedule. Go to bed and get up at the same time each day.    Exercise regularly. It may help you reduce stress. Avoid strenuous exercise for two to four hours before bedtime.    Avoid or limit naps.    Use your bed only for sleep and sex.    Don t spend too much time in bed trying to fall asleep. If you can t fall asleep, get up and do something until you become tired and drowsy.    Avoid or limit caffeine and nicotine. They can keep you awake at night. Also avoid alcohol. It may help you fall asleep at first, but your sleep will not be restful.  Before Bedtime  To sleep better every night, try these  tips:    Have a bedtime routine to let your body and mind know when it s time to sleep.    Going to bed should be relaxing so try to do only relaxing things around bedtime. Sleep will come sooner.    If your worries don t let you sleep, write them down in a diary. Then close it, and go to bed.    Make sure the room is not too hot or too cold. If it s not dark enough, an eye mask can help. If it s noisy, try using earplugs.  Learn to Relax  Stress, anxiety, and body tension may keep you awake at night. To unwind before bedtime, try reading a book, meditation, or yoga. Also, try the following:    Deep breathing. Sit or lie back in a chair. Take a slow, deep breath. Hold it for 5 counts. Then breathe out slowly through your mouth. Keep doing this until you feel relaxed.    Imagery. Think of the last fun trip you took. In your mind, walk through the trip from start to finish. Put as much detail into the memory as you can remember. It will help you relax.  Cognitive Behavioral Therapy (CBT)  CBT is the most effective treatment for long-term insomnia. It tries to address the underlying causes of your sleep problems, including your habits and how you think about sleep.   Individual Therapy  Alex Marshall, PhD  Insomnia   [facility]: [phone]    [facility]: [phone]  Group Therapy  Dates and times to be announced.  Online Programs    www.embraasei.me (pronounced shut eye). There is a fee for this program. Enter the code  [facility]  if you decide to enroll in this program.     www.sleepIO.com (pronounced sleep ee oh). There is a fee for this program. Enter the code  [facility]  if you decide to enroll in this program.  Suggested Resources  Insomnia Treatment Books:    Overcoming Insomnia by Eliot Petersen and Laine Orona (2008)    No More Sleepless Nights by Davide Gonzaelz and Jackie Mendez (1996)    Say Jacqui to Insomnia by Lopez Romero  (2009)    The Insomnia Workbook by Jacqueline Currie and Manuel Layton (2009)    The Insomnia Answer by Gonzales Abrams and Mono Mcclelland (2006)?  Stress Management and Relaxation Books:    The Relaxation and Stress Reduction Workbook by Ashley Lal, Hortensia Hoyos and Demetrio Sutton (2008)    Stress Management Workbook: Techniques and Self-Assessment Procedures by Jesenia Juarez and Jossue Gilliland (1997)    A Mindfulness-Based Stress Reduction Workbook by Hany Arboleda and Tess Arias (2010)    The Complete Stress Management Workbook by Daniel Bueno, Tad Amaya and [place]der [place] (1996)    Assert Yourself by Cherelle Miller and Trung Miller (1977)  Relaxation Resources for Computer Download   These websites offer resources to help you relax. This list is for information only. [facility] is not responsible for the quality of services or the actions of any person or organization  Progressive Muscle Relaxation (PMR):    http://www.[employer]String/progressive-muscle-relaxation-exercise.html    http://studentsupport.[facility]/counseling/resources/self-help/relaxation-and-stress-management/  Deep Breathing Exercises:    http://www.IdeaString/breathing-awareness.html  Meditation:       www.Caterva    www.Vsnapguided-meditation-site.com You may have to pay for some of these resources.  Guided Imagery:    http://www.IdeaString/guided-imagery-scripts.html    http://Kwikpik/library/bhrqvwshbe-dhkxlz-ysusqvk/  Counseling / Behavioral Health  [facility]  Visit www.[email or web address].org or call [phone] to find a clinic close to you.   This is not a prescription and these resources are optional. You must pay for any costs when using these resources. Please ask your insurance carrier if you can be reimbursed for these resources. If so, you are responsible for sending the needed details to your insurance carrier. These  resources may also be tax deductible as medical expenses. Check with your .  These programs and publications are not affiliated in any way with [facility].    9118-2729 [employer]. [address] [place] [address], [place] [address]. All rights reserved. This information is not intended as a substitute for professional medical care. Always follow your healthcare professional's instructions.  This information has been modified by your health care provider with permission from the publisher.        Time spent with pt answering questions, discussing findings, counseling and coordinating care was at least half the appointment time, 45 minutes.    Alyse Mcclendon M.D.    Alyse Mcclendon M.D.        [facility] [place]        Injectable Influenza Immunization Documentation    1.  Is the person to be vaccinated sick today?   No    2. Does the person to be vaccinated have an allergy to a component   of the vaccine?   No  Egg Allergy Algorithm Link    3. Has the person to be vaccinated ever had a serious reaction   to influenza vaccine in the past?   No    4. Has the person to be vaccinated ever had Guillain-Barré syndrome?   No    Form completed by Chloe Roman MA

## 2018-10-21 ENCOUNTER — OFFICE VISIT (OUTPATIENT)
Dept: URGENT CARE | Facility: URGENT CARE | Age: 53
End: 2018-10-21
Payer: COMMERCIAL

## 2018-10-21 VITALS
SYSTOLIC BLOOD PRESSURE: 140 MMHG | HEART RATE: 68 BPM | TEMPERATURE: 98.9 F | RESPIRATION RATE: 20 BRPM | DIASTOLIC BLOOD PRESSURE: 74 MMHG | BODY MASS INDEX: 24.05 KG/M2 | WEIGHT: 131.5 LBS

## 2018-10-21 DIAGNOSIS — T16.2XXA FOREIGN BODY OF LEFT EAR, INITIAL ENCOUNTER: Primary | ICD-10-CM

## 2018-10-21 DIAGNOSIS — B00.1 COLD SORE: ICD-10-CM

## 2018-10-21 PROCEDURE — 99213 OFFICE O/P EST LOW 20 MIN: CPT | Performed by: FAMILY MEDICINE

## 2018-10-21 RX ORDER — ACYCLOVIR 50 MG/G
CREAM TOPICAL
Qty: 5 G | Refills: 11 | Status: SHIPPED | OUTPATIENT
Start: 2018-10-21 | End: 2020-05-28

## 2018-10-21 NOTE — PROGRESS NOTES
SUBJECTIVE:  Eunice Malloy is a 53 year old female who presents with left ear blockage for 1 day(s).   Severity: mild   Timing:sudden onset  Additional symptoms include headache.      History of recurrent otitis: no    Past Medical History:   Diagnosis Date     Anxiety 4/11/2013     Cold sore 8/15/2011     Hypothyroidism 12/9/2010     Tobacco abuse      Current Outpatient Prescriptions   Medication Sig Dispense Refill     acyclovir (ZOVIRAX) 5 % cream Apply topically 5 times daily 5 g 11     hydrOXYzine (ATARAX) 25 MG tablet Take 1-2 tablets (25-50 mg) by mouth nightly as needed (insomnia) Take about 30 minutes before bedtime. 60 tablet 1     levothyroxine (SYNTHROID/LEVOTHROID) 100 MCG tablet Take 1 tablet (100 mcg) by mouth daily 90 tablet 1     Multiple Vitamins-Minerals (MULTIVITAL PO) Take 1 tablet by mouth       ibuprofen (ADVIL/MOTRIN) 800 MG tablet TAKE ONE TABLET BY MOUTH EVERY 8 HOURS AS NEEDED FOR MODERATE PAIN 60 tablet 8     valACYclovir (VALTREX) 1000 mg tablet Take 2 tablets (2,000 mg) by mouth 2 times daily For one day. 12 tablet 11     Social History   Substance Use Topics     Smoking status: Current Some Day Smoker     Years: 15.00     Types: Cigarettes     Smokeless tobacco: Never Used      Comment: 1 pack per week     Alcohol use 0.0 oz/week     0 Standard drinks or equivalent per week      Comment: rare       ROS:   CONSTITUTIONAL:NEGATIVE for fever, chills, change in weight  INTEGUMENTARY/SKIN: N+ for cold sores and wants cream refill    OBJECTIVE:  /74 (Cuff Size: Adult Regular)  Pulse 68  Temp 98.9  F (37.2  C) (Oral)  Resp 20  Wt 131 lb 8 oz (59.6 kg)  LMP 05/14/2008  BMI (P) 24.05 kg/m2   EXAM:  The right TM is normal: no effusions, no erythema, and normal landmarks     The right auditory canal is normal and without drainage, edema or erythema  The left TM is not visualized secondary to foreign body  The left auditory canal is mildly erythematous  Oropharynx exam is normal: no  lesions, erythema, adenopathy or exudate.  GENERAL: no acute distress  EYES: EOMI,  PERRL, conjunctiva clear  NECK: supple, non-tender to palpation, no adenopathy noted  SKIN: no suspicious lesions or rashes     ASSESSMENT:  1. Foreign body of left ear, initial encounter  Rinsed with water and qtip removed.   Avoid using anything in the ear   Pt instructed to come back to the clinic for worsening sx      2. Cold sore  refilled  - acyclovir (ZOVIRAX) 5 % cream; Apply topically 5 times daily  Dispense: 5 g; Refill: 11

## 2018-10-21 NOTE — MR AVS SNAPSHOT
After Visit Summary   10/21/2018    Eunice Malloy    MRN: [id number]           Patient Information     Date Of Birth          1965        Visit Information        Provider Department      10/21/2018 12:10 PM Frantz Nunn MD [facility]        Today's Diagnoses     Foreign body of left ear, initial encounter    -  1    Cold sore           Follow-ups after your visit        Your next 10 appointments already scheduled     Oct 23, 2018 11:00 AM CDT   Office Visit with Alyse Mcclendon MD   [facility] ([facility])    [address] [place] [id number] [phone]           Bring a current list of meds and any records pertaining to this visit. For Physicals, please bring immunization records and any forms needing to be filled out. Please arrive 10 minutes early to complete paperwork.            Nov 14, 2018  8:00 AM CST   New Sleep Patient with Ap Crenshaw MD   [facility] ([facility])    [address] [place] [id number] [phone]            Nov 26, 2018  9:40 AM CST   Office Visit with Alyse Mcclendon MD   [facility] ([facility])    [address] [place] [address] [phone]           Bring a current list of meds and any records pertaining to this visit. For Physicals, please bring immunization records and any forms needing to be filled out. Please arrive 10 minutes early to complete paperwork.              Who to contact     If you have questions or need follow up information about today's clinic visit or your schedule please contact [facility] directly at [phone].  Normal or non-critical lab and imaging results will be communicated to you by MyChart, letter or phone within 4 business days after the clinic has received the results. If you do not hear from us  within 7 days, please contact the clinic through J[employer] or phone. If you have a critical or abnormal lab result, we will notify you by phone as soon as possible.  Submit refill requests through Jini or call your pharmacy and they will forward the refill request to us. Please allow 3 business days for your refill to be completed.          Additional Information About Your Visit        Care EveryWhere ID     This is your Care EveryWhere ID. This could be used by other organizations to access your [facility] medical records  [id number]        Your Vitals Were     Pulse Temperature Respirations Last Period BMI (Body Mass Index)       68 98.9  F (37.2  C) (Oral) 20 05/14/2008 24.05 kg/m2        Blood Pressure from Last 3 Encounters:   10/21/18 140/74   10/17/18 111/69   10/02/18 117/72    Weight from Last 3 Encounters:   10/21/18 131 lb 8 oz (59.6 kg)   10/17/18 135 lb 4 oz (61.3 kg)   10/02/18 134 lb (60.8 kg)              Today, you had the following     No orders found for display         Today's Medication Changes          These changes are accurate as of 10/21/18 12:48 PM.  If you have any questions, ask your nurse or doctor.               Start taking these medicines.        Dose/Directions    acyclovir 5 % cream   Commonly known as:  ZOVIRAX   Used for:  Cold sore   Started by:  Frantz Nunn MD        Apply topically 5 times daily   Quantity:  5 g   Refills:  11            Where to get your medicines      These medications were sent to [facility] [address] [address]     Phone:  [phone]     acyclovir 5 % cream                Primary Care Provider Office Phone # Fax #    Alyse Mcclendon [phone] [address] [facility] [address]        Equal Access to Services     ISAIAS PADILLA AH: Jimi serranoo Sojames, waaxda luqadaha, qaybta kaalmada federico, luis manuel arcos. So  [facility] [phone].    ATENCIÓN: Si hilaria kim, tiene a link disposición servicios gratuitos de asistencia lingüística. Raquel rhodes [phone].    We comply with applicable federal civil rights laws and [place] laws. We do not discriminate on the basis of race, color, national origin, age, disability, sex, sexual orientation, or gender identity.            Thank you!     Thank you for choosing [facility]  for your care. Our goal is always to provide you with excellent care. Hearing back from our patients is one way we can continue to improve our services. Please take a few minutes to complete the written survey that you may receive in the mail after your visit with us. Thank you!             Your Updated Medication List - Protect others around you: Learn how to safely use, store and throw away your medicines at www.disposemymeds.org.          This list is accurate as of 10/21/18 12:48 PM.  Always use your most recent med list.                   Brand Name Dispense Instructions for use Diagnosis    acyclovir 5 % cream    ZOVIRAX    5 g    Apply topically 5 times daily    Cold sore       hydrOXYzine 25 MG tablet    ATARAX    60 tablet    Take 1-2 tablets (25-50 mg) by mouth nightly as needed (insomnia) Take about 30 minutes before bedtime.    Insomnia, unspecified type       ibuprofen 800 MG tablet    ADVIL/MOTRIN    60 tablet    TAKE ONE TABLET BY MOUTH EVERY 8 HOURS AS NEEDED FOR MODERATE PAIN    Chronic bilateral low back pain without sciatica       levothyroxine 100 MCG tablet    SYNTHROID/LEVOTHROID    90 tablet    Take 1 tablet (100 mcg) by mouth daily    Hypothyroidism, unspecified type       MULTIVITAL PO      Take 1 tablet by mouth        valACYclovir 1000 mg tablet    VALTREX    12 tablet    Take 2 tablets (2,000 mg) by mouth 2 times daily For one day.    Insomnia, unspecified type

## 2018-10-23 ENCOUNTER — OFFICE VISIT (OUTPATIENT)
Dept: FAMILY MEDICINE | Facility: CLINIC | Age: 53
End: 2018-10-23
Payer: COMMERCIAL

## 2018-10-23 VITALS
HEART RATE: 51 BPM | DIASTOLIC BLOOD PRESSURE: 77 MMHG | HEIGHT: 62 IN | BODY MASS INDEX: 24.29 KG/M2 | WEIGHT: 132 LBS | TEMPERATURE: 98.1 F | OXYGEN SATURATION: 95 % | SYSTOLIC BLOOD PRESSURE: 130 MMHG

## 2018-10-23 DIAGNOSIS — R10.31 ABDOMINAL PAIN, RIGHT LOWER QUADRANT: Primary | ICD-10-CM

## 2018-10-23 LAB
ALBUMIN UR-MCNC: NEGATIVE MG/DL
APPEARANCE UR: CLEAR
BASOPHILS # BLD AUTO: 0 10E9/L (ref 0–0.2)
BASOPHILS NFR BLD AUTO: 0.2 %
BILIRUB UR QL STRIP: NEGATIVE
COLOR UR AUTO: YELLOW
DIFFERENTIAL METHOD BLD: NORMAL
EOSINOPHIL # BLD AUTO: 0.1 10E9/L (ref 0–0.7)
EOSINOPHIL NFR BLD AUTO: 1.5 %
ERYTHROCYTE [DISTWIDTH] IN BLOOD BY AUTOMATED COUNT: 13.7 % (ref 10–15)
GLUCOSE UR STRIP-MCNC: NEGATIVE MG/DL
HCT VFR BLD AUTO: 40.5 % (ref 35–47)
HGB BLD-MCNC: 13.2 G/DL (ref 11.7–15.7)
HGB UR QL STRIP: NEGATIVE
KETONES UR STRIP-MCNC: NEGATIVE MG/DL
LEUKOCYTE ESTERASE UR QL STRIP: NEGATIVE
LYMPHOCYTES # BLD AUTO: 3.1 10E9/L (ref 0.8–5.3)
LYMPHOCYTES NFR BLD AUTO: 36.5 %
MCH RBC QN AUTO: 30.3 PG (ref 26.5–33)
MCHC RBC AUTO-ENTMCNC: 32.6 G/DL (ref 31.5–36.5)
MCV RBC AUTO: 93 FL (ref 78–100)
MONOCYTES # BLD AUTO: 0.7 10E9/L (ref 0–1.3)
MONOCYTES NFR BLD AUTO: 8.1 %
NEUTROPHILS # BLD AUTO: 4.5 10E9/L (ref 1.6–8.3)
NEUTROPHILS NFR BLD AUTO: 53.7 %
NITRATE UR QL: NEGATIVE
PH UR STRIP: 6 PH (ref 5–7)
PLATELET # BLD AUTO: 318 10E9/L (ref 150–450)
RBC # BLD AUTO: 4.36 10E12/L (ref 3.8–5.2)
SOURCE: NORMAL
SP GR UR STRIP: <=1.005 (ref 1–1.03)
UROBILINOGEN UR STRIP-ACNC: 0.2 EU/DL (ref 0.2–1)
WBC # BLD AUTO: 8.5 10E9/L (ref 4–11)

## 2018-10-23 PROCEDURE — 36415 COLL VENOUS BLD VENIPUNCTURE: CPT | Performed by: FAMILY MEDICINE

## 2018-10-23 PROCEDURE — 85025 COMPLETE CBC W/AUTO DIFF WBC: CPT | Performed by: FAMILY MEDICINE

## 2018-10-23 PROCEDURE — 81003 URINALYSIS AUTO W/O SCOPE: CPT | Performed by: FAMILY MEDICINE

## 2018-10-23 PROCEDURE — 80053 COMPREHEN METABOLIC PANEL: CPT | Performed by: FAMILY MEDICINE

## 2018-10-23 PROCEDURE — 99214 OFFICE O/P EST MOD 30 MIN: CPT | Performed by: FAMILY MEDICINE

## 2018-10-23 NOTE — LETTER
[facility]  [address] [place] [address] [phone]          October 24, 2018    Eunice Malloy                                                                                                                     [address] [facility] [address]        Dear Eunice,    Your lab results have returned.     Your blood counts were normal.     The testing of your blood sugar, kidney function, liver function and electrolytes was normal except for one of the liver tests, which was mildly high. We can discuss this further at your visit next week.     Results for orders placed or performed in visit on 10/23/18   *UA reflex to Microscopic and Culture ([facility] and [facility] (except [place] and [place])   Result Value Ref Range    Color Urine Yellow     Appearance Urine Clear     Glucose Urine Negative NEG^Negative mg/dL    Bilirubin Urine Negative NEG^Negative    Ketones Urine Negative NEG^Negative mg/dL    Specific Gravity Urine <=1.005 1.003 - 1.035    Blood Urine Negative NEG^Negative    pH Urine 6.0 5.0 - 7.0 pH    Protein Albumin Urine Negative NEG^Negative mg/dL    Urobilinogen Urine 0.2 0.2 - 1.0 EU/dL    Nitrite Urine Negative NEG^Negative    Leukocyte Esterase Urine Negative NEG^Negative    Source Midstream Urine    Comprehensive metabolic panel   Result Value Ref Range    Sodium 139 133 - 144 mmol/L    Potassium 3.9 3.4 - 5.3 mmol/L    Chloride 105 94 - 109 mmol/L    Carbon Dioxide 27 20 - 32 mmol/L    Anion Gap 7 3 - 14 mmol/L    Glucose 88 70 - 99 mg/dL    Urea Nitrogen 11 7 - 30 mg/dL    Creatinine 0.67 0.52 - 1.04 mg/dL    GFR Estimate >90 >60 mL/min/1.7m2    GFR Estimate If Black >90 >60 mL/min/1.7m2    Calcium 9.1 8.5 - 10.1 mg/dL    Bilirubin Total 0.4 0.2 - 1.3 mg/dL    Albumin 3.9 3.4 - 5.0 g/dL    Protein Total 8.4 6.8 - 8.8 g/dL    Alkaline Phosphatase 174 (H) 40 - 150 U/L    ALT 32 0 - 50 U/L    AST 21 0 - 45 U/L   CBC with platelets differential    Result Value Ref Range    WBC 8.5 4.0 - 11.0 10e9/L    RBC Count 4.36 3.8 - 5.2 10e12/L    Hemoglobin 13.2 11.7 - 15.7 g/dL    Hematocrit 40.5 35.0 - 47.0 %    MCV 93 78 - 100 fl    MCH 30.3 26.5 - 33.0 pg    MCHC 32.6 31.5 - 36.5 g/dL    RDW 13.7 10.0 - 15.0 %    Platelet Count 318 150 - 450 10e9/L    % Neutrophils 53.7 %    % Lymphocytes 36.5 %    % Monocytes 8.1 %    % Eosinophils 1.5 %    % Basophils 0.2 %    Absolute Neutrophil 4.5 1.6 - 8.3 10e9/L    Absolute Lymphocytes 3.1 0.8 - 5.3 10e9/L    Absolute Monocytes 0.7 0.0 - 1.3 10e9/L    Absolute Eosinophils 0.1 0.0 - 0.7 10e9/L    Absolute Basophils 0.0 0.0 - 0.2 10e9/L    Diff Method Automated Method        Sincerely,       Alyse Mcclendon MD.

## 2018-10-23 NOTE — MR AVS SNAPSHOT
After Visit Summary   10/23/2018    Eunice Malloy    MRN: [id number]           Patient Information     Date Of Birth          1965        Visit Information        Provider Department      10/23/2018 11:00 AM Alyse Mcclendon MD [facility]        Today's Diagnoses     Abdominal pain, right lower quadrant    -  1      Care Instructions    1.  We will do labs today and schedule your pelvic ultrasound  2.  Follow-up with me next week to review results.  If you develop worsening pain in the meantime, please let us know right away.  If your pain is severe go straight to the ER.          Follow-ups after your visit        Your next 10 appointments already scheduled     Oct 24, 2018  1:30 PM CDT   US PELVIC COMPLETE W TRANSVAGINAL with URUS1   [facility]ound ([facility])    [address] [place] [address] [phone]           How do I prepare for my exam? (Food and drink instructions) Adults: Drink four 8-ounce glasses of fluid an hour before your exam. If you need to empty your bladder before your exam, try to release only a little urine. Then, drink another glass of fluid.  Children: * Children who are potty trained up to 6 years old should drink at least 2 cups (16 oz) of water/non-carbonated beverage 30 minutes prior to the exam. * Children who are 6-10 years should drink at least 3 cups (24 oz) of water/non-carbonated beverage 45 minutes prior to the exam. * Children who are 10 years or older should drink at least 4 cups (32 oz) of water/non-carbonated beverage 45 minutes prior to the exam.  If your child is very uncomfortable or has an urgent need to pee, please notify a technologist; they will try to find out how much longer the wait may be and provide instructions to help relieve the pressure.  What should I wear: Wear comfortable clothes.  How long does the exam take: Most ultrasounds take 30 to 60  minutes.  What should I bring: Bring a list of your medicines, including vitamins, minerals and over-the-counter drugs. It is safest to leave personal items at home.  Do I need a :  No  is needed.  What do I need to tell my doctor: Tell your doctor about any allergies you may have.  What should I do after the exam: No restrictions, You may resume normal activities.  What is this test: An ultrasound uses sound waves to make pictures of the body. Sound waves do not cause pain. The only discomfort may be the pressure of the wand against your skin or full bladder.  Who should I call with questions: If you have any questions, please call the Imaging Department where you will have your exam. Directions, parking instructions, and other information is available on our website, [email or web address].            Nov 14, 2018  8:00 AM CST   New Sleep Patient with Ap Crenshaw MD   [facility] ([facility])    [address] [place] [id number] [phone]            Nov 26, 2018  9:40 AM CST   Office Visit with Alyse Mcclendon MD   [facility] ([facility])    [address] [place] [address] [phone]           Bring a current list of meds and any records pertaining to this visit. For Physicals, please bring immunization records and any forms needing to be filled out. Please arrive 10 minutes early to complete paperwork.              Future tests that were ordered for you today     Open Future Orders        Priority Expected Expires Ordered    US Pelvic Complete w Transvaginal Routine  10/23/2019 10/23/2018            Who to contact     If you have questions or need follow up information about today's clinic visit or your schedule please contact [facility] directly at [phone].  Normal or non-critical lab and imaging results will be communicated to you by MyChart, letter or  "phone within 4 business days after the clinic has received the results. If you do not hear from us within 7 days, please contact the clinic through [employer] or phone. If you have a critical or abnormal lab result, we will notify you by phone as soon as possible.  Submit refill requests through [employer] or call your pharmacy and they will forward the refill request to us. Please allow 3 business days for your refill to be completed.          Additional Information About Your Visit        Care EveryWhere ID     This is your Care EveryWhere ID. This could be used by other organizations to access your [facility] medical records  [id number]        Your Vitals Were     Pulse Temperature Height Last Period Pulse Oximetry BMI (Body Mass Index)    51 98.1  F (36.7  C) (Oral) 5' 2\" (1.575 m) 05/14/2008 95% 24.14 kg/m2       Blood Pressure from Last 3 Encounters:   10/23/18 130/77   10/21/18 140/74   10/17/18 111/69    Weight from Last 3 Encounters:   10/23/18 132 lb (59.9 kg)   10/21/18 131 lb 8 oz (59.6 kg)   10/17/18 135 lb 4 oz (61.3 kg)              We Performed the Following     *UA reflex to Microscopic and Culture ([place] and [facility] (except [place] and [place])     CBC with platelets differential     Comprehensive metabolic panel        Primary Care Provider Office Phone # Fax #    Alyse Mcclendon [phone]       [address]  [facility] [address]        Equal Access to Services     ISAIAS PADILLA : Hadii aad ku hadasho Soomaali, waaxda luqadaha, qaybta kaalmada adeegyada, luis manuel mccoy . So [facility] [phone].    ATENCIÓN: Si habla español, tiene a link disposición servicios gratuitos de asistencia lingüística. Llame al [phone].    We comply with applicable federal civil rights laws and [place] laws. We do not discriminate on the basis of race, color, national origin, age, disability, sex, sexual orientation, or gender identity.            Thank you!     " Thank you for choosing [facility]  for your care. Our goal is always to provide you with excellent care. Hearing back from our patients is one way we can continue to improve our services. Please take a few minutes to complete the written survey that you may receive in the mail after your visit with us. Thank you!             Your Updated Medication List - Protect others around you: Learn how to safely use, store and throw away your medicines at www.disposemymeds.org.          This list is accurate as of 10/23/18 12:39 PM.  Always use your most recent med list.                   Brand Name Dispense Instructions for use Diagnosis    acyclovir 5 % cream    ZOVIRAX    5 g    Apply topically 5 times daily    Cold sore       hydrOXYzine 25 MG tablet    ATARAX    60 tablet    Take 1-2 tablets (25-50 mg) by mouth nightly as needed (insomnia) Take about 30 minutes before bedtime.    Insomnia, unspecified type       ibuprofen 800 MG tablet    ADVIL/MOTRIN    60 tablet    TAKE ONE TABLET BY MOUTH EVERY 8 HOURS AS NEEDED FOR MODERATE PAIN    Chronic bilateral low back pain without sciatica       levothyroxine 100 MCG tablet    SYNTHROID/LEVOTHROID    90 tablet    Take 1 tablet (100 mcg) by mouth daily    Hypothyroidism, unspecified type       MULTIVITAL PO      Take 1 tablet by mouth        valACYclovir 1000 mg tablet    VALTREX    12 tablet    Take 2 tablets (2,000 mg) by mouth 2 times daily For one day.    Insomnia, unspecified type

## 2018-10-23 NOTE — PATIENT INSTRUCTIONS
1.  We will do labs today and schedule your pelvic ultrasound  2.  Follow-up with me next week to review results.  If you develop worsening pain in the meantime, please let us know right away.  If your pain is severe go straight to the ER.

## 2018-10-23 NOTE — PROGRESS NOTES
SUBJECTIVE:   Eunice Malloy is a 53 year old female who presents to clinic today for the following health issues:    ABDOMINAL PAIN     Onset: for a while now     Description:   Character: Dull ache and vibrates   Location: right lower quadrant  Radiation: Back occ     Intensity: sometimes     Progression of Symptoms:  intermittent    Accompanying Signs & Symptoms:  Fever/Chills?: no   Gas/Bloating: no   Nausea: no   Vomitting: no   Diarrhea?: no   Constipation:no   Dysuria or Hematuria: no   History:   Trauma: no   Previous similar pain: no    Previous tests done: none    Precipitating factors:   Does the pain change with:     Food: no      BM: no     Urination: no     Alleviating factors:  Nothing     Therapies Tried and outcome: nothing     LMP:  not applicable     She feels as if something is in there moving around, the pain is there throughout the day and it is more of a discomfort.         She has been having a sensation of dull pain, a vibrating pain in the right lower abdomen to mid right abdomen. The pain is sporadic. No specific triggers. Can happen in any position. Feels like there is something in there moving around. Does not notice passing gas more than normal. Eating does not seem to be a trigger. No specific food triggers. Does have frequent urination. Urinates at least 3 times at night, but also has insomnia. She has not had intercourse for a couple of years. She says she feels like she has had intercourse because she feels more moisture in the area. She does not feel like it is a vaginal discharge. She does not feel any urine leakage. Sometimes the abdominal sensation goes into the right back. Denies vaginal itch or burning or odor, or pain.  Denies any severe pain.          Problem list and histories reviewed & adjusted, as indicated.  Additional history: as documented    Patient Active Problem List   Diagnosis     CARDIOVASCULAR SCREENING; LDL GOAL LESS THAN 160     Hypothyroidism     Cold sore      Anxiety     Shoulder pain     Labral tear of shoulder     Microscopic hematuria     Low back pain     Loss of hair     SI (sacroiliac) joint dysfunction and pain - BILATERAL     Abnormal finding on MRI of brain     Lumbago     Past Surgical History:   Procedure Laterality Date     HC EXCIS PRIMARY GANGLION WRIST       TONSILLECTOMY  as child       Social History   Substance Use Topics     Smoking status: Current Some Day Smoker     Years: 15.00     Types: Cigarettes     Smokeless tobacco: Never Used      Comment: 1 pack per week     Alcohol use 0.0 oz/week     0 Standard drinks or equivalent per week      Comment: rare     No family history on file.      Current Outpatient Prescriptions   Medication Sig Dispense Refill     acyclovir (ZOVIRAX) 5 % cream Apply topically 5 times daily 5 g 11     hydrOXYzine (ATARAX) 25 MG tablet Take 1-2 tablets (25-50 mg) by mouth nightly as needed (insomnia) Take about 30 minutes before bedtime. 60 tablet 1     ibuprofen (ADVIL/MOTRIN) 800 MG tablet TAKE ONE TABLET BY MOUTH EVERY 8 HOURS AS NEEDED FOR MODERATE PAIN 60 tablet 8     levothyroxine (SYNTHROID/LEVOTHROID) 100 MCG tablet Take 1 tablet (100 mcg) by mouth daily 90 tablet 1     Multiple Vitamins-Minerals (MULTIVITAL PO) Take 1 tablet by mouth       valACYclovir (VALTREX) 1000 mg tablet Take 2 tablets (2,000 mg) by mouth 2 times daily For one day. 12 tablet 11     Allergies   Allergen Reactions     Sulfa Drugs Rash     Recent Labs   Lab Test  08/15/18   1107  02/07/18   1605  09/21/17   1600  09/05/17   0920  06/22/17   1425   05/04/15   1019   07/07/11   0855   LDL   --    --    --    --    --    --   99   --   148*   HDL   --    --    --    --    --    --   101   --   85   TRIG   --    --    --    --    --    --   69   --   120   ALT   --   27  48   --   37   < >   --    < >   --    CR   --   0.64  0.73   --   0.72   < >   --    < >   --    GFRESTIMATED   --   >90  83   --   85   < >   --    < >   --    GFRESTBLACK   --   " >90  >90   --   >90   GFR Calc     < >   --    < >   --    POTASSIUM   --   4.2  4.0   --   4.2   < >   --    < >   --    TSH  12.05*   --    --   0.85   --    < >  91.59*   < >  4.07    < > = values in this interval not displayed.      BP Readings from Last 3 Encounters:   10/23/18 130/77   10/21/18 140/74   10/17/18 111/69    Wt Readings from Last 3 Encounters:   10/23/18 132 lb (59.9 kg)   10/21/18 131 lb 8 oz (59.6 kg)   10/17/18 135 lb 4 oz (61.3 kg)              Reviewed and updated as needed this visit by clinical staff  Tobacco  Allergies  Meds       Reviewed and updated as needed this visit by Provider         ROS:  As above    OBJECTIVE:     /77  Pulse 51  Temp 98.1  F (36.7  C) (Oral)  Ht 5' 2\" (1.575 m)  Wt 132 lb (59.9 kg)  LMP 05/14/2008  SpO2 95%  BMI 24.14 kg/m2  Body mass index is 24.14 kg/(m^2).  GENERAL: healthy, alert and no distress  HENT: ear canals and TM's normal, nose and mouth without ulcers or lesions  NECK: no adenopathy, no asymmetry, masses, or scars and thyroid normal to palpation  RESP: lungs clear to auscultation - no rales, rhonchi or wheezes  CV: regular rate and rhythm, normal S1 S2, no S3 or S4, no murmur, click or rub, no peripheral edema and peripheral pulses strong  ABDOMEN: soft, ttp right lower quadrant without appreciable mass, no hepatosplenomegaly, no masses and bowel sounds normal   (female): normal female external genitalia, normal urethral meatus, vaginal mucosa, normal cervix/adnexa/uterus without masses or discharge. She does have some suprapubic ttp and some mild tenderness in the area of the right ovary    Diagnostic Test Results:  Results for orders placed or performed in visit on 10/23/18 (from the past 24 hour(s))   *UA reflex to Microscopic and Culture ([place] and [facility] (except [place] and [place])   Result Value Ref Range    Color Urine Yellow     Appearance Urine Clear     Glucose Urine Negative NEG^Negative " mg/dL    Bilirubin Urine Negative NEG^Negative    Ketones Urine Negative NEG^Negative mg/dL    Specific Gravity Urine <=1.005 1.003 - 1.035    Blood Urine Negative NEG^Negative    pH Urine 6.0 5.0 - 7.0 pH    Protein Albumin Urine Negative NEG^Negative mg/dL    Urobilinogen Urine 0.2 0.2 - 1.0 EU/dL    Nitrite Urine Negative NEG^Negative    Leukocyte Esterase Urine Negative NEG^Negative    Source Midstream Urine        ASSESSMENT/PLAN:       1. Abdominal pain, right lower quadrant  unclear etiology with uncertain prognosis, requires further workup    Labs as below and will schedule pelvic ultrasound.  UA without evidence of infection. Will complete additional labs today and will follow up in clinic next week, earlier if worsening and she will go to the ER if severe.   - *UA reflex to Microscopic and Culture ([place] and [facility] (except [place] and [place])  - Comprehensive metabolic panel  - CBC with platelets differential    Patient Instructions   1.  We will do labs today and schedule your pelvic ultrasound  2.  Follow-up with me next week to review results.  If you develop worsening pain in the meantime, please let us know right away.  If your pain is severe go straight to the ER.      Alyse Mcclendon M.D.        [facility]      Letter has been sent to the patient.      .Susan Bailey MA

## 2018-10-24 ENCOUNTER — HOSPITAL ENCOUNTER (OUTPATIENT)
Dept: ULTRASOUND IMAGING | Facility: CLINIC | Age: 53
Discharge: HOME | End: 2018-10-24
Attending: FAMILY MEDICINE | Admitting: FAMILY MEDICINE
Payer: COMMERCIAL

## 2018-10-24 DIAGNOSIS — R10.31 ABDOMINAL PAIN, RIGHT LOWER QUADRANT: ICD-10-CM

## 2018-10-24 LAB
ALBUMIN SERPL-MCNC: 3.9 G/DL (ref 3.4–5)
ALP SERPL-CCNC: 174 U/L (ref 40–150)
ALT SERPL W P-5'-P-CCNC: 32 U/L (ref 0–50)
ANION GAP SERPL CALCULATED.3IONS-SCNC: 7 MMOL/L (ref 3–14)
AST SERPL W P-5'-P-CCNC: 21 U/L (ref 0–45)
BILIRUB SERPL-MCNC: 0.4 MG/DL (ref 0.2–1.3)
BUN SERPL-MCNC: 11 MG/DL (ref 7–30)
CALCIUM SERPL-MCNC: 9.1 MG/DL (ref 8.5–10.1)
CHLORIDE SERPL-SCNC: 105 MMOL/L (ref 94–109)
CO2 SERPL-SCNC: 27 MMOL/L (ref 20–32)
CREAT SERPL-MCNC: 0.67 MG/DL (ref 0.52–1.04)
GFR SERPL CREATININE-BSD FRML MDRD: >90 ML/MIN/1.7M2
GLUCOSE SERPL-MCNC: 88 MG/DL (ref 70–99)
POTASSIUM SERPL-SCNC: 3.9 MMOL/L (ref 3.4–5.3)
PROT SERPL-MCNC: 8.4 G/DL (ref 6.8–8.8)
SODIUM SERPL-SCNC: 139 MMOL/L (ref 133–144)

## 2018-10-24 PROCEDURE — 76830 TRANSVAGINAL US NON-OB: CPT

## 2018-10-25 ENCOUNTER — TELEPHONE (OUTPATIENT)
Dept: FAMILY MEDICINE | Facility: CLINIC | Age: 53
End: 2018-10-25

## 2018-10-25 NOTE — TELEPHONE ENCOUNTER
RN --please call Eunice to let her know that the ultrasound did not show evidence of a reason for her abdominal pain.  We can discuss these results further at her follow-up visit with me. Alyse Mcclendon M.D.       Results for orders placed or performed during the hospital encounter of 10/24/18   US Pelvic Complete w Transvaginal    Narrative    US PELVIC COMPLETE WITH TRANSVAGINAL   10/24/2018 1:27 PM      HISTORY: right lower quadrant pain; Abdominal pain, right lower  quadrant     COMPARISON: 1/22/2013    FINDINGS:   Transabdominal and transvaginal imaging. The uterus measures 3.7 x 1.8  x 2.7 cm. The endometrial stripe measures 0.15 cm. There is a  hypoechoic intramural fibroid measuring 0.6 x 0.9 x 0.5 cm in the mid  to lower body of the uterus which is not significantly changed from  prior exam. There is a nabothian cyst in the cervix. There is no free  fluid in the pelvis.    The right ovary measures 1.7 x 1.0 x 0.6 cm. The left ovary measures  is not well visualized similar to prior exam. There is no adnexal  mass.      Impression    IMPRESSION:   1. No definite findings to explain patient's abdominal pain.  2. Intramural uterine fibroid as above is not significantly changed  dating back to the 1/22/2013 pelvic ultrasound.    I have personally reviewed the examination and initial interpretation  and I agree with the findings.    DARRIAN LUTZ MD

## 2018-10-29 ENCOUNTER — OFFICE VISIT (OUTPATIENT)
Dept: FAMILY MEDICINE | Facility: CLINIC | Age: 53
End: 2018-10-29
Payer: COMMERCIAL

## 2018-10-29 VITALS
SYSTOLIC BLOOD PRESSURE: 111 MMHG | OXYGEN SATURATION: 98 % | WEIGHT: 132 LBS | BODY MASS INDEX: 24.29 KG/M2 | TEMPERATURE: 98 F | HEIGHT: 62 IN | DIASTOLIC BLOOD PRESSURE: 54 MMHG | HEART RATE: 83 BPM

## 2018-10-29 DIAGNOSIS — R74.8 ABNORMAL ALKALINE PHOSPHATASE TEST: ICD-10-CM

## 2018-10-29 DIAGNOSIS — R10.31 ABDOMINAL PAIN, RIGHT LOWER QUADRANT: Primary | ICD-10-CM

## 2018-10-29 PROCEDURE — 99214 OFFICE O/P EST MOD 30 MIN: CPT | Performed by: FAMILY MEDICINE

## 2018-10-29 NOTE — PROGRESS NOTES
"  SUBJECTIVE:   Eunice Malloy is a 53 year old female who presents to clinic today for the following health issues:    Update: they couldn't find anything and she is still having the abdominal discomfort.     From 10/23/2018 clinic visit:  \"  ABDOMINAL PAIN     Onset: for a while now     Description:   Character: Dull ache and vibrates   Location: right lower quadrant  Radiation: Back occ     Intensity: sometimes     Progression of Symptoms:  intermittent    Accompanying Signs & Symptoms:  Fever/Chills?: no   Gas/Bloating: no   Nausea: no   Vomitting: no   Diarrhea?: no   Constipation:no   Dysuria or Hematuria: no   History:   Trauma: no   Previous similar pain: no    Previous tests done: none    Precipitating factors:   Does the pain change with:     Food: no      BM: no     Urination: no     Alleviating factors:  Nothing     Therapies Tried and outcome: nothing     LMP:  not applicable     She feels as if something is in there moving around, the pain is there throughout the day and it is more of a discomfort.         She has been having a sensation of dull pain, a vibrating pain in the right lower abdomen to mid right abdomen. The pain is sporadic. No specific triggers. Can happen in any position. Feels like there is something in there moving around. Does not notice passing gas more than normal. Eating does not seem to be a trigger. No specific food triggers. Does have frequent urination. Urinates at least 3 times at night, but also has insomnia. She has not had intercourse for a couple of years. She says she feels like she has had intercourse because she feels more moisture in the area. She does not feel like it is a vaginal discharge. She does not feel any urine leakage. Sometimes the abdominal sensation goes into the right back. Denies vaginal itch or burning or odor, or pain.  Denies any severe pain. \"         Problem list and histories reviewed & adjusted, as indicated.  Additional history: as " documented    Patient Active Problem List   Diagnosis     CARDIOVASCULAR SCREENING; LDL GOAL LESS THAN 160     Hypothyroidism     Cold sore     Anxiety     Shoulder pain     Labral tear of shoulder     Microscopic hematuria     Low back pain     Loss of hair     SI (sacroiliac) joint dysfunction and pain - BILATERAL     Abnormal finding on MRI of brain     Lumbago     Past Surgical History:   Procedure Laterality Date     HC EXCIS PRIMARY GANGLION WRIST       TONSILLECTOMY  as child       Social History   Substance Use Topics     Smoking status: Current Some Day Smoker     Years: 15.00     Types: Cigarettes     Smokeless tobacco: Never Used      Comment: 1 pack per week     Alcohol use 0.0 oz/week     0 Standard drinks or equivalent per week      Comment: rare     No family history on file.      Current Outpatient Prescriptions   Medication Sig Dispense Refill     acyclovir (ZOVIRAX) 5 % cream Apply topically 5 times daily 5 g 11     hydrOXYzine (ATARAX) 25 MG tablet Take 1-2 tablets (25-50 mg) by mouth nightly as needed (insomnia) Take about 30 minutes before bedtime. 60 tablet 1     ibuprofen (ADVIL/MOTRIN) 800 MG tablet TAKE ONE TABLET BY MOUTH EVERY 8 HOURS AS NEEDED FOR MODERATE PAIN 60 tablet 8     levothyroxine (SYNTHROID/LEVOTHROID) 100 MCG tablet Take 1 tablet (100 mcg) by mouth daily 90 tablet 1     Multiple Vitamins-Minerals (MULTIVITAL PO) Take 1 tablet by mouth       valACYclovir (VALTREX) 1000 mg tablet Take 2 tablets (2,000 mg) by mouth 2 times daily For one day. 12 tablet 11     Allergies   Allergen Reactions     Sulfa Drugs Rash     Recent Labs   Lab Test  10/23/18   1248  08/15/18   1107  02/07/18   1605  09/21/17   1600  09/05/17   0920   05/04/15   1019   07/07/11   0855   LDL   --    --    --    --    --    --   99   --   148*   HDL   --    --    --    --    --    --   101   --   85   TRIG   --    --    --    --    --    --   69   --   120   ALT  32   --   27  48   --    < >   --    < >   --   "  CR  0.67   --   0.64  0.73   --    < >   --    < >   --    GFRESTIMATED  >90   --   >90  83   --    < >   --    < >   --    GFRESTBLACK  >90   --   >90  >90   --    < >   --    < >   --    POTASSIUM  3.9   --   4.2  4.0   --    < >   --    < >   --    TSH   --   12.05*   --    --   0.85   < >  91.59*   < >  4.07    < > = values in this interval not displayed.      BP Readings from Last 3 Encounters:   10/29/18 111/54   10/23/18 130/77   10/21/18 140/74    Wt Readings from Last 3 Encounters:   10/29/18 132 lb (59.9 kg)   10/23/18 132 lb (59.9 kg)   10/21/18 131 lb 8 oz (59.6 kg)              Reviewed and updated as needed this visit by clinical staff  Tobacco  Allergies  Meds       Reviewed and updated as needed this visit by Provider         ROS:  As above    OBJECTIVE:     /54  Pulse 83  Temp 98  F (36.7  C) (Oral)  Ht 5' 2\" (1.575 m)  Wt 132 lb (59.9 kg)  LMP 05/14/2008  SpO2 98%  BMI 24.14 kg/m2  Body mass index is 24.14 kg/(m^2).  GENERAL: healthy, alert and no distress      Diagnostic Test Results:  Results for orders placed or performed during the hospital encounter of 10/24/18   US Pelvic Complete w Transvaginal    Narrative    US PELVIC COMPLETE WITH TRANSVAGINAL   10/24/2018 1:27 PM      HISTORY: right lower quadrant pain; Abdominal pain, right lower  quadrant     COMPARISON: 1/22/2013    FINDINGS:   Transabdominal and transvaginal imaging. The uterus measures 3.7 x 1.8  x 2.7 cm. The endometrial stripe measures 0.15 cm. There is a  hypoechoic intramural fibroid measuring 0.6 x 0.9 x 0.5 cm in the mid  to lower body of the uterus which is not significantly changed from  prior exam. There is a nabothian cyst in the cervix. There is no free  fluid in the pelvis.    The right ovary measures 1.7 x 1.0 x 0.6 cm. The left ovary measures  is not well visualized similar to prior exam. There is no adnexal  mass.      Impression    IMPRESSION:   1. No definite findings to explain patient's abdominal " pain.  2. Intramural uterine fibroid as above is not significantly changed  dating back to the 1/22/2013 pelvic ultrasound.    I have personally reviewed the examination and initial interpretation  and I agree with the findings.    DARRIAN LUTZ MD        ASSESSMENT/PLAN:       1. Abdominal pain, right lower quadrant  Persistent pain, unchanged.   unclear etiology with uncertain prognosis, requires further workup    UA without evidence of infection.  CBC was normal.  Pelvic ultrasound was without evidence of etiology.  CMP was normal with exception to mildly elevated alk phos at 174. Repeat hepatic panel when fasting and check GGT if still elevated.   If neg, will evaluate for possibility of bone disorder. CT scan ordered as well.   See below.        Patient Instructions   1) Return for fasting blood test sometime this week.   2) Have your CT scan completed this week.    3) Return to see me in two weeks.       Alyse Mcclendon M.D.        [facility]      Letter has been sent to the patient.      .Susan Bailey MA

## 2018-10-29 NOTE — MR AVS SNAPSHOT
After Visit Summary   10/29/2018    Eunice Malloy    MRN: [id number]           Patient Information     Date Of Birth          1965        Visit Information        Provider Department      10/29/2018 4:20 PM Alyse Mcclendon MD [facility]        Today's Diagnoses     Abdominal pain, right lower quadrant    -  1    Abnormal alkaline phosphatase test          Care Instructions    1) Return for fasting blood test sometime this week.   2) Have your CT scan completed this week.    3) Return to see me in two weeks.           Follow-ups after your visit        Follow-up notes from your care team     Return in about 1 week (around 11/5/2018) for Follow up visit.      Your next 10 appointments already scheduled     Oct 30, 2018  8:00 AM CDT   LAB with HW LAB   [facility] ([facility])    [address] [place] [address] [phone]           Please do not eat 10-12 hours before your appointment if you are coming in fasting for labs on lipids, cholesterol, or glucose (sugar). This does not apply to pregnant women. Water, hot tea and black coffee (with nothing added) are okay. Do not drink other fluids, diet soda or chew gum.            Oct 30, 2018 10:00 AM CDT   CT ABDOMEN PELVIS W CONTRAST with URCT1   [facility] [place], Radiology ([facility])    [address] [facility] [address] [phone]           How do I prepare for my exam? (Food and drink instructions) To prepare: Do not eat or drink for 2 hours before your exam. If you need to take medicine, you may take it with small sips of water. (We may ask you to take liquid medicine as well.)  How do I prepare for my exam? (Other instructions) Please arrive 30 minutes early for your CT.  Once in the department you might be asked to drink water 15-20 minutes prior to your exam.  If indicated you may be asked to drink an oral contrast in  advance of your CT.  If this is the case, the imaging team will let you know or be in contact with you prior to your appointment  Patients over 70 or patients with diabetes or kidney problems: If you haven t had a blood test (creatinine test) within the last 30 days, the Cardiologist/Radiologist may require you to get this test prior to your exam.  If you have diabetes:  Continue to take your metformin medication on the day of your exam  What should I wear: Please wear loose clothing, such as a sweat suit or jogging clothes. Avoid snaps, zippers and other metal. We may ask you to undress and put on a hospital gown.  How long does the exam take: Most scans take less than 20 minutes.  What should I bring: Please bring any scans or X-rays taken at other hospitals, if similar tests were done. Also bring a list of your medicines, including vitamins, minerals and over-the-counter drugs. It is safest to leave personal items at home.  Do I need a : No  is needed.  What do I need to tell my doctor? Be sure to tell your doctor: * If you have any allergies. * If there s any chance you are pregnant. * If you are breastfeeding.  What should I do after the exam: No restrictions, You may resume normal activities.  What is this test: A CT (computed tomography) scan is a series of pictures that allows us to look inside your body. The scanner creates images of the body in cross sections, much like slices of bread. This helps us see any problems more clearly. You may receive contrast (X-ray dye) before or during your scan. You will be asked to drink the contrast.  Who should I call with questions: If you have any questions, please call the Imaging Department where you will have your exam. Directions, parking instructions, and other information is available on our website, [email or web address].            Nov 12, 2018  4:20 PM CST   Office Visit with Alyse Mcclendon MD   [facility] ([facility])     [address] [place] [address] [phone]           Bring a current list of meds and any records pertaining to this visit. For Physicals, please bring immunization records and any forms needing to be filled out. Please arrive 10 minutes early to complete paperwork.            Nov 14, 2018  8:00 AM CST   New Sleep Patient with Ap Crenshaw MD   [facility] ([facility])    [address] [facility] [address]  [facility] [address]   [phone]            Nov 26, 2018  9:40 AM CST   Office Visit with Alyse Mcclendon MD   [facility] ([facility])    [address] [place] [address] [phone]           Bring a current list of meds and any records pertaining to this visit. For Physicals, please bring immunization records and any forms needing to be filled out. Please arrive 10 minutes early to complete paperwork.              Future tests that were ordered for you today     Open Future Orders        Priority Expected Expires Ordered    Hepatic panel Routine  10/29/2019 10/29/2018    CT Abdomen Pelvis w Contrast Routine  10/29/2019 10/29/2018            Who to contact     If you have questions or need follow up information about today's clinic visit or your schedule please contact [facility] directly at [phone].  Normal or non-critical lab and imaging results will be communicated to you by MyChart, letter or phone within 4 business days after the clinic has received the results. If you do not hear from us within 7 days, please contact the clinic through MyChart or phone. If you have a critical or abnormal lab result, we will notify you by phone as soon as possible.  Submit refill requests through [employer] or call your pharmacy and they will forward the refill request to us. Please allow 3 business days for your refill to be completed.          Additional Information About Your  "Visit        Care EveryWhere ID     This is your Care EveryWhere ID. This could be used by other organizations to access your [facility] medical records  [id number]        Your Vitals Were     Pulse Temperature Height Last Period Pulse Oximetry BMI (Body Mass Index)    83 98  F (36.7  C) (Oral) 5' 2\" (1.575 m) 05/14/2008 98% 24.14 kg/m2       Blood Pressure from Last 3 Encounters:   10/29/18 111/54   10/23/18 130/77   10/21/18 140/74    Weight from Last 3 Encounters:   10/29/18 132 lb (59.9 kg)   10/23/18 132 lb (59.9 kg)   10/21/18 131 lb 8 oz (59.6 kg)               Primary Care Provider Office Phone # Fax #    Alyse Mcclendon [phone] [address]  [facility] [address]        Equal Access to Services     CECILIA [facility] : Hadii pratik ku hadasho Soomaali, waaxda luqadaha, qaybta kaalmada adeegyada, waxay carolin prem mccoy . So [facility] [phone].    ATENCIÓN: Si habla español, tiene a link disposición servicios gratuitos de asistencia lingüística. Llame al [phone].    We comply with applicable federal civil rights laws and [place] laws. We do not discriminate on the basis of race, color, national origin, age, disability, sex, sexual orientation, or gender identity.            Thank you!     Thank you for choosing [facility]  for your care. Our goal is always to provide you with excellent care. Hearing back from our patients is one way we can continue to improve our services. Please take a few minutes to complete the written survey that you may receive in the mail after your visit with us. Thank you!             Your Updated Medication List - Protect others around you: Learn how to safely use, store and throw away your medicines at www.disposemymeds.org.          This list is accurate as of 10/29/18 11:59 PM.  Always use your most recent med list.                   Brand Name Dispense Instructions for use Diagnosis    acyclovir 5 % cream    ZOVIRAX    5 g "    Apply topically 5 times daily    Cold sore       hydrOXYzine 25 MG tablet    ATARAX    60 tablet    Take 1-2 tablets (25-50 mg) by mouth nightly as needed (insomnia) Take about 30 minutes before bedtime.    Insomnia, unspecified type       ibuprofen 800 MG tablet    ADVIL/MOTRIN    60 tablet    TAKE ONE TABLET BY MOUTH EVERY 8 HOURS AS NEEDED FOR MODERATE PAIN    Chronic bilateral low back pain without sciatica       levothyroxine 100 MCG tablet    SYNTHROID/LEVOTHROID    90 tablet    Take 1 tablet (100 mcg) by mouth daily    Hypothyroidism, unspecified type       MULTIVITAL PO      Take 1 tablet by mouth        valACYclovir 1000 mg tablet    VALTREX    12 tablet    Take 2 tablets (2,000 mg) by mouth 2 times daily For one day.    Insomnia, unspecified type

## 2018-10-29 NOTE — PATIENT INSTRUCTIONS
1) Return for fasting blood test sometime this week.   2) Have your CT scan completed this week.    3) Return to see me in two weeks.

## 2018-10-30 ENCOUNTER — HOSPITAL ENCOUNTER (OUTPATIENT)
Dept: CT IMAGING | Facility: CLINIC | Age: 53
Discharge: HOME | End: 2018-10-30
Attending: FAMILY MEDICINE | Admitting: FAMILY MEDICINE
Payer: COMMERCIAL

## 2018-10-30 ENCOUNTER — TELEPHONE (OUTPATIENT)
Dept: FAMILY MEDICINE | Facility: CLINIC | Age: 53
End: 2018-10-30

## 2018-10-30 DIAGNOSIS — R10.31 ABDOMINAL PAIN, RIGHT LOWER QUADRANT: ICD-10-CM

## 2018-10-30 DIAGNOSIS — R74.8 ABNORMAL ALKALINE PHOSPHATASE TEST: ICD-10-CM

## 2018-10-30 DIAGNOSIS — R74.8 ELEVATED ALKALINE PHOSPHATASE LEVEL: ICD-10-CM

## 2018-10-30 LAB
ALBUMIN SERPL-MCNC: 3.6 G/DL (ref 3.4–5)
ALP SERPL-CCNC: 161 U/L (ref 40–150)
ALT SERPL W P-5'-P-CCNC: 29 U/L (ref 0–50)
AST SERPL W P-5'-P-CCNC: 21 U/L (ref 0–45)
BILIRUB DIRECT SERPL-MCNC: <0.1 MG/DL (ref 0–0.2)
BILIRUB SERPL-MCNC: 0.3 MG/DL (ref 0.2–1.3)
PROT SERPL-MCNC: 7.8 G/DL (ref 6.8–8.8)

## 2018-10-30 PROCEDURE — 82977 ASSAY OF GGT: CPT | Performed by: FAMILY MEDICINE

## 2018-10-30 PROCEDURE — 36415 COLL VENOUS BLD VENIPUNCTURE: CPT | Performed by: FAMILY MEDICINE

## 2018-10-30 PROCEDURE — 25000128 H RX IP 250 OP 636: Performed by: FAMILY MEDICINE

## 2018-10-30 PROCEDURE — 74177 CT ABD & PELVIS W/CONTRAST: CPT

## 2018-10-30 PROCEDURE — 25000125 ZZHC RX 250: Performed by: FAMILY MEDICINE

## 2018-10-30 PROCEDURE — 80076 HEPATIC FUNCTION PANEL: CPT | Performed by: FAMILY MEDICINE

## 2018-10-30 RX ORDER — IOPAMIDOL 755 MG/ML
65 INJECTION, SOLUTION INTRAVASCULAR ONCE
Status: COMPLETED | OUTPATIENT
Start: 2018-10-30 | End: 2018-10-30

## 2018-10-30 RX ADMIN — IOPAMIDOL 65 ML: 755 INJECTION, SOLUTION INTRAVENOUS at 10:21

## 2018-10-30 RX ADMIN — SODIUM CHLORIDE 56 ML: 9 INJECTION, SOLUTION INTRAVENOUS at 10:21

## 2018-10-30 NOTE — TELEPHONE ENCOUNTER
RN -- Please call Eunice to let her know that her CT scan was normal, except for some retained stool in the colon.  It may be that her symptoms are coming from constipation.  I recommend that she start a fiber supplement like Metamucil daily.  We will follow-up on this when she returns to see me in a couple of weeks.Alyse Mcclendon M.D.          Results for orders placed or performed during the hospital encounter of 10/30/18   CT Abdomen Pelvis w Contrast    Narrative    CT ABDOMEN AND PELVIS WITH CONTRAST  10/30/2018 10:29 AM     HISTORY:  Right lower quadrant pain for two months, sensation of  movement in abdomen, mildly elevated alkaline phosphatase. No clear  etiology on pelvic ultrasound.    TECHNIQUE:  65 mL Isovue 370. CT images of the abdomen and pelvis  following nonionic intravenous contrast. Radiation dose for this scan  was reduced using automated exposure control, adjustment of the mA  and/or kV according to patient size, or iterative reconstruction  technique.    COMPARISON: CT 9/14/2015, ultrasound 10/24/2018    FINDINGS:  Other than a small cyst at the lower pole of the left  kidney, the liver, gallbladder, spleen, pancreas, adrenal glands, and  kidneys are unremarkable. No abdominal or retroperitoneal  lymphadenopathy. The appendix is normal. No abnormal bowel distention,  free air, or ascites. There is mild sigmoid diverticulosis. Moderate  volume of retained colonic stool. No pelvic mass, lymphadenopathy, or  free fluid. No concerning bone abnormalities.      Impression    IMPRESSION: No acute abnormality in the abdomen or pelvis. There is a  moderate volume of retained colonic stool, suggestive of constipation.    SAUNDRA NARANJO MD

## 2018-10-31 ENCOUNTER — TELEPHONE (OUTPATIENT)
Dept: FAMILY MEDICINE | Facility: CLINIC | Age: 53
End: 2018-10-31

## 2018-10-31 DIAGNOSIS — R74.8 ELEVATED ALKALINE PHOSPHATASE LEVEL: Primary | ICD-10-CM

## 2018-10-31 LAB — GGT SERPL-CCNC: 26 U/L (ref 0–40)

## 2018-10-31 NOTE — TELEPHONE ENCOUNTER
Her GGT was normal. No need to come in for another lab test right now. We will discuss further at her clinic visit. Please ask if she has been trying a fiber supplement to help with possible constipation. This may help her abdominal symptoms. I would like to see how a trial of fiber goes and we can discuss how this has been working for her at her follow up appointment with me.     Alyse Mcclendon M.D.        Results for orders placed or performed in visit on 10/30/18   Hepatic panel   Result Value Ref Range    Bilirubin Direct <0.1 0.0 - 0.2 mg/dL    Bilirubin Total 0.3 0.2 - 1.3 mg/dL    Albumin 3.6 3.4 - 5.0 g/dL    Protein Total 7.8 6.8 - 8.8 g/dL    Alkaline Phosphatase 161 (H) 40 - 150 U/L    ALT 29 0 - 50 U/L    AST 21 0 - 45 U/L   GGT   Result Value Ref Range    GGT 26 0 - 40 U/L

## 2018-10-31 NOTE — TELEPHONE ENCOUNTER
Writer contact [facility] and was informed it is highly likely enough blood sample was take to run GGT.  1% chance patient will need to return for additional lab draw.    Will not know for sure if [facility] blood [facility]    Writer informed Dr. Mcclendon of above.    Called patient:  Left message to call back and ask to speak with an available triage nurse.  LIZETH SingletaryN, RN

## 2018-10-31 NOTE — TELEPHONE ENCOUNTER
RN --please call Eunice to let her know that her liver test result came back and still shows a mildly high alkaline phosphatase level.  Please find out from the lab if the GGT can be added to her tests.  If not, Eunice will need to return for this blood test.She does not need to be fasting for this test.     Alyse Mcclendon M.D.       Results for orders placed or performed in visit on 10/30/18   Hepatic panel   Result Value Ref Range    Bilirubin Direct <0.1 0.0 - 0.2 mg/dL    Bilirubin Total 0.3 0.2 - 1.3 mg/dL    Albumin 3.6 3.4 - 5.0 g/dL    Protein Total 7.8 6.8 - 8.8 g/dL    Alkaline Phosphatase 161 (H) 40 - 150 U/L    ALT 29 0 - 50 U/L    AST 21 0 - 45 U/L

## 2018-10-31 NOTE — LETTER
[address] [address] [phone]          November 5, 2018    Eunice Malloy                                                                                                                     [address] [facility] [address]            Dear Eunice,    We tried contacting you but were unable to reach you.    Dr. Mcclendon received your lab result and says:  -Your GGT was normal.  There is not need to come in for another lab test right now.  She will discuss this further with you at your upcoming appointment with her.  -Dr. Mcclendon is wondering if you have tried a fiber supplement to help with possible constipation?  The supplement may help your abdominal symptoms.  -Dr. Mcclendon would like to see how a trial of fiber supplementation goes and she can discuss how it has been working for you at your upcoming appointment        Sincerely,         Your [facility] Care Team

## 2018-11-02 NOTE — TELEPHONE ENCOUNTER
Message left for patient to return call to clinic and ask to speak to available triage nurse     TRIAGE - see note below from Dr. Hakan Gamez Registered Nurse   [facility]

## 2018-11-05 NOTE — TELEPHONE ENCOUNTER
Patient has not returned triage's calls.    Letter mailed to patient.    LIZETH SingletaryN, RN
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
## 2019-01-10 ENCOUNTER — TELEPHONE (OUTPATIENT)
Dept: FAMILY MEDICINE | Facility: CLINIC | Age: 54
End: 2019-01-10

## 2019-01-10 DIAGNOSIS — B00.1 COLD SORE: Primary | ICD-10-CM

## 2019-01-14 RX ORDER — ACYCLOVIR 400 MG/1
400 TABLET ORAL EVERY 8 HOURS
Qty: 15 TABLET | Refills: 0 | Status: SHIPPED | OUTPATIENT
Start: 2019-01-14 | End: 2019-03-18

## 2019-02-26 DIAGNOSIS — E03.9 HYPOTHYROIDISM, UNSPECIFIED TYPE: ICD-10-CM

## 2019-02-26 DIAGNOSIS — G47.00 INSOMNIA, UNSPECIFIED TYPE: ICD-10-CM

## 2019-02-26 DIAGNOSIS — G47.00 INSOMNIA, UNSPECIFIED TYPE: Primary | ICD-10-CM

## 2019-02-26 RX ORDER — LEVOTHYROXINE SODIUM 100 UG/1
TABLET ORAL
Qty: 30 TABLET | Refills: 0 | Status: SHIPPED | OUTPATIENT
Start: 2019-02-26 | End: 2019-05-13

## 2019-02-26 RX ORDER — TRAZODONE HYDROCHLORIDE 50 MG/1
TABLET, FILM COATED ORAL
Qty: 60 TABLET | Refills: 0 | Status: SHIPPED | OUTPATIENT
Start: 2019-02-26 | End: 2019-05-13

## 2019-02-26 RX ORDER — HYDROXYZINE HYDROCHLORIDE 25 MG/1
25-50 TABLET, FILM COATED ORAL
Qty: 60 TABLET | Refills: 1 | Status: CANCELLED | OUTPATIENT
Start: 2019-02-26

## 2019-02-26 NOTE — TELEPHONE ENCOUNTER
Patient states she is leaving town tomorrow morning and running low on these medications (see other encounter dated 2/26/2019). She apologizes for realizing so late and is hoping someone can approve these ASAP. Please assist. Thanks!

## 2019-02-26 NOTE — TELEPHONE ENCOUNTER
"Requested Prescriptions   Pending Prescriptions Disp Refills     levothyroxine (SYNTHROID/LEVOTHROID) 100 MCG tablet [Pharmacy Med Name: LEVOTHYROXINE 0.100MG (100MCG) TAB]  Last Written Prescription Date:  8/15/2018  Last Fill Quantity: 90 tablet,  # refills: 1   Last Office Visit: 10/29/2018   Future Office Visit:      30 tablet 0     Sig: TAKE 1 TABLET BY MOUTH DAILY    Thyroid Protocol Failed - 2/26/2019 10:49 AM       Failed - Normal TSH on file in past 12 months    Recent Labs   Lab Test 08/15/18  1107   TSH 12.05*           Passed - Patient is 12 years or older       Passed - Recent (12 mo) or future (30 days) visit within the authorizing provider's specialty    Patient had office visit in the last 12 months or has a visit in the next 30 days with authorizing provider or within the authorizing provider's specialty.  See \"Patient Info\" tab in inbasket, or \"Choose Columns\" in Meds & Orders section of the refill encounter.           Passed - Medication is active on med list       Passed - No active pregnancy on record    If patient is pregnant or has had a positive pregnancy test, please check TSH.       Passed - No positive pregnancy test in past 12 months    If patient is pregnant or has had a positive pregnancy test, please check TSH.            "

## 2019-02-26 NOTE — TELEPHONE ENCOUNTER
"  Requested Prescriptions   Pending Prescriptions Disp Refills     levothyroxine (SYNTHROID/LEVOTHROID) 100 MCG tablet [Pharmacy Med Name: LEVOTHYROXINE 0.100MG (100MCG) TAB] 30 tablet 0     Sig: TAKE 1 TABLET BY MOUTH DAILY    Thyroid Protocol Failed - 2/26/2019 10:35 AM       Failed - Normal TSH on file in past 12 months    Recent Labs   Lab Test 08/15/18  1107   TSH 12.05*             Passed - Patient is 12 years or older       Passed - Recent (12 mo) or future (30 days) visit within the authorizing provider's specialty    Patient had office visit in the last 12 months or has a visit in the next 30 days with authorizing provider or within the authorizing provider's specialty.  See \"Patient Info\" tab in inbasket, or \"Choose Columns\" in Meds & Orders section of the refill encounter.             Passed - Medication is active on med list       Passed - No active pregnancy on record    If patient is pregnant or has had a positive pregnancy test, please check TSH.         Passed - No positive pregnancy test in past 12 months    If patient is pregnant or has had a positive pregnancy test, please check TSH.          hydrOXYzine (ATARAX) 25 MG tablet 60 tablet 1     Sig: Take 1-2 tablets (25-50 mg) by mouth nightly as needed (insomnia) Take about 30 minutes before bedtime.    Antihistamines Protocol Passed - 2/26/2019 10:35 AM       Passed - Recent (12 mo) or future (30 days) visit within the authorizing provider's specialty    Patient had office visit in the last 12 months or has a visit in the next 30 days with authorizing provider or within the authorizing provider's specialty.  See \"Patient Info\" tab in inbasket, or \"Choose Columns\" in Meds & Orders section of the refill encounter.             Passed - Patient is age 3 or older    Apply age and/or weight-based dosing for peds patients age 3 and older.    Forward request to provider for patients under the age of 3.         Passed - Medication is active on med list    "

## 2019-02-26 NOTE — TELEPHONE ENCOUNTER
Left message on home number that requested refills have been sent to the pharmacy.  Sapna Posada, RN

## 2019-02-26 NOTE — TELEPHONE ENCOUNTER
There is a second encounter that was sent for Trazadone.   See other refill encounter.   Sapna Posada RN

## 2019-02-26 NOTE — TELEPHONE ENCOUNTER
LM for pt to call clinic triage.  Please clarify what meds you are needing filled. Only one med is pended.  Pt needs lab only appt made to get refill.  JUAN DAVID Sanchez

## 2019-02-26 NOTE — TELEPHONE ENCOUNTER
Patient will get labs done when she returns from out of town, will be gone 10 days planned.  Sapna Posada RN

## 2019-02-26 NOTE — TELEPHONE ENCOUNTER
Patient is asking for at least a 10 day supply as she uses this for sleep.  Leaving town early in the morning tomorrow    Will be out of town for 10 days and will be seen when she returns.  Please advise.  Medication is not on current med list   Last Written Prescription Date:  10/2/18  Last Fill Quantity: 60,  # refills: 0   Last office visit: 10/29/2018 with prescribing provider:     Future Office Visit:    Sapna Posada RN

## 2019-02-26 NOTE — TELEPHONE ENCOUNTER
Routing refill request to provider for review/approval because:  Labs out of range:  TSH  TSH   Date Value Ref Range Status   08/15/2018 12.05 (H) 0.40 - 4.00 mU/L Final   09/05/2017 0.85 0.40 - 4.00 mU/L Final   09/28/2016 13.18 (H) 0.40 - 4.00 mU/L Final   07/11/2016 24.34 (H) 0.40 - 4.00 mU/L Final   05/04/2015 91.59 (H) 0.40 - 4.00 mU/L Final       Per TE with last lab results, patient had not been taking any Thyroid meds for a month prior to the lab work.  Patient was supposed to recheck labs 8 to 10 weeks after restarting med.  Needs lab appointment.  Sapna Posada RN

## 2019-02-26 NOTE — TELEPHONE ENCOUNTER
Pt called stating she is going out of town and needs her insomnia and thyroid medications as soon as possible and left the number for [facility] [phone]

## 2019-02-26 NOTE — TELEPHONE ENCOUNTER
"Requested Prescriptions   Pending Prescriptions Disp Refills     traZODone (DESYREL) 50 MG tablet [Pharmacy Med Name: TRAZODONE 50MG TABLETS]  Not on current med list  Last Office Visit: 10/29/2018   Future Office Visit:      60 tablet 0     Sig: TAKE 1 TO 2 TABLETS BY MOUTH AT BEDTIME AS NEEDED FOR SLEEP    Serotonin Modulators Failed - 2/26/2019 10:46 AM       Failed - Medication is active on med list       Passed - Recent (12 mo) or future (30 days) visit within the authorizing provider's specialty    Patient had office visit in the last 12 months or has a visit in the next 30 days with authorizing provider or within the authorizing provider's specialty.  See \"Patient Info\" tab in inbasket, or \"Choose Columns\" in Meds & Orders section of the refill encounter.           Passed - Patient is age 18 or older       Passed - No active pregnancy on record       Passed - No positive pregnancy test in past 12 months          "

## 2019-03-14 ENCOUNTER — TELEPHONE (OUTPATIENT)
Dept: FAMILY MEDICINE | Facility: CLINIC | Age: 54
End: 2019-03-14

## 2019-03-14 DIAGNOSIS — E03.9 HYPOTHYROIDISM, UNSPECIFIED TYPE: Primary | ICD-10-CM

## 2019-03-14 NOTE — TELEPHONE ENCOUNTER
"Patient has lab only appt TOMORROW 3/15 for \"Thyroid\", no orders in chart.  Dr. Mcclendon is out tomorrow.  Please place FUTURE orders in Epic if appropriate.    Thanks, lab    "

## 2019-03-15 DIAGNOSIS — B00.1 COLD SORE: ICD-10-CM

## 2019-03-15 DIAGNOSIS — E03.9 HYPOTHYROIDISM, UNSPECIFIED TYPE: ICD-10-CM

## 2019-03-15 LAB
T4 FREE SERPL-MCNC: 0.66 NG/DL (ref 0.76–1.46)
TSH SERPL DL<=0.005 MIU/L-ACNC: 32.8 MU/L (ref 0.4–4)

## 2019-03-15 PROCEDURE — 36415 COLL VENOUS BLD VENIPUNCTURE: CPT | Performed by: PHYSICIAN ASSISTANT

## 2019-03-15 PROCEDURE — 84443 ASSAY THYROID STIM HORMONE: CPT | Performed by: PHYSICIAN ASSISTANT

## 2019-03-15 PROCEDURE — 84439 ASSAY OF FREE THYROXINE: CPT | Performed by: PHYSICIAN ASSISTANT

## 2019-03-18 ENCOUNTER — TELEPHONE (OUTPATIENT)
Dept: FAMILY MEDICINE | Facility: CLINIC | Age: 54
End: 2019-03-18

## 2019-03-18 DIAGNOSIS — E03.9 HYPOTHYROIDISM, UNSPECIFIED TYPE: Primary | ICD-10-CM

## 2019-03-18 RX ORDER — ACYCLOVIR 400 MG/1
400 TABLET ORAL EVERY 8 HOURS
Qty: 15 TABLET | Refills: 3 | Status: SHIPPED | OUTPATIENT
Start: 2019-03-18 | End: 2020-05-28

## 2019-03-18 NOTE — TELEPHONE ENCOUNTER
Writer called patient and reviewed message per Dr. Mcclendon.    Patient verbalized understanding and in agreement with plan.    Patient stated she was not taking Levothyroxine consistently, but will take it consistently and recheck labs in 8 weeks.    TSH lab ordered per Dr. Mcclendon.  WILLIAM Wallace, LIZETHN, RN

## 2019-03-18 NOTE — TELEPHONE ENCOUNTER
"Requested Prescriptions   Pending Prescriptions Disp Refills     acyclovir (ZOVIRAX) 400 MG tablet  Last Written Prescription Date:  1/14/2019  Last Fill Quantity: 15 tablet,  # refills: 0   Last Office Visit: 10/29/2018   Future Office Visit:      15 tablet 0     Sig: Take 1 tablet (400 mg) by mouth every 8 hours    Antivirals for Herpes Protocol Passed - 3/17/2019 11:56 AM       Passed - Patient is age 12 or older       Passed - Recent (12 mo) or future (30 days) visit within the authorizing provider's specialty    Patient had office visit in the last 12 months or has a visit in the next 30 days with authorizing provider or within the authorizing provider's specialty.  See \"Patient Info\" tab in inbasket, or \"Choose Columns\" in Meds & Orders section of the refill encounter.           Passed - Medication is active on med list       Passed - Normal serum creatinine on file in past 12 months    Recent Labs   Lab Test 10/23/18  1248   CR 0.67             "

## 2019-03-18 NOTE — TELEPHONE ENCOUNTER
Dr. Mcclendon-Please review and advise/sign if agree.    Patient would like refills added to this medication order.    Writer pended with 3 refills.    Thank you!  LIZETH SingletaryN, RN

## 2019-03-18 NOTE — TELEPHONE ENCOUNTER
RN -- please let pt know that the thyroid test was abnormal. I suspect this is because she has not been taking her medication consistently. Please find out if she has. If not, schedule follow up lab in 8 weeks. If she has been taking the medication consistently the past 8 weeks, it needs to be adjusted.   Alyse Mcclendon M.D.     Results for orders placed or performed in visit on 03/15/19   **TSH with free T4 reflex FUTURE anytime   Result Value Ref Range    TSH 32.80 (H) 0.40 - 4.00 mU/L   T4 free   Result Value Ref Range    T4 Free 0.66 (L) 0.76 - 1.46 ng/dL

## 2019-05-13 DIAGNOSIS — G47.00 INSOMNIA, UNSPECIFIED TYPE: ICD-10-CM

## 2019-05-13 DIAGNOSIS — E03.9 HYPOTHYROIDISM, UNSPECIFIED TYPE: ICD-10-CM

## 2019-05-13 NOTE — TELEPHONE ENCOUNTER
"Requested Prescriptions   Pending Prescriptions Disp Refills     levothyroxine (SYNTHROID/LEVOTHROID) 100 MCG tablet [Pharmacy Med Name: LEVOTHYROXINE 0.100MG (100MCG) TAB] 30 tablet 0     Sig: TAKE 1 TABLET BY MOUTH DAILY  Last Written Prescription Date:  2/26/2109  Last Fill Quantity: 30 tablet,  # refills: 0   Last Office Visit: 10/29/2018   Future Office Visit:            Thyroid Protocol Failed - 5/13/2019 12:55 PM        Failed - Normal TSH on file in past 12 months     Recent Labs   Lab Test 03/15/19  1001   TSH 32.80*            Passed - Patient is 12 years or older        Passed - Recent (12 mo) or future (30 days) visit within the authorizing provider's specialty     Patient had office visit in the last 12 months or has a visit in the next 30 days with authorizing provider or within the authorizing provider's specialty.  See \"Patient Info\" tab in inbasket, or \"Choose Columns\" in Meds & Orders section of the refill encounter.            Passed - Medication is active on med list        Passed - No active pregnancy on record     If patient is pregnant or has had a positive pregnancy test, please check TSH.        Passed - No positive pregnancy test in past 12 months     If patient is pregnant or has had a positive pregnancy test, please check TSH.            "

## 2019-05-13 NOTE — TELEPHONE ENCOUNTER
"Requested Prescriptions   Pending Prescriptions Disp Refills     traZODone (DESYREL) 50 MG tablet [Pharmacy Med Name: TRAZODONE 50MG TABLETS] 60 tablet 0     Sig: TAKE 1 TO 2 TABLETS BY MOUTH AT BEDTIME AS NEEDED FOR SLEEP  Last Written Prescription Date:  2/26/2019  Last Fill Quantity: 60 tablet,  # refills: 0   Last Office Visit: 10/29/2018   Future Office Visit:            Serotonin Modulators Passed - 5/13/2019 12:55 PM        Passed - Recent (12 mo) or future (30 days) visit within the authorizing provider's specialty     Patient had office visit in the last 12 months or has a visit in the next 30 days with authorizing provider or within the authorizing provider's specialty.  See \"Patient Info\" tab in inbasket, or \"Choose Columns\" in Meds & Orders section of the refill encounter.            Passed - Medication is active on med list        Passed - Patient is age 18 or older        Passed - No active pregnancy on record        Passed - No positive pregnancy test in past 12 months          "

## 2019-05-16 RX ORDER — LEVOTHYROXINE SODIUM 100 UG/1
TABLET ORAL
Qty: 15 TABLET | Refills: 0 | Status: SHIPPED | OUTPATIENT
Start: 2019-05-16 | End: 2019-06-17

## 2019-05-16 RX ORDER — TRAZODONE HYDROCHLORIDE 50 MG/1
TABLET, FILM COATED ORAL
Qty: 60 TABLET | Refills: 2 | Status: SHIPPED | OUTPATIENT
Start: 2019-05-16 | End: 2019-12-28

## 2019-05-16 NOTE — TELEPHONE ENCOUNTER
Prescription approved per [facility] Refill Protocol.    Signed Prescriptions:                        Disp   Refills    traZODone (DESYREL) 50 MG tablet           60 tab*2        Sig: TAKE 1 TO 2 TABLETS BY MOUTH AT BEDTIME AS NEEDED FOR           SLEEP  Authorizing Provider: BREANNA BUTTS  Ordering User: JONATHAN PLAZA

## 2019-05-16 NOTE — TELEPHONE ENCOUNTER
Routing refill request to provider for review/approval because:  Jacqueline given x1 and patient did not follow up, please advise    HW REception - ask patient to schedule lab only and bridge can be discussed with provider please

## 2019-05-20 ENCOUNTER — TELEPHONE (OUTPATIENT)
Dept: FAMILY MEDICINE | Facility: CLINIC | Age: 54
End: 2019-05-20

## 2019-05-20 DIAGNOSIS — E03.9 HYPOTHYROIDISM, UNSPECIFIED TYPE: ICD-10-CM

## 2019-05-20 LAB
T4 FREE SERPL-MCNC: 0.95 NG/DL (ref 0.76–1.46)
TSH SERPL DL<=0.005 MIU/L-ACNC: 4.62 MU/L (ref 0.4–4)

## 2019-05-20 PROCEDURE — 84443 ASSAY THYROID STIM HORMONE: CPT | Performed by: FAMILY MEDICINE

## 2019-05-20 PROCEDURE — 36415 COLL VENOUS BLD VENIPUNCTURE: CPT | Performed by: FAMILY MEDICINE

## 2019-05-20 PROCEDURE — 84439 ASSAY OF FREE THYROXINE: CPT | Performed by: FAMILY MEDICINE

## 2019-05-20 RX ORDER — LEVOTHYROXINE SODIUM 100 UG/1
100 TABLET ORAL DAILY
Qty: 90 TABLET | Refills: 0 | Status: CANCELLED | OUTPATIENT
Start: 2019-05-20

## 2019-05-20 NOTE — TELEPHONE ENCOUNTER
RN -- please let pt know that the thyroid test was abnormal, suggesting pt needs a higher dose of levothyroxine. Pt is currently on 100mcg daily.  If pt would like to increase, I would recommend levothyroxine 112mcg daily and return for lab visit in 6-8 weeks for TSH.  Please confirm that she has been taking her levothyroxine 100 mcg every day for the past 6 to 8 weeks prior to having this test otherwise the test result is not accurate.  If she has not been, then I would not recommend adjusting her medication and I would recommend that she come back in 6 to 8 weeks after having taken the levothyroxine 100 mcg every day.  Alyse Mcclendon M.D.     Results for orders placed or performed in visit on 05/20/19   **TSH with free T4 reflex FUTURE anytime   Result Value Ref Range    TSH 4.62 (H) 0.40 - 4.00 mU/L   T4 free   Result Value Ref Range    T4 Free 0.95 0.76 - 1.46 ng/dL

## 2019-05-24 PROBLEM — M54.50 LUMBAGO: Status: RESOLVED | Noted: 2018-08-21 | Resolved: 2019-05-24

## 2019-05-24 NOTE — PROGRESS NOTES
DISCHARGE SUMMARY    Eunice Malloy was seen 2 times for evaluation and treatment.  Patient did not return for further treatment and current status is unknown.  Due to short treatment duration, no objective or functional changes were made.  Please see goal flow sheet from episode noted date below and initial evaluation for further information.  Patient is discharged from therapy and therapy episode is resolved as of 05/24/19.      Linked Episodes   Type: Episode: Status: Noted: Resolved: Last update: Updated by:   PHYSICAL THERAPY LBP 8/21/18 Active 8/21/2018 8/28/2018  3:16 PM Dagoberto Hightower, PT      Comments:

## 2019-06-17 DIAGNOSIS — G89.29 CHRONIC BILATERAL LOW BACK PAIN WITHOUT SCIATICA: ICD-10-CM

## 2019-06-17 DIAGNOSIS — M54.50 CHRONIC BILATERAL LOW BACK PAIN WITHOUT SCIATICA: ICD-10-CM

## 2019-06-17 NOTE — TELEPHONE ENCOUNTER
"Requested Prescriptions   Pending Prescriptions Disp Refills     ibuprofen (ADVIL/MOTRIN) 800 MG tablet  Last Written Prescription Date:  12/11/2017  Last Fill Quantity: 60 tablet,  # refills: 8   Last Office Visit: 10/29/2018   Future Office Visit:      60 tablet 8       NSAID Medications Passed - 6/17/2019 12:33 PM        Passed - Blood pressure under 140/90 in past 12 months     BP Readings from Last 3 Encounters:   10/29/18 111/54   10/23/18 130/77   10/21/18 140/74           Passed - Normal ALT on file in past 12 months     Recent Labs   Lab Test 10/30/18  1052   ALT 29           Passed - Normal AST on file in past 12 months     Recent Labs   Lab Test 10/30/18  1052   AST 21           Passed - Recent (12 mo) or future (30 days) visit within the authorizing provider's specialty     Patient had office visit in the last 12 months or has a visit in the next 30 days with authorizing provider or within the authorizing provider's specialty.  See \"Patient Info\" tab in inbasket, or \"Choose Columns\" in Meds & Orders section of the refill encounter.            Passed - Patient is age 6-64 years        Passed - Normal CBC on file in past 12 months     Recent Labs   Lab Test 10/23/18  1248   WBC 8.5   RBC 4.36   HGB 13.2   HCT 40.5              Passed - Medication is active on med list        Passed - No active pregnancy on record        Passed - Normal serum creatinine on file in past 12 months     Recent Labs   Lab Test 10/23/18  1248   CR 0.67           Passed - No positive pregnancy test in past 12 months          "

## 2019-06-19 RX ORDER — IBUPROFEN 800 MG/1
TABLET, FILM COATED ORAL
Qty: 60 TABLET | Refills: 1 | Status: SHIPPED | OUTPATIENT
Start: 2019-06-19 | End: 2020-03-23

## 2019-06-19 NOTE — TELEPHONE ENCOUNTER
Prescription approved per [facility] Refill Protocol.  LOV: 10/29/2019    Sheri Tong, RN  Triage Nurse

## 2019-09-10 DIAGNOSIS — E03.9 HYPOTHYROIDISM, UNSPECIFIED TYPE: ICD-10-CM

## 2019-09-10 RX ORDER — LEVOTHYROXINE SODIUM 100 UG/1
TABLET ORAL
Qty: 30 TABLET | Refills: 0 | OUTPATIENT
Start: 2019-09-10

## 2019-09-10 NOTE — TELEPHONE ENCOUNTER
"Requested Prescriptions   Pending Prescriptions Disp Refills     levothyroxine (SYNTHROID/LEVOTHROID) 100 MCG tablet [Pharmacy Med Name: LEVOTHYROXINE 0.100MG (100MCG) TAB] 30 tablet 0     Sig: TAKE 1 TABLET BY MOUTH DAILY  Not on current med list  Last Office Visit: 10/29/2018   Future Office Visit:            Thyroid Protocol Failed - 9/10/2019  3:55 PM        Failed - Normal TSH on file in past 12 months     Recent Labs   Lab Test 05/20/19  0924   TSH 4.62*            Passed - Patient is 12 years or older        Passed - Recent (12 mo) or future (30 days) visit within the authorizing provider's specialty     Patient had office visit in the last 12 months or has a visit in the next 30 days with authorizing provider or within the authorizing provider's specialty.  See \"Patient Info\" tab in inbasket, or \"Choose Columns\" in Meds & Orders section of the refill encounter.            Passed - Medication is active on med list        Passed - No active pregnancy on record     If patient is pregnant or has had a positive pregnancy test, please check TSH.        Passed - No positive pregnancy test in past 12 months     If patient is pregnant or has had a positive pregnancy test, please check TSH.            "

## 2019-09-17 NOTE — TELEPHONE ENCOUNTER
Patient called to inquire about the status of this request. States she cannot afford to come in right now for labs and is hoping PCP can fill this for one more month when she is able to come in. Please assist. Thanks!

## 2019-09-18 RX ORDER — LEVOTHYROXINE SODIUM 112 UG/1
112 TABLET ORAL DAILY
Qty: 30 TABLET | Refills: 0 | Status: SHIPPED | OUTPATIENT
Start: 2019-09-18 | End: 2019-11-09

## 2019-09-18 NOTE — TELEPHONE ENCOUNTER
"Alyse Mcclendon MD,  Routing refill request to provider for review/approval because:  Per chart review, patient was to return for labs in 6-8 weeks after dose increase    Per patient:   \"States she cannot afford to come in right now for labs and is hoping PCP can fill this for one more month when she is able to come in.\"    30-day supply pended    Please advise    Thank You!  hSeri Tong RN  Triage Nurse  "

## 2019-09-18 NOTE — TELEPHONE ENCOUNTER
TRACY for pt.  All we need is the confirmation on this dose.  Is the pt taking 112 mcg of levothyroxine daily?  (we recently rec'd a 100 mcg dose request.)    JUAN DAVID Sanchez

## 2019-09-18 NOTE — TELEPHONE ENCOUNTER
We need to check with pt about current dose of levothyroxine. Is pt currently taking the 112 mcg dose daily?    I left message for pt to call clinic triage.  JUAN DAVID Sanchez

## 2019-09-24 NOTE — TELEPHONE ENCOUNTER
Team coordinators-Please contact patient to schedule non-fasting lab appt to recheck thyroid.    Thank you!  LIZETH SingletaryN, RN

## 2019-11-09 DIAGNOSIS — E03.9 HYPOTHYROIDISM, UNSPECIFIED TYPE: ICD-10-CM

## 2019-11-11 DIAGNOSIS — E03.9 HYPOTHYROIDISM, UNSPECIFIED TYPE: ICD-10-CM

## 2019-11-11 PROCEDURE — 36415 COLL VENOUS BLD VENIPUNCTURE: CPT | Performed by: FAMILY MEDICINE

## 2019-11-11 PROCEDURE — 84443 ASSAY THYROID STIM HORMONE: CPT | Performed by: FAMILY MEDICINE

## 2019-11-11 PROCEDURE — 84439 ASSAY OF FREE THYROXINE: CPT | Performed by: FAMILY MEDICINE

## 2019-11-11 NOTE — TELEPHONE ENCOUNTER
TSH lab in process.    Will need to wait for TSH result to process refill.  WILLIAM Wallace, LIZETHN, RN

## 2019-11-12 LAB
T4 FREE SERPL-MCNC: 0.95 NG/DL (ref 0.76–1.46)
TSH SERPL DL<=0.005 MIU/L-ACNC: 5.92 MU/L (ref 0.4–4)

## 2019-11-12 RX ORDER — LEVOTHYROXINE SODIUM 112 UG/1
TABLET ORAL
Qty: 90 TABLET | Refills: 0 | Status: SHIPPED | OUTPATIENT
Start: 2019-11-12 | End: 2020-01-13

## 2019-11-12 NOTE — TELEPHONE ENCOUNTER
Writer called patient and reviewed message per Dr. Mcclendon.    Patient verbalized understanding, stated:  1. Was not taking medication consistently because she ran out  2. Will return for 2 month follow up for lab  3. Will set alarm on phone to remind her to take medication    Pharmacy confirmed with patient and levothyroxine 112 mcg tablet and lab ordered per Dr. Mcclendon.    LIZETH SingletaryN, RN

## 2019-11-12 NOTE — TELEPHONE ENCOUNTER
Patient was informed that labs were still in process. Patient is leaving out of town tomorrow morning and she'd like to pick refill up today if possible even if its just a weeks worth until she gets back would be fine.  Please advise, thank you!    Sarahi Mckeon

## 2019-11-12 NOTE — TELEPHONE ENCOUNTER
TSH   Date Value Ref Range Status   11/11/2019 5.92 (H) 0.40 - 4.00 mU/L Final     She has been pretty inconsistent with her levothyroxine in the past.  Please call her to find out if she has been taking it every single day for the last 6 to 8 weeks.  If so, then the TSH result is accurate and her dose should be increased to 125 mcg levothyroxine daily and she should return in 2 months to repeat her thyroid test.  If she has been inconsistent taking the medication, then I would not change the medication dose at this time, but would rather continue the current dose and ask that she return in a couple of months for repeat TSH.  If she has trouble remembering to take her medication, she could set alarm on her phone or place the medication next to her toothbrush so that she remembers to take it every day.       Alyse Mcclendon M.D.

## 2019-11-12 NOTE — TELEPHONE ENCOUNTER
Dr Mcclendon,     TSH is now resulted. Can you advise on dosing. Pt going out of town tomorrow.     Desire Banks, RN, BSN

## 2019-12-17 ENCOUNTER — TELEPHONE (OUTPATIENT)
Dept: FAMILY MEDICINE | Facility: CLINIC | Age: 54
End: 2019-12-17

## 2019-12-17 NOTE — TELEPHONE ENCOUNTER
10/29/18 was last OV with pcp.    Seems like pt should get this from the provider that dx this.      I called pt.  She feels this was dx a few years ago by [facility] specialty.  This letter is needed for a gym.  This letter is not for work.    Will send request to PCP.  Pended letter.  JUAN DAVID Sanchez     English

## 2019-12-17 NOTE — LETTER
[facility] [place][note 2[place]  [address] [place] [id number] [phone]          December 17, 2019      RE:  Eunice Malloy                                                                                                                     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 She cannot do strenuous work for a long period of time.        Sincerely,         Alyse Mcclendon MD/hina

## 2019-12-17 NOTE — LETTER
[facility] [place]  [address] [place] [id number]  Phone: [phone]    December 18, 2019        Eunice Malloy  [address] [address]          To whom it may concern:    RE: Eunice Dawson has a history of low back pain for which she has sought medical care. She reports that she stopped going to the gym due to her back pain during a 3 month time during which she continued to be charged for her gym membership. She is requesting reimbursement for those three months that she did not use the gym due to back pain.     Please contact me for questions or concerns.      Sincerely,        Alyse Mcclendon MD

## 2019-12-17 NOTE — TELEPHONE ENCOUNTER
I haven't seen Eunice for the past year. I would recommend that she schedule with sports medicine if she is having pain that is limiting her activity and needs physician assessment and documentation of that limitation.     Alyse Mcclendon M.D.

## 2019-12-17 NOTE — TELEPHONE ENCOUNTER
Reason for Call:  Other Note/letter    Detailed comments: Patient is requesting for a note stating that she's been diagnosed with lymphoma & bulging disc. Should also note that she cannot do strenuous work for a long period of time. Please advise, thank you!    *Pt will pick note up from clinic once complete*    Phone Number Patient can be reached at: Home number on file [phone] (home)    Best Time: anytime    Can we leave a detailed message on this number? YES    Call taken on 12/17/2019 at 11:28 AM by Sarahi Reyes

## 2019-12-17 NOTE — TELEPHONE ENCOUNTER
I deleted the letter that I had pended.    I gave pt this message.  Pt stated Dr. Mcclendon rx her ibuprofen 800 mg.  PT joined a gym and then felt she put membership on hold for back pain.   Pt feels that the gym overbilled her for 3 months.  Gym requested this letter from medical provider to credit this monthly payment of the 3 months.    Please advise.  JUAN DAVID Sanchez

## 2019-12-18 NOTE — TELEPHONE ENCOUNTER
Team coordinators-Please place letter at  for  and notify patient when completed.    Thank you!  LIZETH SingletaryN, RN

## 2019-12-26 ENCOUNTER — TELEPHONE (OUTPATIENT)
Dept: FAMILY MEDICINE | Facility: CLINIC | Age: 54
End: 2019-12-26

## 2019-12-26 ENCOUNTER — OFFICE VISIT (OUTPATIENT)
Dept: PODIATRY | Facility: CLINIC | Age: 54
End: 2019-12-26
Payer: MEDICAID

## 2019-12-26 ENCOUNTER — ANCILLARY PROCEDURE (OUTPATIENT)
Dept: GENERAL RADIOLOGY | Facility: CLINIC | Age: 54
End: 2019-12-26
Attending: PODIATRIST
Payer: MEDICAID

## 2019-12-26 VITALS
SYSTOLIC BLOOD PRESSURE: 106 MMHG | DIASTOLIC BLOOD PRESSURE: 68 MMHG | BODY MASS INDEX: 23.92 KG/M2 | WEIGHT: 135 LBS | HEIGHT: 63 IN

## 2019-12-26 DIAGNOSIS — G47.00 INSOMNIA, UNSPECIFIED TYPE: ICD-10-CM

## 2019-12-26 DIAGNOSIS — M20.32 HALLUX MALLEUS OF LEFT FOOT: ICD-10-CM

## 2019-12-26 DIAGNOSIS — S99.922A TOE INJURY, LEFT, INITIAL ENCOUNTER: ICD-10-CM

## 2019-12-26 DIAGNOSIS — S99.922A TOE INJURY, LEFT, INITIAL ENCOUNTER: Primary | ICD-10-CM

## 2019-12-26 PROCEDURE — 99203 OFFICE O/P NEW LOW 30 MIN: CPT | Performed by: PODIATRIST

## 2019-12-26 PROCEDURE — 73660 X-RAY EXAM OF TOE(S): CPT | Mod: LT

## 2019-12-26 ASSESSMENT — MIFFLIN-ST. JEOR: SCORE: 1176.49

## 2019-12-26 NOTE — TELEPHONE ENCOUNTER
"Requested Prescriptions   Pending Prescriptions Disp Refills     traZODone (DESYREL) 50 MG tablet [Pharmacy Med Name: TRAZODONE 50MG TABLETS] 60 tablet 2     Sig: TAKE 1 TO 2 TABLETS BY MOUTH AT BEDTIME AS NEEDED FOR SLEEP  Last Written Prescription Date:  5/16/2019  Last Fill Quantity: 60 tablet,  # refills: 2   Last Office Visit: 10/29/2018   Future Office Visit:            Serotonin Modulators Failed - 12/26/2019  9:28 AM        Failed - Recent (12 mo) or future (30 days) visit within the authorizing provider's specialty     Patient has had an office visit with the authorizing provider or a provider within the authorizing providers department within the previous 12 mos or has a future within next 30 days. See \"Patient Info\" tab in inbasket, or \"Choose Columns\" in Meds & Orders section of the refill encounter.            Passed - Medication is active on med list        Passed - Patient is age 18 or older        Passed - No active pregnancy on record        Passed - No positive pregnancy test in past 12 months          "

## 2019-12-26 NOTE — LETTER
"    12/26/2019         RE: Eunice Malloy  [address]  [place] [address]        Dear Colleague,    Thank you for referring your patient, Eunice Maloly, to the [facility] in [place]. Please see a copy of my visit note below.      ASSESSMENT/PLAN:    Encounter Diagnoses   Name Primary?     Toe injury, left, initial encounter Yes     Hallux malleus of left foot      I reviewed the XR images with the patient.  The relevant anatomy and function was discussed, in relation to the problem.      On the lateral projection, it looks like there was injury to the base of the distal phalanx, likely fracture.     She is not having brea pain. I explained there is no indication for surgery (she had asked about having her toe fixed).  I can explained some localized numbness in her left 2nd toe, but not numbness and tingling along her foot and leg. This likely has a more proximal origin.  I offered a referral to neurology. She will hold off for now. This symptom (foot and leg) is intermittent.    Should she develop toe pain, then arthroplasty or arthrodesis would be an option. I discussed this with her.     I advised her to consider stiffer soled shoes to protect the toe.        Body mass index is 23.91 kg/m .      Ap Gross DPM, FACFAS, MS    [place] [facility] & Ankle Surgery      ____________________________________________________________________    HPI:         Chief Complaint: jammed toe bone, left 2nd toe  Onset of problem: 4/2019  She stubbed the toe on a step off, entering a room with a slightly elevated floor.  Pain/ discomfort is described as:  Tingling and numbness. She says this extend to kiara and lateral leg at times. Most bothersome when she is driving. She reports having back pain and a disk problem.   Denies pain  Frequency:  Constant numbness    Previous treatment: she said that she massaged it and \"pulled on bone.\"  *  Patient Active Problem List   Diagnosis     CARDIOVASCULAR " SCREENING; LDL GOAL LESS THAN 160     Hypothyroidism     Cold sore     Anxiety     Shoulder pain     Labral tear of shoulder     Microscopic hematuria     Low back pain     Loss of hair     SI (sacroiliac) joint dysfunction and pain - BILATERAL     Abnormal finding on MRI of brain   *  *  Past Surgical History:   Procedure Laterality Date     HC EXCIS PRIMARY GANGLION WRIST       TONSILLECTOMY  as child   *  *  Current Outpatient Medications   Medication Sig Dispense Refill     acyclovir (ZOVIRAX) 400 MG tablet Take 1 tablet (400 mg) by mouth every 8 hours 15 tablet 3     acyclovir (ZOVIRAX) 5 % cream Apply topically 5 times daily 5 g 11     hydrOXYzine (ATARAX) 25 MG tablet Take 1-2 tablets (25-50 mg) by mouth nightly as needed (insomnia) Take about 30 minutes before bedtime. 60 tablet 1     ibuprofen (ADVIL/MOTRIN) 800 MG tablet TAKE ONE TABLET BY MOUTH EVERY 8 HOURS AS NEEDED FOR MODERATE PAIN 60 tablet 1     levothyroxine (SYNTHROID/LEVOTHROID) 112 MCG tablet TAKE 1 TABLET(112 MCG) BY MOUTH DAILY 90 tablet 0     Multiple Vitamins-Minerals (MULTIVITAL PO) Take 1 tablet by mouth       traZODone (DESYREL) 50 MG tablet TAKE 1 TO 2 TABLETS BY MOUTH AT BEDTIME AS NEEDED FOR SLEEP 60 tablet 2     valACYclovir (VALTREX) 1000 mg tablet Take 2 tablets (2,000 mg) by mouth 2 times daily For one day. 12 tablet 11       ROS:     A 10-point review of systems was performed and is positive for that noted above in the HPI and as seen below.  All other areas are negative.     Numbness in feet?  yes   Calf pain with walking? yes  Recent foot/ankle injury? See HPI  Weight change over past 12 months? 3# gain  Self perception as overweight? yes  Recent flu-like symptoms? no  Joint pain other than feet ? no  Social History: Employment:  no;  Exercise/Physical activity:  low;  Tobacco use: yes  Social History     Socioeconomic History     Marital status: Single     Spouse name: Not on file     Number of children: Not on file     Years  of education: Not on file     Highest education level: Not on file   Occupational History     Not on file   Social Needs     Financial resource strain: Not on file     Food insecurity:     Worry: Not on file     Inability: Not on file     Transportation needs:     Medical: Not on file     Non-medical: Not on file   Tobacco Use     Smoking status: Current Some Day Smoker     Years: 15.00     Types: Cigarettes     Smokeless tobacco: Never Used     Tobacco comment: 1 pack per week   Substance and Sexual Activity     Alcohol use: Yes     Alcohol/week: 0.0 standard drinks     Comment: rare     Drug use: No     Sexual activity: Never   Lifestyle     Physical activity:     Days per week: Not on file     Minutes per session: Not on file     Stress: Not on file   Relationships     Social connections:     Talks on phone: Not on file     Gets together: Not on file     Attends [employer] service: Not on file     Active member of club or organization: Not on file     Attends meetings of clubs or organizations: Not on file     Relationship status: Not on file     Intimate partner violence:     Fear of current or ex partner: Not on file     Emotionally abused: Not on file     Physically abused: Not on file     Forced sexual activity: Not on file   Other Topics Concern     Parent/sibling w/ CABG, MI or angioplasty before 65F 55M? No   Social History Narrative    Balanced Diet - Yes    Osteoporosis Prevention Measures - Dairy servings per day: 1    Regular Exercise -  Yes Describe walking three miles a day    Dental Exam - YES - Date: once per year    Eye Exam - NO    Self Breast Exam - Yes    Abuse: Current or Past (Physical, Sexual or Emotional)- No    Do you feel safe in your environment - Yes    Guns stored in the home - No    Sunscreen used - Yes    Seatbelts used - Yes    Lipids -  UNKNOWN pt states never been tested    Glucose -  UNKNOWN    Colon Cancer Screening - No    Hemoccults - NO    Pap Test -  YES - Date: within a year  "   Do you have any concerns about STD's -  No    Mammography - NO, last Mammo was 2 years ago    DEXA - NO    Immunizations reviewed and up to date - NO, unsure about last TD.    Kennedy VEGA MA               Family history:  No family history on file.    Rheumatoid arthritis:  no  Foot Problems: no  Diabetes: sibling      EXAM:    Vitals: /68   Ht 1.6 m (5' 3\")   Wt 61.2 kg (135 lb)   LMP 05/14/2008   BMI 23.91 kg/m     BMI: Body mass index is 23.91 kg/m .  Height: 5' 3\"    Constitutional/ general:  Pt is in no apparent distress, appears well-nourished.  Cooperative with history and physical exam.     Vascular:  Pedal pulses are palpable bilaterally for both the DP and PT arteries.  CFT < 3 sec.  No edema.  Pedal hair growth noted.     Neuro:  Alert and oriented x 3. Coordinated gait.  Light touch sensation is intact to the L4, L5, S1 distributions. No obvious deficits.  No evidence of neurological-based weakness, spasticity, or contracture in the lower extremities.     Derm: Normal texture and turgor.  No erythema, ecchymosis, or cyanosis.  No open lesions.     Musculoskeletal:    Lower extremity muscle strength is normal.  Patient is ambulatory without an assistive device or brace .  Left second toe with mild contracture of both joints, more so distally. The right 2nd toe has a somewhat similar appearance.             Again, thank you for allowing me to participate in the care of your patient.        Sincerely,        Ap Gross, VAUGHN    "

## 2019-12-26 NOTE — TELEPHONE ENCOUNTER
Team Coordinators: please contact patient. She has not been seen in the clinic since 10/29/2018. Patient needs to schedule office visit and medication can be addressed at that visit    Thank You!  Sheri Franklin RN  Triage Nurse

## 2019-12-26 NOTE — TELEPHONE ENCOUNTER
"Pt comes into clinic today to request this medication from PCP. Pt states that she wants it for sleep. Pt is having difficulty sleeping at night and says she is having a hard time \"shutting off her mind.\" Pt would like it to go to the [facility] on [address]. Ok to leave message. Thanks!     Brigid Ortiz  [employer] Patient Rep    "

## 2019-12-26 NOTE — PROGRESS NOTES
"  ASSESSMENT/PLAN:    Encounter Diagnoses   Name Primary?     Toe injury, left, initial encounter Yes     Hallux malleus of left foot      I reviewed the XR images with the patient.  The relevant anatomy and function was discussed, in relation to the problem.      On the lateral projection, it looks like there was injury to the base of the distal phalanx, likely fracture.     She is not having brea pain. I explained there is no indication for surgery (she had asked about having her toe fixed).  I can explained some localized numbness in her left 2nd toe, but not numbness and tingling along her foot and leg. This likely has a more proximal origin.  I offered a referral to neurology. She will hold off for now. This symptom (foot and leg) is intermittent.    Should she develop toe pain, then arthroplasty or arthrodesis would be an option. I discussed this with her.     I advised her to consider stiffer soled shoes to protect the toe.        Body mass index is 23.91 kg/m .      Ap Gross DPM, FACJOSSE, MS    [place] Pod[facility] & Ankle Surgery      ____________________________________________________________________    HPI:         Chief Complaint: jammed toe bone, left 2nd toe  Onset of problem: 4/2019  She stubbed the toe on a step off, entering a room with a slightly elevated floor.  Pain/ discomfort is described as:  Tingling and numbness. She says this extend to kiara and lateral leg at times. Most bothersome when she is driving. She reports having back pain and a disk problem.   Denies pain  Frequency:  Constant numbness    Previous treatment: she said that she massaged it and \"pulled on bone.\"  *  Patient Active Problem List   Diagnosis     CARDIOVASCULAR SCREENING; LDL GOAL LESS THAN 160     Hypothyroidism     Cold sore     Anxiety     Shoulder pain     Labral tear of shoulder     Microscopic hematuria     Low back pain     Loss of hair     SI (sacroiliac) joint dysfunction and pain - BILATERAL "     Abnormal finding on MRI of brain   *  *  Past Surgical History:   Procedure Laterality Date     HC EXCIS PRIMARY GANGLION WRIST       TONSILLECTOMY  as child   *  *  Current Outpatient Medications   Medication Sig Dispense Refill     acyclovir (ZOVIRAX) 400 MG tablet Take 1 tablet (400 mg) by mouth every 8 hours 15 tablet 3     acyclovir (ZOVIRAX) 5 % cream Apply topically 5 times daily 5 g 11     hydrOXYzine (ATARAX) 25 MG tablet Take 1-2 tablets (25-50 mg) by mouth nightly as needed (insomnia) Take about 30 minutes before bedtime. 60 tablet 1     ibuprofen (ADVIL/MOTRIN) 800 MG tablet TAKE ONE TABLET BY MOUTH EVERY 8 HOURS AS NEEDED FOR MODERATE PAIN 60 tablet 1     levothyroxine (SYNTHROID/LEVOTHROID) 112 MCG tablet TAKE 1 TABLET(112 MCG) BY MOUTH DAILY 90 tablet 0     Multiple Vitamins-Minerals (MULTIVITAL PO) Take 1 tablet by mouth       traZODone (DESYREL) 50 MG tablet TAKE 1 TO 2 TABLETS BY MOUTH AT BEDTIME AS NEEDED FOR SLEEP 60 tablet 2     valACYclovir (VALTREX) 1000 mg tablet Take 2 tablets (2,000 mg) by mouth 2 times daily For one day. 12 tablet 11       ROS:     A 10-point review of systems was performed and is positive for that noted above in the HPI and as seen below.  All other areas are negative.     Numbness in feet?  yes   Calf pain with walking? yes  Recent foot/ankle injury? See HPI  Weight change over past 12 months? 3# gain  Self perception as overweight? yes  Recent flu-like symptoms? no  Joint pain other than feet ? no  Social History: Employment:  no;  Exercise/Physical activity:  low;  Tobacco use: yes  Social History     Socioeconomic History     Marital status: Single     Spouse name: Not on file     Number of children: Not on file     Years of education: Not on file     Highest education level: Not on file   Occupational History     Not on file   Social Needs     Financial resource strain: Not on file     Food insecurity:     Worry: Not on file     Inability: Not on file      Transportation needs:     Medical: Not on file     Non-medical: Not on file   Tobacco Use     Smoking status: Current Some Day Smoker     Years: 15.00     Types: Cigarettes     Smokeless tobacco: Never Used     Tobacco comment: 1 pack per week   Substance and Sexual Activity     Alcohol use: Yes     Alcohol/week: 0.0 standard drinks     Comment: rare     Drug use: No     Sexual activity: Never   Lifestyle     Physical activity:     Days per week: Not on file     Minutes per session: Not on file     Stress: Not on file   Relationships     Social connections:     Talks on phone: Not on file     Gets together: Not on file     Attends Restorationism service: Not on file     Active member of club or organization: Not on file     Attends meetings of clubs or organizations: Not on file     Relationship status: Not on file     Intimate partner violence:     Fear of current or ex partner: Not on file     Emotionally abused: Not on file     Physically abused: Not on file     Forced sexual activity: Not on file   Other Topics Concern     Parent/sibling w/ CABG, MI or angioplasty before 65F 55M? No   Social History Narrative    Balanced Diet - Yes    Osteoporosis Prevention Measures - Dairy servings per day: 1    Regular Exercise -  Yes Describe walking three miles a day    Dental Exam - YES - Date: once per year    Eye Exam - NO    Self Breast Exam - Yes    Abuse: Current or Past (Physical, Sexual or Emotional)- No    Do you feel safe in your environment - Yes    Guns stored in the home - No    Sunscreen used - Yes    Seatbelts used - Yes    Lipids -  UNKNOWN pt states never been tested    Glucose -  UNKNOWN    Colon Cancer Screening - No    Hemoccults - NO    Pap Test -  YES - Date: within a year    Do you have any concerns about STD's -  No    Mammography - NO, last Mammo was 2 years ago    DEXA - NO    Immunizations reviewed and up to date - NO, unsure about last TD.    Kennedy VEGA MA               Family history:  No family  "history on file.    Rheumatoid arthritis:  no  Foot Problems: no  Diabetes: sibling      EXAM:    Vitals: /68   Ht 1.6 m (5' 3\")   Wt 61.2 kg (135 lb)   LMP 05/14/2008   BMI 23.91 kg/m    BMI: Body mass index is 23.91 kg/m .  Height: 5' 3\"    Constitutional/ general:  Pt is in no apparent distress, appears well-nourished.  Cooperative with history and physical exam.     Vascular:  Pedal pulses are palpable bilaterally for both the DP and PT arteries.  CFT < 3 sec.  No edema.  Pedal hair growth noted.     Neuro:  Alert and oriented x 3. Coordinated gait.  Light touch sensation is intact to the L4, L5, S1 distributions. No obvious deficits.  No evidence of neurological-based weakness, spasticity, or contracture in the lower extremities.     Derm: Normal texture and turgor.  No erythema, ecchymosis, or cyanosis.  No open lesions.     Musculoskeletal:    Lower extremity muscle strength is normal.  Patient is ambulatory without an assistive device or brace .  Left second toe with mild contracture of both joints, more so distally. The right 2nd toe has a somewhat similar appearance.           "

## 2019-12-28 RX ORDER — TRAZODONE HYDROCHLORIDE 50 MG/1
TABLET, FILM COATED ORAL
Qty: 60 TABLET | Refills: 0 | Status: SHIPPED | OUTPATIENT
Start: 2019-12-28 | End: 2020-01-13

## 2019-12-28 NOTE — TELEPHONE ENCOUNTER
HW Reception Medication is being filled for 1 time refill only due to:  Patient needs to be seen because it has been more than one year since last visit.     Signed Prescriptions:                        Disp   Refills    traZODone (DESYREL) 50 MG tablet           60 tab*0        Sig: TAKE 1 TO 2 TABLETS BY MOUTH AT BEDTIME AS NEEDED FOR           SLEEP  Authorizing Provider: BREANNA BUTTS  Ordering User: JONATHAN PLAZA

## 2020-01-13 ENCOUNTER — OFFICE VISIT (OUTPATIENT)
Dept: FAMILY MEDICINE | Facility: CLINIC | Age: 55
End: 2020-01-13
Payer: COMMERCIAL

## 2020-01-13 ENCOUNTER — OFFICE VISIT (OUTPATIENT)
Dept: FAMILY MEDICINE | Facility: CLINIC | Age: 55
End: 2020-01-13

## 2020-01-13 VITALS
BODY MASS INDEX: 24.05 KG/M2 | OXYGEN SATURATION: 98 % | HEIGHT: 63 IN | WEIGHT: 135.75 LBS | TEMPERATURE: 97.9 F | HEART RATE: 63 BPM | DIASTOLIC BLOOD PRESSURE: 62 MMHG | SYSTOLIC BLOOD PRESSURE: 112 MMHG

## 2020-01-13 DIAGNOSIS — Z12.31 VISIT FOR SCREENING MAMMOGRAM: ICD-10-CM

## 2020-01-13 DIAGNOSIS — F41.9 ANXIETY: ICD-10-CM

## 2020-01-13 DIAGNOSIS — D17.9 LIPOMA, UNSPECIFIED SITE: ICD-10-CM

## 2020-01-13 DIAGNOSIS — E03.9 HYPOTHYROIDISM, UNSPECIFIED TYPE: ICD-10-CM

## 2020-01-13 DIAGNOSIS — Z23 NEED FOR PROPHYLACTIC VACCINATION AND INOCULATION AGAINST INFLUENZA: ICD-10-CM

## 2020-01-13 DIAGNOSIS — G47.00 INSOMNIA, UNSPECIFIED TYPE: Primary | ICD-10-CM

## 2020-01-13 DIAGNOSIS — M62.830 BACK MUSCLE SPASM: ICD-10-CM

## 2020-01-13 DIAGNOSIS — F43.23 ADJUSTMENT DISORDER WITH MIXED ANXIETY AND DEPRESSED MOOD: ICD-10-CM

## 2020-01-13 DIAGNOSIS — F17.200 TOBACCO DEPENDENCE SYNDROME: ICD-10-CM

## 2020-01-13 DIAGNOSIS — Z80.8 FAMILY HISTORY OF SKIN CANCER: ICD-10-CM

## 2020-01-13 DIAGNOSIS — F41.9 ANXIETY: Primary | ICD-10-CM

## 2020-01-13 PROCEDURE — 99214 OFFICE O/P EST MOD 30 MIN: CPT | Mod: 25 | Performed by: FAMILY MEDICINE

## 2020-01-13 PROCEDURE — 90715 TDAP VACCINE 7 YRS/> IM: CPT | Performed by: FAMILY MEDICINE

## 2020-01-13 PROCEDURE — 96127 BRIEF EMOTIONAL/BEHAV ASSMT: CPT | Performed by: FAMILY MEDICINE

## 2020-01-13 PROCEDURE — 90471 IMMUNIZATION ADMIN: CPT | Performed by: FAMILY MEDICINE

## 2020-01-13 PROCEDURE — 90472 IMMUNIZATION ADMIN EACH ADD: CPT | Performed by: FAMILY MEDICINE

## 2020-01-13 PROCEDURE — 99207 ZZC NO CHARGE BEHAVIORAL WARM HANDOFF: CPT | Performed by: SOCIAL WORKER

## 2020-01-13 PROCEDURE — 90682 RIV4 VACC RECOMBINANT DNA IM: CPT | Performed by: FAMILY MEDICINE

## 2020-01-13 RX ORDER — LEVOTHYROXINE SODIUM 112 UG/1
TABLET ORAL
Qty: 90 TABLET | Refills: 0 | Status: SHIPPED | OUTPATIENT
Start: 2020-01-13 | End: 2020-05-28

## 2020-01-13 RX ORDER — CYCLOBENZAPRINE HCL 10 MG
5-10 TABLET ORAL 3 TIMES DAILY PRN
Qty: 30 TABLET | Refills: 1 | Status: SHIPPED | OUTPATIENT
Start: 2020-01-13 | End: 2020-01-17

## 2020-01-13 RX ORDER — TRAZODONE HYDROCHLORIDE 50 MG/1
TABLET, FILM COATED ORAL
Qty: 180 TABLET | Refills: 3 | Status: SHIPPED | OUTPATIENT
Start: 2020-01-13 | End: 2020-06-19

## 2020-01-13 ASSESSMENT — PATIENT HEALTH QUESTIONNAIRE - PHQ9
10. IF YOU CHECKED OFF ANY PROBLEMS, HOW DIFFICULT HAVE THESE PROBLEMS MADE IT FOR YOU TO DO YOUR WORK, TAKE CARE OF THINGS AT HOME, OR GET ALONG WITH OTHER PEOPLE: NOT DIFFICULT AT ALL
SUM OF ALL RESPONSES TO PHQ QUESTIONS 1-9: 3
SUM OF ALL RESPONSES TO PHQ QUESTIONS 1-9: 3

## 2020-01-13 ASSESSMENT — ANXIETY QUESTIONNAIRES
1. FEELING NERVOUS, ANXIOUS, OR ON EDGE: NOT AT ALL
5. BEING SO RESTLESS THAT IT IS HARD TO SIT STILL: NOT AT ALL
GAD7 TOTAL SCORE: 1
4. TROUBLE RELAXING: NOT AT ALL
2. NOT BEING ABLE TO STOP OR CONTROL WORRYING: NOT AT ALL
GAD7 TOTAL SCORE: 1
GAD7 TOTAL SCORE: 1
7. FEELING AFRAID AS IF SOMETHING AWFUL MIGHT HAPPEN: NOT AT ALL
7. FEELING AFRAID AS IF SOMETHING AWFUL MIGHT HAPPEN: NOT AT ALL
3. WORRYING TOO MUCH ABOUT DIFFERENT THINGS: SEVERAL DAYS
6. BECOMING EASILY ANNOYED OR IRRITABLE: NOT AT ALL

## 2020-01-13 ASSESSMENT — MIFFLIN-ST. JEOR: SCORE: 1179.89

## 2020-01-13 NOTE — NURSING NOTE
Prior to immunization administration, verified patients identity using patient s name and date of birth. Please see Immunization Activity for additional information.     Screening Questionnaire for Adult Immunization    Are you sick today?   No   Do you have allergies to medications, food, a vaccine component or latex?   No   Have you ever had a serious reaction after receiving a vaccination?   No   Do you have a long-term health problem with heart, lung, kidney, or metabolic disease (e.g., diabetes), asthma, a blood disorder, no spleen, complement component deficiency, a cochlear implant, or a spinal fluid leak?  Are you on long-term aspirin therapy?   No   Do you have cancer, leukemia, HIV/AIDS, or any other immune system problem?   No   Do you have a parent, brother, or sister with an immune system problem?   No   In the past 3 months, have you taken medications that affect  your immune system, such as prednisone, other steroids, or anticancer drugs; drugs for the treatment of rheumatoid arthritis, Crohn s disease, or psoriasis; or have you had radiation treatments?   No   Have you had a seizure, or a brain or other nervous system problem?   No   During the past year, have you received a transfusion of blood or blood    products, or been given immune (gamma) globulin or antiviral drug?   No   For women: Are you pregnant or is there a chance you could become       pregnant during the next month?   No   Have you received any vaccinations in the past 4 weeks?   No     Immunization questionnaire answers were all negative.        Per orders of Dr. Mcclendon, injection of Tdap given by Chloe Roman CMA. Patient instructed to remain in clinic for 15 minutes afterwards, and to report any adverse reaction to me immediately.       Screening performed by Chloe Roman CMA on 1/13/2020 at 12:15 PM.

## 2020-01-13 NOTE — PROGRESS NOTES
"Patient had appointment with his/her primary care physician. [facility] services were requested. Explained the role of the [facility] and provided informational handout and contact information for the [facility].   Patient was seen her PCP for sleep medication.  Patient reports she is taking trazodone but feels it is not helping.  Patient reports a long history of insomnia.  Patient reports she has a history of depression.  Patient reports bed she starts thinking all of her \"bad thoughts\".  Discussed the role of the [facility] in treating insomnia.  Discussed different interventions that may be helpful to address her insomnia other than relying solely on medication.  Patient opened this and agreed to follow-up with the [facility].  "

## 2020-01-13 NOTE — PROGRESS NOTES
"  SUBJECTIVE:   Eunice Malloy is a 54 year old female who presents to clinic today for the following health issues:    Answers for HPI/ROS submitted by the patient on 1/13/2020   If you checked off any problems, how difficult have these problems made it for you to do your work, take care of things at home, or get along with other people?: Not difficult at all  PHQ9 TOTAL SCORE: 3  HUONG 7 TOTAL SCORE: 1    She reports she is worried because she has bumps on her skin and her brother was diagnosed with melanoma.   She is still having trouble sleeping and is wondering about tizanidine. Her friend uses it to sleep. She uses trazodone 50mg nightly. Has not been taking 100mg nightly.   She read an article about clogged arteries and wonders how a person would know.   She is still smoking. Wants a refill of chantix. Has tolerated it and found it effective in the past.   She says the inside her eyes are kind of purple and wonders if I has to do with iron.   Wants to have her back lipomas removed. Gets back pain sometimes and she has attributed that to her lipomas.       Hypothyroidism Follow-up      Since last visit, patient describes the following symptoms: dry skin, depression and hair loss- she has been taking vitamins for that- biotin       How many servings of fruits and vegetables do you eat daily?  1-2    On average, how many sweetened beverages do you drink each day (Examples: soda, juice, sweet tea, etc.  Do NOT count diet or artificially sweetened beverages)?   0    How many days per week do you miss taking your medication? 0       She was last seen for insomnia on 10/17/2018.     From visit note on 10/17/2018:  \"She has been staying up to read until 11pm and then tries to go to sleep. She notices that eating something heavy seems to help her go to sleep, but she does not want to do this as she is gaining weight. She tosses and turns and then it takes her up to 3 hours to get to sleep. That seems to be pretty " "consistent. Once she falls asleep, she will sleep maybe a couple of hours and then wake up again. She tries to go back to sleep again, but often does not. She recently lost her job because she was not able to concentrate and keep up at work. She was in training for a new job and could not concentrate during training.      She has taken the trazodone about an hour before sleep. It does not seem to help. She tried the 50mg tablet and that did not work, so she took the 100mg tablet.      Last night was a good night. She fell asleep at about 12:30 and woke up at about 5:30. Yesterday was so tired and felt like she could not concentrate when she was driving and almost turned the wrong way.      She has used hydroxyzine for insomnia and that     She will be starting a new job probably the 29th of October.      She has been more crabby with her roommate. She is very tired.      She has not been taking her levothyroxine. She just picked up the prescription a week ago and just took the first one last night with food. \"    From clinic visit on 10/2/18:     \"She hasn't been sleeping for a few months.   She has been taking melatonin (10 mg) up to 5 tablets.   Two to three nights/week she is not sleeping. Due to lack of sleep, she is a zombie during the day. She is not taking naps during the day. She has difficulty falling asleep and staying asleep. She is worried about not working.   She recently lost her job due to not being able to focus during the day.   A few friends of her recommended asking for Ambien, ativan or valium.   She drinks around 2-3 cups of coffee in the morning. No soda or pop drinks.  This morning she was wondering if she was drugged. She hasn't had any outside food or drinks.   She wants to quit smoking and wants to restart chantix. She is currently smoking up to 4 packs/week. Previously chantix helped.   Recent loss of friends, declined therapy\"    Problem list and histories reviewed & adjusted, as " indicated.  Additional history: as documented    Patient Active Problem List   Diagnosis     CARDIOVASCULAR SCREENING; LDL GOAL LESS THAN 160     Hypothyroidism     Cold sore     Anxiety     Shoulder pain     Labral tear of shoulder     Microscopic hematuria     Low back pain     Loss of hair     SI (sacroiliac) joint dysfunction and pain - BILATERAL     Abnormal finding on MRI of brain     Past Surgical History:   Procedure Laterality Date     HC EXCIS PRIMARY GANGLION WRIST       TONSILLECTOMY  as child       Social History     Tobacco Use     Smoking status: Current Some Day Smoker     Years: 15.00     Types: Cigarettes     Smokeless tobacco: Never Used     Tobacco comment: 1 pack per week   Substance Use Topics     Alcohol use: Yes     Alcohol/week: 0.0 standard drinks     Comment: rare     No family history on file.      Current Outpatient Medications   Medication Sig Dispense Refill     acyclovir (ZOVIRAX) 400 MG tablet Take 1 tablet (400 mg) by mouth every 8 hours 15 tablet 3     acyclovir (ZOVIRAX) 5 % cream Apply topically 5 times daily 5 g 11     hydrOXYzine (ATARAX) 25 MG tablet Take 1-2 tablets (25-50 mg) by mouth nightly as needed (insomnia) Take about 30 minutes before bedtime. 60 tablet 1     ibuprofen (ADVIL/MOTRIN) 800 MG tablet TAKE ONE TABLET BY MOUTH EVERY 8 HOURS AS NEEDED FOR MODERATE PAIN 60 tablet 1     levothyroxine (SYNTHROID/LEVOTHROID) 112 MCG tablet TAKE 1 TABLET(112 MCG) BY MOUTH DAILY 90 tablet 0     Multiple Vitamins-Minerals (MULTIVITAL PO) Take 1 tablet by mouth       traZODone (DESYREL) 50 MG tablet TAKE 1 TO 2 TABLETS BY MOUTH AT BEDTIME AS NEEDED FOR SLEEP 60 tablet 0     valACYclovir (VALTREX) 1000 mg tablet Take 2 tablets (2,000 mg) by mouth 2 times daily For one day. 12 tablet 11     Allergies   Allergen Reactions     Sulfa Drugs Rash     Recent Labs   Lab Test 11/11/19  1153 05/20/19  0924  10/30/18  1052 10/23/18  1248  02/07/18  1605  05/04/15  1019   LDL  --   --   --   --    --   --   --   --  99   HDL  --   --   --   --   --   --   --   --  101   TRIG  --   --   --   --   --   --   --   --  69   ALT  --   --   --  29 32  --  27   < >  --    CR  --   --   --   --  0.67  --  0.64   < >  --    GFRESTIMATED  --   --   --   --  >90  --  >90   < >  --    GFRESTBLACK  --   --   --   --  >90  --  >90   < >  --    POTASSIUM  --   --   --   --  3.9  --  4.2   < >  --    TSH 5.92* 4.62*   < >  --   --    < >  --    < > 91.59*    < > = values in this interval not displayed.      BP Readings from Last 3 Encounters:   12/26/19 106/68   10/29/18 111/54   10/23/18 130/77    Wt Readings from Last 3 Encounters:   12/26/19 61.2 kg (135 lb)   10/29/18 59.9 kg (132 lb)   10/23/18 59.9 kg (132 lb)              Reviewed and updated as needed this visit by clinical staff       Reviewed and updated as needed this visit by Provider         ROS:  CONSTITUTIONAL: NEGATIVE for fever, chills, change in weight  ENT/MOUTH: NEGATIVE for ear, mouth and throat problems  RESP: NEGATIVE for significant cough or SOB  CV: NEGATIVE for chest pain, palpitations or peripheral edema    OBJECTIVE:     LMP 05/14/2008   There is no height or weight on file to calculate BMI.  GENERAL: healthy, alert and no distress    Diagnostic Test Results:  none     ASSESSMENT/PLAN:       1. Insomnia, unspecified type     I recommended increasing her trazodone to 100 mg nightly if she would like.  Discussed that tizanidine is not prescribed for insomnia, though her friend had taken it for insomnia.  Discussed that it is sedating, but is used as a muscle relaxant.  She is not interested in scheduling with the sleep therapist.  She had been taking melatonin in the past, but was instructed to stop as she was taking much more than the recommended amount (and did not find it helpful)    She did not find the trazodone effective.  In the past, she found hydralazine helpful.  We will stop the trazodone for now and she will start hydralazine at  bedtime.  I discussed importance of sleep hygiene with her and reviewed instructions for sleep hygiene.  In addition I recommended scheduling with a sleep therapist.  I would be very cautious about giving her other sedatives like Ambien or any benzos due to her history of taking medications in ways other than instructed.  I reiterated the importance of taking medications as instructed and not taking more than prescribed dose or combining medications unless specifically instructed.     2. Hypothyroidism, unspecified type  She has been taking her levothyroxine as instructed for the past month.    She will be returning in a month for repeat TSH.     3. Adjustment disorder with mixed anxiety and depressed mood  She again declines therapy, but then noted she might be interested in seeing a female therapist and was given a therapy referral today.     4. Need for prophylactic vaccination and inoculation against influenza     - INFLUENZA QUAD, RECOMBINANT, P-FREE (RIV4) (FLUBLOCK) [66417]  - Vaccine Administration, Initial [41227]    5. Back muscle spasm  She requested a refill of cyclobenzaprine today.  Reminded her that this is sedating and that she should not take it before driving.  - cyclobenzaprine (FLEXERIL) 10 MG tablet; Take 0.5-1 tablets (5-10 mg) by mouth 3 times daily as needed for muscle spasms  Dispense: 30 tablet; Refill: 1    6. Visit for screening mammogram     - MA SCREENING DIGITAL BILAT - Future  (s+30); Future    7. Tobacco dependence syndrome  She is interested in quitting smoking again.  I refilled her Chantix.  She had asked a question about arterial disease today.  Discussed with her that her base risk factor for damage to her arteries at this point is smoking.  - varenicline (CHANTIX RICARDO) 0.5 MG X 11 & 1 MG X 42 tablet; Take 0.5 mg tab daily for 3 days, THEN 0.5 mg tab twice daily for 4 days, THEN 1 mg twice daily.  Dispense: 53 tablet; Refill: 1    8. Family history of skin cancer  I recommended  scheduling skin cancer screening with dermatology.  - DERMATOLOGY REFERRAL    9. Anxiety     - MENTAL HEALTH REFERRAL  - Adult; Outpatient Treatment; Individual/Couples/Family/Group Therapy/Health Psychology; [facility] [place] [facility] [phone]; We will contact you to schedule the appointment or please call with any questions    10. Lipoma, unspecified site  She requested another referral to discuss removal of her lipomas.  - GENERAL SURG ADULT REFERRAL; Future     Patient Instructions   1) You can try taking trazodone 100mg (two 50mg tablets) at bedtime for sleep.  2) Schedule with dermatology for a skin cancer screening  3) The best thing you can do to protect your arteries from aging right now is to quit smoking. I sent the chantix prescription for you.   4) I recommend getting the new shingles vaccine, Shingrix.  You can call your insurance company to find out if this vaccine is covered.  You may also ask our pharmacy to check if your insurance covers Shingrix if given at the pharmacy.   5) Consider scheduling with a therapist (see referral).   6) You can discuss removal of lipomas with the dermatologist or see general surgery.         Alyse Mcclendon M.D.        [facility]

## 2020-01-13 NOTE — PATIENT INSTRUCTIONS
1) You can try taking trazodone 100mg (two 50mg tablets) at bedtime for sleep.  2) Schedule with dermatology for a skin cancer screening  3) The best thing you can do to protect your arteries from aging right now is to quit smoking. I sent the chantix prescription for you.   4) I recommend getting the new shingles vaccine, Shingrix.  You can call your insurance company to find out if this vaccine is covered.  You may also ask our pharmacy to check if your insurance covers Shingrix if given at the pharmacy.   5) Consider scheduling with a therapist (see referral).   6) You can discuss removal of lipomas with the dermatologist or see general surgery.

## 2020-01-14 ASSESSMENT — PATIENT HEALTH QUESTIONNAIRE - PHQ9: SUM OF ALL RESPONSES TO PHQ QUESTIONS 1-9: 3

## 2020-01-14 ASSESSMENT — ANXIETY QUESTIONNAIRES: GAD7 TOTAL SCORE: 1

## 2020-01-17 ENCOUNTER — HOSPITAL ENCOUNTER (OUTPATIENT)
Dept: MAMMOGRAPHY | Facility: CLINIC | Age: 55
Discharge: HOME | End: 2020-01-17
Attending: FAMILY MEDICINE | Admitting: FAMILY MEDICINE
Payer: COMMERCIAL

## 2020-01-17 ENCOUNTER — OFFICE VISIT (OUTPATIENT)
Dept: FAMILY MEDICINE | Facility: CLINIC | Age: 55
End: 2020-01-17
Payer: COMMERCIAL

## 2020-01-17 ENCOUNTER — ANCILLARY PROCEDURE (OUTPATIENT)
Dept: GENERAL RADIOLOGY | Facility: CLINIC | Age: 55
End: 2020-01-17
Attending: NURSE PRACTITIONER
Payer: COMMERCIAL

## 2020-01-17 VITALS
BODY MASS INDEX: 23.74 KG/M2 | DIASTOLIC BLOOD PRESSURE: 57 MMHG | SYSTOLIC BLOOD PRESSURE: 108 MMHG | HEART RATE: 67 BPM | TEMPERATURE: 97.7 F | OXYGEN SATURATION: 97 % | WEIGHT: 134 LBS

## 2020-01-17 DIAGNOSIS — M79.18 MYOFASCIAL PAIN ON RIGHT SIDE: Primary | ICD-10-CM

## 2020-01-17 DIAGNOSIS — Z12.31 VISIT FOR SCREENING MAMMOGRAM: ICD-10-CM

## 2020-01-17 DIAGNOSIS — M79.18 MYOFASCIAL PAIN ON RIGHT SIDE: ICD-10-CM

## 2020-01-17 PROCEDURE — 99213 OFFICE O/P EST LOW 20 MIN: CPT | Performed by: NURSE PRACTITIONER

## 2020-01-17 PROCEDURE — 72100 X-RAY EXAM L-S SPINE 2/3 VWS: CPT

## 2020-01-17 PROCEDURE — 77063 BREAST TOMOSYNTHESIS BI: CPT

## 2020-01-17 RX ORDER — CYCLOBENZAPRINE HCL 10 MG
10 TABLET ORAL 3 TIMES DAILY PRN
Qty: 15 TABLET | Refills: 0 | Status: SHIPPED | OUTPATIENT
Start: 2020-01-17 | End: 2021-07-19

## 2020-01-17 NOTE — PROGRESS NOTES
Subjective     Eunice Malloy is a 54 year old female who presents to clinic today for the following health issues:    HPI   Back Pain     Duration: 2 days right sided back pain above the right hip bone.  Seems worse today.  Able to walk but sitting causes discomfort       Specific cause: none    Description:   Character of pain: sharp and constant  Pain radiation:none  New numbness or weakness in legs, not attributed to pain:  no     Intensity: severe    History:   Pain interferes with job: Not applicable  History of back problems: previous herniated disc L4-5 level  Any previous MRI or X-rays: MRI lumbar spine  Sees a specialist for back pain:  No  Therapies tried without relief: peppermint oil;  Has ibuprofen 800mg for pain but did not take any    Alleviating factors:   Improved by:       Precipitating factors:  Worsened by: Lifting, Bending, Standing, Sitting, Lying Flat and Walking  No known trauma    Ana Solares MA         Accompanying Signs & Symptoms:  Risk of Fracture:  None  Risk of Cauda Equina:  None  Risk of Infection:  None  Risk of Cancer:  None  Risk of Ankylosing Spondylitis:  Onset at age <35, male, AND morning back stiffness. no       Patient Active Problem List   Diagnosis     CARDIOVASCULAR SCREENING; LDL GOAL LESS THAN 160     Hypothyroidism     Cold sore     Anxiety     Shoulder pain     Labral tear of shoulder     Microscopic hematuria     Low back pain     Loss of hair     SI (sacroiliac) joint dysfunction and pain - BILATERAL     Abnormal finding on MRI of brain     Past Surgical History:   Procedure Laterality Date     HC EXCIS PRIMARY GANGLION WRIST       TONSILLECTOMY  as child       Social History     Tobacco Use     Smoking status: Current Some Day Smoker     Years: 15.00     Types: Cigarettes     Smokeless tobacco: Never Used     Tobacco comment: 1 pack per week   Substance Use Topics     Alcohol use: Yes     Alcohol/week: 0.0 standard drinks     Comment: rare     History  reviewed. No pertinent family history.      Current Outpatient Medications   Medication Sig Dispense Refill     acyclovir (ZOVIRAX) 400 MG tablet Take 1 tablet (400 mg) by mouth every 8 hours (Patient not taking: Reported on 1/17/2020) 15 tablet 3     acyclovir (ZOVIRAX) 5 % cream Apply topically 5 times daily (Patient not taking: Reported on 1/17/2020) 5 g 11     hydrOXYzine (ATARAX) 25 MG tablet Take 1-2 tablets (25-50 mg) by mouth nightly as needed (insomnia) Take about 30 minutes before bedtime. (Patient not taking: Reported on 1/17/2020) 60 tablet 1     ibuprofen (ADVIL/MOTRIN) 800 MG tablet TAKE ONE TABLET BY MOUTH EVERY 8 HOURS AS NEEDED FOR MODERATE PAIN 60 tablet 1     levothyroxine (SYNTHROID/LEVOTHROID) 112 MCG tablet TAKE 1 TABLET(112 MCG) BY MOUTH DAILY 90 tablet 0     Multiple Vitamins-Minerals (MULTIVITAL PO) Take 1 tablet by mouth       traZODone (DESYREL) 50 MG tablet TAKE 1 TO 2 TABLETS BY MOUTH AT BEDTIME AS NEEDED FOR SLEEP 180 tablet 3     valACYclovir (VALTREX) 1000 mg tablet Take 2 tablets (2,000 mg) by mouth 2 times daily For one day. 12 tablet 11     varenicline (CHANTIX RICARDO) 0.5 MG X 11 & 1 MG X 42 tablet Take 0.5 mg tab daily for 3 days, THEN 0.5 mg tab twice daily for 4 days, THEN 1 mg twice daily. 53 tablet 1     Allergies   Allergen Reactions     Sulfa Drugs Rash       Reviewed and updated as needed this visit by Provider  Problems         Review of Systems   ROS COMP: Constitutional, HEENT, cardiovascular, pulmonary, gi and gu systems are negative, except as otherwise noted.      Objective      /57 (BP Location: Left arm, Cuff Size: Adult Regular)   Pulse 67   Temp 97.7  F (36.5  C) (Tympanic)   Wt 60.8 kg (134 lb)   LMP 05/14/2008   SpO2 97%   BMI 23.74 kg/m      Physical Exam   GENERAL: healthy, alert and appears relaxed  MS: no gross musculoskeletal defects noted, no edema  SKIN: no suspicious lesions or rashes  BACK: no CVA tenderness, no paralumbar tenderness,  nonradiating right sided low back pain in mid scapular line and laterally, some muscle induration, no SI tenderness, flexes  80 degrees, extends 10 degrees, some mild discomfort with all ROM, even gait, negative SLR   PSYCH: mentation appears normal, affect normal/bright    Diagnostic Test Results:  Labs reviewed in Epic        Assessment & Plan       ICD-10-CM    1. Myofascial pain on right side M79.18 XR Lumbar Spine 2/3 Views           Patient Instructions   Please begin taking the ibuprofen 800mg 3 times a day as necessary for pain  Use heat not cold  And take the flexeril muscle relaxant as necessary up to 3 times a day        MICHAEL Lewis CNP  [facility]

## 2020-01-17 NOTE — PATIENT INSTRUCTIONS
Please begin taking the ibuprofen 800mg 3 times a day as necessary for pain  Use heat not cold  And take the flexeril muscle relaxant as necessary up to 3 times a day

## 2020-01-17 NOTE — LETTER
[facility]  [address] [facility]  [address]  [place]  [address]  Tel: [phone]    January 17, 2020    Eunice Malloy  [address] [facility] [address]        Dear Ms. Malloy,    Enclosed are the results we discussed.   Resulted Orders   XR Lumbar Spine 2/3 Views    Narrative    LUMBAR SPINE TWO TO THREE VIEWS  1/17/2020 11:30 AM     HISTORY: Myofascial pain on right side.    COMPARISON: Lumbar spine MR 6/5/2015.       Impression    IMPRESSION: Alignment of the lumbar spine is within normal limits. No  loss of vertebral body height. Mild degenerative endplate changes and  loss of intervertebral disc space at L2-L3 and L5-S1.     VICKY ANGULO MD       If you have any further questions or problems, please contact our office.      Sincerely,    Janae Jim NP / [place]

## 2020-01-22 ENCOUNTER — TRANSFERRED RECORDS (OUTPATIENT)
Dept: HEALTH INFORMATION MANAGEMENT | Facility: CLINIC | Age: 55
End: 2020-01-22

## 2020-03-21 DIAGNOSIS — G89.29 CHRONIC BILATERAL LOW BACK PAIN WITHOUT SCIATICA: ICD-10-CM

## 2020-03-21 DIAGNOSIS — M54.50 CHRONIC BILATERAL LOW BACK PAIN WITHOUT SCIATICA: ICD-10-CM

## 2020-03-23 RX ORDER — IBUPROFEN 800 MG/1
TABLET, FILM COATED ORAL
Qty: 60 TABLET | Refills: 0 | Status: SHIPPED | OUTPATIENT
Start: 2020-03-23 | End: 2020-06-19

## 2020-03-23 NOTE — TELEPHONE ENCOUNTER
"Requested Prescriptions   Pending Prescriptions Disp Refills     ibuprofen (ADVIL/MOTRIN) 800 MG tablet [Pharmacy Med Name: IBUPROFEN 800MG TABLETS] 60 tablet 1     Sig: TAKE 1 TABLET BY MOUTH EVERY 8 HOURS AS NEEDED FOR MODERATE PAIN  Last Written Prescription Date:  6/19/2019  Last Fill Quantity: 60 tablet,  # refills: 1   Last Office Visit: 1/17/2020   Future Office Visit:            NSAID Medications Failed - 3/21/2020 12:09 PM        Failed - Normal ALT on file in past 12 months     Recent Labs   Lab Test 10/30/18  1052   ALT 29           Failed - Normal AST on file in past 12 months     Recent Labs   Lab Test 10/30/18  1052   AST 21           Failed - Normal CBC on file in past 12 months     Recent Labs   Lab Test 10/23/18  1248   WBC 8.5   RBC 4.36   HGB 13.2   HCT 40.5              Failed - Normal serum creatinine on file in past 12 months     Recent Labs   Lab Test 10/23/18  1248   CR 0.67     Ok to refill medication if creatinine is low          Passed - Blood pressure under 140/90 in past 12 months     BP Readings from Last 3 Encounters:   01/17/20 108/57   01/13/20 112/62   12/26/19 106/68           Passed - Recent (12 mo) or future (30 days) visit within the authorizing provider's specialty     Patient has had an office visit with the authorizing provider or a provider within the authorizing providers department within the previous 12 mos or has a future within next 30 days. See \"Patient Info\" tab in inbasket, or \"Choose Columns\" in Meds & Orders section of the refill encounter.            Passed - Patient is age 6-64 years        Passed - Medication is active on med list        Passed - No active pregnancy on record        Passed - No positive pregnancy test in past 12 months             "

## 2020-03-23 NOTE — TELEPHONE ENCOUNTER
HW Reception Medication is being filled for 1 time refill only due to:  Patient needs labs nonfasting.     Signed Prescriptions:                        Disp   Refills    ibuprofen (ADVIL/MOTRIN) 800 MG tablet     60 tab*0        Sig: TAKE 1 TABLET BY MOUTH EVERY 8 HOURS AS NEEDED FOR           MODERATE PAIN  Authorizing Provider: JOSH SERNA  Ordering User: JONATHAN PLAZA

## 2020-05-10 DIAGNOSIS — E03.9 HYPOTHYROIDISM, UNSPECIFIED TYPE: ICD-10-CM

## 2020-05-12 NOTE — TELEPHONE ENCOUNTER
Routing refill request to provider for review/approval because:  Labs not current:  TSH  Pt was due for f/u visit in April. Routing to team to schedule and PCP to authorize refills.   Eva ClaudioD, KAITLYNN, BCACP  [facility] Pharmacist, [facility]

## 2020-05-13 NOTE — TELEPHONE ENCOUNTER
HP Reception - please encourage office visit for future refills     Last office visit 1/13/20 house - Return in about 3 months (around 4/13/2020) for Physical Exam.

## 2020-05-27 NOTE — TELEPHONE ENCOUNTER
Virtual visit scheduled with PCP on 5/28.    Please address refill & close encounter, thanks!    Sarahi PABLO     [facility]

## 2020-05-28 ENCOUNTER — VIRTUAL VISIT (OUTPATIENT)
Dept: FAMILY MEDICINE | Facility: CLINIC | Age: 55
End: 2020-05-28
Payer: COMMERCIAL

## 2020-05-28 VITALS — HEIGHT: 63 IN | BODY MASS INDEX: 23.04 KG/M2 | WEIGHT: 130 LBS

## 2020-05-28 DIAGNOSIS — E03.9 HYPOTHYROIDISM, UNSPECIFIED TYPE: ICD-10-CM

## 2020-05-28 PROCEDURE — 99213 OFFICE O/P EST LOW 20 MIN: CPT | Mod: 95 | Performed by: FAMILY MEDICINE

## 2020-05-28 RX ORDER — LEVOTHYROXINE SODIUM 112 UG/1
TABLET ORAL
Qty: 90 TABLET | Refills: 3 | Status: SHIPPED | OUTPATIENT
Start: 2020-05-28 | End: 2020-06-19

## 2020-05-28 ASSESSMENT — MIFFLIN-ST. JEOR: SCORE: 1153.81

## 2020-05-28 NOTE — PROGRESS NOTES
"Eunice Malloy is a 54 year old female who is being evaluated via a billable video visit.      The patient has been notified of following:     \"This video visit will be conducted via a call between you and your physician/provider. We have found that certain health care needs can be provided without the need for an in-person physical exam.  This service lets us provide the care you need with a video conversation.  If a prescription is necessary we can send it directly to your pharmacy.  If lab work is needed we can place an order for that and you can then stop by our lab to have the test done at a later time.    Video visits are billed at different rates depending on your insurance coverage.  Please reach out to your insurance provider with any questions.    If during the course of the call the physician/provider feels a video visit is not appropriate, you will not be charged for this service.\"    Patient has given verbal consent for Video visit? Yes    How would you like to obtain your AVS? Mail a copy    Patient would like the video invitation sent by: Text to cell phone: [phone] (M)    Will anyone else be joining your video visit? No    Subjective     Eunice Malloy is a 54 year old female who presents today via video visit for the following health issues:    HPI    Hypothyroidism Follow-up      Since last visit, patient describes the following symptoms: weight gain, hair loss- taking vitamins for regrowth       How many servings of fruits and vegetables do you eat daily?  0-1    On average, how many sweetened beverages do you drink each day (Examples: soda, juice, sweet tea, etc.  Do NOT count diet or artificially sweetened beverages)?   1    How many days per week do you exercise enough to make your heart beat faster? Not at this time     How many minutes a day do you exercise enough to make your heart beat faster? Not at this time     How many days per week do you miss taking your medication? 0        Video " Start Time: 11:14 AM unable to get video to work, so started telephone encounter.      She has been taking the levothyroxine and needs her thyroid checked.     Patient Active Problem List   Diagnosis     CARDIOVASCULAR SCREENING; LDL GOAL LESS THAN 160     Hypothyroidism     Cold sore     Anxiety     Shoulder pain     Labral tear of shoulder     Microscopic hematuria     Low back pain     Loss of hair     SI (sacroiliac) joint dysfunction and pain - BILATERAL     Abnormal finding on MRI of brain     Past Surgical History:   Procedure Laterality Date     HC EXCIS PRIMARY GANGLION WRIST       TONSILLECTOMY  as child       Social History     Tobacco Use     Smoking status: Current Some Day Smoker     Years: 15.00     Types: Cigarettes     Smokeless tobacco: Never Used     Tobacco comment: 1 pack per week   Substance Use Topics     Alcohol use: Yes     Alcohol/week: 0.0 standard drinks     Comment: rare     No family history on file.      Current Outpatient Medications   Medication Sig Dispense Refill     cyclobenzaprine (FLEXERIL) 10 MG tablet Take 1 tablet (10 mg) by mouth 3 times daily as needed for muscle spasms 15 tablet 0     ibuprofen (ADVIL/MOTRIN) 800 MG tablet TAKE 1 TABLET BY MOUTH EVERY 8 HOURS AS NEEDED FOR MODERATE PAIN 60 tablet 0     levothyroxine (SYNTHROID/LEVOTHROID) 112 MCG tablet TAKE 1 TABLET(112 MCG) BY MOUTH DAILY 90 tablet 3     Multiple Vitamins-Minerals (MULTIVITAL PO) Take 1 tablet by mouth       traZODone (DESYREL) 50 MG tablet TAKE 1 TO 2 TABLETS BY MOUTH AT BEDTIME AS NEEDED FOR SLEEP 180 tablet 3     valACYclovir (VALTREX) 1000 mg tablet Take 2 tablets (2,000 mg) by mouth 2 times daily For one day. 12 tablet 11     varenicline (CHANTIX RICARDO) 0.5 MG X 11 & 1 MG X 42 tablet Take 0.5 mg tab daily for 3 days, THEN 0.5 mg tab twice daily for 4 days, THEN 1 mg twice daily. 53 tablet 1     Allergies   Allergen Reactions     Sulfa Drugs Rash       Reviewed and updated as needed this visit by  "Provider         Review of Systems   As above       Objective    Ht 1.6 m (5' 3\")   Wt 59 kg (130 lb)   LMP 05/14/2008   BMI 23.03 kg/m    Estimated body mass index is 23.03 kg/m  as calculated from the following:    Height as of this encounter: 1.6 m (5' 3\").    Weight as of this encounter: 59 kg (130 lb).  Physical Exam     GENERAL: Healthy, alert and no distress  EYES: Eyes grossly normal to inspection.  No discharge or erythema, or obvious scleral/conjunctival abnormalities.  RESP: No audible wheeze, cough, or visible cyanosis.  No visible retractions or increased work of breathing.    SKIN: Visible skin clear. No significant rash, abnormal pigmentation or lesions.  NEURO: Cranial nerves grossly intact.  Mentation and speech appropriate for age.  PSYCH: Mentation appears normal, affect normal/bright, judgement and insight intact, normal speech and appearance well-groomed.      Diagnostic Test Results:  Labs reviewed in Epic  TSH   Date Value Ref Range Status   11/11/2019 5.92 (H) 0.40 - 4.00 mU/L Final            Assessment & Plan       ICD-10-CM    1. Hypothyroidism, unspecified type  E03.9 levothyroxine (SYNTHROID/LEVOTHROID) 112 MCG tablet     **TSH with free T4 reflex FUTURE anytime       She has been taking her levothyroxine regularly and feels it is working well. She needs a refill. Due for TSH. Will have MA call to help her schedule this.                    Return for Lab Work.    Alyse Mcclendon MD   [facility]      Video-Visit Details    Type of service:  Video Visit    Video End Time:11:36 AM    Originating Location (pt. Location): Home    Distant Location (provider location):  [facility]     Platform used for Video Visit: unable to get video to work, so converted to phone encounter.     Return for Lab Work.       Alyse Mcclendon MD, MD      "

## 2020-06-01 ENCOUNTER — TELEPHONE (OUTPATIENT)
Dept: FAMILY MEDICINE | Facility: CLINIC | Age: 55
End: 2020-06-01

## 2020-06-01 DIAGNOSIS — E03.9 HYPOTHYROIDISM, UNSPECIFIED TYPE: ICD-10-CM

## 2020-06-01 PROCEDURE — 84443 ASSAY THYROID STIM HORMONE: CPT | Performed by: FAMILY MEDICINE

## 2020-06-01 PROCEDURE — 36415 COLL VENOUS BLD VENIPUNCTURE: CPT | Performed by: FAMILY MEDICINE

## 2020-06-01 PROCEDURE — 84439 ASSAY OF FREE THYROXINE: CPT | Performed by: FAMILY MEDICINE

## 2020-06-01 RX ORDER — LEVOTHYROXINE SODIUM 112 UG/1
TABLET ORAL
Qty: 90 TABLET | Refills: 0 | OUTPATIENT
Start: 2020-06-01

## 2020-06-01 NOTE — TELEPHONE ENCOUNTER
I talked to pt.  She already talked to [facility] pharmacy.  They will have it ready for the pt    JUAN DAVID Sanchez

## 2020-06-01 NOTE — TELEPHONE ENCOUNTER
The patients levothyroxine (SYNTHROID/LEVOTHROID) 112 MCG tablet was sent to the [facility] pharmacy which is currently closed.  The patient would like her prescription transferred to the [facility] and would also like to request a 3 month fill to avoid coming to the pharmacy monthly.

## 2020-06-01 NOTE — TELEPHONE ENCOUNTER
The patient has a lab appointment today and would like to pick her prescription today if at all possible.

## 2020-06-02 LAB
T4 FREE SERPL-MCNC: 1.33 NG/DL (ref 0.76–1.46)
TSH SERPL DL<=0.005 MIU/L-ACNC: 0.01 MU/L (ref 0.4–4)

## 2020-06-19 DIAGNOSIS — E03.9 HYPOTHYROIDISM, UNSPECIFIED TYPE: ICD-10-CM

## 2020-06-19 DIAGNOSIS — M54.50 CHRONIC BILATERAL LOW BACK PAIN WITHOUT SCIATICA: ICD-10-CM

## 2020-06-19 DIAGNOSIS — G47.00 INSOMNIA, UNSPECIFIED TYPE: ICD-10-CM

## 2020-06-19 DIAGNOSIS — G89.29 CHRONIC BILATERAL LOW BACK PAIN WITHOUT SCIATICA: ICD-10-CM

## 2020-06-19 RX ORDER — LEVOTHYROXINE SODIUM 112 UG/1
TABLET ORAL
Qty: 90 TABLET | Refills: 0 | Status: SHIPPED | OUTPATIENT
Start: 2020-06-19 | End: 2020-10-02

## 2020-06-19 RX ORDER — TRAZODONE HYDROCHLORIDE 50 MG/1
TABLET, FILM COATED ORAL
Qty: 180 TABLET | Refills: 1 | Status: SHIPPED | OUTPATIENT
Start: 2020-06-19 | End: 2020-11-11

## 2020-06-19 RX ORDER — IBUPROFEN 800 MG/1
TABLET, FILM COATED ORAL
Qty: 30 TABLET | Refills: 0 | Status: SHIPPED | OUTPATIENT
Start: 2020-06-19 | End: 2020-11-11

## 2020-06-19 NOTE — TELEPHONE ENCOUNTER
Gave pt this message.  I did tell her about the future labs ordered.  She will get those done in August.  JUAN DAVID Sanchez

## 2020-06-19 NOTE — TELEPHONE ENCOUNTER
Needs refills of trazodone and Ibuprofen 800 mg.  Almost out of these meds    Also wants to get levothyroxine early, so she can do only one trip to pharmacy (because of the distance).    Patient states that the label on her thyroid medication states 90 tablets, but only received 30 of them    Please call when done or if you have any questions.  OK to LM on VM.

## 2020-06-19 NOTE — TELEPHONE ENCOUNTER
Pt came to lab on 6/1/20 and did have her thyroid labs done but they did not due the standing labs for the CBC and BMP    Therefore she is due for annual creatinine before RN can refill the ibuprofen     She uses ibuprofen for back pain, takes as needed , just a couple times a week    Is it ok to refill ibuprofen ?    Pt will come back in August for her thyroid test again and she can get BMP and CBC then if ok.     (pt asked for call back with MD response so she knows when to go to pharmacy)    Desire Banks RN, BSN         (thyroid med and trazodone refilled - this more of a pharmacy transfer)

## 2020-06-19 NOTE — TELEPHONE ENCOUNTER
Given 30 tablets of high-dose ibuprofen.  Use sparingly as can increase risk of heart attack, GI bleed, kidney failure etc.  She should do the labs as planned and follow-up with her primary Dr. house for further refills or concerns.

## 2020-09-25 ENCOUNTER — OFFICE VISIT (OUTPATIENT)
Dept: URGENT CARE | Facility: URGENT CARE | Age: 55
End: 2020-09-25
Payer: COMMERCIAL

## 2020-09-25 VITALS
WEIGHT: 134 LBS | BODY MASS INDEX: 23.74 KG/M2 | OXYGEN SATURATION: 100 % | TEMPERATURE: 98 F | DIASTOLIC BLOOD PRESSURE: 74 MMHG | SYSTOLIC BLOOD PRESSURE: 125 MMHG | HEART RATE: 90 BPM

## 2020-09-25 DIAGNOSIS — L20.9 ATOPIC DERMATITIS, UNSPECIFIED TYPE: Primary | ICD-10-CM

## 2020-09-25 PROCEDURE — 99213 OFFICE O/P EST LOW 20 MIN: CPT | Performed by: PHYSICIAN ASSISTANT

## 2020-09-25 RX ORDER — BACITRACIN ZINC 500 [USP'U]/G
OINTMENT TOPICAL 2 TIMES DAILY
Qty: 15 G | Refills: 0 | Status: SHIPPED | OUTPATIENT
Start: 2020-09-25 | End: 2023-05-10

## 2020-09-26 NOTE — PROGRESS NOTES
SUBJECTIVE:   Eunice Malloy is a 54 year old female presenting with a chief complaint of   Chief Complaint   Patient presents with     Urgent Care     Rash     c/o rash for 3 days       She is an established patient of [facility].  Patient presents with a rash x 2-3 days.  It itches and hurts.  Patient witch hazel and peroxide.  No vaginal discharge.   Patient swims in pools only.      PMHx:  Hypothyroidism, depression; medications:  Antidepressant and thyroid.  Surgeries.  None.          Review of Systems   Skin: Positive for rash.   All other systems reviewed and are negative.      Past Medical History:   Diagnosis Date     Anxiety 4/11/2013     Cold sore 8/15/2011     Hypothyroidism 12/9/2010     Tobacco abuse      No family history on file.  Current Outpatient Medications   Medication Sig Dispense Refill     amoxicillin-clavulanate (AUGMENTIN) 875-125 MG tablet Take 1 tablet by mouth 2 times daily for 7 days 14 tablet 0     bacitracin 500 UNIT/GM external ointment Apply topically 2 times daily 15 g 0     cyclobenzaprine (FLEXERIL) 10 MG tablet Take 1 tablet (10 mg) by mouth 3 times daily as needed for muscle spasms 15 tablet 0     ibuprofen (ADVIL/MOTRIN) 800 MG tablet TAKE 1 TABLET BY MOUTH EVERY 8 HOURS AS NEEDED FOR MODERATE PAIN 30 tablet 0     levothyroxine (SYNTHROID/LEVOTHROID) 112 MCG tablet TAKE 1 TABLET(112 MCG) BY MOUTH DAILY 90 tablet 0     Multiple Vitamins-Minerals (MULTIVITAL PO) Take 1 tablet by mouth       traZODone (DESYREL) 50 MG tablet TAKE 1 TO 2 TABLETS BY MOUTH AT BEDTIME AS NEEDED FOR SLEEP 180 tablet 1     valACYclovir (VALTREX) 1000 mg tablet Take 2 tablets (2,000 mg) by mouth 2 times daily For one day. 12 tablet 11     varenicline (CHANTIX RICARDO) 0.5 MG X 11 & 1 MG X 42 tablet Take 0.5 mg tab daily for 3 days, THEN 0.5 mg tab twice daily for 4 days, THEN 1 mg twice daily. 53 tablet 1     Social History     Tobacco Use     Smoking status: Current Some Day Smoker     Years: 15.00     Types:  Cigarettes     Smokeless tobacco: Never Used     Tobacco comment: 1 pack per week   Substance Use Topics     Alcohol use: Yes     Alcohol/week: 0.0 standard drinks     Comment: rare       OBJECTIVE  /74   Pulse 90   Temp 98  F (36.7  C) (Oral)   Wt 60.8 kg (134 lb)   LMP 05/14/2008   SpO2 100%   BMI 23.74 kg/m      Physical Exam  Vitals signs and nursing note reviewed. Exam conducted with a chaperone present.   Constitutional:       Appearance: Normal appearance. She is normal weight.   Cardiovascular:      Rate and Rhythm: Normal rate.   Skin:     Capillary Refill: Capillary refill takes less than 2 seconds.      Comments: Group of papules to left buttock and left inner thigh (5-10 in a group.  Not vesicles.     Neurological:      General: No focal deficit present.      Mental Status: She is alert.   Psychiatric:         Mood and Affect: Mood normal.         Labs:  No results found for this or any previous visit (from the past 24 hour(s)).    X-Ray was not done.    ASSESSMENT:      ICD-10-CM    1. Atopic dermatitis, unspecified type  L20.9 amoxicillin-clavulanate (AUGMENTIN) 875-125 MG tablet     bacitracin 500 UNIT/GM external ointment        Medical Decision Making:    Differential Diagnosis:  Atopic derm,     Serious Comorbid Conditions:  Adult:  as above    PLAN:    augmentin and topical bacitracin    Followup:    If not improving or if condition worsens, follow up with your Primary Care Provider    Patient Instructions     Patient Education     What is Atopic Dermatitis?  Atopic dermatitis (also called eczema) causes chronic skin irritation. It is often found in infants, teens, and adults. This disease often runs in families (is genetic). It may also be linked to allergies, such as hay fever and sometimes asthma. Patches of skin become dry, red, itchy, and scaly. In older adults, abnormally dry skin is often called xerosis. Sometimes eczema is only on the hands or feet. It often improves when the  skin is well hydrated. It gets worse when the skin is dry. You can help control symptoms by practicing good self-care. Avoid anything that causes flare-ups (such as sunburn or vigorous scratching).  Where do you have symptoms?  Atopic dermatitis symptoms can appear anywhere on the body. But in most cases they vary based on the person s age. In infants, irritation is often seen on the cheeks, chin, near the mouth, and under the eyelids. In children ages 2 through 10, skin folds, such as the backs of the knees, or in the arm crease, are most often affected. In children 11 and older and in adults, symptoms can affect many areas.  What triggers symptoms?  Symptoms flare because of many things. These include skin dryness, scratching, stress, harsh soaps, and irritants such as dust or wool. Try to avoid anything that causes flare-ups.  Recognizing what causes flare-ups  To figure out what causes atopic dermatitis to flare, keep a list of things that seem to affect your skin. Start by filling in the spaces below. Then keep writing them down in a notebook or diary. The things that affect each person vary. So keep your own list and try to avoid your triggers.    Date Last Reviewed: 2/1/2017 2000-2019 [employer]. [address] [place] [address], [place] [address]. All rights reserved. This information is not intended as a substitute for professional medical care. Always follow your healthcare professional's instructions.

## 2020-09-26 NOTE — PATIENT INSTRUCTIONS
Patient Education     What is Atopic Dermatitis?  Atopic dermatitis (also called eczema) causes chronic skin irritation. It is often found in infants, teens, and adults. This disease often runs in families (is genetic). It may also be linked to allergies, such as hay fever and sometimes asthma. Patches of skin become dry, red, itchy, and scaly. In older adults, abnormally dry skin is often called xerosis. Sometimes eczema is only on the hands or feet. It often improves when the skin is well hydrated. It gets worse when the skin is dry. You can help control symptoms by practicing good self-care. Avoid anything that causes flare-ups (such as sunburn or vigorous scratching).  Where do you have symptoms?  Atopic dermatitis symptoms can appear anywhere on the body. But in most cases they vary based on the person s age. In infants, irritation is often seen on the cheeks, chin, near the mouth, and under the eyelids. In children ages 2 through 10, skin folds, such as the backs of the knees, or in the arm crease, are most often affected. In children 11 and older and in adults, symptoms can affect many areas.  What triggers symptoms?  Symptoms flare because of many things. These include skin dryness, scratching, stress, harsh soaps, and irritants such as dust or wool. Try to avoid anything that causes flare-ups.  Recognizing what causes flare-ups  To figure out what causes atopic dermatitis to flare, keep a list of things that seem to affect your skin. Start by filling in the spaces below. Then keep writing them down in a notebook or diary. The things that affect each person vary. So keep your own list and try to avoid your triggers.    Date Last Reviewed: 2/1/2017 2000-2019 [employer]. [address] [place] [address], [place] [address]. All rights reserved. This information is not intended as a substitute for professional medical care. Always follow your healthcare professional's instructions.

## 2020-10-02 DIAGNOSIS — E03.9 HYPOTHYROIDISM, UNSPECIFIED TYPE: ICD-10-CM

## 2020-10-02 RX ORDER — LEVOTHYROXINE SODIUM 112 UG/1
TABLET ORAL
Qty: 30 TABLET | Refills: 0 | Status: SHIPPED | OUTPATIENT
Start: 2020-10-02 | End: 2020-11-09

## 2020-10-02 NOTE — TELEPHONE ENCOUNTER
Reason for Call:  Medication or medication refill:    Do you use a [facility]?  Name of the pharmacy and phone number for the current request:  [id number] - [place], [place] - [address]        Name of the medication requested: levothyroxine (SYNTHROID/LEVOTHROID) 112 MCG tablet    Other request:     Can we leave a detailed message on this number? YES    Phone number patient can be reached at: Cell number on file:    Telephone Information:   Mobile [phone]       Best Time: ASAP    Call taken on 10/2/2020 at 10:20 AM by JACKI Enciso  [place]   Patient Rep

## 2020-10-02 NOTE — TELEPHONE ENCOUNTER
Alyse Mcclendon MD   6/2/2020 10:17 AM      Your thyroid stimulating hormone returned low with your actual thyroid hormone in the normal range. Because of the low TSH, I would like to recheck in a couple of months. If still low, I may recommend reducing your levothyroxine dose. Please schedule a lab visit in August.            Tsh ordered.    Medication is being filled for 1 time refill only due to:  Patient needs labs tsh.

## 2020-11-02 DIAGNOSIS — E03.9 HYPOTHYROIDISM, UNSPECIFIED TYPE: ICD-10-CM

## 2020-11-02 RX ORDER — LEVOTHYROXINE SODIUM 112 UG/1
TABLET ORAL
Qty: 30 TABLET | Refills: 0 | Status: CANCELLED | OUTPATIENT
Start: 2020-11-02

## 2020-11-02 NOTE — LETTER
November 7, 2020      Eunice Malloy  [address] [facility] [address]        Amanda Dawson,      We received a refill request for levothyroxine (SYNTHROID/LEVOTHROID) 112 MCG tablet; however, you are due for a LAB ONLY visit to receive a refill. Please call us at [phone] at your earliest convenience to schedule an appointment.       We greatly appreciate the opportunity to serve you and thank you for trusting us with your health care.        Your health care team at [facility]               Sincerely,        Alyse Mcclendon MD

## 2020-11-05 ENCOUNTER — TELEPHONE (OUTPATIENT)
Dept: FAMILY MEDICINE | Facility: CLINIC | Age: 55
End: 2020-11-05

## 2020-11-05 NOTE — TELEPHONE ENCOUNTER
Reason for call:  Patient reporting a symptom    Symptom or request: symptoms    Duration (how long have symptoms been present): over month    Have you been treated for this before? No    Additional comments: been having pains in both arms    Phone Number patient can be reached at:  Home number on file [phone] (home)    Best Time:      Can we leave a detailed message on this number:  YES    Call taken on 11/5/2020 at 9:36 AM by Ting Rahman

## 2020-11-05 NOTE — TELEPHONE ENCOUNTER
LOV: 5/28/20    S-(situation): Patient reporting pain in right elbow  Some days the pain is worse than others. Intermittent pain in rt elbow has been  happening for about a month  Using peppermint oil and Patient takes 800mg of Ibuprofen --does help   denies any pain like this in the past. Denies discoloration or growing redness.  Denies fall or trauma to elbow. Unclear if swollen-patient tried to compare to left but was unsure.    B-(background): hx of shoulder and low back pain    A-(assessment): patient with rt elbow pain for the past month      R-(recommendations): Patient scheduled appt. To see Alyse Mcclendon MD on Monday 11/9.  Patient in agreement with plan and verbalizes understanding.   Thanks!   Elysia Calderón RN

## 2020-11-07 NOTE — TELEPHONE ENCOUNTER
LM to call back and schedule a LAB ONLY visit for further refills. Sent [email or web address] message and letter.      Tiny SANCHEZ  [facility]      Routing to nurse team due to pending medication.

## 2020-11-09 ENCOUNTER — TELEPHONE (OUTPATIENT)
Dept: FAMILY MEDICINE | Facility: CLINIC | Age: 55
End: 2020-11-09

## 2020-11-09 DIAGNOSIS — E03.9 HYPOTHYROIDISM, UNSPECIFIED TYPE: ICD-10-CM

## 2020-11-09 RX ORDER — LEVOTHYROXINE SODIUM 112 UG/1
TABLET ORAL
Qty: 30 TABLET | Refills: 0 | Status: SHIPPED | OUTPATIENT
Start: 2020-11-09 | End: 2020-12-08

## 2020-11-09 NOTE — TELEPHONE ENCOUNTER
Reason for Call:  Other prescription    Detailed comments: patient needs a refill for:    levothyroxine (SYNTHROID/LEVOTHROID) 112 MCG tablet    [facility] [id number] [place] [address]    Patient has a scheduled visit with Dr. Mcclendon on 11-11-20, so please refill today ASAP.    Patient wants to pick it up today.    Phone Number Patient can be reached at: Home number on file [phone] (home)    Best Time: asap    Can we leave a detailed message on this number? YES    Call taken on 11/9/2020 at 1:37 PM by Jennifer Banerjee

## 2020-11-11 ENCOUNTER — OFFICE VISIT (OUTPATIENT)
Dept: FAMILY MEDICINE | Facility: CLINIC | Age: 55
End: 2020-11-11
Payer: COMMERCIAL

## 2020-11-11 VITALS
OXYGEN SATURATION: 96 % | WEIGHT: 129 LBS | SYSTOLIC BLOOD PRESSURE: 118 MMHG | DIASTOLIC BLOOD PRESSURE: 76 MMHG | BODY MASS INDEX: 22.85 KG/M2 | TEMPERATURE: 98.3 F | RESPIRATION RATE: 16 BRPM | HEART RATE: 83 BPM

## 2020-11-11 DIAGNOSIS — G47.00 INSOMNIA, UNSPECIFIED TYPE: ICD-10-CM

## 2020-11-11 DIAGNOSIS — G89.29 CHRONIC BILATERAL LOW BACK PAIN WITHOUT SCIATICA: ICD-10-CM

## 2020-11-11 DIAGNOSIS — Z86.0100 HISTORY OF COLONIC POLYPS: ICD-10-CM

## 2020-11-11 DIAGNOSIS — E03.9 HYPOTHYROIDISM, UNSPECIFIED TYPE: ICD-10-CM

## 2020-11-11 DIAGNOSIS — M25.521 RIGHT ELBOW PAIN: Primary | ICD-10-CM

## 2020-11-11 DIAGNOSIS — M54.50 CHRONIC BILATERAL LOW BACK PAIN WITHOUT SCIATICA: ICD-10-CM

## 2020-11-11 LAB
ANION GAP SERPL CALCULATED.3IONS-SCNC: <1 MMOL/L (ref 3–14)
BUN SERPL-MCNC: 12 MG/DL (ref 7–30)
CALCIUM SERPL-MCNC: 8.6 MG/DL (ref 8.5–10.1)
CHLORIDE SERPL-SCNC: 108 MMOL/L (ref 94–109)
CO2 SERPL-SCNC: 30 MMOL/L (ref 20–32)
CREAT SERPL-MCNC: 0.81 MG/DL (ref 0.52–1.04)
GFR SERPL CREATININE-BSD FRML MDRD: 82 ML/MIN/{1.73_M2}
GLUCOSE SERPL-MCNC: 78 MG/DL (ref 70–99)
POTASSIUM SERPL-SCNC: 4.6 MMOL/L (ref 3.4–5.3)
SODIUM SERPL-SCNC: 138 MMOL/L (ref 133–144)
TSH SERPL DL<=0.005 MIU/L-ACNC: 3.26 MU/L (ref 0.4–4)

## 2020-11-11 PROCEDURE — 80048 BASIC METABOLIC PNL TOTAL CA: CPT | Performed by: FAMILY MEDICINE

## 2020-11-11 PROCEDURE — 36415 COLL VENOUS BLD VENIPUNCTURE: CPT | Performed by: FAMILY MEDICINE

## 2020-11-11 PROCEDURE — 99214 OFFICE O/P EST MOD 30 MIN: CPT | Performed by: FAMILY MEDICINE

## 2020-11-11 PROCEDURE — 84443 ASSAY THYROID STIM HORMONE: CPT | Performed by: FAMILY MEDICINE

## 2020-11-11 RX ORDER — TRAZODONE HYDROCHLORIDE 50 MG/1
TABLET, FILM COATED ORAL
Qty: 180 TABLET | Refills: 1 | Status: SHIPPED | OUTPATIENT
Start: 2020-11-11 | End: 2021-05-10

## 2020-11-11 RX ORDER — IBUPROFEN 800 MG/1
TABLET, FILM COATED ORAL
Qty: 90 TABLET | Refills: 0 | Status: SHIPPED | OUTPATIENT
Start: 2020-11-11 | End: 2023-05-10

## 2020-11-11 RX ORDER — LEVOTHYROXINE SODIUM 100 UG/1
100 TABLET ORAL DAILY
Qty: 90 TABLET | Refills: 0 | Status: CANCELLED | OUTPATIENT
Start: 2020-11-11

## 2020-11-11 RX ORDER — LEVOTHYROXINE SODIUM 112 UG/1
TABLET ORAL
Qty: 90 TABLET | Refills: 0 | Status: CANCELLED | OUTPATIENT
Start: 2020-11-11

## 2020-11-11 NOTE — PROGRESS NOTES
SUBJECTIVE:   Eunice Malloy is a 55 year old female who presents to clinic today for the following health issues:    Hypothyroidism Follow-up      Since last visit, patient describes the following symptoms: hair loss        Wt Readings from Last 5 Encounters:   11/11/20 58.5 kg (129 lb)   09/25/20 60.8 kg (134 lb)   05/28/20 59 kg (130 lb)   01/17/20 60.8 kg (134 lb)   01/13/20 61.6 kg (135 lb 12 oz)      Joint Pain - right elbow pain     Onset: Over 1month    Description:   Location: right elbow  Character: Sharp and Stabbing    Intensity: moderate, severe    Progression of Symptoms: worse    Accompanying Signs & Symptoms: worse at night.  Other symptoms: none    History:   Previous similar pain: no       Precipitating factors:   Trauma or overuse: no     Alleviating factors:  Improved by: rest/inactivity and massage.    Therapies Tried and outcome: see above    Insomnia - Still rough and still take 1 tabs of the trazodone at night. Would like something else to help improve sleep.     Elysia Calderón RN       11/5/20 11:22 AM  Note     LOV: 5/28/20     S-(situation): Patient reporting pain in right elbow  Some days the pain is worse than others. Intermittent pain in rt elbow has been  happening for about a month  Using peppermint oil and Patient takes 800mg of Ibuprofen --does help   denies any pain like this in the past. Denies discoloration or growing redness.  Denies fall or trauma to elbow. Unclear if swollen-patient tried to compare to left but was unsure.     B-(background): hx of shoulder and low back pain     A-(assessment): patient with rt elbow pain for the past month        R-(recommendations): Patient scheduled appt. To see Alyse Mcclendon MD on Monday 11/9.  Patient in agreement with plan and verbalizes understanding.   Thanks!   Elysia Calderón RN                     Problem list and histories reviewed & adjusted, as indicated.  Additional history: as documented    Patient Active Problem List    Diagnosis     CARDIOVASCULAR SCREENING; LDL GOAL LESS THAN 160     Hypothyroidism     Cold sore     Anxiety     Shoulder pain     Labral tear of shoulder     Microscopic hematuria     Low back pain     Loss of hair     SI (sacroiliac) joint dysfunction and pain - BILATERAL     Abnormal finding on MRI of brain     History of colonic polyps     Insomnia, unspecified type     Past Surgical History:   Procedure Laterality Date     HC EXCIS PRIMARY GANGLION WRIST       TONSILLECTOMY  as child       Social History     Tobacco Use     Smoking status: Current Some Day Smoker     Years: 15.00     Types: Cigarettes     Smokeless tobacco: Never Used     Tobacco comment: 1 pack per week   Substance Use Topics     Alcohol use: Yes     Alcohol/week: 0.0 standard drinks     Comment: rare     History reviewed. No pertinent family history.      Current Outpatient Medications   Medication Sig Dispense Refill     bacitracin 500 UNIT/GM external ointment Apply topically 2 times daily 15 g 0     cyclobenzaprine (FLEXERIL) 10 MG tablet Take 1 tablet (10 mg) by mouth 3 times daily as needed for muscle spasms 15 tablet 0     ibuprofen (ADVIL/MOTRIN) 800 MG tablet TAKE 1 TABLET BY MOUTH EVERY 8 HOURS AS NEEDED FOR MODERATE PAIN 30 tablet 0     levothyroxine (SYNTHROID/LEVOTHROID) 112 MCG tablet TAKE 1 TABLET(112 MCG) BY MOUTH DAILY. DUE FOR LAB APPOINTMENT October 2020-NO FURTHER REFILLS 30 tablet 0     Multiple Vitamins-Minerals (MULTIVITAL PO) Take 1 tablet by mouth       traZODone (DESYREL) 50 MG tablet TAKE 1 TO 2 TABLETS BY MOUTH AT BEDTIME AS NEEDED FOR SLEEP 180 tablet 1     valACYclovir (VALTREX) 1000 mg tablet Take 2 tablets (2,000 mg) by mouth 2 times daily For one day. 12 tablet 11     varenicline (CHANTIX RICARDO) 0.5 MG X 11 & 1 MG X 42 tablet Take 0.5 mg tab daily for 3 days, THEN 0.5 mg tab twice daily for 4 days, THEN 1 mg twice daily. 53 tablet 1     Allergies   Allergen Reactions     Sulfa Drugs Rash     Recent Labs   Lab  Test 06/01/20  1353 11/11/19  1153 10/30/18  1052 10/30/18  1052 10/23/18  1248 02/07/18  1605 02/07/18  1605 05/04/15  1019 05/04/15  1019   LDL  --   --   --   --   --   --   --   --  99   HDL  --   --   --   --   --   --   --   --  101   TRIG  --   --   --   --   --   --   --   --  69   ALT  --   --   --  29 32  --  27   < >  --    CR  --   --   --   --  0.67  --  0.64   < >  --    GFRESTIMATED  --   --   --   --  >90  --  >90   < >  --    GFRESTBLACK  --   --   --   --  >90  --  >90   < >  --    POTASSIUM  --   --   --   --  3.9  --  4.2   < >  --    TSH 0.01* 5.92*   < >  --   --    < >  --    < > 91.59*    < > = values in this interval not displayed.      BP Readings from Last 3 Encounters:   11/11/20 118/76   09/25/20 125/74   01/17/20 108/57    Wt Readings from Last 3 Encounters:   11/11/20 58.5 kg (129 lb)   09/25/20 60.8 kg (134 lb)   05/28/20 59 kg (130 lb)              Reviewed and updated as needed this visit by clinical staff  Tobacco  Allergies  Meds   Med Hx  Surg Hx  Fam Hx  Soc Hx      Reviewed and updated as needed this visit by Provider                 ROS:  As above     OBJECTIVE:     /76 (BP Location: Left arm, Patient Position: Sitting, Cuff Size: Adult Regular)   Pulse 83   Temp 98.3  F (36.8  C) (Tympanic)   Resp 16   Wt 58.5 kg (129 lb)   LMP 05/14/2008   SpO2 96%   BMI 22.85 kg/m    Body mass index is 22.85 kg/m .  GENERAL: healthy, alert and no distress  Elbow Musculoskeletal Exam    Inspection      Inspection - Right        Ecchymosis: none      Swelling: none      Deformity: none      Prior incision: none    Palpation      Palpation - Right        Crepitus (radiocapitellar): none        Crepitus (ulnohumeral): none      Tenderness: present        Lateral/extensor origin: mild    Range of Motion      Range of Motion - Right        Right elbow range of motion is normal.    Strength      Strength - Right        Right elbow strength is normal.    Neurovascular       "Neurovascular - Right        Right elbow nerve sensation is normal.      Diagnostic Test Results:  TSH   Date Value Ref Range Status   06/01/2020 0.01 (L) 0.40 - 4.00 mU/L Final         ASSESSMENT/PLAN:        1. Right elbow pain  Recommended PT and gave instructions for care, see below   Recommended RICE when symptoms flare  She will schedule PT  Recommended trial of tylenol 1000mg tid and use ibuprofen if pain not adequately controlled on tylenol.     2. Hypothyroidism, unspecified type  Did not take levothyroxine for a few days, but prior to that had been consistent for the past two months, so will recheck tsh today.   Has had difficulty remembering to take her levothyroxine consistently in the past. She is due for a recheck today as most recent TSH was low.   Will continue levothyroxine 112mcg daily until results are known then will adjust as indicated.     3. Insomnia, unspecified type  Recommended sleep therapy. Will help her schedule with Shaan Vergara for CBT-I  She continues trazodone 50-100mg at bedtime for insomnia.     From clinic visit on 1/13/2020:   \"I recommended increasing her trazodone to 100 mg nightly if she would like.  Discussed that tizanidine is not prescribed for insomnia, though her friend had taken it for insomnia.  Discussed that it is sedating, but is used as a muscle relaxant.  She is not interested in scheduling with the sleep therapist.  She had been taking melatonin in the past, but was instructed to stop as she was taking much more than the recommended amount (and did not find it helpful) \"    10/17/2018:  She did not find the trazodone effective.  In the past, she found hydralazine helpful.  We will stop the trazodone for now and she will start hydralazine at bedtime.  I discussed importance of sleep hygiene with her and reviewed instructions for sleep hygiene.  In addition I recommended scheduling with a sleep therapist.  I would be very cautious about giving her other sedatives " like Ambien or any benzos due to her history of taking medications in ways other than instructed.  I reiterated the importance of taking medications as instructed and not taking more than prescribed dose or combining medications unless specifically instructed.    4. History of colonic polyps  She is overdue for colonoscopy, recommend scheduling.      Patient Instructions   You need a colonoscopy:  This is a lifesaving screening test - please call to set up an appointment today.  [phone] ([facility])  or   [phone] ([address])    Schedule physical therapy for your elbow. When elbow pain increases I recommend resting, icing and elevating your elbow. You may also try an over-the-counter elbow brace to help with the pain.       Patient Education     Tennis Elbow  Muscles connect to bones by thick, fibrous cords (tendons). When the muscles are overused by repeated motion, the tendons may become inflamed and painful. This condition is called tendonitis.   Tennis elbow (lateral epicondylitis) is a form of tendonitis. It occurs when the forearm muscles are used again and again in a twisting motion. Pain from tennis elbow occurs mainly on the outside of the elbow. But the pain can spread into the forearm and wrist. Your elbow may also be swollen and tender to the touch.   The pain may get worse when you move your arm or do certain activities. Bending your wrist back, shaking hands, or turning a doorknob may cause pain. The pain often gets worse after several weeks or months. Sometimes you may feel pain when your arm is still.   Tennis players who use a backhand stroke with poor technique are more likely to get tennis elbow. But playing tennis is only one cause of tennis elbow. Other common activities that can cause it include:     Hammering    Painting    Raking  Besides tennis players, people at risk include , gardeners, musicians, and dentists. Sometimes people get tennis elbow without  doing anything that would cause the injury.   Treatment includes resting the arm and taking anti-inflammatory medicines. Special splints can help ease symptoms. Symptoms should get better after 4 to 6 weeks of rest. You may need steroid injections if resting and using a splint don t help. After the pain is relieved, you should change your activities so the symptoms don t return. You may need physical therapy. It may include stretching, range-of-motion, and strengthening exercises. These treatments help most cases. You may need surgery if your symptoms continue for 6 months despite treatment.   Home care  Follow these guidelines when caring for yourself at home:    Rest your elbow as needed. Protect it from movement that causes pain. You may be told to use a forearm splint at night to ease symptoms in the morning. Your healthcare provider may recommend a special wrap or splint to compress the muscles of the forearm. This can ease pain during daytime activities. As your symptoms get better, start to move your elbow more.    Put an ice pack on the injured area. Do this for 20 minutes every 1 to 2 hours the first day for pain relief. You can make an ice pack by wrapping a plastic bag of ice cubes in a thin towel. Continue using the ice pack 3 to 4 times a day for the next several days. Then use the ice pack as needed to ease pain and swelling.    You may use acetaminophen or ibuprofen to control pain, unless another pain medicine was prescribed. If you have chronic liver or kidney disease, talk with your healthcare provider before using these medicines. Also talk with your provider if you ve had a stomach ulcer or gastrointestinal bleeding.    After your elbow heals, avoid the motion that caused your pain. Or learn to move in a way that causes less stress on the tendon. Using a forearm wrap may keep tennis elbow from happening again.    A tennis elbow strap may ease pain and keep you from further injury when you start  playing tennis again. You can also lower your risk for injury by warming up before you play and cooling down afterward. You should also use the right equipment. For instance, make sure your racquet has the right  and is the right size for you.  Follow-up care  Follow up with your healthcare provider as advised, if your symptoms don t get better after 2 to 3 weeks of treatment.   When to seek medical advice  Call your healthcare provider right away if any of these occur:    Redness over the painful area    Pain, stiffness,  or swelling at the elbow gets worse    Any numbness or tingling in your arm, hands, or fingers    Unexplained fever over 100.4 F (38 C)    [employer] last reviewed this educational content on 12/1/2019 2000-2020 [employer]. [address] [place] [address], [place] [address]. All rights reserved. This information is not intended as a substitute for professional medical care. Always follow your healthcare professional's instructions.               Alyse Mcclendon M.D.        [facility]

## 2020-11-11 NOTE — PATIENT INSTRUCTIONS
You need a colonoscopy:  This is a lifesaving screening test - please call to set up an appointment today.  [phone] ([facility])  or   [phone] ([address])    Schedule physical therapy for your elbow. When elbow pain increases I recommend resting, icing and elevating your elbow. You may also try an over-the-counter elbow brace to help with the pain.       Patient Education     Tennis Elbow  Muscles connect to bones by thick, fibrous cords (tendons). When the muscles are overused by repeated motion, the tendons may become inflamed and painful. This condition is called tendonitis.   Tennis elbow (lateral epicondylitis) is a form of tendonitis. It occurs when the forearm muscles are used again and again in a twisting motion. Pain from tennis elbow occurs mainly on the outside of the elbow. But the pain can spread into the forearm and wrist. Your elbow may also be swollen and tender to the touch.   The pain may get worse when you move your arm or do certain activities. Bending your wrist back, shaking hands, or turning a doorknob may cause pain. The pain often gets worse after several weeks or months. Sometimes you may feel pain when your arm is still.   Tennis players who use a backhand stroke with poor technique are more likely to get tennis elbow. But playing tennis is only one cause of tennis elbow. Other common activities that can cause it include:     Hammering    Painting    Raking  Besides tennis players, people at risk include , gardeners, musicians, and dentists. Sometimes people get tennis elbow without doing anything that would cause the injury.   Treatment includes resting the arm and taking anti-inflammatory medicines. Special splints can help ease symptoms. Symptoms should get better after 4 to 6 weeks of rest. You may need steroid injections if resting and using a splint don t help. After the pain is relieved, you should change your activities so the symptoms don t  return. You may need physical therapy. It may include stretching, range-of-motion, and strengthening exercises. These treatments help most cases. You may need surgery if your symptoms continue for 6 months despite treatment.   Home care  Follow these guidelines when caring for yourself at home:    Rest your elbow as needed. Protect it from movement that causes pain. You may be told to use a forearm splint at night to ease symptoms in the morning. Your healthcare provider may recommend a special wrap or splint to compress the muscles of the forearm. This can ease pain during daytime activities. As your symptoms get better, start to move your elbow more.    Put an ice pack on the injured area. Do this for 20 minutes every 1 to 2 hours the first day for pain relief. You can make an ice pack by wrapping a plastic bag of ice cubes in a thin towel. Continue using the ice pack 3 to 4 times a day for the next several days. Then use the ice pack as needed to ease pain and swelling.    You may use acetaminophen or ibuprofen to control pain, unless another pain medicine was prescribed. If you have chronic liver or kidney disease, talk with your healthcare provider before using these medicines. Also talk with your provider if you ve had a stomach ulcer or gastrointestinal bleeding.    After your elbow heals, avoid the motion that caused your pain. Or learn to move in a way that causes less stress on the tendon. Using a forearm wrap may keep tennis elbow from happening again.    A tennis elbow strap may ease pain and keep you from further injury when you start playing tennis again. You can also lower your risk for injury by warming up before you play and cooling down afterward. You should also use the right equipment. For instance, make sure your racquet has the right  and is the right size for you.  Follow-up care  Follow up with your healthcare provider as advised, if your symptoms don t get better after 2 to 3 weeks of  treatment.   When to seek medical advice  Call your healthcare provider right away if any of these occur:    Redness over the painful area    Pain, stiffness,  or swelling at the elbow gets worse    Any numbness or tingling in your arm, hands, or fingers    Unexplained fever over 100.4 F (38 C)    [employer] last reviewed this educational content on 12/1/2019 2000-2020 [employer]. [address] [place] [address]. All rights reserved. This information is not intended as a substitute for professional medical care. Always follow your healthcare professional's instructions.

## 2020-11-18 ENCOUNTER — HOSPITAL ENCOUNTER (OUTPATIENT)
Facility: CLINIC | Age: 55
End: 2020-11-18
Attending: SURGERY | Admitting: SURGERY
Payer: COMMERCIAL

## 2020-11-18 NOTE — PROGRESS NOTES
[facility] Initial Evaluation    Subjective:  Eunice Malloy is a 55 year old female with a right elbow condition. Symptoms commenced as a result of: ***. Condition occurred in the following environment: ***. Onset of symptoms: October 2020. Location of symptoms: ***. Pain level on number scale: ***/10. Quality of pain: sharp, stabbing. Associated symptoms: ***. Pain frequency (constant/intermittent): ***. Symptoms are exacerbated by: ***. Symptoms are relieved by: rest/inactivity and massage. Progression of symptoms since onset (same/better/worse): worse. Special tests (x-ray, MRI, CT scan, EMG, bone scan): ***. Previous treatment: ***. Improvement with previous treatment: ***. General health as reported by patient is ***. Pertinent medical history includes: See Epic. Medical allergies: see Epic. Other pertinent surgeries: see Epic. Current medications: See Epic. Occupation: ***. Patient is (working in normal job without restrictions/working in normal job with restrictions/working in an alternate job/not working due to present treatment problem): ***. Primary job tasks: ***. Barriers at home/work: None reported by patient. Red flags: None reported by patient.    Objective  Posture    Forward Head    Rounded Shoulders    Scapular Winging      Elbow/Wrist AROM Right Left   FL     EXT     SUPINATION     PRONATION     Wrist EXT     Wrist FL     Radial Deviation     Ulnar Deviation       Elbow/Wrist PROM Right Left   FL     EXT     SUPINATION     PRONATION     Wrist EXT     Wrist FL     Radial Deviation     Ulnar Deviation       Elbow/Wrist Strength Right Left   FL ***/5 ***/5   EXT ***/5 ***/5   Supination ***/5 ***/5   Pronation ***/5 ***/5   Wrist EXT ***/5 ***/5   Wrist FL ***/5 ***/5   Radial Deviation ***/5 ***/5   Ulnar Deviation ***/5 ***/5     Special Tests Right Left   Lateral epicondylitis     Medial epicondylitis     Tinel s     Pronator Teres     Valgus Ext     Phalen s       Elbow  Ligament Testing Right Left   Valgus at 0     Valgus at 30     Holden's Milking       Assessment/Plan:    Patient is a 55 year old female with right side elbow complaints.    Patient has the following significant findings with corresponding treatment plan.                Diagnosis 1:  Right elbow pain  {:015140}    Therapy Evaluation Codes:   1) History comprised of:   Personal factors that impact the plan of care:      {Personal factors impacting care:116443}.    Comorbidity factors that impact the plan of care are:      {Comorbidities impacting care:808653}.     Medications impacting care: {Medications Impacting care:632335}.  2) Examination of Body Systems comprised of:   Body structures and functions that impact the plan of care:      {Body Structures and functions:818595}.   Activity limitations that impact the plan of care are:      {Activity/participation limitations or restrictions:438710}.  3) Clinical presentation characteristics are:   {Clinical presentation characteristics:238181}.  4) Decision-Making    {Decision Making Low/Moderate/High:377362}  Cumulative Therapy Evaluation is: {Low/Moderate/High/Re-evaluation complexity:427377}.    Previous and current functional limitations:  (See Goal Flow Sheet for this information)    Short term and Long term goals: (See Goal Flow Sheet for this information)     Communication ability:  Patient appears to be able to clearly communicate and understand verbal and written communication and follow directions correctly.  Treatment Explanation - The following has been discussed with the patient:   RX ordered/plan of care  Anticipated outcomes  Possible risks and side effects  This patient would benefit from PT intervention to resume normal activities.   Rehab potential is good.    Frequency:  1 X week, once daily  Duration:  for 4 weeks tapering to 2 X a month over 1 month  Discharge Plan:  Achieve all LTG.  Independent in home treatment program.  Reach maximal  therapeutic benefit.    Please refer to the daily flowsheet for treatment today, total treatment time and time spent performing 1:1 timed codes.

## 2020-11-19 ENCOUNTER — THERAPY VISIT (OUTPATIENT)
Dept: PHYSICAL THERAPY | Facility: CLINIC | Age: 55
End: 2020-11-19
Attending: FAMILY MEDICINE
Payer: COMMERCIAL

## 2020-11-19 DIAGNOSIS — M77.11 RIGHT TENNIS ELBOW: ICD-10-CM

## 2020-11-19 DIAGNOSIS — M25.521 RIGHT ELBOW PAIN: ICD-10-CM

## 2020-11-19 PROCEDURE — 97110 THERAPEUTIC EXERCISES: CPT | Mod: GP | Performed by: PHYSICAL THERAPIST

## 2020-11-19 PROCEDURE — 97161 PT EVAL LOW COMPLEX 20 MIN: CPT | Mod: GP | Performed by: PHYSICAL THERAPIST

## 2020-11-19 NOTE — PROGRESS NOTES
[facility] Initial Evaluation     Present: no    Subjective:  Eunice Malloy is a 55 year old female with a right elbow condition. Pt reports that she's been having pain for a few months in the right elbow without known cause. At one point she bumped into the elbow with pain. Pain is primarily over the lateral ankle and is worse with grasping and typing. Denies vague symptoms. Given a tennis elbow brace yesterday which she will try to wear over the next week.      Symptoms commenced as a result of: unsure. Condition occurred in the following environment: unsure. Onset of symptoms: a few months ago. Location of symptoms: lateral elbow and at times under the posterior elbow at well. Pain level on number scale: 5/10. Quality of pain: soreness, shooting. Associated symptoms: none. Pain frequency (constant/intermittent): constant. Symptoms are exacerbated by: reaching, lifting, grasping, certain motions. Symptoms are relieved by: peppermint oil. Progression of symptoms since onset (same/better/worse): same. Special tests (x-ray, MRI, CT scan, EMG, bone scan): none. Previous treatment: none. Improvement with previous treatment: none. General health as reported by patient is excellent. Pertinent medical history includes: See Epic. Medical allergies: see Epic. Other pertinent surgeries: see Epic. Current medications: See Epic. 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 Patient is (working in normal job without restrictions/working in normal job with restrictions/working in an alternate job/not working due to present treatment problem): normal. Primary job tasks: driving, lifting carrying. Barriers at home/work: None reported by patient. Red flags: None reported by patient.    Objective  Posture: forward head, rounded shoulders    Scapular positioning: NT    Screening: negative shoulder, negative cervical    Flexibility: wrist extensor limited and painful  AROM: R shoulder and cervical normal and pain free,  pain with active wrist extesion and passive wrist flexion      Shoulder Strength (* = pain) Right Left   FL 5/5 5/5   ABD 5/5 5/5   ER 5/5 5/5   IR 5/5 5/5   Biceps 5/5 5/5   Middle Trapezius NT/5 NT/5   Lower Trapezius NT/5 NT/5       Special Tests: lateral epicondyle test + right, Cozen's + right    Palpation tenderness: lateral epicondyle tender right, wrist extensors tender proximally    Accessory Motion: NT    Other Tests: none    Assessment/Plan:    Patient is a 55 year old female with right side elbow complaints.    Patient has the following significant findings with corresponding treatment plan.                Diagnosis 1:  Right Elbow pain, lateral epicondylitis  Pain -  manual therapy, self management, education and home program  Decreased ROM/flexibility - manual therapy and therapeutic exercise  Decreased joint mobility - manual therapy and therapeutic exercise  Decreased strength - therapeutic exercise and therapeutic activities  Impaired muscle performance - neuro re-education  Decreased function - therapeutic activities    Therapy Evaluation Codes:   1) History comprised of:   Personal factors that impact the plan of care:      Past/current experiences and Time since onset of symptoms.    Comorbidity factors that impact the plan of care are:      Depression, Pain at night/rest and Sleep disorder/apnea.     Medications impacting care: Anti-depressant.  2) Examination of Body Systems comprised of:   Body structures and functions that impact the plan of care:      Elbow and Wrist.   Activity limitations that impact the plan of care are:      Bathing, Cooking, Driving, Dressing, Grasping, Lifting, Reading/Computer work, Sitting, Sports and Sleeping.  3) Clinical presentation characteristics are:   Stable/Uncomplicated.  4) Decision-Making    Low complexity using standardized patient assessment instrument and/or measureable assessment of functional outcome.  Cumulative Therapy Evaluation is: Low  complexity.    Previous and current functional limitations:  (See Goal Flow Sheet for this information)    Short term and Long term goals: (See Goal Flow Sheet for this information)     Communication ability:  Patient appears to be able to clearly communicate and understand verbal and written communication and follow directions correctly.  Treatment Explanation - The following has been discussed with the patient:   RX ordered/plan of care  Anticipated outcomes  Possible risks and side effects  This patient would benefit from PT intervention to resume normal activities.   Rehab potential is good.    Frequency:  1 X every other week, once daily  Duration:  for 8 weeks  Discharge Plan:  Achieve all LTG.  Independent in home treatment program.  Reach maximal therapeutic benefit.    Please refer to the daily flowsheet for treatment today, total treatment time and time spent performing 1:1 timed codes.     Please Contact me with any questions or concerns. Thank you for for patience and cooperation.     Fam Roach PT, DPT, [facility]  Physical Therapist  [facility]  [phone]

## 2020-11-26 DIAGNOSIS — Z11.59 ENCOUNTER FOR SCREENING FOR OTHER VIRAL DISEASES: Primary | ICD-10-CM

## 2020-12-07 DIAGNOSIS — E03.9 HYPOTHYROIDISM, UNSPECIFIED TYPE: ICD-10-CM

## 2020-12-08 RX ORDER — LEVOTHYROXINE SODIUM 112 UG/1
TABLET ORAL
Qty: 30 TABLET | Refills: 10 | Status: SHIPPED | OUTPATIENT
Start: 2020-12-08 | End: 2021-09-28

## 2021-01-06 PROBLEM — M77.11 RIGHT TENNIS ELBOW: Status: RESOLVED | Noted: 2020-11-19 | Resolved: 2021-01-06

## 2021-01-06 PROBLEM — M25.521 RIGHT ELBOW PAIN: Status: RESOLVED | Noted: 2020-11-19 | Resolved: 2021-01-06

## 2021-01-06 NOTE — PROGRESS NOTES
Patient seen for one time evaluation and treatment.  Patient did not return for further treatment and current status is unknown.  Please see initial evaluation for further information.    Please Contact me with any questions or concerns. Thank you for for patience and cooperation.     Fam Roach PT, DPT, [facility]  Physical Therapist  [place] [place]  [phone]

## 2021-03-15 ENCOUNTER — TELEPHONE (OUTPATIENT)
Dept: FAMILY MEDICINE | Facility: CLINIC | Age: 56
End: 2021-03-15

## 2021-03-15 DIAGNOSIS — B00.1 COLD SORE: ICD-10-CM

## 2021-03-15 RX ORDER — PENCICLOVIR 10 MG/G
CREAM TOPICAL
Refills: 11 | Status: CANCELLED | OUTPATIENT
Start: 2021-03-15

## 2021-03-15 NOTE — TELEPHONE ENCOUNTER
Reason for Call:  Medication or medication refill:    Do you use a [facility]?  Name of the pharmacy and phone number for the current request:     [facility] [address]    Name of the medication requested: acyclovir ointment    Other request:     Can we leave a detailed message on this number? YES    Phone number patient can be reached at: Home number on file [phone] (home)    Best Time: Any    Call taken on 3/15/2021 at 2:02 PM by Beatrice Aguilar    Thank You,    Beatrice BARR    [facility]  [phone] or [phone]

## 2021-03-15 NOTE — TELEPHONE ENCOUNTER
Dr Mcclendon,    Pt asking for acyclovir ointment.    She  has been prescribed acylovir ointment in the past but the system would not allow me to pull it up but gave insurance preferred alternative and did not allow me to go past this window.    Please order if ok or advise.    Desire Banks RN, BSN  [facility]

## 2021-03-16 RX ORDER — ACYCLOVIR 50 MG/G
CREAM TOPICAL
Qty: 5 G | Refills: 11 | Status: SHIPPED | OUTPATIENT
Start: 2021-03-16 | End: 2023-05-10

## 2021-03-17 ENCOUNTER — TELEPHONE (OUTPATIENT)
Dept: FAMILY MEDICINE | Facility: CLINIC | Age: 56
End: 2021-03-17

## 2021-03-17 NOTE — TELEPHONE ENCOUNTER
"PA started for acyclovir (ZOVIRAX) 5 % external cream.  To submit the PA:  1. Go to [email or web address] and click Enter a key  2. Enter the pt's last name and date of birth and the key   Patient last name:Gama   :[birth year]   Key:[id number]  3. Complete the form and click \"Send to Plan\"    "

## 2021-03-17 NOTE — TELEPHONE ENCOUNTER
Central Prior Authorization Team   Phone: [phone]      PA Initiation    Medication: acyclovir (ZOVIRAX) 5 % external cream, rec 3-16-21  Insurance Company: [employer] - Phone [phone] Fax [phone]  Pharmacy Filling the Rx: [id number] [place] [id number] [facility] [address]  Filling Pharmacy Phone: [phone]  Filling Pharmacy Fax:    Start Date: 3/17/2021

## 2021-03-18 NOTE — TELEPHONE ENCOUNTER
Prior Authorization Approval    Authorization Effective Date: 2/15/2021  Authorization Expiration Date: 3/18/2022  Medication: acyclovir (ZOVIRAX) 5 % external cream, rec 3-16-21  Approved Dose/Quantity:    Reference #:     Insurance Company: [employer] - Phone [phone] Fax [phone]  Expected CoPay:       CoPay Card Available:      Foundation Assistance Needed:    Which Pharmacy is filling the prescription (Not needed for infusion/clinic administered): [id number] [place] [address] AT [facility] [address]  Pharmacy Notified: Yes  Patient Notified: Yes  **Instructed pharmacy to notify patient when script is ready to /ship.**

## 2021-03-30 ENCOUNTER — TELEPHONE (OUTPATIENT)
Dept: GASTROENTEROLOGY | Facility: CLINIC | Age: 56
End: 2021-03-30

## 2021-03-30 ENCOUNTER — HOSPITAL ENCOUNTER (OUTPATIENT)
Dept: MAMMOGRAPHY | Facility: CLINIC | Age: 56
Discharge: HOME | End: 2021-03-30
Attending: FAMILY MEDICINE | Admitting: FAMILY MEDICINE
Payer: COMMERCIAL

## 2021-03-30 DIAGNOSIS — Z12.31 VISIT FOR SCREENING MAMMOGRAM: ICD-10-CM

## 2021-03-30 PROCEDURE — 77063 BREAST TOMOSYNTHESIS BI: CPT

## 2021-03-30 NOTE — TELEPHONE ENCOUNTER
Patient is scheduled for COLONOSCOPY with Dr. BARROS    Spoke with: EDGARD    Date of Procedure: 05/21/2021    Location: [facility]    Sedation Type CS    Conscious Sedation- Needs  for 6 hours after the procedure  MAC/General-Needs  for 24 hours after procedure    Pre-op for Unit [address] MAC and OR NOT NEEDED    (if yes advise patient they will need a pre-op prior to procedure)      Is patient on blood thinners? -NO (If yes- inform patient to follow up with PCP or provider for follow up instructions)     Informed patient they will need an adult  YES  Cannot take any type of public or medical transportation alone    Informed Patient of COVID Test Requirement YES    Preferred Pharmacy for Pre Prescription NA    Confirmed Nurse will call to complete assessment YES    Additional comments: NA

## 2021-05-09 DIAGNOSIS — Z11.59 ENCOUNTER FOR SCREENING FOR OTHER VIRAL DISEASES: ICD-10-CM

## 2021-05-13 ENCOUNTER — TELEPHONE (OUTPATIENT)
Dept: GASTROENTEROLOGY | Facility: CLINIC | Age: 56
End: 2021-05-13

## 2021-05-13 DIAGNOSIS — Z12.11 ENCOUNTER FOR SCREENING COLONOSCOPY: Primary | ICD-10-CM

## 2021-05-13 RX ORDER — BISACODYL 5 MG
TABLET, DELAYED RELEASE (ENTERIC COATED) ORAL
Qty: 4 TABLET | Refills: 0 | Status: SHIPPED | OUTPATIENT
Start: 2021-05-13 | End: 2023-05-10

## 2021-05-13 NOTE — TELEPHONE ENCOUNTER
Writer reviewed pre-assessment questions with patient prior to upcoming colonoscopy on 5.21.2021.  COVID test scheduled for 5.17.21.    Reviewed golytely prep instructions with patient.  Noting no nuts, seeds, or popcorn 7 days prior to procedure. Pt had golytely with her last colonoscopy.  Prep instructions sent via Letter.    Pt requests golytely prep instructions via unencrypted e-mail. Pt acknowledges that they have agreed to accept the risks and receive unencrypted communications for this incidence.     Designated  policy reviewed with patient.     Patient verbalized understanding.  No further questions or concerns.    Hamida Russo RN

## 2021-05-13 NOTE — LETTER
May 13, 2021      Eunice Malloy  [address]  [facility] [address]              Dear Eunice,    Colonoscopy  Your exam is on 5.21.2021  Arrival Time: 0745  [facility] [address], [place] [address]    Please arrive with an adult who can drive you home after the exam and stay with you for the next 24 hours, unless your provider says otherwise.   The medicines used in the exam will make you sleepy. You will not be able to drive. You cannot take a medical cab, taxi, Uber, train or bus by yourself.                              Please ensure your COVID test is scheduled within 96 hours or 4 days of your procedure. If you have not been contacted to schedule please call [phone].    For questions or appointments, call:  [facility]: [phone]  Monday through Friday, 7 a.m. to 4:30 p.m.  (If it is after hours, call [phone]. Ask for the GI fellow resident on call.)        Split-Dose Golytely (Colyte, Nulytely)  Prep Instructions for your Colonoscopy    Please read these instructions carefully at least 7 days before your colonoscopy.    You must flush the bowel totally of waste so that the doctor can have a clear view and do a thorough exam.  Without your efforts, we may have to repeat the exam.      Getting ready    Fill the prescription as soon as possible to be sure it's in stock.    You must arrange for an adult to drive you home after your exam. Your colonoscopy cannot be done unless you have a ride. If you need to use public transportation, someone must ride with you.    Check with your insurance company to be sure they will cover this exam.    7 days before the exam (Date):  5.14.2021  Talk to your doctor:  If you take blood-thinners (such as Coumadin, Plavix, Xarelto), your prescription or schedule may need to change before the test.    Stop taking fiber supplements, multi-vitamins with iron, and medicines  that contain iron.    Continue taking prescribed aspirin; talk to your prescribing doctor with any concerns.    Stop eating nuts and seeds.  These can stay in the colon for days.    If you have diabetes:  Ask to have your exam early in the morning.  Also, ask your doctor if you should change your diet or medicines.    2 days before the exam (Date):  5.19.2021      Begin a low-fiber diet: No raw fruits or vegetables, whole wheat, seeds, nuts, popcorn or other high-fiber foods (see list on page). No binding agents: (bran, Metamucil, Fibercon) and no Olestra (a fat substitute).    Drink at least 4 to 6 large glasses of sports drink: (not red  or purple) each day.    At bedtime, take 2 Dulcolax  (bisacodyl) tablets.    One day before the exam (Date):  5.20.21    Follow the low-fiber diet until 1 p.m. After that, drink only clear liquids (see the list below.) Drink at least 8 to 10 full glasses of clear liquids during the day.    Fill the jug that contains the Golytely powder with warm water. Cover and shake until well mixed. Use a full gallon of water. Chill for 3 hours, but do not add ice.    At 3 p.m., take 2 tablets of Dulcolax (bisacodyl).    At 6 p.m. start drinking an 8-ounce glass every 15 minutes until the jug is half empty (about 8 glasses). Store the rest in the refrigerator    Stay near a toilet while using this medicine. Besides diarrhea (watery stools), you may have mild cramping, bloating and nausea.    Day of exam:  5.21.2021      6 hours before your exam check-in Drink the other half of the jug.  You should finish the prep 4 hours before the exam.    Do not chew or swallow anything including water or gum for at least 3 hours before your colonoscopy. This is a safety issue and we may need to cancel your exam if you do not observe this policy.    You may drink clear liquids until 3 hours before your exam.    If you must take medicine, you may take it with sips of water. Do not take diabetes medicine by  mouth until after your exam.    If you have asthma. Bring your inhaler with you.    Please arrive with an adult with you take you home after the test: The medicine used will make you sleepy. If you do not have someone to take you home, we may cancel your test.    Clear liquids  You may have:    Water, tea, coffee (no cream)    Soda pop, Gatorade (not red or purple)    Clear nutrition drinks (Enlive, Resource Breeze)    Jell-O, Popsicles (no milk or fruit pieces) or sorbet (not red or purple)    Fat-free soup broth or bouillon    Plain hard candy, such as clear life-savers (not red or purple)    Clear juices and fruit-flavored drinks such as apple juice, white grape juice, Hi-C and Huy-Aid (not red or purple)  Do not have:    Milk or milk products such as ice cream, malts or shakes    Red or purple drinks of any kind such as cranberry juice or grape juice. Avoid red or purple Jell-O, Popsicles, Huy-Aid, sorbet and candy.    Juices with pulp such as orange, grapefruit, pineapple or tomato juice    Cream soups of any kind    Alcohol    You may have:    White bread, rolls, biscuits, croissants, Cummings toast, white flour tortillas, waffles, pancakes, white Maori toast, white rice, noodles, pasta, macaroni, cooked and peeled potatoes, plain crackers, saltines, cooked farina or cream of rice, puffed rice, corn flakes, Rice Krispies, Special K.    Tender cooked and canned vegetables without seeds, carrots, asparagus tips, green beans, wax beans, spinach, vegetable broth. NO raw vegetables.    Strained fruit juice, canned fruit without skin or seeds (no pineapple), applesauce, pear sauce, ripe bananas, melons (no watermelon)    Milk (plain or flavored), cheese, cottage cheese, yogurt (no berries), custard, ice cream (no nuts)    Tender, well-cooked ground beef, lamb, veal, ham, pork, chicken, turkey, fish, or organ meats, eggs, creamy peanut butter    Margarine, butter, mayonnaise, sour cream, salad dressing, plain gravy,  spices, cooked herbs, bouillon, sugar, clear jelly, honey, syrup    Snacks, sweets, and drinks: Pretzels, hard candy, plain cakes and cookies (no nuts or seeds), Jell-O, plain pudding, sherbet, Popsicles, coffee, tea, soda, pop.    DO NOT HAVE:      Breads or rolls that contain nuts, seeds or fruit; whole wheat or whole grain breads that contain more than 1 gram of fiber per slice (check the nutrition facts label); cornbread; corn or whole wheat tortillas; potatoes with skin; brown rice, wild rice, kasha (buckwheat)    Any raw or steamed vegetables; vegetables with seeds; corn in any form    Prunes, prune juice, raisins and other dried fruits, berries and other fruits with seeds, canned pineapple; fresh or frozen fruits not listed above    Any yogurt with nuts, seeds or berries    Tough, fibrous meats with gristle; cooked dried beans, peas or lentils; crunchy peanut butter    Pickles, olives, relish, horseradish; jam, marmalade, preserves    Popcorn, nuts, seeds, granola, coconut, candies made with nuts or seeds; all desserts that contain nuts, seeds, raisins and other dried fruits, coconut, whole grains or bran.

## 2021-05-17 DIAGNOSIS — Z11.59 ENCOUNTER FOR SCREENING FOR OTHER VIRAL DISEASES: ICD-10-CM

## 2021-05-17 LAB
SARS-COV-2 RNA RESP QL NAA+PROBE: NORMAL
SPECIMEN SOURCE: NORMAL

## 2021-05-17 PROCEDURE — U0003 INFECTIOUS AGENT DETECTION BY NUCLEIC ACID (DNA OR RNA); SEVERE ACUTE RESPIRATORY SYNDROME CORONAVIRUS 2 (SARS-COV-2) (CORONAVIRUS DISEASE [COVID-19]), AMPLIFIED PROBE TECHNIQUE, MAKING USE OF HIGH THROUGHPUT TECHNOLOGIES AS DESCRIBED BY CMS-2020-01-R: HCPCS | Mod: 90 | Performed by: PATHOLOGY

## 2021-05-17 PROCEDURE — U0005 INFEC AGEN DETEC AMPLI PROBE: HCPCS | Mod: 90 | Performed by: PATHOLOGY

## 2021-05-17 PROCEDURE — 99000 SPECIMEN HANDLING OFFICE-LAB: CPT | Performed by: PATHOLOGY

## 2021-05-18 LAB
LABORATORY COMMENT REPORT: NORMAL
SARS-COV-2 RNA RESP QL NAA+PROBE: NEGATIVE
SPECIMEN SOURCE: NORMAL

## 2021-05-21 ENCOUNTER — HOSPITAL ENCOUNTER (OUTPATIENT)
Facility: AMBULATORY SURGERY CENTER | Age: 56
Discharge: HOME | End: 2021-05-21
Attending: INTERNAL MEDICINE | Admitting: INTERNAL MEDICINE
Payer: COMMERCIAL

## 2021-05-21 VITALS
SYSTOLIC BLOOD PRESSURE: 105 MMHG | OXYGEN SATURATION: 95 % | RESPIRATION RATE: 16 BRPM | HEIGHT: 63 IN | WEIGHT: 127 LBS | BODY MASS INDEX: 22.5 KG/M2 | TEMPERATURE: 97.4 F | HEART RATE: 55 BPM | DIASTOLIC BLOOD PRESSURE: 67 MMHG

## 2021-05-21 LAB — COLONOSCOPY: NORMAL

## 2021-05-21 PROCEDURE — 45378 DIAGNOSTIC COLONOSCOPY: CPT

## 2021-05-21 RX ORDER — SIMETHICONE
LIQUID (ML) MISCELLANEOUS PRN
Status: DISCONTINUED | OUTPATIENT
Start: 2021-05-21 | End: 2021-05-21 | Stop reason: HOSPADM

## 2021-05-21 RX ORDER — ONDANSETRON 2 MG/ML
4 INJECTION INTRAMUSCULAR; INTRAVENOUS
Status: DISCONTINUED | OUTPATIENT
Start: 2021-05-21 | End: 2021-05-22 | Stop reason: HOSPADM

## 2021-05-21 RX ORDER — LIDOCAINE 40 MG/G
CREAM TOPICAL
Status: DISCONTINUED | OUTPATIENT
Start: 2021-05-21 | End: 2021-05-22 | Stop reason: HOSPADM

## 2021-05-21 RX ORDER — FENTANYL CITRATE 50 UG/ML
INJECTION, SOLUTION INTRAMUSCULAR; INTRAVENOUS PRN
Status: DISCONTINUED | OUTPATIENT
Start: 2021-05-21 | End: 2021-05-21 | Stop reason: HOSPADM

## 2021-05-21 ASSESSMENT — MIFFLIN-ST. JEOR: SCORE: 1135.2

## 2021-05-21 NOTE — OR NURSING
Dismissal process was explained to patient, When nurse went to check on patient she had left without an escort. Nurse attempted to contact her responsible adult and it went to voicemail.

## 2021-05-27 ENCOUNTER — IMMUNIZATION (OUTPATIENT)
Dept: NURSING | Facility: CLINIC | Age: 56
End: 2021-05-27
Payer: COMMERCIAL

## 2021-05-27 PROCEDURE — 0001A PR COVID VAC PFIZER DIL RECON 30 MCG/0.3 ML IM: CPT

## 2021-05-27 PROCEDURE — 91300 PR COVID VAC PFIZER DIL RECON 30 MCG/0.3 ML IM: CPT

## 2021-06-17 ENCOUNTER — IMMUNIZATION (OUTPATIENT)
Dept: NURSING | Facility: CLINIC | Age: 56
End: 2021-06-17
Attending: INTERNAL MEDICINE
Payer: COMMERCIAL

## 2021-06-17 PROCEDURE — 91300 PR COVID VAC PFIZER DIL RECON 30 MCG/0.3 ML IM: CPT

## 2021-06-17 PROCEDURE — 0002A PR COVID VAC PFIZER DIL RECON 30 MCG/0.3 ML IM: CPT

## 2021-06-24 ENCOUNTER — TELEPHONE (OUTPATIENT)
Dept: FAMILY MEDICINE | Facility: CLINIC | Age: 56
End: 2021-06-24

## 2021-06-24 NOTE — TELEPHONE ENCOUNTER
Writer relayed message to patient. Patient in agreement to get scheduled with Jeff Vergara. Sahra from reception will call patient and get scheduled now.    Patient in agreement with plan and verbalizes understanding.   Thanks!   Elysia Calderón RN

## 2021-06-24 NOTE — TELEPHONE ENCOUNTER
She has not been specifically diagnosed or treated for depression. She is on trazodone, which is prescribed for insomnia. She can schedule with Shaan Vergara for diagnostic assessment and possible letter (for her employer?) if she would like. Please facilitate scheduling.     Alyse Mcclendon M.D.

## 2021-06-24 NOTE — TELEPHONE ENCOUNTER
Patient is asking for a letter stating she has depression and is on antidepressants.  Needs letter either emailed or faxed to [place] at:  [email or web address]  Fax [phone]  Alternately, can e-mail copy to patient at [email or web address]  Please let patient know when available and sent to marsh[employer].    Letter pended.  Please edit and sign if you agree.  Sapna Posada RN  [facility]

## 2021-06-24 NOTE — LETTER
[place] [id number] [place] [address]  [phone]          June 24, 2021    Re: Eunice Malloy                                                                                                                     [address] [facility] [address]            To whom it may concern,    Please note that the above named patient is under my care and currently has a diagnosis of depression.  Her depression is being treated with antidepressant medication.          Sincerely,         Alyse Mcclendon MD/tb

## 2021-07-14 ENCOUNTER — TELEPHONE (OUTPATIENT)
Dept: BEHAVIORAL HEALTH | Facility: CLINIC | Age: 56
End: 2021-07-14

## 2021-07-14 NOTE — TELEPHONE ENCOUNTER
Patient had a 11 AM telemedicine session.  [facility] attempted to contact patient and left a voicemail apologizing for 10-minute delay.  Provided contact number of [facility] and of central scheduling to reschedule the appointment.

## 2021-07-19 ENCOUNTER — OFFICE VISIT (OUTPATIENT)
Dept: FAMILY MEDICINE | Facility: CLINIC | Age: 56
End: 2021-07-19
Payer: COMMERCIAL

## 2021-07-19 VITALS
OXYGEN SATURATION: 98 % | DIASTOLIC BLOOD PRESSURE: 77 MMHG | HEART RATE: 59 BPM | WEIGHT: 126 LBS | SYSTOLIC BLOOD PRESSURE: 127 MMHG | BODY MASS INDEX: 22.32 KG/M2 | TEMPERATURE: 98.1 F

## 2021-07-19 DIAGNOSIS — M79.18 MYOFASCIAL PAIN ON RIGHT SIDE: ICD-10-CM

## 2021-07-19 DIAGNOSIS — M54.50 BILATERAL LOW BACK PAIN WITHOUT SCIATICA, UNSPECIFIED CHRONICITY: Primary | ICD-10-CM

## 2021-07-19 PROCEDURE — 99214 OFFICE O/P EST MOD 30 MIN: CPT | Performed by: FAMILY MEDICINE

## 2021-07-19 RX ORDER — CYCLOBENZAPRINE HCL 10 MG
5-10 TABLET ORAL 3 TIMES DAILY PRN
Qty: 30 TABLET | Refills: 0 | Status: SHIPPED | OUTPATIENT
Start: 2021-07-19 | End: 2023-05-10

## 2021-07-19 RX ORDER — ACETAMINOPHEN 500 MG
500-1000 TABLET ORAL EVERY 6 HOURS PRN
Qty: 60 TABLET | Refills: 0 | Status: SHIPPED | OUTPATIENT
Start: 2021-07-19 | End: 2023-05-10

## 2021-07-19 NOTE — PROGRESS NOTES
Assessment & Plan     Bilateral low back pain without sciatica, unspecified chronicity  Paresthesias up and down lower back without leg symptoms. She is adamant about having repeat MRI today. Order placed. I recommended scheduling again with sports medicine. She feels the lipomas in her lower back are the cause of some or all of her symptoms. She will be scheduling follow up visit with general surgery who previously offered to remove the lipomas. I reviewed previous MRI results with her today.   - MR Lumbar Spine w/o Contrast  - Orthopedic  Referral  - acetaminophen (TYLENOL) 500 MG tablet  Dispense: 60 tablet; Refill: 0    Myofascial pain on right side   refilled flexeril today   - cyclobenzaprine (FLEXERIL) 10 MG tablet  Dispense: 30 tablet; Refill: 0    Patient Instructions   Schedule with Dr. Sary Song with general surgery if you would like to have your lipoma removed.     Schedule MRI    Schedule with the back specialist.     Take cyclobenzaprine 5-10 mg up to three times daily as needed for pain. You can also continue to take ibuprofen 800mg three times a day with food or milk.   Start tylenol 500-1000mg up to four times a day     When You Have Low Back Pain    Caring for Your Back  You are not alone.    Low back pain is very common. Nearly half of all adults have low back pain in any given year. The good news is that back pain is rarely a danger to your health. Most people can manage their back pain on their own and about half of them start feeling better within 2 weeks. In 9 out of 10 cases, low back pain goes away or no longer limits daily activity within 6 weeks.     Your outlook is good!    Your symptoms tell us that your low back pain is most likely not a danger to you. Most of the time we do not know the exact cause of low back pain, even if you see a doctor or have an MRI. However, treatment can still work without knowing the cause of the pain. Less than 1 in 100 people need  surgery for their back pain.     What can I do about my low back pain?     There are three things you can do to ease low back pain and help it go away.    Use heat or cold packs.    Take medicine as directed.    Use positions, movements and exercises.     Using heat or cold packs    Try cold packs or gentle heat to ease your pain. Use whichever gives you the most relief. Apply the cold pack or heat for 15 minutes at a time, as often as needed.    Taking medicine      If your doctor has prescribed medicine, be sure to follow the directions.    If you take over-the-counter medicine, read and follow the directions.    Talk to your doctor if you have any questions.     Using positions, movements and exercises    Research tells us that moving your joints and muscles can help you recover from back pain. Such activity should be simple and gentle. Use the positions in the photos as well as walking to help relieve your pain. Try taking a short walk every 3 to 4 hours during the day. Walk for a few minutes inside your home or take longer walks outside, on a treadmill or at a mall. Slowly increase the amount of time you walk. Expect discomfort when you begin, but it should lessen as your back starts to heal. When your back feels better, walk daily to keep your back and body healthy.    Finding a comfortable position    When your back pain is new, certain positions will ease your pain. Gently try each of the positions below until you find one that is helpful. Once you find a position of comfort, use it as often as you like when you are resting. You will recover faster if you combine rest with activity.         Lie on your back with your legs bent. You can do this by placing a pillow under your knees. Or you may lie on the floor and rest your lower legs on the seat of a chair.       Lie on your side with your knees bent, and place a pillow between your knees.       Lie on your stomach over pillows.      When should I call my  "doctor?    Your back pain should improve over the first couple of weeks. As it improves, you should be able to return to your normal activities. But call your doctor if:    You have a sudden change in your ability to control your bladder or bowels.    You feel tingling in your groin or legs.    The pain spreads down your leg and into your foot.    Your toes, feet or leg muscles feel weak.    You feel generally unwell or sick.    Your pain does not get better or gets worse.      If you are deaf or hard of hearing, please let us know. We provide many free services including sign language interpreters,oral interpreters, TTYs, telephone amplifiers, note takers and written materials.    For informational purposes only. Not to replace the advice of your health care provider. Copyright   2013 [facility]. All rights reserved. [employer] 407414 - Rev 06/14.                             Tobacco Cessation:   reports that she has been smoking cigarettes. She has smoked for the past 25.00 years. She has never used smokeless tobacco.              Alyse Mcclendon MD, MD  [facility]    Tobi Dawson is a 55 year old who presents for the following health issues     HPI     -The last few weeks she has been having tingling sensation up and down her spine, sporadic pain which radiates from middle back to low back   -Last few days she has been having trouble using the restroom. Pain in tail bone   - she is requesting an MRI  -Feels like something is moving inside of her spine   -She has been taking over the counter pain medication prn with no relief          The past few weeks she hs been having sensations of something going up and down her spine. It tingles and hurts. The past few days she gets a pain when she goes to the bathroom there is pain in the lower back. \"it's moving around\" \"something is in me\". The pain is progressing. When she had to have a BM and then pushed and then the pain " was throbbing bottom of her spine. She is adamant about having a repeat MRI. Feels the lipomas in her back. Feels like they are the cause of her pain. Denies radiation of the pain to her legs. Denies weakness, numbness, tingling, saddle anesthesia. No recent trauma.     Review of Systems          Objective    /77 (BP Location: Right arm, Patient Position: Sitting, Cuff Size: Adult Regular)   Pulse 59   Temp 98.1  F (36.7  C) (Oral)   Wt 57.2 kg (126 lb)   LMP 05/14/2008   SpO2 98%   BMI 22.32 kg/m    Body mass index is 22.32 kg/m .  Physical Exam   GENERAL: healthy, alert and no distress  Comprehensive back pain exam:  No tenderness, Range of motion not limited by pain, Lower extremity strength functional and equal on both sides, Lower extremity reflexes within normal limits bilaterally, Lower extremity sensation normal and equal on both sides and Straight leg raise negative bilaterally,       Order  MR Lumbar Spine w/o Contrast [[id number]] (Order [id number])  Exam Information    Exam Date Exam Time Accession # Performing Department Results    6/5/15  6:13 PM [id number] [facility]    PACS Images     Show images for MR Lumbar Spine w/o Contrast   Study Result    Narrative & Impression   MR LUMBAR SPINE WITHOUT CONTRAST 6/5/2015 6:13 PM      HISTORY: Lumbago.        TECHNIQUE: Sagittal T1 and STIR. Sagittal T2 and axial dual-echo T2.      FINDINGS: Five lumbar vertebrae are assumed. Disc dehydration  throughout. High signal posterior annular fissuring at L5-S1.  Incidentally noted hemangioma within T12.     Findings by specific level:     There is minimal to mild multilevel facet degenerative change.     L4-5: Disc bulging without significant stenosis.     L5-S1: Disc bulging without central stenosis. Moderate bilateral  foraminal stenosis.     No disc herniation or stenosis is seen at the other lumbar levels.     IMPRESSION  IMPRESSION:  1. Mild multilevel degenerative  changes.  2. Disc bulging at L4-5 and L5-S1. Moderate bilateral foraminal  stenosis at L5-S1.     DAVION MENDEZ MD

## 2021-07-19 NOTE — PATIENT INSTRUCTIONS
Schedule with Dr. Sary Song with general surgery if you would like to have your lipoma removed.     Schedule MRI    Schedule with the back specialist.     Take cyclobenzaprine 5-10 mg up to three times daily as needed for pain. You can also continue to take ibuprofen 800mg three times a day with food or milk.   Start tylenol 500-1000mg up to four times a day     When You Have Low Back Pain    Caring for Your Back  You are not alone.    Low back pain is very common. Nearly half of all adults have low back pain in any given year. The good news is that back pain is rarely a danger to your health. Most people can manage their back pain on their own and about half of them start feeling better within 2 weeks. In 9 out of 10 cases, low back pain goes away or no longer limits daily activity within 6 weeks.     Your outlook is good!    Your symptoms tell us that your low back pain is most likely not a danger to you. Most of the time we do not know the exact cause of low back pain, even if you see a doctor or have an MRI. However, treatment can still work without knowing the cause of the pain. Less than 1 in 100 people need surgery for their back pain.     What can I do about my low back pain?     There are three things you can do to ease low back pain and help it go away.    Use heat or cold packs.    Take medicine as directed.    Use positions, movements and exercises.     Using heat or cold packs    Try cold packs or gentle heat to ease your pain. Use whichever gives you the most relief. Apply the cold pack or heat for 15 minutes at a time, as often as needed.    Taking medicine      If your doctor has prescribed medicine, be sure to follow the directions.    If you take over-the-counter medicine, read and follow the directions.    Talk to your doctor if you have any questions.     Using positions, movements and exercises    Research tells us that moving your joints and muscles can help you recover from back pain.  Such activity should be simple and gentle. Use the positions in the photos as well as walking to help relieve your pain. Try taking a short walk every 3 to 4 hours during the day. Walk for a few minutes inside your home or take longer walks outside, on a treadmill or at a mall. Slowly increase the amount of time you walk. Expect discomfort when you begin, but it should lessen as your back starts to heal. When your back feels better, walk daily to keep your back and body healthy.    Finding a comfortable position    When your back pain is new, certain positions will ease your pain. Gently try each of the positions below until you find one that is helpful. Once you find a position of comfort, use it as often as you like when you are resting. You will recover faster if you combine rest with activity.         Lie on your back with your legs bent. You can do this by placing a pillow under your knees. Or you may lie on the floor and rest your lower legs on the seat of a chair.       Lie on your side with your knees bent, and place a pillow between your knees.       Lie on your stomach over pillows.      When should I call my doctor?    Your back pain should improve over the first couple of weeks. As it improves, you should be able to return to your normal activities. But call your doctor if:    You have a sudden change in your ability to control your bladder or bowels.    You feel tingling in your groin or legs.    The pain spreads down your leg and into your foot.    Your toes, feet or leg muscles feel weak.    You feel generally unwell or sick.    Your pain does not get better or gets worse.      If you are deaf or hard of hearing, please let us know. We provide many free services including sign language interpreters,oral interpreters, TTYs, telephone amplifiers, note takers and written materials.    For informational purposes only. Not to replace the advice of your health care provider. Copyright   2013 [facility]  Services. All rights reserved. [employer] 859466 - Rev 06/14.

## 2021-07-28 ENCOUNTER — HOSPITAL ENCOUNTER (OUTPATIENT)
Dept: MRI IMAGING | Facility: CLINIC | Age: 56
Discharge: HOME | End: 2021-07-28
Attending: FAMILY MEDICINE | Admitting: FAMILY MEDICINE
Payer: COMMERCIAL

## 2021-07-28 DIAGNOSIS — M54.50 BILATERAL LOW BACK PAIN WITHOUT SCIATICA, UNSPECIFIED CHRONICITY: ICD-10-CM

## 2021-07-28 PROCEDURE — 72148 MRI LUMBAR SPINE W/O DYE: CPT

## 2021-07-28 PROCEDURE — 72148 MRI LUMBAR SPINE W/O DYE: CPT | Mod: 26 | Performed by: RADIOLOGY

## 2021-08-09 ENCOUNTER — OFFICE VISIT (OUTPATIENT)
Dept: NEUROSURGERY | Facility: CLINIC | Age: 56
End: 2021-08-09
Attending: FAMILY MEDICINE
Payer: COMMERCIAL

## 2021-08-09 VITALS
OXYGEN SATURATION: 98 % | HEIGHT: 63 IN | RESPIRATION RATE: 18 BRPM | DIASTOLIC BLOOD PRESSURE: 65 MMHG | SYSTOLIC BLOOD PRESSURE: 110 MMHG | WEIGHT: 126 LBS | HEART RATE: 64 BPM | BODY MASS INDEX: 22.32 KG/M2

## 2021-08-09 DIAGNOSIS — M54.50 BILATERAL LOW BACK PAIN WITHOUT SCIATICA, UNSPECIFIED CHRONICITY: ICD-10-CM

## 2021-08-09 PROCEDURE — G0463 HOSPITAL OUTPT CLINIC VISIT: HCPCS

## 2021-08-09 PROCEDURE — 99243 OFF/OP CNSLTJ NEW/EST LOW 30: CPT | Performed by: NURSE PRACTITIONER

## 2021-08-09 ASSESSMENT — MIFFLIN-ST. JEOR: SCORE: 1130.66

## 2021-08-09 NOTE — PROGRESS NOTES
[facility]  Neurosurgery Clinic Visit      CC: back pain    Primary care Provider: Alyse Mcclendon    Reason For Visit:   I was asked by Dr. Mcclendon to consult on the patient for bilateral low back pain without sciatica.    HPI: Eunice Malloy is a 55 year old female with a history of chronic back pain. She presents today to review her lumbar MRI. She reports bilateral low back pain. She denies radicular symptoms, paresthesias, and weakness. No bowel/bladder complaints and foot drop. Denies saddle anesthesia. She states she has lipomas on her lower back as well. She was instructed to follow up with General Surgery regarding removal. She has not yet made that appointment. She has tried PT in the past without much benefit. She has not had any back surgery.    Past Medical History:   Diagnosis Date     Anxiety 4/11/2013     Cold sore 8/15/2011     Depression      Hypothyroidism 12/9/2010     Tobacco abuse        Past Medical History reviewed with patient during visit.    Past Surgical History:   Procedure Laterality Date     COLONOSCOPY N/A 5/21/2021    Procedure: COLONOSCOPY;  Surgeon: Efren Lawler MD;  Location: [facility] OR     HC EXCIS PRIMARY GANGLION WRIST       TONSILLECTOMY  as child     Past Surgical History reviewed with patient during visit.    Current Outpatient Medications   Medication     acetaminophen (TYLENOL) 500 MG tablet     acyclovir (ZOVIRAX) 5 % external cream     bacitracin 500 UNIT/GM external ointment     bisacodyl (DULCOLAX) 5 MG EC tablet     cyclobenzaprine (FLEXERIL) 10 MG tablet     ibuprofen (ADVIL/MOTRIN) 800 MG tablet     levothyroxine (SYNTHROID/LEVOTHROID) 112 MCG tablet     Multiple Vitamins-Minerals (MULTIVITAL PO)     polyethylene glycol (GOLYTELY) 236 g suspension     traZODone (DESYREL) 50 MG tablet     valACYclovir (VALTREX) 1000 mg tablet     varenicline (CHANTIX RICARDO) 0.5 MG X 11 & 1 MG X 42 tablet     No current facility-administered medications for this visit.        Allergies   Allergen Reactions     Sulfa Drugs Rash       Social History     Socioeconomic History     Marital status: Single     Spouse name: Not on file     Number of children: Not on file     Years of education: Not on file     Highest education level: Not on file   Occupational History     Not on file   Tobacco Use     Smoking status: Current Some Day Smoker     Years: 25.00     Types: Cigarettes     Smokeless tobacco: Never Used     Tobacco comment: 1 pack per week   Substance and Sexual Activity     Alcohol use: Yes     Alcohol/week: 0.0 standard drinks     Comment: rare     Drug use: No     Sexual activity: Never   Other Topics Concern     Parent/sibling w/ CABG, MI or angioplasty before 65F 55M? No   Social History Narrative    Balanced Diet - Yes    Osteoporosis Prevention Measures - Dairy servings per day: 1    Regular Exercise -  Yes Describe walking three miles a day    Dental Exam - YES - Date: once per year    Eye Exam - NO    Self Breast Exam - Yes    Abuse: Current or Past (Physical, Sexual or Emotional)- No    Do you feel safe in your environment - Yes    Guns stored in the home - No    Sunscreen used - Yes    Seatbelts used - Yes    Lipids -  UNKNOWN pt states never been tested    Glucose -  UNKNOWN    Colon Cancer Screening - No    Hemoccults - NO    Pap Test -  YES - Date: within a year    Do you have any concerns about STD's -  No    Mammography - NO, last Mammo was 2 years ago    DEXA - NO    Immunizations reviewed and up to date - NO, unsure about last TD.    Kennedy VEGA MA             Social Determinants of Health     Financial Resource Strain:      Difficulty of Paying Living Expenses:    Food Insecurity:      Worried About Running Out of Food in the Last Year:      Ran Out of Food in the Last Year:    Transportation Needs:      Lack of Transportation (Medical):      Lack of Transportation (Non-Medical):    Physical Activity:      Days of Exercise per Week:      Minutes of Exercise per  "Session:    Stress:      Feeling of Stress :    Social Connections:      Frequency of Communication with Friends and Family:      Frequency of Social Gatherings with Friends and Family:      Attends Oriental orthodox Services:      Active Member of Clubs or Organizations:      Attends Club or Organization Meetings:      Marital Status:    Intimate Partner Violence:      Fear of Current or Ex-Partner:      Emotionally Abused:      Physically Abused:      Sexually Abused:        History reviewed. No pertinent family history.      ROS: 10 point ROS neg other than the symptoms noted above in the HPI.    Vital Signs:   /65   Pulse 64   Resp 18   Ht 5' 3\" (1.6 m)   Wt 126 lb (57.2 kg)   LMP 05/14/2008   SpO2 98%   BMI 22.32 kg/m        Examination:  Constitutional:  Alert, well nourished, NAD.  Memory: recent and remote memory   HEENT: Normocephalic, atraumatic.   Pulm:  Without shortness of breath   CV:  No pitting edema of BLE.      Neurological:  Awake  Alert  Oriented x 3  Speech clear  Tongue midline    Motor exam:  Hip Flexor:                 Right: 5/5  Left:  5/5  Hip Adductor:             Right:  5/5  Left:  5/5  Hip Abductor:             Right:  5/5  Left:  5/5  Gastroc Soleus:        Right:  5/5  Left:  5/5  Tib/Ant:                      Right:  5/5  Left:  5/5  EHL:                          Right:  5/5  Left:  5/5     Sensation normal to bilateral upper and lower extremities  Muscle tone to bilateral upper and lower extremities   Gait: Able to stand from a seated position. Normal non-antalgic, non-myelopathic gait.  Able to heel/toe walk without loss of balance    Lumbar examination reveals no tenderness of the spine or paraspinous muscles.  Hip height is symmetrical. Negative SI joint, sciatic notch or greater trochanteric tenderness to palpation bilaterally.  Straight leg raise is negative bilaterally.      Imaging:   Lumbar MRI 7/28/2021  Impression: No significant change from the prior study with " multilevel lumbar spondylosis as detailed above.    Assessment/Plan:   Chronic low back pain without sciatica. Discussed options including PT and a referral to pain management. She states she would like to see general surgery for lipoma excision. She will contact our clinic if symptoms persist or worsen following this. She verbalized understanding and agreement.    Patient Instructions   Patient Next Steps:      Please call [phone] to schedule your [place] [facility] [place] Sary Song of general surgery.       If you have any questions please get in contact with Dr. Mcclendon regarding next steps.     Please call us if you have any further questions or concerns.    [facility]   Phone: [phone]  Fax: [phone]              Jacqueline Bledsoe, RC  [facility]  [address] [place] [id number]  Tel [phone]  Fax [phone]

## 2021-08-09 NOTE — PATIENT INSTRUCTIONS
Patient Next Steps:      Please call [phone] to schedule your appointment at the [facility] with Dr. Sary Song of general surgery.       If you have any questions please get in contact with Dr. Mcclendon regarding next steps.     Please call us if you have any further questions or concerns.    [facility]   Phone: [phone]  Fax: [phone]

## 2021-08-09 NOTE — LETTER
8/9/2021         RE: Eunice Malloy  [address] [facility] [address]        Dear Colleague,    Thank you for referring your patient, Eunice Malloy, to the [facility] NEUROSURGERY CLINIC [place]. Please see a copy of my visit note below.    [facility] Neurosurgery  Neurosurgery Clinic Visit      CC: back pain    Primary care Provider: Alyse Mcclendon    Reason For Visit:   I was asked by Dr. Mcclendon to consult on the patient for bilateral low back pain without sciatica.    HPI: Eunice Malloy is a 55 year old female with a history of chronic back pain. She presents today to review her lumbar MRI. She reports bilateral low back pain. She denies radicular symptoms, paresthesias, and weakness. No bowel/bladder complaints and foot drop. Denies saddle anesthesia. She states she has lipomas on her lower back as well. She was instructed to follow up with General Surgery regarding removal. She has not yet made that appointment. She has tried PT in the past without much benefit. She has not had any back surgery.    Past Medical History:   Diagnosis Date     Anxiety 4/11/2013     Cold sore 8/15/2011     Depression      Hypothyroidism 12/9/2010     Tobacco abuse        Past Medical History reviewed with patient during visit.    Past Surgical History:   Procedure Laterality Date     COLONOSCOPY N/A 5/21/2021    Procedure: COLONOSCOPY;  Surgeon: Efren Lawler MD;  Location: [facility] OR     HC EXCIS PRIMARY GANGLION WRIST       TONSILLECTOMY  as child     Past Surgical History reviewed with patient during visit.    Current Outpatient Medications   Medication     acetaminophen (TYLENOL) 500 MG tablet     acyclovir (ZOVIRAX) 5 % external cream     bacitracin 500 UNIT/GM external ointment     bisacodyl (DULCOLAX) 5 MG EC tablet     cyclobenzaprine (FLEXERIL) 10 MG tablet     ibuprofen (ADVIL/MOTRIN) 800 MG tablet     levothyroxine (SYNTHROID/LEVOTHROID) 112 MCG tablet     Multiple Vitamins-Minerals (MULTIVITAL PO)      polyethylene glycol (GOLYTELY) 236 g suspension     traZODone (DESYREL) 50 MG tablet     valACYclovir (VALTREX) 1000 mg tablet     varenicline (CHANTIX RICARDO) 0.5 MG X 11 & 1 MG X 42 tablet     No current facility-administered medications for this visit.       Allergies   Allergen Reactions     Sulfa Drugs Rash       Social History     Socioeconomic History     Marital status: Single     Spouse name: Not on file     Number of children: Not on file     Years of education: Not on file     Highest education level: Not on file   Occupational History     Not on file   Tobacco Use     Smoking status: Current Some Day Smoker     Years: 25.00     Types: Cigarettes     Smokeless tobacco: Never Used     Tobacco comment: 1 pack per week   Substance and Sexual Activity     Alcohol use: Yes     Alcohol/week: 0.0 standard drinks     Comment: rare     Drug use: No     Sexual activity: Never   Other Topics Concern     Parent/sibling w/ CABG, MI or angioplasty before 65F 55M? No   Social History Narrative    Balanced Diet - Yes    Osteoporosis Prevention Measures - Dairy servings per day: 1    Regular Exercise -  Yes Describe walking three miles a day    Dental Exam - YES - Date: once per year    Eye Exam - NO    Self Breast Exam - Yes    Abuse: Current or Past (Physical, Sexual or Emotional)- No    Do you feel safe in your environment - Yes    Guns stored in the home - No    Sunscreen used - Yes    Seatbelts used - Yes    Lipids -  UNKNOWN pt states never been tested    Glucose -  UNKNOWN    Colon Cancer Screening - No    Hemoccults - NO    Pap Test -  YES - Date: within a year    Do you have any concerns about STD's -  No    Mammography - NO, last Mammo was 2 years ago    DEXA - NO    Immunizations reviewed and up to date - NO, unsure about last TD.    Kennedy VEGA MA             Social Determinants of Health     Financial Resource Strain:      Difficulty of Paying Living Expenses:    Food Insecurity:      Worried About Running Out  "of Food in the Last Year:      Ran Out of Food in the Last Year:    Transportation Needs:      Lack of Transportation (Medical):      Lack of Transportation (Non-Medical):    Physical Activity:      Days of Exercise per Week:      Minutes of Exercise per Session:    Stress:      Feeling of Stress :    Social Connections:      Frequency of Communication with Friends and Family:      Frequency of Social Gatherings with Friends and Family:      Attends Yazidism Services:      Active Member of Clubs or Organizations:      Attends Club or Organization Meetings:      Marital Status:    Intimate Partner Violence:      Fear of Current or Ex-Partner:      Emotionally Abused:      Physically Abused:      Sexually Abused:        History reviewed. No pertinent family history.      ROS: 10 point ROS neg other than the symptoms noted above in the HPI.    Vital Signs:   /65   Pulse 64   Resp 18   Ht 5' 3\" (1.6 m)   Wt 126 lb (57.2 kg)   LMP 05/14/2008   SpO2 98%   BMI 22.32 kg/m        Examination:  Constitutional:  Alert, well nourished, NAD.  Memory: recent and remote memory   HEENT: Normocephalic, atraumatic.   Pulm:  Without shortness of breath   CV:  No pitting edema of BLE.      Neurological:  Awake  Alert  Oriented x 3  Speech clear  Tongue midline    Motor exam:  Hip Flexor:                 Right: 5/5  Left:  5/5  Hip Adductor:             Right:  5/5  Left:  5/5  Hip Abductor:             Right:  5/5  Left:  5/5  Gastroc Soleus:        Right:  5/5  Left:  5/5  Tib/Ant:                      Right:  5/5  Left:  5/5  EHL:                          Right:  5/5  Left:  5/5     Sensation normal to bilateral upper and lower extremities  Muscle tone to bilateral upper and lower extremities   Gait: Able to stand from a seated position. Normal non-antalgic, non-myelopathic gait.  Able to heel/toe walk without loss of balance    Lumbar examination reveals no tenderness of the spine or paraspinous muscles.  Hip height is " symmetrical. Negative SI joint, sciatic notch or greater trochanteric tenderness to palpation bilaterally.  Straight leg raise is negative bilaterally.      Imaging:   Lumbar MRI 7/28/2021  Impression: No significant change from the prior study with multilevel lumbar spondylosis as detailed above.    Assessment/Plan:   Chronic low back pain without sciatica. Discussed options including PT and a referral to pain management. She states she would like to see general surgery for lipoma excision. She will contact our clinic if symptoms persist or worsen following this. She verbalized understanding and agreement.    Patient Instructions   Patient Next Steps:      Please call [phone] to schedule your appointment at the [facility] with Dr. Sary Song of general surgery.       If you have any questions please get in contact with Dr. Mcclendon regarding next steps.     Please call us if you have any further questions or concerns.    [facility]   Phone: [phone]  Fax: [phone]              Jacqueline Bledsoe CNP  [facility]surgery  [address] [place] [id number]  Tel [phone]  Fax [phone]        Again, thank you for allowing me to participate in the care of your patient.        Sincerely,        Jacqueline Bledsoe NP

## 2021-09-01 ENCOUNTER — NURSE TRIAGE (OUTPATIENT)
Dept: FAMILY MEDICINE | Facility: CLINIC | Age: 56
End: 2021-09-01

## 2021-09-01 NOTE — TELEPHONE ENCOUNTER
S-(situation): Patient calling experiencing rib cage pain.     B-(background): Patient fell when in plank position at yoga class last Friday.     A-(assessment): Patient experiencing ribcage pain on right initially, has now traveled to left side. Pain described as moderate 5-8 out of 10 when sneezing or coughing. No breaks in skin, no difficulty breathing. Able to raise arm.     R-(recommendations): Advised per protocol, patient be seen today or tomorrow in clinic. Reviewed care advice under care tab. Advised UC if not able to be seen in that timeframe. No further questions or concerns. Transferred to central scheduling to assist in scheduling.       Additional Information    Negative: Major injury from dangerous force or speed (e.g., MVA, fall > 10 feet or 3 meters)    Negative: Bullet wound, knife wound or other penetrating object    Negative: Puncture wound that sounds life-threatening to the triager    Negative: Severe difficulty breathing (e.g., struggling for each breath, speaks in single words)    Negative: Major bleeding (actively dripping or spurting) that can't be stopped    Negative: Open wound of the chest with sound of moving air (sucking wound) or visible air bubbles    Negative: Shock suspected (e.g., cold/pale/clammy skin, too weak to stand, low BP, rapid pulse)    Negative: Coughing or spitting up blood    Negative: Bluish (or gray) lips or face    Negative: Unconscious or was unconscious    Negative: Sounds like a life-threatening emergency to the triager    Negative: Chest pain not from an injury    Negative: Wound looks infected    Negative: SEVERE chest pain    Negative: Difficulty breathing and not severe    Negative: Skin is split open or gaping (or length > 1/2 inch or 12 mm)    Negative: Bleeding won't stop after 10 minutes of direct pressure (using correct technique)    Negative: Sounds like a serious injury to the triager    Negative: Can't take a deep breath but no respiratory distress  "(e.g., hurts to take a deep breath)    Negative: Shallow puncture wound    Negative: Collarbone is painful and difficulty raising arm    Patient wants to be seen    Negative: No prior tetanus shots (or is not fully vaccinated) and any wound (e.g., cut or scrape)    Negative: Patient is confused or is an unreliable provider of information (e.g., dementia, profound mental retardation, alcohol intoxication)    Answer Assessment - Initial Assessment Questions  1. MECHANISM: \"How did the injury happen?\"      Doing a plank in yoga, fell on right side. Now hurts on left side ribcage as well as right    2. ONSET: \"When did the injury happen?\" (Minutes or hours ago)      Happened on Friday   3. LOCATION: \"Where on the chest is the injury located?\"      Left and right side ribcage  4. APPEARANCE: \"What does the injury look like?\"      Can't see anything outside  5. BLEEDING: \"Is there any bleeding now? If so, ask: How long has it been bleeding?\"      no  6. SEVERITY: \"Any difficulty with breathing?\"      No difficulty breathing. Does hurt to sneeze and cough   7. SIZE: For cuts, bruises, or swelling, ask: \"How large is it?\" (e.g., inches or centimeters)      no  8. PAIN: \"Is there pain?\" If so, ask: \"How bad is the pain?\"   (e.g., Scale 1-10; or mild, moderate, severe)      Fluctuates between 5-8 out of 10  9. TETANUS: For any breaks in the skin, ask: \"When was the last tetanus booster?\"      No breaks in skin  10. PREGNANCY: \"Is there any chance you are pregnant?\" \"When was your last menstrual period?\"        No    Protocols used: CHEST INJURY-A-OH    Remi LANZA RN    "

## 2021-09-09 ENCOUNTER — PATIENT OUTREACH (OUTPATIENT)
Dept: SURGERY | Facility: CLINIC | Age: 56
End: 2021-09-09

## 2021-09-09 NOTE — PROGRESS NOTES
Patient Telephone Reminder Call    Date of call:  09/09/21  Phone numbers:  Home number on file [phone] (home)    Reached patient/confirmed appointment:  Yes  Appointment with:   Dr. Sary Song  Reason for visit: consult for lipoma

## 2021-09-10 ENCOUNTER — OFFICE VISIT (OUTPATIENT)
Dept: SURGERY | Facility: CLINIC | Age: 56
End: 2021-09-10
Payer: COMMERCIAL

## 2021-09-10 VITALS
SYSTOLIC BLOOD PRESSURE: 121 MMHG | BODY MASS INDEX: 22.77 KG/M2 | HEIGHT: 63 IN | DIASTOLIC BLOOD PRESSURE: 61 MMHG | HEART RATE: 59 BPM | WEIGHT: 128.5 LBS | OXYGEN SATURATION: 96 %

## 2021-09-10 DIAGNOSIS — R22.2 MASS OF SKIN OF BACK: Primary | ICD-10-CM

## 2021-09-10 PROCEDURE — 99202 OFFICE O/P NEW SF 15 MIN: CPT | Performed by: SURGERY

## 2021-09-10 ASSESSMENT — PAIN SCALES - GENERAL: PAINLEVEL: NO PAIN (0)

## 2021-09-10 ASSESSMENT — MIFFLIN-ST. JEOR: SCORE: 1142

## 2021-09-10 NOTE — NURSING NOTE
"Chief Complaint   Patient presents with     Consult     Consultation Tumor Lower Back       Vitals:    09/10/21 1148   BP: 121/61   BP Location: Left arm   Patient Position: Chair   Cuff Size: Adult Regular   Pulse: 59   SpO2: 96%   Weight: 58.3 kg (128 lb 8 oz)   Height: 1.6 m (5' 3\")       Body mass index is 22.76 kg/m .                            "

## 2021-09-10 NOTE — PATIENT INSTRUCTIONS
You met with Dr. Sary Song.      Today's visit instructions:    Dr. Song would like you to undergo an Ultrasound.  Our office will call you with results and recommendations.     If you have questions please contact Danelle RN, Bettye RN, or Yon RN during regular clinic hours, Monday through Friday 7:30 AM - 4:00 PM, or you can contact us via [employer] at anytime.       If you have urgent needs after-hours, weekends, or holidays please call the hospital at [phone] and ask to speak with our on-call General Surgery Team.    Appointment [phone], option #1   Nurse Advice (Bettye Mcclendon, or Yon): [phone]   Surgery Scheduler (Payal): [phone]  Fax: [phone]

## 2021-09-10 NOTE — PROGRESS NOTES
General Surgery Clinic Note - New Patient Visit    NAME: Eunice Malloy,  55 year old female    PCP: Alyse Mcclendon  MRN:   [id number]      Ph#: [phone]  Date: Sep 10, 2021    Chief Complaint:     History of Present Illness: Eunice Malloy is a 55 year old female who presents to the clinic with     Past Medical History: History in Epic reviewed with the patient:  Past Medical History:   Diagnosis Date     Anxiety 4/11/2013     Cold sore 8/15/2011     Depression      Hypothyroidism 12/9/2010     Tobacco abuse        Past Surgical History: History in Epic reviewed with the patient:  Past Surgical History:   Procedure Laterality Date     COLONOSCOPY N/A 5/21/2021    Procedure: COLONOSCOPY;  Surgeon: Efren Lawler MD;  Location: [facility] [place]      EXCIS PRIMARY GANGLION WRIST       TONSILLECTOMY  as child       Family History: History in Epic reviewed with the patient:  No family history on file.    Social History: History in Epic reviewed with the patient:  Social History     Socioeconomic History     Marital status: Single     Spouse name: Not on file     Number of children: Not on file     Years of education: Not on file     Highest education level: Not on file   Occupational History     Not on file   Tobacco Use     Smoking status: Current Some Day Smoker     Years: 25.00     Types: Cigarettes     Smokeless tobacco: Never Used     Tobacco comment: pack = 2 days    Substance and Sexual Activity     Alcohol use: Yes     Alcohol/week: 0.0 standard drinks     Comment: rare     Drug use: No     Sexual activity: Never   Other Topics Concern     Parent/sibling w/ CABG, MI or angioplasty before 65F 55M? No   Social History Narrative    Balanced Diet - Yes    Osteoporosis Prevention Measures - Dairy servings per day: 1    Regular Exercise -  Yes Describe walking three miles a day    Dental Exam - YES - Date: once per year    Eye Exam - NO    Self Breast Exam - Yes    Abuse: Current or Past (Physical, Sexual or Emotional)-  No    Do you feel safe in your environment - Yes    Guns stored in the home - No    Sunscreen used - Yes    Seatbelts used - Yes    Lipids -  UNKNOWN pt states never been tested    Glucose -  UNKNOWN    Colon Cancer Screening - No    Hemoccults - NO    Pap Test -  YES - Date: within a year    Do you have any concerns about STD's -  No    Mammography - NO, last Mammo was 2 years ago    DEXA - NO    Immunizations reviewed and up to date - NO, unsure about last TD.    Kennedy VEGA MA             Social Determinants of Health     Financial Resource Strain:      Difficulty of Paying Living Expenses:    Food Insecurity:      Worried About Running Out of Food in the Last Year:      Ran Out of Food in the Last Year:    Transportation Needs:      Lack of Transportation (Medical):      Lack of Transportation (Non-Medical):    Physical Activity:      Days of Exercise per Week:      Minutes of Exercise per Session:    Stress:      Feeling of Stress :    Social Connections:      Frequency of Communication with Friends and Family:      Frequency of Social Gatherings with Friends and Family:      Attends Rastafarian Services:      Active Member of Clubs or Organizations:      Attends Club or Organization Meetings:      Marital Status:    Intimate Partner Violence:      Fear of Current or Ex-Partner:      Emotionally Abused:      Physically Abused:      Sexually Abused:        Allergies:     Allergies   Allergen Reactions     Sulfa Drugs Rash       Outpatient Medications:  Outpatient Encounter Medications as of 9/10/2021   Medication Sig Dispense Refill     acetaminophen (TYLENOL) 500 MG tablet Take 1-2 tablets (500-1,000 mg) by mouth every 6 hours as needed for pain 60 tablet 0     acyclovir (ZOVIRAX) 5 % external cream Apply topically 5 times daily 5 g 11     bacitracin 500 UNIT/GM external ointment Apply topically 2 times daily 15 g 0     bisacodyl (DULCOLAX) 5 MG EC tablet Take 2 tablets by mouth at bedtime 2 days prior to your  "procedure.  Take 2 tablets by mouth at 3:00pm one day prior to your procedure. 4 tablet 0     cyclobenzaprine (FLEXERIL) 10 MG tablet Take 0.5-1 tablets (5-10 mg) by mouth 3 times daily as needed for muscle spasms 30 tablet 0     ibuprofen (ADVIL/MOTRIN) 800 MG tablet TAKE 1 TABLET BY MOUTH EVERY 8 HOURS AS NEEDED FOR MODERATE PAIN 90 tablet 0     levothyroxine (SYNTHROID/LEVOTHROID) 112 MCG tablet TAKE 1 TABLET(112 MCG) BY MOUTH DAILY 30 tablet 10     Multiple Vitamins-Minerals (MULTIVITAL PO) Take 1 tablet by mouth       polyethylene glycol (GOLYTELY) 236 g suspension Take as directed in patient instructions.  One day before exam fill jug with powder and one gallon of water.  At 6:00pm drink 8oz glass of mixture every 15 min until the jug is half empty.  Store the rest in the refrigerator.  Day of exam: 6 hours prior to exam drink the other half of mixture. 4000 mL 0     traZODone (DESYREL) 50 MG tablet TAKE 1 TO 2 TABLETS BY MOUTH AT BEDTIME AS NEEDED FOR SLEEP 180 tablet 1     valACYclovir (VALTREX) 1000 mg tablet Take 2 tablets (2,000 mg) by mouth 2 times daily For one day. 12 tablet 11     varenicline (CHANTIX RICARDO) 0.5 MG X 11 & 1 MG X 42 tablet Take 0.5 mg tab daily for 3 days, THEN 0.5 mg tab twice daily for 4 days, THEN 1 mg twice daily. 53 tablet 1     No facility-administered encounter medications on file as of 9/10/2021.       ROS: 10 systems reviewed and all are negative except as above.    EXAM:  /61 (BP Location: Left arm, Patient Position: Chair, Cuff Size: Adult Regular)   Pulse 59   Ht 1.6 m (5' 3\")   Wt 58.3 kg (128 lb 8 oz)   LMP 05/14/2008   SpO2 96%   BMI 22.76 kg/m    Psych: Normal affect  Neuro: No gross focal deficits noted  Head:Normocephalic, atraumatic  Eyes: Non icteric  Neck:supple  Heart: Regular rate and rhythm  Lungs: non-labored, quiet respiration  Back/Abdomen: Two rubbery mobile lumps felt on either side of the lumbar spine.  Extremities: No obvious " deformities    Imaging Data (I have personally reviewed the following reports):  No results found for this or any previous visit (from the past 744 hour(s)).    A/P: Eunice Malloy is a 55 year old female with a feeling of something crawling inside her spine.  She is certain this is related to the soft tissue masses. Superficial lipoma or lymph nodes are unlikely to cause this.  We will start with an US and reassess after results are available.      Sary Song MD FACS  Associate Professor of Surgery  Pager [phone]

## 2021-09-10 NOTE — LETTER
9/10/2021       RE: Eunice Malloy  [address]  [facility] [address]     Dear Colleague,    Thank you for referring your patient, Eunice Malloy, to the [facility] [place] at [facility]. Please see a copy of my visit note below.    General Surgery Clinic Note - New Patient Visit    NAME: Eunice Malloy,  55 year old female    PCP: Alyse Mcclendon  MRN:   [id number]      #: [phone]  Date: Sep 10, 2021    Chief Complaint:     History of Present Illness: Eunice Malloy is a 55 year old female who presents to the clinic with     Past Medical History: History in Epic reviewed with the patient:  Past Medical History:   Diagnosis Date     Anxiety 4/11/2013     Cold sore 8/15/2011     Depression      Hypothyroidism 12/9/2010     Tobacco abuse        Past Surgical History: History in Epic reviewed with the patient:  Past Surgical History:   Procedure Laterality Date     COLONOSCOPY N/A 5/21/2021    Procedure: COLONOSCOPY;  Surgeon: Efren Lawler MD;  Location: [facility] OR     HC EXCIS PRIMARY GANGLION WRIST       TONSILLECTOMY  as child       Family History: History in Epic reviewed with the patient:  No family history on file.    Social History: History in Epic reviewed with the patient:  Social History     Socioeconomic History     Marital status: Single     Spouse name: Not on file     Number of children: Not on file     Years of education: Not on file     Highest education level: Not on file   Occupational History     Not on file   Tobacco Use     Smoking status: Current Some Day Smoker     Years: 25.00     Types: Cigarettes     Smokeless tobacco: Never Used     Tobacco comment: pack = 2 days    Substance and Sexual Activity     Alcohol use: Yes     Alcohol/week: 0.0 standard drinks     Comment: rare     Drug use: No     Sexual activity: Never   Other Topics Concern     Parent/sibling w/ CABG, MI or angioplasty before 65F 55M? No    Social History Narrative    Balanced Diet - Yes    Osteoporosis Prevention Measures - Dairy servings per day: 1    Regular Exercise -  Yes Describe walking three miles a day    Dental Exam - YES - Date: once per year    Eye Exam - NO    Self Breast Exam - Yes    Abuse: Current or Past (Physical, Sexual or Emotional)- No    Do you feel safe in your environment - Yes    Guns stored in the home - No    Sunscreen used - Yes    Seatbelts used - Yes    Lipids -  UNKNOWN pt states never been tested    Glucose -  UNKNOWN    Colon Cancer Screening - No    Hemoccults - NO    Pap Test -  YES - Date: within a year    Do you have any concerns about STD's -  No    Mammography - NO, last Mammo was 2 years ago    DEXA - NO    Immunizations reviewed and up to date - NO, unsure about last TD.    Kennedy VEGA MA             Social Determinants of Health     Financial Resource Strain:      Difficulty of Paying Living Expenses:    Food Insecurity:      Worried About Running Out of Food in the Last Year:      Ran Out of Food in the Last Year:    Transportation Needs:      Lack of Transportation (Medical):      Lack of Transportation (Non-Medical):    Physical Activity:      Days of Exercise per Week:      Minutes of Exercise per Session:    Stress:      Feeling of Stress :    Social Connections:      Frequency of Communication with Friends and Family:      Frequency of Social Gatherings with Friends and Family:      Attends [employer] Services:      Active Member of Clubs or Organizations:      Attends Club or Organization Meetings:      Marital Status:    Intimate Partner Violence:      Fear of Current or Ex-Partner:      Emotionally Abused:      Physically Abused:      Sexually Abused:        Allergies:     Allergies   Allergen Reactions     Sulfa Drugs Rash       Outpatient Medications:  Outpatient Encounter Medications as of 9/10/2021   Medication Sig Dispense Refill     acetaminophen (TYLENOL) 500 MG tablet Take 1-2 tablets (500-1,000  "mg) by mouth every 6 hours as needed for pain 60 tablet 0     acyclovir (ZOVIRAX) 5 % external cream Apply topically 5 times daily 5 g 11     bacitracin 500 UNIT/GM external ointment Apply topically 2 times daily 15 g 0     bisacodyl (DULCOLAX) 5 MG EC tablet Take 2 tablets by mouth at bedtime 2 days prior to your procedure.  Take 2 tablets by mouth at 3:00pm one day prior to your procedure. 4 tablet 0     cyclobenzaprine (FLEXERIL) 10 MG tablet Take 0.5-1 tablets (5-10 mg) by mouth 3 times daily as needed for muscle spasms 30 tablet 0     ibuprofen (ADVIL/MOTRIN) 800 MG tablet TAKE 1 TABLET BY MOUTH EVERY 8 HOURS AS NEEDED FOR MODERATE PAIN 90 tablet 0     levothyroxine (SYNTHROID/LEVOTHROID) 112 MCG tablet TAKE 1 TABLET(112 MCG) BY MOUTH DAILY 30 tablet 10     Multiple Vitamins-Minerals (MULTIVITAL PO) Take 1 tablet by mouth       polyethylene glycol (GOLYTELY) 236 g suspension Take as directed in patient instructions.  One day before exam fill jug with powder and one gallon of water.  At 6:00pm drink 8oz glass of mixture every 15 min until the jug is half empty.  Store the rest in the refrigerator.  Day of exam: 6 hours prior to exam drink the other half of mixture. 4000 mL 0     traZODone (DESYREL) 50 MG tablet TAKE 1 TO 2 TABLETS BY MOUTH AT BEDTIME AS NEEDED FOR SLEEP 180 tablet 1     valACYclovir (VALTREX) 1000 mg tablet Take 2 tablets (2,000 mg) by mouth 2 times daily For one day. 12 tablet 11     varenicline (CHANTIX RICARDO) 0.5 MG X 11 & 1 MG X 42 tablet Take 0.5 mg tab daily for 3 days, THEN 0.5 mg tab twice daily for 4 days, THEN 1 mg twice daily. 53 tablet 1     No facility-administered encounter medications on file as of 9/10/2021.       ROS: 10 systems reviewed and all are negative except as above.    EXAM:  /61 (BP Location: Left arm, Patient Position: Chair, Cuff Size: Adult Regular)   Pulse 59   Ht 1.6 m (5' 3\")   Wt 58.3 kg (128 lb 8 oz)   LMP 05/14/2008   SpO2 96%   BMI 22.76 kg/m  "   Psych: Normal affect  Neuro: No gross focal deficits noted  Head:Normocephalic, atraumatic  Eyes: Non icteric  Neck:supple  Heart: Regular rate and rhythm  Lungs: non-labored, quiet respiration  Back/Abdomen: Two rubbery mobile lumps felt on either side of the lumbar spine.  Extremities: No obvious deformities    Imaging Data (I have personally reviewed the following reports):  No results found for this or any previous visit (from the past 744 hour(s)).    A/P: Eunice Malloy is a 55 year old female with a feeling of something crawling inside her spine.  She is certain this is related to the soft tissue masses. Superficial lipoma or lymph nodes are unlikely to cause this.  We will start with an US and reassess after results are available.      Sary Song MD FACS  Associate Professor of Surgery  Pager [phone]         Again, thank you for allowing me to participate in the care of your patient.      Sincerely,    Sary Song MD

## 2021-09-15 ENCOUNTER — NURSE TRIAGE (OUTPATIENT)
Dept: FAMILY MEDICINE | Facility: CLINIC | Age: 56
End: 2021-09-15

## 2021-09-15 NOTE — TELEPHONE ENCOUNTER
"Call received from patient:  1. US scheduled on 9/17/21 and wonders if additional imaging can be ordered to be completed at the same time as US appt on 9/17/21  2. Yesterday AM woke up with back pain   A. Location: left side of back-shoulder blade  3. Has a mass on lower back and has \"a lot of lipomas\" on back  4. No injury  5. Pain is not reducing   A. Took 800 mg Ibuprofen 30-60 minutes ago without much relief  6. Pain currently is borderline severe   A. Constant pain  7. Radiates across back and if tries to stretch it hurts  8. No lifting of heavy objects, strenuous work or exercises   A. Did yoga over weekend  9. No new weakness numbness  10. No problems with bowel nor bladder control  11. No fever, abdominal, dysuria, hematuria    Patient requested late afternoon appt.  Appt scheduled today with Dr. SHARRI Mccullough at 1620.  Appt date, time and location confirmed with patient.        Reason for Disposition    Age > 50 and no history of prior similar back pain    Additional Information    Negative: Passed out (i.e., fainted, collapsed and was not responding)    Negative: Shock suspected (e.g., cold/pale/clammy skin, too weak to stand, low BP, rapid pulse)    Negative: Sounds like a life-threatening emergency to the triager    Negative: Major injury to the back (e.g., MVA, fall > 10 feet or 3 meters, penetrating injury, etc.)    Negative: Pain in the upper back over the ribs (rib cage) that radiates (travels) into the chest    Negative: Pain in the upper back over the ribs (rib cage) and worsened by coughing (or clearly increases with breathing)    Negative: SEVERE back pain of sudden onset and age > 60    Negative: SEVERE abdominal pain (e.g., excruciating)    Negative: Abdominal pain and age > 60    Negative: Unable to urinate (or only a few drops) and bladder feels very full    Negative: Loss of bladder or bowel control (urine or bowel incontinence; wetting self, leaking stool) of new onset    Negative: Numbness " (loss of sensation) in groin or rectal area    Negative: Pain radiates into groin, scrotum    Negative: Blood in urine (red, pink, or tea-colored)    Negative: Vomiting and pain over lower ribs of back (i.e., flank - kidney area)    Negative: Weakness of a leg or foot (e.g., unable to bear weight, dragging foot)    Negative: Patient sounds very sick or weak to the triager    Negative: Fever > 100.4 F (38.0 C) and flank pain    Negative: Pain or burning with passing urine (urination)    Negative: SEVERE back pain (e.g., excruciating, unable to do any normal activities) and not improved after pain medicine and CARE ADVICE    Negative: Numbness in an arm or hand (i.e., loss of sensation) and upper back pain    Negative: Numbness in a leg or foot (i.e., loss of sensation)    Negative: High-risk adult (e.g., history of cancer, history of HIV, or history of IV drug abuse)    Negative: Painful rash with multiple small blisters grouped together (i.e., dermatomal distribution or 'band' or 'stripe')    Negative: Pain radiates into the thigh or further down the leg, and in both legs    Protocols used: BACK PAIN-LIZETH RichardsN, RN  [facility]

## 2021-09-17 ENCOUNTER — PATIENT OUTREACH (OUTPATIENT)
Dept: SURGERY | Facility: CLINIC | Age: 56
End: 2021-09-17

## 2021-09-17 ENCOUNTER — HOSPITAL ENCOUNTER (OUTPATIENT)
Dept: ULTRASOUND IMAGING | Facility: CLINIC | Age: 56
Discharge: HOME | End: 2021-09-17
Attending: SURGERY | Admitting: SURGERY
Payer: COMMERCIAL

## 2021-09-17 DIAGNOSIS — R22.2 MASS OF SKIN OF BACK: ICD-10-CM

## 2021-09-17 PROCEDURE — 76705 ECHO EXAM OF ABDOMEN: CPT

## 2021-09-17 PROCEDURE — 76705 ECHO EXAM OF ABDOMEN: CPT | Mod: 26 | Performed by: RADIOLOGY

## 2021-09-17 NOTE — PROGRESS NOTES
Patient underwent US of back mass(s) and results are now available.  Message sent to provider. Await results/recommendations.  Patient will be notified at that time.    EXAM: Limited Abdominal Ultrasound, 9/17/2021      HISTORY: Evaluate back mass; Mass of skin of back.     COMPARISON: MRI lumbar spine 7/28/2021. CT 10/30/2018..     FINDINGS: Targeted ultrasound of the superficial soft tissues of the  low back reveal multiple isoechoic lesions within the subcutaneous fat  without significant associated Doppler signal measuring up to 2.7 x  2.4 x 0.8 cm on the left and 2.5 x 1.3 x 0.8 cm on the right. Lesion  on the left may correlate with a similar subtle lesion at the level of  L5-S1 seen on recent MRI and CT 2018. Underlying fascial planes are  preserved.                                                                       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

## 2021-09-21 DIAGNOSIS — R22.2 MASS OF SUBCUTANEOUS TISSUE OF BACK: Primary | ICD-10-CM

## 2021-09-21 NOTE — PATIENT INSTRUCTIONS
Reminder:  Surgery Requirements  1. Your surgery will be at the [facility] [place]- 5th Floor  2. You will need to arrive 1 hour early based on the location of your surgery.  3. You will need a Pre-op physical before your procedure- your surgeon will do this the day of surgery.   4. Stop any blood thinners, vitamins, minerals, or herbal supplements 5 days before surgery.  If you are taking a prescribed blood thinner please let us know for specific instructions.  5. You may eat/drink/drive without restrictions/limitations.  6. Wash with the soap (Antibacterial, Dial Complete Foam, Hibiclense, or soap given/mailed from the clinic) the night before and morning of surgery. See instructions in the Surgery Packet.  7. You will need to undergo a COVID-19 test 2-4 days prior to your scheduled procedure.  Our surgery scheduler will help arrange testing.  8. If you would like a procedure estimate please call [facility] Financial Counselor at [phone] or [phone].    At this time, any patient that does not have COVID-19 testing within 4 days of surgery and results available to the surgeon and anesthesia team before the procedure may have their procedure postponed or canceled. We do this to keep our patients, providers, and employees safe. If you decline to test, then you will need to contact your surgeon to determine when or if your procedure will still take place.    OR    We highly encourage patients to get tested for COVID-19 at one of our designated [facility] testing sites. We process the tests in our lab, which allows us to get the results quickly. If you choose to get tested at a non-[facility] location, you will need to contact your primary care provider to make those arrangements and ensure the results are available to your surgeon before you arrive for your procedure. If we do not receive the results in time, your procedure may be postponed or canceled. Please make  sure your test is collected 3-days prior to your procedure date. The results will need to get faxed to [phone].     After Your Mass/Lump Removal       Incision care     You may take a shower the day after surgery. Carefully wash your incision with soap and water. Do not submerge yourself in water (bath, whirlpool, hot tub, pool, lake) for 14 days after surgery.     Remove the gauze bandage 24 hours after surgery, but leave the medical tape (Steri-Strips) or glue in place. These will loosen and fall off on their own 1-2 weeks after surgery.       Always wash your hands before touching your incisions or removing bandages.     It is not unusual to form a collection of fluid or blood under your incision that may feel firm or squishy- it can take several weeks to months for your body to reabsorb it.  At times, it may even drain.  If that should happen keep the area clean with soap, water,  and cover with a clean gauze dressing. You can change this daily or as needed.     Other medicines     Wait to start aspirin or blood thinners until the day after surgery. You can continue your regular medicines at your normal time the day after surgery.     Your pain medicine may cause constipation (hard, dry stools). To help with this, take the stool softener your doctor gave you or an over-the-counter stool softener or laxative. You can stop taking this when you are no longer taking pain medicine and your bowel movements are back to normal.      For pain or discomfort     Take the narcotic pain medicine your doctor gave you as needed and as instructed on the bottle. If you prefer to use over-the-counter medication, use acetaminophen (Tylenol) or ibuprofen (Advil, Motrin) as instructed on the box. Do not take Tylenol if it is in your narcotic pain medication.      Use an ice pack on your surgical cut (incision) for 20 minutes at a time as needed for the first 24 hours. Be sure to protect your skin by putting a cloth between the  ice pack and your skin.      Activities   No driving until you feel it s safe to do so. Don t drive while taking narcotic pain medicine.      Diet   You can eat your regular meals after surgery.      Results   You should know any test results about 5 to 7 business days after surgery       When to call the doctor   Call your doctor if you have:     A fever above 101 F (38.3 C) (taken under the tongue), or a fever or chills lasting more than a day.     Redness at the incision site.     Any fluid or blood draining from the incision, especially if it smells bad.      Severe pain that doesn t improve with pain medicine.      We will call you 2 to 4 days after surgery to review this handout, answer questions and help arrange after-surgery care. If you have questions or concerns, please call [phone] during regular office hours. If you need to call after business hours, call [phone] and ask to page the surgeon on-call.

## 2021-09-21 NOTE — PROGRESS NOTES
Dr. Song asked that the patients recent US and MRI be compared.   The 7/2021 MRI covers the midline only.  Surrounding fat/lipoma can often blend in and is difficult to identify on MRI unless there is a marker placed over a specific area.  He does not see a definite lipoma on MRI.  He does not recommend and additional imaging.    Discussed with Dr. Song.  She is recommending that one mass be removed under local anesthesia and additional recommendations will be made after pathology is available.  CR placed.    Attempted to reach the patient with no answer. LM on VM to call office.    Spoke with patient.    Pre and Post op Patient Education/Teaching Flowsheet  Relevant Diagnosis:  Mass  Teaching Topic:  Pre and post op teaching  Person(s) Involved in teaching:  Patient     Motivation Level:  Asks Questions:  Yes  Eager to Learn:  Yes  Cooperative:  Yes  Receptive (willing/able to accept information):  Yes  Any cultural factors/[place] beliefs that may influence understanding or compliance?  No    Patient/caregiver/family demonstrates understanding of the following:  Reason for the appointment, diagnosis, and treatment plan:  Yes  Patient demonstrates understanding of the following:  Pre-op bowel prep:  No  Post-op pain management recommendations (medications, ice compress, binder/athletic supporter (if applicable), etc.:  Yes  Inguinal hernia patients:  Post-op urinary retention- discussed signs/symptoms and visit to ER for Mckeon catheter placement and to stay in place for at least 48 hours:  NA  Restrictions:  NA  Medications to take the day of surgery:  Per PCP  Blood thinner medications discussed and when to stop (if applicable):  Yes  Wound care:  Yes  Diabetes medication management (if applicable):  Per PCP  Which situations necessitate calling provider and whom to contact:  Discussed how to contact the hospital, nurse, and clinic scheduling staff if necessary      Date and time of surgery:   TBD  Location of surgery: [facility] 5th [facility]  History and Physical and any other testing necessary prior to surgery:  Surgeon  Required time line for completion of History and Physical and any pre-op testing:  Yes  Discuss need for someone to drive patient home and stay with them for 24 hours:  NA  Pre-op showering/scrub information with Surgical Scrub:  Yes  NPO Guidelines:  No restrictions  COVID-19 Testing:  Yes    Infection Prevention: Patient demonstrates understanding of the following:  Patient instructed on hand hygiene:  Yes  Surgical procedure site care will be taught and will be reviewed at the time of discharge  Signs and symptoms of infection taught:  Yes  Wound care reviewed and will be taught at the time of discharge  Central venous catheter care will be taught at the time of discharge (if applicable)    Post-op follow-up:  Instructional materials used/given/mailed:  [facility] Booklet, post op teaching sheet, Map, Soap, and arrival/location information    Surgical instructions mailed to patient

## 2021-09-24 DIAGNOSIS — F17.200 TOBACCO DEPENDENCE SYNDROME: ICD-10-CM

## 2021-09-24 NOTE — TELEPHONE ENCOUNTER
Reason for Call:  Medication or medication refill:    Do you use a [facility]?  Name of the pharmacy and phone number for the current request:  [id number] [place] [id number] AT [facility] [address]  [phone]    Name of the medication requested: varenicline (CHANTIX RICARDO) 0.5 MG X 11 & 1 MG X 42 tablet    Other request: Patient is requesting an RX for this medication. Please assist. Thanks!    Can we leave a detailed message on this number? YES    Phone number patient can be reached at: Cell number on file:    Telephone Information:   Mobile [phone]     Best Time: Any    Call taken on 9/24/2021 at 4:23 PM by Cecy Tenorio

## 2021-09-26 NOTE — TELEPHONE ENCOUNTER
"Routing refill request to provider for review/approval because:  --Based on previous documentation in this encounter and medication history it appears patient is requesting an order for another starter pack.    --I prefer provider authorize this time.    --Last visit:  7/19/2021 House - \"Tobacco Cessation:    reports that she has been smoking cigarettes. She has smoked for the past 25.00 years. She has never used smokeless tobacco.\"    --Future Visit: none.        "

## 2021-09-27 ENCOUNTER — TELEPHONE (OUTPATIENT)
Dept: FAMILY MEDICINE | Facility: CLINIC | Age: 56
End: 2021-09-27

## 2021-09-27 DIAGNOSIS — E03.9 HYPOTHYROIDISM, UNSPECIFIED TYPE: ICD-10-CM

## 2021-09-27 NOTE — TELEPHONE ENCOUNTER
"PA started for varenicline (CHANTIX RICARDO) 0.5 MG X 11 & 1 MG X 42 tablet.  To submit the PA:  1. Go to [email or web address] and click Enter a key  2. Enter the pt's last name and date of birth and the key   Patient last name:Gama    :[birth year]   Key:[id number]  3. Complete the form and click \"Send to Plan\"    "

## 2021-09-28 RX ORDER — LEVOTHYROXINE SODIUM 112 UG/1
TABLET ORAL
Qty: 30 TABLET | Refills: 0 | Status: SHIPPED | OUTPATIENT
Start: 2021-09-28 | End: 2021-10-25

## 2021-09-28 NOTE — TELEPHONE ENCOUNTER
One additional month approved; pt needs to schedule her annual visit.      WILLIAM Valdez RN  [facility]

## 2021-09-29 ENCOUNTER — TELEPHONE (OUTPATIENT)
Dept: SURGERY | Facility: CLINIC | Age: 56
End: 2021-09-29

## 2021-09-29 NOTE — TELEPHONE ENCOUNTER
Attempted to contact patient in order to schedule surgery, no answer. Left voicemail instructing patient to return call to [phone] when ready to schedule.

## 2021-10-19 ENCOUNTER — TELEPHONE (OUTPATIENT)
Dept: FAMILY MEDICINE | Facility: CLINIC | Age: 56
End: 2021-10-19

## 2021-10-19 DIAGNOSIS — F17.200 TOBACCO DEPENDENCE SYNDROME: ICD-10-CM

## 2021-10-19 NOTE — TELEPHONE ENCOUNTER
Pt calling to request a refill of her Chantix.  Pt does have this on file with current refills but states that she had an insurance change and the pharmacy needs the prescription resent - this is done.    Pt also inquires about Pfizer booster information.  Pt second dose was 6/17/21; pt was given scheduling number and told she may schedule 12/17/21 and after.    WILLIAM Valdez RN  [facility]

## 2021-10-20 ENCOUNTER — OFFICE VISIT (OUTPATIENT)
Dept: URGENT CARE | Facility: URGENT CARE | Age: 56
End: 2021-10-20
Payer: COMMERCIAL

## 2021-10-20 VITALS
SYSTOLIC BLOOD PRESSURE: 122 MMHG | HEART RATE: 70 BPM | TEMPERATURE: 98.1 F | RESPIRATION RATE: 18 BRPM | DIASTOLIC BLOOD PRESSURE: 61 MMHG | OXYGEN SATURATION: 96 % | WEIGHT: 128 LBS | BODY MASS INDEX: 22.67 KG/M2

## 2021-10-20 DIAGNOSIS — L23.89 ALLERGIC CONTACT DERMATITIS DUE TO OTHER AGENTS: Primary | ICD-10-CM

## 2021-10-20 PROCEDURE — 99213 OFFICE O/P EST LOW 20 MIN: CPT | Performed by: PHYSICIAN ASSISTANT

## 2021-10-20 RX ORDER — BENZOCAINE/MENTHOL 6 MG-10 MG
LOZENGE MUCOUS MEMBRANE 2 TIMES DAILY
Qty: 30 G | Refills: 0 | Status: SHIPPED | OUTPATIENT
Start: 2021-10-20 | End: 2021-10-27

## 2021-10-20 NOTE — PROGRESS NOTES
URGENT CARE VISIT:    SUBJECTIVE:   HPI:   Eunice Malloy is a 56 year old who presents with rash located over face since 1 day(s) ago. Rash is sudden onset and rash seems to be stable. She describes rash as itching and red. Patient denies difficulty breathing or throat/tongue swelling. Patient has tried hand  wipes with no relief of symptoms. Patient used a new soap for face yesterday before rash. Denies new foods or medications.  Patient denies previous history of a similar rash. No one around them has had a similar rash.     PMH:   Past Medical History:   Diagnosis Date     Anxiety 4/11/2013     Cold sore 8/15/2011     Depression      Hypothyroidism 12/9/2010     Tobacco abuse      Allergies: Sulfa drugs   Medications:   Current Outpatient Medications   Medication Sig Dispense Refill     acetaminophen (TYLENOL) 500 MG tablet Take 1-2 tablets (500-1,000 mg) by mouth every 6 hours as needed for pain 60 tablet 0     acyclovir (ZOVIRAX) 5 % external cream Apply topically 5 times daily 5 g 11     bacitracin 500 UNIT/GM external ointment Apply topically 2 times daily 15 g 0     bisacodyl (DULCOLAX) 5 MG EC tablet Take 2 tablets by mouth at bedtime 2 days prior to your procedure.  Take 2 tablets by mouth at 3:00pm one day prior to your procedure. 4 tablet 0     cyclobenzaprine (FLEXERIL) 10 MG tablet Take 0.5-1 tablets (5-10 mg) by mouth 3 times daily as needed for muscle spasms 30 tablet 0     hydrocortisone (CORTAID) 1 % external cream Apply topically 2 times daily for 7 days 30 g 0     ibuprofen (ADVIL/MOTRIN) 800 MG tablet TAKE 1 TABLET BY MOUTH EVERY 8 HOURS AS NEEDED FOR MODERATE PAIN 90 tablet 0     levothyroxine (SYNTHROID/LEVOTHROID) 112 MCG tablet TAKE 1 TABLET(112 MCG) BY MOUTH DAILY 30 tablet 0     Multiple Vitamins-Minerals (MULTIVITAL PO) Take 1 tablet by mouth       polyethylene glycol (GOLYTELY) 236 g suspension Take as directed in patient instructions.  One day before exam fill jug with powder  and one gallon of water.  At 6:00pm drink 8oz glass of mixture every 15 min until the jug is half empty.  Store the rest in the refrigerator.  Day of exam: 6 hours prior to exam drink the other half of mixture. 4000 mL 0     traZODone (DESYREL) 50 MG tablet TAKE 1 TO 2 TABLETS BY MOUTH AT BEDTIME AS NEEDED FOR SLEEP 180 tablet 1     valACYclovir (VALTREX) 1000 mg tablet Take 2 tablets (2,000 mg) by mouth 2 times daily For one day. 12 tablet 11     varenicline (CHANTIX RICARDO) 0.5 MG X 11 & 1 MG X 42 tablet Take 0.5 mg tab daily for 3 days, THEN 0.5 mg tab twice daily for 4 days, THEN 1 mg twice daily. 53 tablet 1     Social History:   Social History     Socioeconomic History     Marital status: Single     Spouse name: Not on file     Number of children: Not on file     Years of education: Not on file     Highest education level: Not on file   Occupational History     Not on file   Tobacco Use     Smoking status: Current Some Day Smoker     Years: 25.00     Types: Cigarettes     Smokeless tobacco: Never Used     Tobacco comment: pack = 2 days    Substance and Sexual Activity     Alcohol use: Yes     Alcohol/week: 0.0 standard drinks     Comment: rare     Drug use: No     Sexual activity: Never   Other Topics Concern     Parent/sibling w/ CABG, MI or angioplasty before 65F 55M? No   Social History Narrative    Balanced Diet - Yes    Osteoporosis Prevention Measures - Dairy servings per day: 1    Regular Exercise -  Yes Describe walking three miles a day    Dental Exam - YES - Date: once per year    Eye Exam - NO    Self Breast Exam - Yes    Abuse: Current or Past (Physical, Sexual or Emotional)- No    Do you feel safe in your environment - Yes    Guns stored in the home - No    Sunscreen used - Yes    Seatbelts used - Yes    Lipids -  UNKNOWN pt states never been tested    Glucose -  UNKNOWN    Colon Cancer Screening - No    Hemoccults - NO    Pap Test -  YES - Date: within a year    Do you have any concerns about STD's  -  No    Mammography - NO, last Mammo was 2 years ago    DEXA - NO    Immunizations reviewed and up to date - NO, unsure about last TD.    Kennedy VEGA MA             Social Determinants of Health     Financial Resource Strain:      Difficulty of Paying Living Expenses:    Food Insecurity:      Worried About Running Out of Food in the Last Year:      Ran Out of Food in the Last Year:    Transportation Needs:      Lack of Transportation (Medical):      Lack of Transportation (Non-Medical):    Physical Activity:      Days of Exercise per Week:      Minutes of Exercise per Session:    Stress:      Feeling of Stress :    Social Connections:      Frequency of Communication with Friends and Family:      Frequency of Social Gatherings with Friends and Family:      Attends Voodoo Services:      Active Member of Clubs or Organizations:      Attends Club or Organization Meetings:      Marital Status:    Intimate Partner Violence:      Fear of Current or Ex-Partner:      Emotionally Abused:      Physically Abused:      Sexually Abused:        ROS: ROS otherwise found to be negative except as noted above.    OBJECTIVE:  /61   Pulse 70   Temp 98.1  F (36.7  C)   Resp 18   Wt 58.1 kg (128 lb)   LMP 05/14/2008   SpO2 96%   BMI 22.67 kg/m    General: WDWN in NAD.   Eyes: Non-injected conjunctivas without drainage bilaterally.  Ears: Bilateral TMs are easily visualized without erythema, injection, or effusion. No erythema or edema of external canals.    Oropharynx: No erythema of oropharynx. No edema of tongue.   Cardiac: RRR without murmurs, rubs, or gallops.  Respiratory: LCTAB without adventitious sounds. Non-labored breathing.  Integumentary:   Distribution: localized  Location: forehead and cheeks    Color: red,  Lesion type: maculopapular, confluent with no other findings  Neuro: Alert and oriented.       ASSESSMENT:     ICD-10-CM    1. Allergic contact dermatitis due to other agents  L23.89 hydrocortisone  (CORTAID) 1 % external cream        PLAN:  Patient Instructions   Patient was educated on the natural course of rash likely due to new soap.  Take medication as prescribed. Conservative measures discussed including over-the-counter antihistamines (Benadryl every 6 hours). See your primary care provider if symptoms worsen or do not improve in 7 days. Seek emergency care if you develop severe pain/redness, shortness of breath, or difficulty swallowing.     Patient verbalized understanding and is agreeable to plan. The patient was discharged ambulatory and in stable condition.    Orquidea Terry PA-C on 10/20/2021 at 10:47 AM

## 2021-10-20 NOTE — TELEPHONE ENCOUNTER
Please offer MTM visit for smoking cessation visit if she would like. If she knows she would like nicotine replacement and knows what she would like,  let me know. Alyse Mcclendon M.D.

## 2021-10-20 NOTE — TELEPHONE ENCOUNTER
Talked to pt.  Told her about the PA denial.  She will check with her insurance for options.  She will let us know.  JUAN DAVID Sanchez

## 2021-10-20 NOTE — PATIENT INSTRUCTIONS
Patient was educated on the natural course of rash.  Take medication as prescribed. Conservative measures discussed including over-the-counter antihistamines (Benadryl every 6 hours). See your primary care provider if symptoms worsen or do not improve in 7 days. Seek emergency care if you develop severe pain/redness, shortness of breath, or difficulty swallowing.

## 2021-10-20 NOTE — TELEPHONE ENCOUNTER
PA was denied. Please order alternative med with complete SIG or begin appeal process.     If you would like to appeal:   Create letter of medical necessity or    Compile supporting clinical documentation in EPIC Telephone encounter (TE).     Route TE to: [facility].    Plan exclusion.    Are there alternatives available??

## 2021-11-09 DIAGNOSIS — G47.00 INSOMNIA, UNSPECIFIED TYPE: ICD-10-CM

## 2021-11-11 RX ORDER — TRAZODONE HYDROCHLORIDE 50 MG/1
TABLET, FILM COATED ORAL
Qty: 180 TABLET | Refills: 1 | Status: SHIPPED | OUTPATIENT
Start: 2021-11-11 | End: 2022-05-18

## 2021-11-11 NOTE — TELEPHONE ENCOUNTER
Prescription approved per [facility] Refill Protocol.  LIZETH SingletaryN, RN  [facility]

## 2021-11-23 DIAGNOSIS — E03.9 HYPOTHYROIDISM, UNSPECIFIED TYPE: ICD-10-CM

## 2021-11-26 NOTE — TELEPHONE ENCOUNTER
Routing refill request to provider for review/approval because:  Labs not current:     Normal TSH on file in past 12 months  TSH   Date Value Ref Range Status   11/11/2020 3.26 0.40 - 4.00 mU/L Final     Last Written Prescription Date:  10/25/21  Last Fill Quantity: 30,  # refills: 0   Last office visit: 7/19/2021 with prescribing provider:  House   [facility] Office Visit:      Ana Valdez RN  [facility]

## 2021-12-09 ENCOUNTER — LAB (OUTPATIENT)
Dept: LAB | Facility: CLINIC | Age: 56
End: 2021-12-09
Payer: COMMERCIAL

## 2021-12-09 DIAGNOSIS — Z13.220 SCREENING FOR HYPERLIPIDEMIA: ICD-10-CM

## 2021-12-09 LAB
T4 FREE SERPL-MCNC: 1.34 NG/DL (ref 0.76–1.46)
TSH SERPL DL<=0.005 MIU/L-ACNC: 0.16 MU/L (ref 0.4–4)

## 2021-12-09 PROCEDURE — 84443 ASSAY THYROID STIM HORMONE: CPT

## 2021-12-09 PROCEDURE — 36415 COLL VENOUS BLD VENIPUNCTURE: CPT

## 2021-12-09 PROCEDURE — 80061 LIPID PANEL: CPT

## 2021-12-09 PROCEDURE — 84439 ASSAY OF FREE THYROXINE: CPT

## 2021-12-09 NOTE — LETTER
December 21, 2021      Eunice LAURA Gama  [address] [facility] [address]        Dear ,    Here are the results:   Lab Results        Component                Value               Date                        CHOL                     243                 12/09/2021                  CHOL                     214                 05/04/2015             Lab Results        Component                Value               Date                        HDL                      75                  12/09/2021                  HDL                      101                 05/04/2015             Lab Results        Component                Value               Date                        LDL                      118                 12/09/2021                  LDL                      99                  05/04/2015             Lab Results        Component                Value               Date                        TRIG                     248                 12/09/2021                  TRIG                     69                  05/04/2015             Lab Results        Component                Value               Date                        CHOLHDLRATIO             2.1                 05/04/2015               Desired or goal levels are:   CHOLESTEROL: Desirable is less than 200.   HDL (Good Cholesterol): Desirable is greater than 40 (for men) greater than 50 (for women).   LDL (Bad Cholesterol): Desirable is less than 130 (or less than 100 if you have heart disease or diabetes). Borderline 130-160.   TRIGLYCERIDES: Desirable is less than 150.  Borderline is 150-200.     For high triglycerides, reduce the intake of jam, jelly, honey, alcohol, and/or coffee creamers.     As you may know, an elevated cholesterol is one factor that increases your risk for heart disease and stroke. You can improve your cholesterol by controlling the amount and type of fat you eat and by increasing your daily activity level.     Here are some  ways to improve your nutrition:   Eat less fat (especially butter, Crisco and other saturated fats)   Buy lean cuts of meat, reduce your portions of red meat or substitute poultry or fish   Use skim milk and low-fat dairy products   Eat no more than 4 egg yolks per week   Avoid fried or fast foods that are high in fat   Eat more fruits and vegetables       Also consider starting or increasing your aerobic activity. Aerobic activity is the best way to improve HDL (good) cholesterol. If this would be new to you, please talk with me first about what activities are safe for you.       Please feel free to contact us with any questions or if you would like more information.       Resulted Orders   Lipid panel reflex to direct LDL Fasting   Result Value Ref Range    Cholesterol 243 (H) <200 mg/dL    Triglycerides 248 (H) <150 mg/dL    Direct Measure HDL 75 >=50 mg/dL    LDL Cholesterol Calculated 118 (H) <=100 mg/dL    Non HDL Cholesterol 168 (H) <130 mg/dL    Patient Fasting > 8hrs? Unknown     Narrative    Cholesterol  Desirable:  <200 mg/dL    Triglycerides  Normal:  Less than 150 mg/dL  Borderline High:  150-199 mg/dL  High:  200-499 mg/dL  Very High:  Greater than or equal to 500 mg/dL    Direct Measure HDL  Female:  Greater than or equal to 50 mg/dL   Male:  Greater than or equal to 40 mg/dL    LDL Cholesterol  Desirable:  <100mg/dL  Above Desirable:  100-129 mg/dL   Borderline High:  130-159 mg/dL   High:  160-189 mg/dL   Very High:  >= 190 mg/dL    Non HDL Cholesterol  Desirable:  130 mg/dL  Above Desirable:  130-159 mg/dL  Borderline High:  160-189 mg/dL  High:  190-219 mg/dL  Very High:  Greater than or equal to 220 mg/dL   TSH WITH FREE T4 REFLEX   Result Value Ref Range    TSH 0.16 (L) 0.40 - 4.00 mU/L   T4 free   Result Value Ref Range    Free T4 1.34 0.76 - 1.46 ng/dL       Sincerely,      Alyse Mcclendon MD

## 2021-12-10 NOTE — TELEPHONE ENCOUNTER
Dr. Mcclendon:    TSH   Date Value Ref Range Status   12/09/2021 0.16 (L) 0.40 - 4.00 mU/L Final   11/11/2020 3.26 0.40 - 4.00 mU/L Final     90 days pended.    Ana Valdez RN  [facility]

## 2021-12-13 RX ORDER — LEVOTHYROXINE SODIUM 112 UG/1
TABLET ORAL
Qty: 90 TABLET | Refills: 0 | Status: SHIPPED | OUTPATIENT
Start: 2021-12-13 | End: 2023-04-05 | Stop reason: DRUGHIGH

## 2021-12-15 DIAGNOSIS — E03.9 HYPOTHYROIDISM, UNSPECIFIED TYPE: ICD-10-CM

## 2021-12-16 LAB
CHOLEST SERPL-MCNC: 243 MG/DL
FASTING STATUS PATIENT QL REPORTED: ABNORMAL
HDLC SERPL-MCNC: 75 MG/DL
LDLC SERPL CALC-MCNC: 118 MG/DL
NONHDLC SERPL-MCNC: 168 MG/DL
TRIGL SERPL-MCNC: 248 MG/DL

## 2021-12-16 RX ORDER — LEVOTHYROXINE SODIUM 112 UG/1
TABLET ORAL
Qty: 90 TABLET | Refills: 0 | OUTPATIENT
Start: 2021-12-16

## 2021-12-16 NOTE — TELEPHONE ENCOUNTER
Med refused. 90 day supply sent on 12/13/2021.    Samia Roberts RN  [facility]

## 2021-12-20 DIAGNOSIS — E03.9 HYPOTHYROIDISM, UNSPECIFIED TYPE: Primary | ICD-10-CM

## 2021-12-20 RX ORDER — LEVOTHYROXINE SODIUM 100 UG/1
100 TABLET ORAL DAILY
Qty: 90 TABLET | Refills: 0 | Status: SHIPPED | OUTPATIENT
Start: 2021-12-20 | End: 2022-08-25

## 2021-12-20 NOTE — RESULT ENCOUNTER NOTE
The results of your recent lipid (cholesterol) profile were abnormal.    Here are the results:  Lab Results       Component                Value               Date                       CHOL                     243                 12/09/2021                 CHOL                     214                 05/04/2015            Lab Results       Component                Value               Date                       HDL                      75                  12/09/2021                 HDL                      101                 05/04/2015            Lab Results       Component                Value               Date                       LDL                      118                 12/09/2021                 LDL                      99                  05/04/2015            Lab Results       Component                Value               Date                       TRIG                     248                 12/09/2021                 TRIG                     69                  05/04/2015            Lab Results       Component                Value               Date                       CHOLHDLRATIO             2.1                 05/04/2015              Desired or goal levels are:  CHOLESTEROL: Desirable is less than 200.   HDL (Good Cholesterol): Desirable is greater than 40 (for men) greater than 50 (for women).  LDL (Bad Cholesterol): Desirable is less than 130 (or less than 100 if you have heart disease or diabetes). Borderline 130-160.  TRIGLYCERIDES: Desirable is less than 150.  Borderline is 150-200.    For high triglycerides, reduce the intake of jam, jelly, honey, alcohol, and/or coffee creamers.    As you may know, an elevated cholesterol is one factor that increases your risk for heart disease and stroke. You can improve your cholesterol by controlling the amount and type of fat you eat and by increasing your daily activity level.    Here are some ways to improve your nutrition:  Eat less fat (especially  butter, Crisco and other saturated fats)  Buy lean cuts of meat, reduce your portions of red meat or substitute poultry or fish  Use skim milk and low-fat dairy products  Eat no more than 4 egg yolks per week  Avoid fried or fast foods that are high in fat  Eat more fruits and vegetables      Also consider starting or increasing your aerobic activity. Aerobic activity is the best way to improve HDL (good) cholesterol. If this would be new to you, please talk with me first about what activities are safe for you.      Please feel free to contact us with any questions or if you would like more information.

## 2021-12-20 NOTE — TELEPHONE ENCOUNTER
RN -- please let pt know that the thyroid test was abnormal, suggesting pt needs a lower dose of levothyroxine. Pt is currently on 112mcg daily.  I would recommend levothyroxine 1000mcg daily and return for lab visit in 8 weeks for TSH. Alyse Mcclendon M.D.          TSH   Date Value Ref Range Status   12/09/2021 0.16 (L) 0.40 - 4.00 mU/L Final   11/11/2020 3.26 0.40 - 4.00 mU/L Final

## 2021-12-20 NOTE — TELEPHONE ENCOUNTER
Spoke with patient and informed of lab result and new prescription.  Lab appt made for 2/18/22, prescription sent to new pharmacy pt requested.      Ana Valdez RN  [facility]

## 2022-01-10 ENCOUNTER — TELEPHONE (OUTPATIENT)
Dept: FAMILY MEDICINE | Facility: CLINIC | Age: 57
End: 2022-01-10
Payer: COMMERCIAL

## 2022-01-10 DIAGNOSIS — Z13.6 CARDIOVASCULAR SCREENING; LDL GOAL LESS THAN 160: Primary | ICD-10-CM

## 2022-01-10 NOTE — TELEPHONE ENCOUNTER
Reason for Call:  Request for results:    Name of test or procedure: labs    Date of test of procedure: 12-9-2021    Location of the test or procedure:  [facility]    OK to leave the result message on voice mail or with a family member? YES    Phone number Patient can be reached at:  Home number on file [phone] (home)    Additional comments:     Call taken on 1/10/2022 at 3:19 PM by Ting Rahman

## 2022-01-10 NOTE — TELEPHONE ENCOUNTER
RN called patient back and went over lipid results and result note from Dr. Mcclendon from 12/9 labs.     Patient requesting to have lipds re-drawn as she is shocked they are so high since she has been exercising and eating healthy. She would like to fast this time.     Pended.     MARCIN Garcia RN  [facility]

## 2022-01-14 ENCOUNTER — PATIENT OUTREACH (OUTPATIENT)
Dept: SURGERY | Facility: CLINIC | Age: 57
End: 2022-01-14
Payer: COMMERCIAL

## 2022-01-14 NOTE — PROGRESS NOTES
Patient met with Dr. Song 9/2021 regarding a back mass and excision was recommended.  Attempted to reach patient via telephone (x2) with no answer. LM on  to call office.  Letter mailed.    ASC  MB  [facility]   60 min

## 2022-01-14 NOTE — LETTER
January 14, 2022        Eunice Malloy  [address] [facility] [address]        Dear Eunice Malloy,    I am writing at this time to follow up on the office visit that you had with Dr. Sary Song in September of 2021 regarding a back mass.  If you would like to schedule a surgical procedure or have any questions, please contact our office at your earliest convenience at [phone].    Sincerely,        The General Surgery Team

## 2022-01-25 ENCOUNTER — NURSE TRIAGE (OUTPATIENT)
Dept: NURSING | Facility: CLINIC | Age: 57
End: 2022-01-25
Payer: COMMERCIAL

## 2022-01-25 NOTE — TELEPHONE ENCOUNTER
Pt called in states she has headache.  The pain is on the front of her head.  The headache started today at 10:30 am.  The headache is come and go.  The headache is 5-6/10 on the scale.  The headache like not new but like this.  The Pt states she didn't know the cause of the headache.  Not diagnosed with migraine in the past.  No recent head injury.  No fever, no stiff neck, no eye pain, no sore throat, no runny nose.  Not pregnant.  The disposition is to be seen today or tomorrow.  Care advice given per protocol.  Patient agrees with care advice given.   Agreed to call back if he has additional symptoms or questions.      Darrel Faye [place] Nurse Advisor 1/25/2022 1:32 PM      Reason for Disposition    New headache and age > 50    Additional Information    Negative: Difficult to awaken or acting confused (e.g., disoriented, slurred speech)    Negative: Weakness of the face, arm or leg on one side of the body and new onset    Negative: Numbness of the face, arm or leg on one side of the body and new onset    Negative: Loss of speech or garbled speech and new onset    Negative: Passed out (i.e., fainted, collapsed and was not responding)    Negative: Sounds like a life-threatening emergency to the triager    Negative: Followed a head injury within last 3 days    Negative: Traumatic Brain Injury (TBI) is suspected    Negative: Sinus pain of forehead and yellow or green nasal discharge    Negative: Pregnant    Negative: Unable to walk without falling    Negative: Stiff neck (can't touch chin to chest)    Negative: Possibility of carbon monoxide exposure    Negative: SEVERE headache, states 'worst headache' of life    Negative: SEVERE headache, sudden-onset (i.e., reaching maximum intensity within seconds to 1 hour)    Negative: Severe pain in one eye    Negative: Loss of vision or double vision (Exception: same as prior migraines)    Negative: Patient sounds very sick or weak to the triager    Negative: Fever > 103  F (39.4 C)    Negative: Fever > 100.0 F (37.8 C) and has diabetes mellitus or a weak immune system (e.g., HIV positive, cancer chemotherapy, organ transplant, splenectomy, chronic steroids)    Negative: SEVERE headache (e.g., excruciating) and has had severe headaches before    Negative: SEVERE headache and not relieved by pain meds    Negative: SEVERE headache and vomiting    Negative: SEVERE headache and fever    Negative: New headache and weak immune system (e.g., HIV positive, cancer chemo, splenectomy, organ transplant, chronic steroids)    Negative: Fever present > 3 days (72 hours)    Negative: Patient wants to be seen    Negative: Unexplained headache that is present > 24 hours    Protocols used: HEADACHE-A-[place]

## 2022-01-27 ENCOUNTER — PATIENT OUTREACH (OUTPATIENT)
Dept: SURGERY | Facility: CLINIC | Age: 57
End: 2022-01-27
Payer: COMMERCIAL

## 2022-01-27 NOTE — PROGRESS NOTES
Attempted to reach patient to arrange a surgical procedure with Dr. Song. No answer, LM on VM to call office.

## 2022-01-28 ENCOUNTER — IMMUNIZATION (OUTPATIENT)
Dept: NURSING | Facility: CLINIC | Age: 57
End: 2022-01-28
Payer: COMMERCIAL

## 2022-01-28 PROCEDURE — 91305 COVID-19,PF,PFIZER (12+ YRS): CPT

## 2022-01-28 PROCEDURE — 0054A COVID-19,PF,PFIZER (12+ YRS): CPT

## 2022-03-24 ENCOUNTER — NURSE TRIAGE (OUTPATIENT)
Dept: NURSING | Facility: CLINIC | Age: 57
End: 2022-03-24
Payer: COMMERCIAL

## 2022-03-24 ENCOUNTER — ANCILLARY PROCEDURE (OUTPATIENT)
Dept: GENERAL RADIOLOGY | Facility: CLINIC | Age: 57
End: 2022-03-24
Attending: PHYSICIAN ASSISTANT
Payer: COMMERCIAL

## 2022-03-24 ENCOUNTER — APPOINTMENT (OUTPATIENT)
Dept: CT IMAGING | Facility: CLINIC | Age: 57
End: 2022-03-24
Attending: EMERGENCY MEDICINE
Payer: COMMERCIAL

## 2022-03-24 ENCOUNTER — HOSPITAL ENCOUNTER (EMERGENCY)
Facility: CLINIC | Age: 57
Discharge: HOME | End: 2022-03-24
Attending: EMERGENCY MEDICINE | Admitting: EMERGENCY MEDICINE
Payer: COMMERCIAL

## 2022-03-24 ENCOUNTER — TELEPHONE (OUTPATIENT)
Dept: NURSING | Facility: CLINIC | Age: 57
End: 2022-03-24

## 2022-03-24 ENCOUNTER — OFFICE VISIT (OUTPATIENT)
Dept: URGENT CARE | Facility: URGENT CARE | Age: 57
End: 2022-03-24
Payer: COMMERCIAL

## 2022-03-24 VITALS
WEIGHT: 128 LBS | HEIGHT: 63 IN | BODY MASS INDEX: 22.68 KG/M2 | SYSTOLIC BLOOD PRESSURE: 112 MMHG | DIASTOLIC BLOOD PRESSURE: 78 MMHG | RESPIRATION RATE: 18 BRPM | OXYGEN SATURATION: 98 % | TEMPERATURE: 98.1 F | HEART RATE: 86 BPM

## 2022-03-24 VITALS
DIASTOLIC BLOOD PRESSURE: 77 MMHG | TEMPERATURE: 98.1 F | OXYGEN SATURATION: 99 % | BODY MASS INDEX: 22.67 KG/M2 | WEIGHT: 128 LBS | HEART RATE: 86 BPM | SYSTOLIC BLOOD PRESSURE: 116 MMHG

## 2022-03-24 DIAGNOSIS — R10.84 ABDOMINAL PAIN, GENERALIZED: Primary | ICD-10-CM

## 2022-03-24 DIAGNOSIS — R10.84 ABDOMINAL PAIN, GENERALIZED: ICD-10-CM

## 2022-03-24 DIAGNOSIS — K57.32 SIGMOID DIVERTICULITIS: ICD-10-CM

## 2022-03-24 DIAGNOSIS — N20.1 URETERIC STONE: ICD-10-CM

## 2022-03-24 DIAGNOSIS — N30.00 ACUTE CYSTITIS WITHOUT HEMATURIA: ICD-10-CM

## 2022-03-24 LAB
ALBUMIN SERPL-MCNC: 3.8 G/DL (ref 3.4–5)
ALBUMIN UR-MCNC: ABNORMAL MG/DL
ALP SERPL-CCNC: 176 U/L (ref 40–150)
ALT SERPL W P-5'-P-CCNC: 62 U/L (ref 0–50)
ANION GAP SERPL CALCULATED.3IONS-SCNC: 7 MMOL/L (ref 3–14)
APPEARANCE UR: ABNORMAL
AST SERPL W P-5'-P-CCNC: 64 U/L (ref 0–45)
BACTERIA #/AREA URNS HPF: ABNORMAL /HPF
BASOPHILS # BLD AUTO: 0 10E3/UL (ref 0–0.2)
BASOPHILS NFR BLD AUTO: 0 %
BILIRUB DIRECT SERPL-MCNC: 0.1 MG/DL (ref 0–0.2)
BILIRUB SERPL-MCNC: 0.5 MG/DL (ref 0.2–1.3)
BILIRUB UR QL STRIP: NEGATIVE
BUN SERPL-MCNC: 17 MG/DL (ref 7–30)
CALCIUM SERPL-MCNC: 9.7 MG/DL (ref 8.5–10.1)
CAOX CRY #/AREA URNS HPF: ABNORMAL /HPF
CHLORIDE BLD-SCNC: 103 MMOL/L (ref 94–109)
CLUE CELLS: ABNORMAL
CO2 SERPL-SCNC: 24 MMOL/L (ref 20–32)
COLOR UR AUTO: YELLOW
CREAT SERPL-MCNC: 0.72 MG/DL (ref 0.52–1.04)
EOSINOPHIL # BLD AUTO: 0.1 10E3/UL (ref 0–0.7)
EOSINOPHIL NFR BLD AUTO: 0 %
ERYTHROCYTE [DISTWIDTH] IN BLOOD BY AUTOMATED COUNT: 13 % (ref 10–15)
GFR SERPL CREATININE-BSD FRML MDRD: >90 ML/MIN/1.73M2
GLUCOSE BLD-MCNC: 112 MG/DL (ref 70–99)
GLUCOSE UR STRIP-MCNC: NEGATIVE MG/DL
HCT VFR BLD AUTO: 41.4 % (ref 35–47)
HGB BLD-MCNC: 13.5 G/DL (ref 11.7–15.7)
HGB UR QL STRIP: ABNORMAL
IMM GRANULOCYTES # BLD: 0 10E3/UL
IMM GRANULOCYTES NFR BLD: 0 %
KETONES UR STRIP-MCNC: NEGATIVE MG/DL
LEUKOCYTE ESTERASE UR QL STRIP: NEGATIVE
LIPASE SERPL-CCNC: 97 U/L (ref 73–393)
LYMPHOCYTES # BLD AUTO: 1.8 10E3/UL (ref 0.8–5.3)
LYMPHOCYTES NFR BLD AUTO: 12 %
MCH RBC QN AUTO: 30.1 PG (ref 26.5–33)
MCHC RBC AUTO-ENTMCNC: 32.6 G/DL (ref 31.5–36.5)
MCV RBC AUTO: 92 FL (ref 78–100)
MONOCYTES # BLD AUTO: 1.5 10E3/UL (ref 0–1.3)
MONOCYTES NFR BLD AUTO: 10 %
NEUTROPHILS # BLD AUTO: 11.9 10E3/UL (ref 1.6–8.3)
NEUTROPHILS NFR BLD AUTO: 78 %
NITRATE UR QL: NEGATIVE
PH UR STRIP: 5.5 [PH] (ref 5–7)
PLATELET # BLD AUTO: 337 10E3/UL (ref 150–450)
POTASSIUM BLD-SCNC: 4.4 MMOL/L (ref 3.4–5.3)
PROT SERPL-MCNC: 7.3 G/DL (ref 6.8–8.8)
RBC # BLD AUTO: 4.48 10E6/UL (ref 3.8–5.2)
RBC #/AREA URNS AUTO: ABNORMAL /HPF
SODIUM SERPL-SCNC: 134 MMOL/L (ref 133–144)
SP GR UR STRIP: >=1.03 (ref 1–1.03)
SQUAMOUS #/AREA URNS AUTO: ABNORMAL /LPF
TRICHOMONAS, WET PREP: ABNORMAL
UROBILINOGEN UR STRIP-ACNC: 0.2 E.U./DL
WBC # BLD AUTO: 15.3 10E3/UL (ref 4–11)
WBC #/AREA URNS AUTO: ABNORMAL /HPF
WBC'S/HIGH POWER FIELD, WET PREP: ABNORMAL
YEAST, WET PREP: ABNORMAL

## 2022-03-24 PROCEDURE — 99285 EMERGENCY DEPT VISIT HI MDM: CPT | Mod: 25

## 2022-03-24 PROCEDURE — 81001 URINALYSIS AUTO W/SCOPE: CPT | Performed by: PHYSICIAN ASSISTANT

## 2022-03-24 PROCEDURE — 99214 OFFICE O/P EST MOD 30 MIN: CPT | Performed by: PHYSICIAN ASSISTANT

## 2022-03-24 PROCEDURE — 36415 COLL VENOUS BLD VENIPUNCTURE: CPT | Performed by: EMERGENCY MEDICINE

## 2022-03-24 PROCEDURE — 36415 COLL VENOUS BLD VENIPUNCTURE: CPT | Performed by: PHYSICIAN ASSISTANT

## 2022-03-24 PROCEDURE — 82248 BILIRUBIN DIRECT: CPT | Performed by: EMERGENCY MEDICINE

## 2022-03-24 PROCEDURE — 87210 SMEAR WET MOUNT SALINE/INK: CPT | Performed by: PHYSICIAN ASSISTANT

## 2022-03-24 PROCEDURE — 74019 RADEX ABDOMEN 2 VIEWS: CPT | Performed by: RADIOLOGY

## 2022-03-24 PROCEDURE — 250N000011 HC RX IP 250 OP 636: Performed by: EMERGENCY MEDICINE

## 2022-03-24 PROCEDURE — 74177 CT ABD & PELVIS W/CONTRAST: CPT

## 2022-03-24 PROCEDURE — 83690 ASSAY OF LIPASE: CPT | Performed by: EMERGENCY MEDICINE

## 2022-03-24 PROCEDURE — 80053 COMPREHEN METABOLIC PANEL: CPT | Performed by: PHYSICIAN ASSISTANT

## 2022-03-24 PROCEDURE — 85025 COMPLETE CBC W/AUTO DIFF WBC: CPT | Performed by: PHYSICIAN ASSISTANT

## 2022-03-24 PROCEDURE — 250N000009 HC RX 250: Performed by: EMERGENCY MEDICINE

## 2022-03-24 PROCEDURE — 250N000013 HC RX MED GY IP 250 OP 250 PS 637: Performed by: EMERGENCY MEDICINE

## 2022-03-24 RX ORDER — NAPROXEN 500 MG/1
500 TABLET ORAL
Qty: 21 TABLET | Refills: 0 | Status: SHIPPED | OUTPATIENT
Start: 2022-03-24 | End: 2022-03-31

## 2022-03-24 RX ORDER — IOPAMIDOL 755 MG/ML
64 INJECTION, SOLUTION INTRAVASCULAR ONCE
Status: COMPLETED | OUTPATIENT
Start: 2022-03-24 | End: 2022-03-24

## 2022-03-24 RX ORDER — SENNA AND DOCUSATE SODIUM 50; 8.6 MG/1; MG/1
1-2 TABLET, FILM COATED ORAL 2 TIMES DAILY PRN
Qty: 30 TABLET | Refills: 0 | Status: SHIPPED | OUTPATIENT
Start: 2022-03-24 | End: 2022-04-23

## 2022-03-24 RX ORDER — CEPHALEXIN 500 MG/1
500 CAPSULE ORAL 4 TIMES DAILY
Qty: 28 CAPSULE | Refills: 0 | Status: SHIPPED | OUTPATIENT
Start: 2022-03-24 | End: 2022-03-31

## 2022-03-24 RX ORDER — HYDROCODONE BITARTRATE AND ACETAMINOPHEN 5; 325 MG/1; MG/1
1 TABLET ORAL EVERY 6 HOURS PRN
Qty: 10 TABLET | Refills: 0 | Status: SHIPPED | OUTPATIENT
Start: 2022-03-24 | End: 2022-03-27

## 2022-03-24 RX ORDER — TAMSULOSIN HYDROCHLORIDE 0.4 MG/1
0.8 CAPSULE ORAL DAILY
Qty: 14 CAPSULE | Refills: 0 | Status: SHIPPED | OUTPATIENT
Start: 2022-03-24 | End: 2022-03-31

## 2022-03-24 RX ADMIN — IOPAMIDOL 64 ML: 755 INJECTION, SOLUTION INTRAVENOUS at 19:10

## 2022-03-24 RX ADMIN — AMOXICILLIN AND CLAVULANATE POTASSIUM 1 TABLET: 875; 125 TABLET, FILM COATED ORAL at 20:55

## 2022-03-24 RX ADMIN — SODIUM CHLORIDE 60 ML: 900 INJECTION INTRAVENOUS at 19:10

## 2022-03-24 ASSESSMENT — ENCOUNTER SYMPTOMS
CHILLS: 0
COUGH: 0
NAUSEA: 0
ABDOMINAL PAIN: 1
SORE THROAT: 0
APPETITE CHANGE: 0
FLANK PAIN: 1
FEVER: 0
VOMITING: 0

## 2022-03-24 NOTE — PROGRESS NOTES
Abdominal pain, generalized  - UA Macro with Reflex to Micro and Culture - lab collect; Future  - UA Macro with Reflex to Micro and Culture - lab collect  - Urine Microscopic  - Wet prep - Clinic Collect  - XR KUB; Future  - CBC with platelets and differential; Future  - Basic metabolic panel  (Ca, Cl, CO2, Creat, Gluc, K, Na, BUN); Future  - CBC with platelets and differential  - Basic metabolic panel  (Ca, Cl, CO2, Creat, Gluc, K, Na, BUN)    Ureteric stone  - tamsulosin (FLOMAX) 0.4 MG capsule; Take 2 capsules (0.8 mg) by mouth daily for 7 days  - naproxen (NAPROSYN) 500 MG tablet; Take 1 tablet (500 mg) by mouth 3 times daily (with meals) for 7 days    Acute cystitis without hematuria  - cephALEXin (KEFLEX) 500 MG capsule; Take 1 capsule (500 mg) by mouth 4 times daily for 7 days    30 minutes spent on the date of the encounter doing chart review, history and exam, documentation and further activities per the note     See Patient Instructions  Patient Instructions     Patient Education     Understanding Kidney Stones  Your kidneys are bean-shaped organs. They help filter extra salts, waste, and water from your body. You need to drink enough water every day to help flush the extra salts into your urine. Aim for 6 to 8 8-ounce cups every day.   What are kidney stones?  Kidney stones are made up of chemical crystals that separate out from urine. These crystals clump together to make stones. They may stay in the kidney or move into the urinary tract.    Why kidney stones form  Kidneys form stones for many reasons. If you don t drink enough water, for instance, you won t have enough urine to dilute chemicals. Then the chemicals may form crystals, which can develop into stones. Here are some reasons why kidney stones form:     Fluid loss (dehydration). This can concentrate urine, causing stones to form.    Certain foods. Some foods contain large amounts of the chemicals that sometimes crystallize into stones. Eating  foods that contain a lot of meat or salt can lead to more kidney stones.    Kidney infections. These infections foster stones by slowing urine flow or changing the acid balance of your urine.    Family history. If family members have had kidney stones, you re more likely to have them, too.    A lack of certain substances in your urine. Some substances can help protect you from forming stones. If you don t have enough of these in your urine, stone formation can increase.  Where stones form  Stones begin in the cup-shaped part of the kidney (calyx). Some stay in the calyx and grow. Others move into the kidney, pelvis, or into the ureter. There they can lodge, block the flow of urine, and cause pain.     Symptoms  Many stones cause sudden and severe pain and bloody urine. Others cause nausea or frequent, burning urination. Symptoms often depend on your stone s size and location. Fever may indicate a serious infection. Call your healthcare provider right away if you develop a fever.   [employer] last reviewed this educational content on 2/1/2020 2000-2021 [employer]. All rights reserved. This information is not intended as a substitute for professional medical care. Always follow your healthcare professional's instructions.           Patient Education     Understanding Urinary Tract Infections (UTIs)   Most UTIs are caused by bacteria, but they may also be caused by viruses or fungi. Bacteria from the bowel are the most common source of infection. The infection may start because of any of the following:     Sexual activity.  During sex, bacteria can travel from the penis, vagina, or rectum into the urethra.     Bacteria outside the rectum getting into the urethra.  Bacteria on the skin outside the rectum may travel into the urethra. This is more common in women since the rectum and urethra are closer to each other than in men. Wiping from front to back after using the toilet and keeping the area clean  can help prevent germs from getting to the urethra.    Blocked urine flow through the urinary tract. If urine sits too long, germs may start to grow out of control.  Parts of the urinary tract  The infection can occur in any part of the urinary tract.       The kidneys. These organs collect and store urine.    The ureters. These tubes carry urine from the kidneys to the bladder.    The bladder. This holds urine until you are ready to let it out.    The urethra. This tube carries urine from the bladder out of the body. It is shorter in women, so bacteria can move through it more easily. The urethra is longer in men, so a UTI is less likely to reach the bladder or kidneys in men.  [employer] last reviewed this educational content on 1/1/2020 2000-2021 [employer]. All rights reserved. This information is not intended as a substitute for professional medical care. Always follow your healthcare professional's instructions.               GRAEME Minaya [facility]    Subjective   56 year old who presents to clinic today for the following health issues:    Urgent Care and Abdominal Pain       HPI     Genitourinary - Female  Onset/Duration: Yesterday  Description:   Painful urination (Dysuria): no           Frequency: YES  Blood in urine (Hematuria): no  Delay in urine (Hesitency): no  Intensity: moderate  Progression of Symptoms:  worsening  Accompanying Signs & Symptoms:  Fever/chills: chills  Flank pain: right sided   Nausea and vomiting: no  Vaginal symptoms: none  Abdominal/Pelvic Pain: YES- Right sided abdomen up to ribs  History:   History of frequent UTI s: no  History of kidney stones: no  Precipitating or alleviating factors: None  Therapies tried and outcome:  None     Review of Systems   Review of Systems   See HPI     Objective    Temp: 98.1  F (36.7  C) Temp src: Tympanic BP: 116/77 Pulse: 86     SpO2: 99 %       Physical Exam   Physical Exam  Constitutional:        General: She is not in acute distress.     Appearance: Normal appearance. She is normal weight. She is not ill-appearing, toxic-appearing or diaphoretic.   HENT:      Head: Normocephalic and atraumatic.   Cardiovascular:      Rate and Rhythm: Normal rate.      Pulses: Normal pulses.   Pulmonary:      Effort: Pulmonary effort is normal. No respiratory distress.   Abdominal:      Palpations: Abdomen is soft. There is no shifting dullness, fluid wave, hepatomegaly, splenomegaly, mass or pulsatile mass.      Tenderness: There is abdominal tenderness in the right upper quadrant, right lower quadrant and suprapubic area. There is no right CVA tenderness, left CVA tenderness or rebound.      Hernia: No hernia is present.       Neurological:      General: No focal deficit present.      Mental Status: She is alert and oriented to person, place, and time. Mental status is at baseline.      Gait: Gait normal.   Psychiatric:         Mood and Affect: Mood normal.         Behavior: Behavior normal.         Thought Content: Thought content normal.         Judgment: Judgment normal.          Results for orders placed or performed in visit on 03/24/22 (from the past 24 hour(s))   UA Macro with Reflex to Micro and Culture - lab collect    Specimen: Urine, Midstream   Result Value Ref Range    Color Urine Yellow Colorless, Straw, Light Yellow, Yellow    Appearance Urine Slightly Cloudy (A) Clear    Glucose Urine Negative Negative mg/dL    Bilirubin Urine Negative Negative    Ketones Urine Negative Negative mg/dL    Specific Gravity Urine >=1.030 1.003 - 1.035    Blood Urine Small (A) Negative    pH Urine 5.5 5.0 - 7.0    Protein Albumin Urine Trace (A) Negative mg/dL    Urobilinogen Urine 0.2 0.2, 1.0 E.U./dL    Nitrite Urine Negative Negative    Leukocyte Esterase Urine Negative Negative   Urine Microscopic   Result Value Ref Range    Bacteria Urine Few (A) None Seen /HPF    RBC Urine None Seen 0-2 /HPF /HPF    WBC Urine None Seen  0-5 /HPF /HPF    Squamous Epithelials Urine None Seen None Seen /LPF    Calcium Oxalate Crystals Urine Moderate (A) None Seen /HPF    Narrative    Urine Culture not indicated   Wet prep - Clinic Collect    Specimen: Vagina; Swab   Result Value Ref Range    Trichomonas Absent Absent    Yeast Absent Absent    Clue Cells Absent Absent    WBCs/high power field 1+ (A) None   CBC with platelets and differential    Narrative    The following orders were created for panel order CBC with platelets and differential.  Procedure                               Abnormality         Status                     ---------                               -----------         ------                     CBC with platelets and d...[[id number]]                      In process                   Please view results for these tests on the individual orders.

## 2022-03-24 NOTE — LETTER
March 24, 2022      To Whom It May Concern:      Eunice Malloy was seen in our urgent care today, 03/24/22.  I expect her condition to improve over the next 1-3 days.  She may return to work when improved.    Sincerely,        Tushar Coombs PA-C

## 2022-03-24 NOTE — ED PROVIDER NOTES
History   Chief Complaint:  Abdominal Pain and Flank Pain       The history is provided by the patient.      Eunice Malloy is a 56 year old female with history of low back pain and diverticulitis who presents for evaluation of abdominal pain and flank pain. She reports pain for a couple days, noting most pain in the center of her abdomen and on the right side. She says pain has been constant, but fluctuating in severity over the past two days. The patient went to urgent care where an x-ray showed two kidney stones. She notes that the provider was also concerned for appendicitis and referred her to the emergency department after blood work showed high WBC. The patient reports continued abdominal pain, but some relief after taking pain medication. She states that her appetite has been good. Her LMP was a few years ago. The patient denies fever, nausea, vomiting, chills, sore throat, cough, and new vaginal discharge. She reports occasional alcohol use and denies tobacco use. The patient denies family history of gallbladder issues.     Review of Systems   Constitutional: Negative for appetite change, chills and fever.   HENT: Negative for sore throat.    Respiratory: Negative for cough.    Gastrointestinal: Positive for abdominal pain. Negative for nausea and vomiting.   Genitourinary: Positive for flank pain. Negative for vaginal discharge.   All other systems reviewed and are negative.        Allergies:  Sulfa Drugs    Medications:  Dulcolax  Keflex  Flexeril  Levothyroxine  Flomax  Desyrel  Valtrex  Varenicline    Past Medical History:     Anxiety  Depression  Hypothyroidism  Tobacco abuse  Cold sore  Low back pain  Labral tear of shoulder  SI joint dysfunction and pain  Colonic polyps  Insomnia  Diverticulitis  Benign paroxysmal positional vertigo    Past Surgical History:    Colonoscopy  Excise primary ganglion wrist  Tonsillectomy     Family History:    The patient denies past family history of gallbladder  "issues.     Social History:  The patient presents alone.  She reports occasional alcohol use, denies tobacco use.      Physical Exam     Patient Vitals for the past 24 hrs:   BP Temp Temp src Pulse Resp SpO2 Height Weight   03/24/22 2000 -- -- -- -- -- 98 % -- --   03/24/22 1939 -- -- -- -- -- 97 % -- --   03/24/22 1825 112/78 98.1  F (36.7  C) Oral 86 18 98 % 1.6 m (5' 3\") 58.1 kg (128 lb)       Physical Exam  Constitutional: Well developed, mildly forlorn, nontox appearance  Head: Atraumatic.   Mouth/Throat: Oropharynx is clear and moist.   Neck:  no stridor  Eyes: no scleral icterus  Cardiovascular: RRR, 2+ bilat radial pulses  Pulmonary/Chest: nml resp effort  Abdominal: ND, soft, RLQ tenderness, no rebound or guarding   Ext: Warm, well perfused, no edema  Neurological: A&O, symmetric facies, moves ext x4  Skin: Skin is warm and dry.   Psychiatric: Behavior is normal. Thought content normal.   Nursing note and vitals reviewed.    Emergency Department Course     Imaging:  CT Abdomen Pelvis w Contrast   Final Result   IMPRESSION:    1.  Acute sigmoid diverticulitis. No abscess.   2.  A 1.6 cm left adrenal nodule is not definitively characterized, but is unchanged since 10/30/2018. Stability over time suggests a benign etiology. Consider adrenal nodule protocol CT for further characterization.      Report per radiology    Laboratory:  Labs Ordered and Resulted from Time of ED Arrival to Time of ED Departure   HEPATIC FUNCTION PANEL - Abnormal       Result Value    Bilirubin Total 0.5      Bilirubin Direct 0.1      Protein Total 7.3      Albumin 3.8      Alkaline Phosphatase 176 (*)     AST 64 (*)     ALT 62 (*)    LIPASE - Normal    Lipase 97          Emergency Department Course:     Reviewed:  I reviewed nursing notes, vitals, past medical history and Care Everywhere    Assessments:  1850 I obtained history and examined the patient as noted above.   2037 I rechecked the patient and explained findings. At this " point I feel that the patient is safe for discharge, and the patient agrees.     Interventions:   Augmentin 1 tablet Oral     Disposition:  The patient was discharged to home.     Impression & Plan     Medical Decision Makin year old female presenting w/ abdominal pain     DDx includes diverticulitis, constipation, colitis, enteritis, abdominal pain NOS, UTI, ureteral stone, intra-abdominal abscess.  Doubt  Vascular catastrophe given hx and physical exam.  Labs reviewed from  and significant for leukocytosis, urine inconsistent w/ infection.  Imaging sig for diverticulitis as described above.  Given labs and imaging, patient's presentation consistent with uncomplicated diverticulitis with no evidence of perforation on imaging.  First dose antibiotics given as noted above.  Patient reports feeling improved.  At this time I feel the pt is safe for discharge.  Recommendations given regarding follow up with PCP and return to the emergency department as needed for new or worsening symptoms.  Pt counseled on all results, disposition and diagnosis.  They are understanding and agreeable to plan. Patient discharged in stable condition.         Diagnosis:    ICD-10-CM    1. Sigmoid diverticulitis  K57.32        Discharge Medications:  Discharge Medication List as of 3/24/2022  8:45 PM      START taking these medications    Details   amoxicillin-clavulanate (AUGMENTIN) 875-125 MG tablet Take 1 tablet by mouth 2 times daily for 19 doses, Disp-19 tablet, R-0, E-Prescribe      HYDROcodone-acetaminophen (NORCO) 5-325 MG tablet Take 1 tablet by mouth every 6 hours as needed for breakthrough pain or severe pain, Disp-10 tablet, R-0, E-Prescribe      SENNA-docusate sodium (SENNA S) 8.6-50 MG tablet Take 1-2 tablets by mouth 2 times daily as needed (if taking opioid), Disp-30 tablet, R-0, Local Print             Scribe Disclosure:  I, Megan Valdez, am serving as a scribe at 6:37 PM on 3/24/2022 to document services  personally performed by Kenny Clarke MD based on my observations and the provider's statements to me.          Kenny Clarke MD  03/24/22 1762

## 2022-03-24 NOTE — PATIENT INSTRUCTIONS
Patient Education     Understanding Kidney Stones  Your kidneys are bean-shaped organs. They help filter extra salts, waste, and water from your body. You need to drink enough water every day to help flush the extra salts into your urine. Aim for 6 to 8 8-ounce cups every day.   What are kidney stones?  Kidney stones are made up of chemical crystals that separate out from urine. These crystals clump together to make stones. They may stay in the kidney or move into the urinary tract.    Why kidney stones form  Kidneys form stones for many reasons. If you don t drink enough water, for instance, you won t have enough urine to dilute chemicals. Then the chemicals may form crystals, which can develop into stones. Here are some reasons why kidney stones form:     Fluid loss (dehydration). This can concentrate urine, causing stones to form.    Certain foods. Some foods contain large amounts of the chemicals that sometimes crystallize into stones. Eating foods that contain a lot of meat or salt can lead to more kidney stones.    Kidney infections. These infections foster stones by slowing urine flow or changing the acid balance of your urine.    Family history. If family members have had kidney stones, you re more likely to have them, too.    A lack of certain substances in your urine. Some substances can help protect you from forming stones. If you don t have enough of these in your urine, stone formation can increase.  Where stones form  Stones begin in the cup-shaped part of the kidney (calyx). Some stay in the calyx and grow. Others move into the kidney, pelvis, or into the ureter. There they can lodge, block the flow of urine, and cause pain.     Symptoms  Many stones cause sudden and severe pain and bloody urine. Others cause nausea or frequent, burning urination. Symptoms often depend on your stone s size and location. Fever may indicate a serious infection. Call your healthcare provider right away if you develop a  fever.   [employer] last reviewed this educational content on 2/1/2020 2000-2021 [employer]. All rights reserved. This information is not intended as a substitute for professional medical care. Always follow your healthcare professional's instructions.           Patient Education     Understanding Urinary Tract Infections (UTIs)   Most UTIs are caused by bacteria, but they may also be caused by viruses or fungi. Bacteria from the bowel are the most common source of infection. The infection may start because of any of the following:     Sexual activity.  During sex, bacteria can travel from the penis, vagina, or rectum into the urethra.     Bacteria outside the rectum getting into the urethra.  Bacteria on the skin outside the rectum may travel into the urethra. This is more common in women since the rectum and urethra are closer to each other than in men. Wiping from front to back after using the toilet and keeping the area clean can help prevent germs from getting to the urethra.    Blocked urine flow through the urinary tract. If urine sits too long, germs may start to grow out of control.  Parts of the urinary tract  The infection can occur in any part of the urinary tract.       The kidneys. These organs collect and store urine.    The ureters. These tubes carry urine from the kidneys to the bladder.    The bladder. This holds urine until you are ready to let it out.    The urethra. This tube carries urine from the bladder out of the body. It is shorter in women, so bacteria can move through it more easily. The urethra is longer in men, so a UTI is less likely to reach the bladder or kidneys in men.  [employer] last reviewed this educational content on 1/1/2020 2000-2021 [employer]. All rights reserved. This information is not intended as a substitute for professional medical care. Always follow your healthcare professional's instructions.

## 2022-03-24 NOTE — TELEPHONE ENCOUNTER
Called patient, no same day appts available. She will come in to  today    MARCIN Garcia RN  [facility]

## 2022-03-24 NOTE — TELEPHONE ENCOUNTER
Nurse Triage SBAR    Is this a 2nd Level Triage? YES, LICENSED PRACTITIONER REVIEW IS REQUIRED    Situation: Patient calling regarding her intermittent abdominal pain for the last two days that seems to be getting progressively worse.     Background: Abdominal pain started 2 days ago.     Assessment:     Middle-lower abdominal pain, right/middle/left side.   Intermittently sharp abdominal pain.Was constant for > 2 hours last night, not currently constant today  Severity: Can range from mild-severe.    Currently pain moderate and intermittent today.   Last bowel movement yesterday.    Denies blood in stool  Denies black tarry stools  Denies any vomiting  Denies any nausea  Denies any fevers.     Recommendation: Per protocol, go to ED/UCC or office with PCP approval. Patient would prefer to see Dr. Mcclendon (PCP) in clinic. Per RN discretion will send to PCP and care team for second level triage.     Do you recommended ED?  Do you recommended UCC?  Do you recommended ADS?  Do you recommended office visit today? Is so, are you able to fit patient into scheduled?     Call patient back at: [phone]   Okay to leave a detailed message? YES    Routed to provider    Does the patient meet one of the following criteria for ADS visit consideration? 16+ years old, with an [facility] PCP     TIP  Providers, please consider if this condition is appropriate for management at one of our Acute and Diagnostic Services sites.     If patient is a good candidate, please use dotphrase <dot>triageresponse and select Refer to ADS to document.      Mirlande Ortiz RN, BSN Nurse Triage Advisor 8:16 AM 3/24/2022       Reason for Disposition    Patient sounds very sick or weak to the triager    Additional Information    Negative: Sounds like a life-threatening emergency to the triager    Negative: Shock suspected (e.g., cold/pale/clammy skin, too weak to stand, low BP, rapid pulse)    Negative: Passed out (i.e., fainted, collapsed and was not  responding)    Negative: Chest pain    Negative: Pain is mainly in upper abdomen (if needed ask: 'is it mainly above the belly button?')    Negative: Abdominal pain and pregnant > 20 weeks    Negative: Abdominal pain and pregnant < 20 weeks    Negative: SEVERE abdominal pain (e.g., excruciating)    Negative: Vomiting red blood or black (coffee ground) material    Negative: Bloody, black, or tarry bowel movements (Exception: chronic-unchanged black-grey bowel movements and is taking iron pills or Pepto-bismol)    Negative: Vomiting bile (green color)    Negative: Constant abdominal pain lasting > 2 hours    Protocols used: ABDOMINAL PAIN - FEMALE-A-[place]    COVID 19 Nurse Triage Plan/Patient Instructions    Please be aware that novel coronavirus (COVID-19) may be circulating in the community. If you develop symptoms such as fever, cough, or SOB or if you have concerns about the presence of another infection including coronavirus (COVID-19), please contact your health care provider or visit [email or web address].     Disposition/Instructions    In-Person Visit with provider recommended. Reference Visit Selection Guide.    Thank you for taking steps to prevent the spread of this virus.  o Limit your contact with others.  o Wear a simple mask to cover your cough.  o Wash your hands well and often.    Resources    [facility]: About COVID-19: [email or web address]    CDC: What to Do If You're Sick: www.cdc.gov/coronavirus/2019-ncov/about/steps-when-sick.html    CDC: Ending Home Isolation: www.cdc.gov/coronavirus/2019-ncov/hcp/disposition-in-home-patients.html     CDC: Caring for Someone: www.cdc.gov/coronavirus/2019-ncov/if-you-are-sick/care-for-someone.html     [facility]: Interim Guidance for Hospital Discharge to Home: www.health.[facility]/diseases/coronavirus/hcp/hospdischarge.pdf    [facility] clinical trials (COVID-19 research studies): [email or web address]      Below are the COVID-19 hotlines at the [place] Department of Health ([facility]). Interpreters are available.   o For health questions: Call 237-377-9450 or 1-684.167.6802 (7 a.m. to 7 p.m.)  o For questions about schools and childcare: Call 192-067-7452 or 1-308.882.7921 (7 a.m. to 7 p.m.)

## 2022-03-24 NOTE — TELEPHONE ENCOUNTER
Pt is returning a call.      Reviewed lab results with patient. I advised her, per Tushar Coombs PA-C, that he would like he to be seen in the ED, now to have an abdominal CT scan done, to rule out appendicitis.  She verbalized understanding and stated that she would to go [facility] now to be seen in the ED.    Luly Kowalski RN  [facility] Nurse Advisor  3/24/2022 at 5:48 PM      Will route to PA at  so he is aware.

## 2022-03-24 NOTE — ED TRIAGE NOTES
Pt received call from  after being seen to go to ED for CT scan for possible appendicitis. Pt states she has kidney stones and was given medication for that. States generalized abdominal pain along with right flank pain.

## 2022-03-24 NOTE — TELEPHONE ENCOUNTER
She needs to be seen in person, same day clinic visit or urgent care if none available, thank you!  Sincerely,  Dr. Chiqui Garza MD  3/24/2022

## 2022-03-25 ENCOUNTER — PATIENT OUTREACH (OUTPATIENT)
Dept: FAMILY MEDICINE | Facility: CLINIC | Age: 57
End: 2022-03-25
Payer: COMMERCIAL

## 2022-03-25 NOTE — TELEPHONE ENCOUNTER
"ED/Discharge Protocol    \"Hi, my name is Samia, a registered nurse, and I am calling on behalf of Dr. Mcclendon's office at [facility].  I am calling to follow up and see how things are going for you after your recent visit.\"    \"I see that you were in the ER on yesterday.    How are you doing now that you are home?\" better, recovering    Is patient experiencing symptoms that may require a hospital visit?  no    Discharge Instructions    \"Let's review your discharge instructions.  What is/are the follow-up recommendations?  Pt. Response: Call her doctor    \"Were you instructed to make a follow-up appointment?\"  Pt. Response: No.       \"When you see the provider, I would recommend that you bring your discharge instructions with you.    Medications    \"How many new medications are you on since your hospitalization/ED visit?\"    0-1  \"How many of your current medicines changed (dose, timing, name, etc.) while you were in the hospital/ED visit?\"   0-1  \"Do you have questions about your medications?\"   No  \"Were you newly diagnosed with heart failure, COPD, diabetes or did you have a heart attack?\"   No  Was MTM referral placed (*Make sure to put transitions as reason for referral)?   No    Call Summary    \"Do you have any questions or concerns about your condition or care plan at the moment?\"    No  Triage nurse advice given: none    Patient was in ER once in the past year     \"If you have questions or things don't continue to improve, we encourage you contact us through the main clinic number,  [phone].  Even if the clinic is not open, triage nurses are available 24/7 to help you.     We would like you to know that our clinic has extended hours (provide information).  We also have urgent care (provide details on closest location and hours/contact info)\"      \"Thank you for your time and take care!\"    Samai Roberts RN  [facility]  "

## 2022-03-25 NOTE — TELEPHONE ENCOUNTER
ED / Discharge Outreach Protocol    Patient Contact    Attempt # 1    Was call answered?  No.  Left message on voicemail with information to call me back.    LIZETH GarciaN RN  [facility]

## 2022-03-25 NOTE — DISCHARGE INSTRUCTIONS
Discharge Instructions  Diverticulitis  Your provider has diagnosed you with diverticulitis.  Diverticulitis is an infection of a diverticulum, which is a tiny sack-like structure that protrudes off the wall of the colon (large intestine).  These sacks are created over years of increased pressure in the colon (this is diverticulosis), usually as a result of a diet without enough fruits, vegetables, and whole grains. Because these sacks are small, bacteria can get trapped inside them and cause an infection (this is divericulitis).  This infection often causes abdominal (belly) pain, fever, nausea (sick to stomach), and vomiting (throwing up). Diverticulitis is usually treated at home.  However, sometimes diverticulitis needs treatment in the hospital and may even need surgery.    Generally, every Emergency Department visit should have a follow-up clinic visit with either a primary or a specialty clinic/provider. Please follow-up as instructed by your emergency provider today.    Return to the Emergency Department if:   You get an oral temperature above 102oF or as directed by your provider.  You have blood in your stools (bright red or black, tarry stools), or have blood in your vomit.  You keep vomiting or cannot drink liquids or cannot keep your medicine down.  You cannot have a bowel movement or you cannot pass gas.  Your stomach gets bloated or bigger.  You faint or become very weak.  Your pain is too bad to tolerate.  You have new symptoms or anything that worries you.    What can I do to help myself?  Fill any antibiotic prescriptions the provider gave you and take them right away. Be sure to finish the whole antibiotic prescription.  For the first day or two at home drink only clear liquids.  This lets your intestines rest.  If your pain has improved after one or two days, you may start eating mild foods. Soda crackers, toast, plain noodles, gelatin, applesauce and bananas are good first choices.  Avoid  "foods that have acid, are spicy, fatty or fibrous (such as meats, coarse grains, vegetables). You may start eating these foods again in about 3-4 days when you are better.  Once you are back to normal, eat a high fiber diet of fruits, vegetables and whole grains. Some people think you should avoid eating nuts, seeds, and corn, but there is no definite proof this makes any difference in whether you will get diverticulitis again.   Pain and fever can be treated with Tylenol  (acetaminophen) or you might have been prescribed a pain medication.    Probiotics: If you have been given an antibiotic, you may want to also take a probiotic pill or eat yogurt with live cultures. Probiotics have \"good bacteria\" to help your intestines stay healthy. Studies have shown that probiotics help prevent diarrhea and other intestine problems (including C. diff infection) when you take antibiotics. You can buy these without a prescription in the pharmacy section of the store.  If you were given a prescription for medicine here today, be sure to read all of the information (including the package insert) that comes with your prescription.  This will include important information about the medicine, its side effects, and any warnings that you need to know about.  The pharmacist who fills the prescription can provide more information and answer questions you may have about the medicine.  If you have questions or concerns that the pharmacist cannot address, please call or return to the Emergency Department.     Remember that you can always come back to the Emergency Department if you are not able to see your regular provider in the amount of time listed above, if you get any new symptoms, or if there is anything that worries you.    "

## 2022-05-06 ENCOUNTER — HOSPITAL ENCOUNTER (OUTPATIENT)
Dept: MAMMOGRAPHY | Facility: CLINIC | Age: 57
Discharge: HOME | End: 2022-05-06
Attending: FAMILY MEDICINE | Admitting: FAMILY MEDICINE
Payer: COMMERCIAL

## 2022-05-06 DIAGNOSIS — Z12.31 VISIT FOR SCREENING MAMMOGRAM: ICD-10-CM

## 2022-05-06 PROCEDURE — 77067 SCR MAMMO BI INCL CAD: CPT

## 2022-05-17 ENCOUNTER — NURSE TRIAGE (OUTPATIENT)
Dept: NURSING | Facility: CLINIC | Age: 57
End: 2022-05-17
Payer: COMMERCIAL

## 2022-05-17 DIAGNOSIS — G47.00 INSOMNIA, UNSPECIFIED TYPE: ICD-10-CM

## 2022-05-17 NOTE — TELEPHONE ENCOUNTER
"Pt requests refill on trazodone prescribed by Tereza Coello. Rx refill request routed to provider refill pool.     Reason for Disposition    [1] Caller requesting a NON-URGENT new prescription or refill AND [2] triager unable to refill per unit policy    Additional Information    Negative: Drug overdose and triager unable to answer question    Negative: Caller requesting information unrelated to medicine    Negative: Caller requesting a prescription for Strep throat and has a positive culture result    Negative: Rash while taking a medication or within 3 days of stopping it    Negative: Immunization reaction suspected    Negative: [1] Asthma and [2] having symptoms of asthma (cough, wheezing, etc.)    Negative: [1] Influenza symptoms AND [2] anti-viral med prescription request, such as Tamiflu    Negative: [1] Symptom of illness (e.g., headache, abdominal pain, earache, vomiting) AND [2] more than mild    Negative: MORE THAN A DOUBLE DOSE of a prescription or over-the-counter (OTC) drug    Negative: [1] DOUBLE DOSE (an extra dose or lesser amount) of over-the-counter (OTC) drug AND [2] any symptoms (e.g., dizziness, nausea, pain, sleepiness)    Negative: [1] DOUBLE DOSE (an extra dose or lesser amount) of prescription drug AND [2] any symptoms (e.g., dizziness, nausea, pain, sleepiness)    Negative: Took another person's prescription drug    Negative: [1] DOUBLE DOSE (an extra dose or lesser amount) of prescription drug AND [2] NO symptoms (Exception: a double dose of antibiotics)    Negative: Diabetes drug error or overdose (e.g., took wrong type of insulin or took extra dose)    Negative: [1] Request for URGENT new prescription or refill of \"essential\" medication (i.e., likelihood of harm to patient if not taken) AND [2] triager unable to fill per unit policy    Negative: [1] Prescription not at pharmacy AND [2] was prescribed by PCP recently    Negative: [1] Pharmacy calling with prescription questions " AND [2] triager unable to answer question    Negative: [1] Caller has URGENT medication question about med that PCP or specialist prescribed AND [2] triager unable to answer question    Negative: [1] Caller has NON-URGENT medication question about med that PCP prescribed AND [2] triager unable to answer question    Protocols used: MEDICATION QUESTION CALL-A-AH

## 2022-05-18 RX ORDER — TRAZODONE HYDROCHLORIDE 50 MG/1
TABLET, FILM COATED ORAL
Qty: 120 TABLET | Refills: 0 | Status: SHIPPED | OUTPATIENT
Start: 2022-05-18 | End: 2022-07-19

## 2022-05-18 NOTE — TELEPHONE ENCOUNTER
Prescription approved per [facility] Refill Protocol for 60 days.     WILLIAM Valdez RN  [facility]

## 2022-06-02 ENCOUNTER — NURSE TRIAGE (OUTPATIENT)
Dept: FAMILY MEDICINE | Facility: CLINIC | Age: 57
End: 2022-06-02
Payer: COMMERCIAL

## 2022-06-02 NOTE — TELEPHONE ENCOUNTER
Reason for call:  Patient reporting a symptom    Symptom or request: back pain    Duration (how long have symptoms been present):     Have you been treated for this before?     Additional comments: would like a nurse to call back from PCP team. Pt declined Triage.    Phone Number patient can be reached at:  Home number on file [phone] (home)    Best Time:  ANY    Can we leave a detailed message on this number:  YES    Call taken on 6/2/2022 at 9:28 AM by Klever Francisco

## 2022-06-02 NOTE — TELEPHONE ENCOUNTER
"Pt calling to report R flank pain worsening over 3 days.  Describes as R side \"toward the back beneath rib cage.\"  Unprovoked onset \"a few days ago\" - worsened last night, kept her from good sleep.  No injury, can only recall that she helped a friend rake last weekend.    Pain is intermittent, describes as \"soreness,\" doesn't worsen with deep breath, favors the area, no radiation, tender to palp, worsens with bending and twisting.  Took ibuprofen with minimal relief.      No N/V/D, no hematuria or dysuria, no changes to B&B, no fever, no SOB, denies possibility of pregnancy.    Pt is not available until after work.  No clinic openings today or tomorrow.  Pt states she will come to  after work, advised open until 8pm.    WILLIAM Valdez RN  [facility]    Reason for Disposition    MODERATE pain (e.g., interferes with normal activities or awakens from sleep)    Additional Information    Negative: Passed out (i.e., lost consciousness, collapsed and was not responding)    Negative: Shock suspected (e.g., cold/pale/clammy skin, too weak to stand, low BP, rapid pulse)    Negative: Sounds like a life-threatening emergency to the triager    Negative: Followed a major injury to the back (e.g., MVA, fall > 10 feet or 3 meters, penetrating injury, etc.)    Negative: Upper, mid or lower back pain that occurs mainly in the midline    Negative: SEVERE pain (e.g., excruciating, scale 8-10) and present > 1 hour    Negative: Sudden onset of severe flank pain and age > 60    Negative: Abdominal pain and age > 60    Negative: Unable to urinate (or only a few drops) > 4 hours and bladder feels very full (e.g., palpable bladder or strong urge to urinate)    Negative: Pain radiates into groin, scrotum    Negative: Blood in urine (red, pink, or tea-colored)    Negative: Vomiting    Negative: Weakness of a leg or foot (e.g., unable to bear weight, dragging foot)    Negative: Patient sounds very sick or weak to " the triager    Negative: Fever > 100.4 F (38.0 C)    Negative: Pain or burning with passing urine (urination)    Protocols used: FLANK PAIN-A-OH

## 2022-06-08 ENCOUNTER — OFFICE VISIT (OUTPATIENT)
Dept: URGENT CARE | Facility: URGENT CARE | Age: 57
End: 2022-06-08
Payer: COMMERCIAL

## 2022-06-08 VITALS
TEMPERATURE: 97.8 F | OXYGEN SATURATION: 98 % | HEIGHT: 63 IN | BODY MASS INDEX: 23.04 KG/M2 | RESPIRATION RATE: 18 BRPM | SYSTOLIC BLOOD PRESSURE: 118 MMHG | HEART RATE: 67 BPM | DIASTOLIC BLOOD PRESSURE: 84 MMHG | WEIGHT: 130 LBS

## 2022-06-08 DIAGNOSIS — R10.9 RIGHT FLANK PAIN: Primary | ICD-10-CM

## 2022-06-08 LAB
ALBUMIN UR-MCNC: NEGATIVE MG/DL
APPEARANCE UR: CLEAR
BASOPHILS # BLD AUTO: 0 10E3/UL (ref 0–0.2)
BASOPHILS NFR BLD AUTO: 0 %
BILIRUB UR QL STRIP: NEGATIVE
COLOR UR AUTO: YELLOW
EOSINOPHIL # BLD AUTO: 0.2 10E3/UL (ref 0–0.7)
EOSINOPHIL NFR BLD AUTO: 2 %
ERYTHROCYTE [DISTWIDTH] IN BLOOD BY AUTOMATED COUNT: 13.2 % (ref 10–15)
GLUCOSE UR STRIP-MCNC: NEGATIVE MG/DL
HCT VFR BLD AUTO: 41.6 % (ref 35–47)
HGB BLD-MCNC: 13.2 G/DL (ref 11.7–15.7)
HGB UR QL STRIP: ABNORMAL
IMM GRANULOCYTES # BLD: 0.1 10E3/UL
IMM GRANULOCYTES NFR BLD: 1 %
KETONES UR STRIP-MCNC: NEGATIVE MG/DL
LEUKOCYTE ESTERASE UR QL STRIP: NEGATIVE
LYMPHOCYTES # BLD AUTO: 2.9 10E3/UL (ref 0.8–5.3)
LYMPHOCYTES NFR BLD AUTO: 37 %
MCH RBC QN AUTO: 29.5 PG (ref 26.5–33)
MCHC RBC AUTO-ENTMCNC: 31.7 G/DL (ref 31.5–36.5)
MCV RBC AUTO: 93 FL (ref 78–100)
MONOCYTES # BLD AUTO: 0.8 10E3/UL (ref 0–1.3)
MONOCYTES NFR BLD AUTO: 10 %
NEUTROPHILS # BLD AUTO: 4 10E3/UL (ref 1.6–8.3)
NEUTROPHILS NFR BLD AUTO: 50 %
NITRATE UR QL: NEGATIVE
PH UR STRIP: 6 [PH] (ref 5–7)
PLATELET # BLD AUTO: 316 10E3/UL (ref 150–450)
RBC # BLD AUTO: 4.48 10E6/UL (ref 3.8–5.2)
RBC #/AREA URNS AUTO: NORMAL /HPF
SP GR UR STRIP: 1.01 (ref 1–1.03)
UROBILINOGEN UR STRIP-ACNC: 0.2 E.U./DL
WBC # BLD AUTO: 8 10E3/UL (ref 4–11)
WBC #/AREA URNS AUTO: NORMAL /HPF

## 2022-06-08 PROCEDURE — 85025 COMPLETE CBC W/AUTO DIFF WBC: CPT | Performed by: INTERNAL MEDICINE

## 2022-06-08 PROCEDURE — 80053 COMPREHEN METABOLIC PANEL: CPT | Performed by: INTERNAL MEDICINE

## 2022-06-08 PROCEDURE — 81001 URINALYSIS AUTO W/SCOPE: CPT | Performed by: INTERNAL MEDICINE

## 2022-06-08 PROCEDURE — 99214 OFFICE O/P EST MOD 30 MIN: CPT | Performed by: INTERNAL MEDICINE

## 2022-06-08 PROCEDURE — 36415 COLL VENOUS BLD VENIPUNCTURE: CPT | Performed by: INTERNAL MEDICINE

## 2022-06-08 RX ORDER — IBUPROFEN 800 MG/1
800 TABLET, FILM COATED ORAL EVERY 8 HOURS PRN
Qty: 30 TABLET | Refills: 0 | Status: SHIPPED | OUTPATIENT
Start: 2022-06-08 | End: 2023-05-10

## 2022-06-08 ASSESSMENT — ENCOUNTER SYMPTOMS
SHORTNESS OF BREATH: 0
ABDOMINAL PAIN: 0
CHILLS: 0
HEMATURIA: 0
FEVER: 0
COUGH: 0
NAUSEA: 0
DYSURIA: 0
VOMITING: 0
DIAPHORESIS: 0

## 2022-06-08 NOTE — PROGRESS NOTES
ASSESSMENT AND PLAN:      ICD-10-CM    1. Right flank pain  R10.9 UA macro with reflex to Microscopic and Culture - Clinc Collect     Comprehensive metabolic panel (BMP + Alb, Alk Phos, ALT, AST, Total. Bili, TP)     ibuprofen (ADVIL/MOTRIN) 800 MG tablet     CBC with platelets and differential     Comprehensive metabolic panel (BMP + Alb, Alk Phos, ALT, AST, Total. Bili, TP)     CBC with platelets and differential     Urine Microscopic       Patient with right flank pain for less than 2 weeks.    Recent bout of diverticulitis that presented with abdominal pain and right flank pain.  Reviewed CT from March 2022.  During this episode she had a elevated white count.  It would be unusual for diverticulitis to present with right flank pain.    Differential would also include renal stone pyelonephritis. Referred pain from gallstones  Of note MRI in the past did show slightly bulging disc T12- L1.  Symptoms did improve with ibuprofen    For work-up decision to check CBC.  Screen for elevated white count with recent diverticulitis.  Screen for anemia  Screen urinalysis to look for hematuria to suggest stone and also infection.  Comprehensive metabolic profile file screen renal function and   liver function tests with concern of gallbladder colic    Discussed with patient may not be able to find exact etiology for back pain.  Initial CBC and urine test are normal  Metabolic panel liver function test should be back tomorrow.    If these are normal, potentially could be related to her bulging disc noted on MRI.    I would like her to try ibuprofen    If elevated liver function tests or abnormal kidney function would recommend abdominal ultrasound    Another thought is that she did have x-ray suggesting constipation although usually it does not cause flank pain.  I did not do an x-ray at this visit    Recommend follow-up with primary in the next 1 to 2 weeks for recheck and decide further evaluation is needed  CC chart to  PCP    There are no Patient Instructions on file for this visit.  No follow-ups on file.        Jeaneth Perdomo MD  [facility]    Subjective     Eunice Malloy is a 56 year old who presents for Patient presents with:  Urgent Care  Back Pain: X1 week, Rt side back pain, more painful in the evening.     an established patient of [facility].    Back Pain    Onset of symptoms was < 2 week(s) ago.  Location: right middle of back  Radiation: does not radiate  No injury  Worried about kidney area        Has hx back problems -reviewed chart has history of lumbar back pain and sacroiliac dysfunction  Also has lipomas and low back area with planned removal that was delayed due to COVID  Therapies to improve symptoms include: ibuprofen - has helped with this pain but took her last dose      Review of Systems   Constitutional: Negative for chills, diaphoresis and fever.   Respiratory: Negative for cough and shortness of breath.    Gastrointestinal: Negative for abdominal pain, nausea and vomiting.   Genitourinary: Negative for dysuria and hematuria.   Skin: Negative for rash.       MR LUMBAR SPINE W/O CONTRAST 7/28/2021 5:20 PM     Provided History: Low back pain, > 6 wks; Bilateral low back pain  without sciatica, unspecified chronicity     ICD-10: Bilateral low back pain without sciatica, unspecified  chronicity     Comparison: 8/5/2015     Technique: Sagittal T1-weighted, sagittal STIR, 3D volumetric axial  and sagittal reconstructed T2-weighted images of the lumbar spine were  obtained without intravenous contrast.      Findings: There are 5 lumbar-type vertebrae assumed for the purposes  of this dictation.  The tip of the conus medullaris is at L1.  Normal  lumbar vertebral alignment.  There is no significant disc height  narrowing .  There is a T1 and T2 hyperintense focus in the T12  vertebral body and also one in the L4 vertebral body that are  unchanged and consistent with benign vertebral  "hemangiomata.     On a level by level basis:     T12-L1: Minimal posterior disc bulge but no significant spinal canal  stenosis or neural foraminal narrowing.     L1-2: Minimal circumferential posterior disc bulge but no significant  spinal canal stenosis or neural foraminal narrowing.     L2-3: No spinal canal or neuroforaminal stenosis.     L3-4: No spinal canal or neuroforaminal stenosis.     L4-5: Minimal disc bulge with facet arthropathy but no spinal canal  stenosis. The neural foramen are mildly narrowed bilaterally.     L5-S1: Circumferential disc bulge with facet arthropathy without  significant spinal canal stenosis. The lateral recesses are narrowed  bilaterally without neural impingement. There is mild-to-moderate  bilateral foraminal narrowing.     Paraspinous tissues are within normal limits.                                                                      Impression: No significant change from the prior study with multilevel  lumbar spondylosis as detailed above.      ABDOMEN ONE VIEW  3/24/2022 10:52 AM      HISTORY: Abdominal pain, generalized.     COMPARISON: None.                                                                      IMPRESSION: Moderate volume of retained colonic stool. No evidence of  small bowel obstruction or free air. No urinary tract calculi  demonstrated.     SAUNDRA NARANJO MD         Objective    /84   Pulse 67   Temp 97.8  F (36.6  C) (Temporal)   Resp 18   Ht 1.6 m (5' 3\")   Wt 59 kg (130 lb)   LMP 05/14/2008   SpO2 98%   BMI 23.03 kg/m    Physical Exam  Vitals reviewed.   Constitutional:       Appearance: Normal appearance.   Neurological:      Mental Status: She is alert.            Results for orders placed or performed in visit on 06/08/22 (from the past 24 hour(s))   UA macro with reflex to Microscopic and Culture - Clinc Collect    Specimen: Urine, Midstream   Result Value Ref Range    Color Urine Yellow Colorless, Straw, Light Yellow, Yellow    " Appearance Urine Clear Clear    Glucose Urine Negative Negative mg/dL    Bilirubin Urine Negative Negative    Ketones Urine Negative Negative mg/dL    Specific Gravity Urine 1.010 1.003 - 1.035    Blood Urine Trace (A) Negative    pH Urine 6.0 5.0 - 7.0    Protein Albumin Urine Negative Negative mg/dL    Urobilinogen Urine 0.2 0.2, 1.0 E.U./dL    Nitrite Urine Negative Negative    Leukocyte Esterase Urine Negative Negative   Urine Microscopic   Result Value Ref Range    RBC Urine 0-2 0-2 /HPF /HPF    WBC Urine 0-5 0-5 /HPF /HPF    Narrative    Urine Culture not indicated   CBC with platelets and differential    Narrative    The following orders were created for panel order CBC with platelets and differential.  Procedure                               Abnormality         Status                     ---------                               -----------         ------                     CBC with platelets and d...[[id number]]                      Final result                 Please view results for these tests on the individual orders.   CBC with platelets and differential   Result Value Ref Range    WBC Count 8.0 4.0 - 11.0 10e3/uL    RBC Count 4.48 3.80 - 5.20 10e6/uL    Hemoglobin 13.2 11.7 - 15.7 g/dL    Hematocrit 41.6 35.0 - 47.0 %    MCV 93 78 - 100 fL    MCH 29.5 26.5 - 33.0 pg    MCHC 31.7 31.5 - 36.5 g/dL    RDW 13.2 10.0 - 15.0 %    Platelet Count 316 150 - 450 10e3/uL    % Neutrophils 50 %    % Lymphocytes 37 %    % Monocytes 10 %    % Eosinophils 2 %    % Basophils 0 %    % Immature Granulocytes 1 %    Absolute Neutrophils 4.0 1.6 - 8.3 10e3/uL    Absolute Lymphocytes 2.9 0.8 - 5.3 10e3/uL    Absolute Monocytes 0.8 0.0 - 1.3 10e3/uL    Absolute Eosinophils 0.2 0.0 - 0.7 10e3/uL    Absolute Basophils 0.0 0.0 - 0.2 10e3/uL    Absolute Immature Granulocytes 0.1 <=0.4 10e3/uL

## 2022-06-09 ENCOUNTER — TELEPHONE (OUTPATIENT)
Dept: FAMILY MEDICINE | Facility: CLINIC | Age: 57
End: 2022-06-09
Payer: COMMERCIAL

## 2022-06-09 LAB
ALBUMIN SERPL-MCNC: 4.1 G/DL (ref 3.4–5)
ALP SERPL-CCNC: 146 U/L (ref 40–150)
ALT SERPL W P-5'-P-CCNC: 30 U/L (ref 0–50)
ANION GAP SERPL CALCULATED.3IONS-SCNC: 2 MMOL/L (ref 3–14)
AST SERPL W P-5'-P-CCNC: 18 U/L (ref 0–45)
BILIRUB SERPL-MCNC: 0.3 MG/DL (ref 0.2–1.3)
BUN SERPL-MCNC: 15 MG/DL (ref 7–30)
CALCIUM SERPL-MCNC: 9.1 MG/DL (ref 8.5–10.1)
CHLORIDE BLD-SCNC: 106 MMOL/L (ref 94–109)
CO2 SERPL-SCNC: 30 MMOL/L (ref 20–32)
CREAT SERPL-MCNC: 0.65 MG/DL (ref 0.52–1.04)
GFR SERPL CREATININE-BSD FRML MDRD: >90 ML/MIN/1.73M2
GLUCOSE BLD-MCNC: 87 MG/DL (ref 70–99)
POTASSIUM BLD-SCNC: 4.8 MMOL/L (ref 3.4–5.3)
PROT SERPL-MCNC: 8.1 G/DL (ref 6.8–8.8)
SODIUM SERPL-SCNC: 138 MMOL/L (ref 133–144)

## 2022-06-09 NOTE — TELEPHONE ENCOUNTER
Reason for Call:  Same Day Appointment, Requested Provider:  Dr. Mcclendon    PCP: Alyse Mcclendon    Reason for visit: Follow up from UC    Duration of symptoms:     Have you been treated for this in the past? No    Additional comments:     Can we leave a detailed message on this number? YES    Phone number patient can be reached at: Cell number on file:    Telephone Information:   Mobile [phone]       Best Time: anytime    Call taken on 6/9/2022 at 5:21 PM by Brandy Judge

## 2022-06-13 NOTE — TELEPHONE ENCOUNTER
Please call to schedule virtual UC follow up with me. Transfer to a nurse if questions or has concerns about waiting to be seen so they can triage. Thanks! Alyse Mcclendon M.D.

## 2022-06-14 ENCOUNTER — LAB (OUTPATIENT)
Dept: URGENT CARE | Facility: URGENT CARE | Age: 57
End: 2022-06-14
Attending: FAMILY MEDICINE
Payer: COMMERCIAL

## 2022-06-14 ENCOUNTER — VIRTUAL VISIT (OUTPATIENT)
Dept: FAMILY MEDICINE | Facility: CLINIC | Age: 57
End: 2022-06-14
Payer: COMMERCIAL

## 2022-06-14 DIAGNOSIS — M25.551 HIP PAIN, RIGHT: ICD-10-CM

## 2022-06-14 DIAGNOSIS — R50.9 FEVER, UNSPECIFIED FEVER CAUSE: ICD-10-CM

## 2022-06-14 DIAGNOSIS — Z20.822 SUSPECTED COVID-19 VIRUS INFECTION: ICD-10-CM

## 2022-06-14 DIAGNOSIS — R10.9 RIGHT FLANK PAIN: Primary | ICD-10-CM

## 2022-06-14 LAB — SARS-COV-2 RNA RESP QL NAA+PROBE: NEGATIVE

## 2022-06-14 PROCEDURE — 99213 OFFICE O/P EST LOW 20 MIN: CPT | Mod: CS | Performed by: FAMILY MEDICINE

## 2022-06-14 PROCEDURE — U0003 INFECTIOUS AGENT DETECTION BY NUCLEIC ACID (DNA OR RNA); SEVERE ACUTE RESPIRATORY SYNDROME CORONAVIRUS 2 (SARS-COV-2) (CORONAVIRUS DISEASE [COVID-19]), AMPLIFIED PROBE TECHNIQUE, MAKING USE OF HIGH THROUGHPUT TECHNOLOGIES AS DESCRIBED BY CMS-2020-01-R: HCPCS

## 2022-06-14 PROCEDURE — U0005 INFEC AGEN DETEC AMPLI PROBE: HCPCS

## 2022-06-14 NOTE — PROGRESS NOTES
"Eunice is a 56 year old who is being evaluated via a billable telephone visit.           Assessment & Plan     Suspected COVID-19 virus infection  Symptoms x 1 day. Recommended COVID test. Will do home covid test now. If positive, will call clinic to schedule visit to discuss possible treatment. If negative, will schedule COVID PCR test at one of our clinics.   She has received both doses of the Pfizer COVID-19 vaccine and one booster dose.   - Symptomatic; Yes; 6/14/2022 COVID-19 Virus (Coronavirus) by PCR    Fever, unspecified fever cause     - Symptomatic; Yes; 6/14/2022 COVID-19 Virus (Coronavirus) by PCR    Right flank pain  Resolved    Right hip pain  Referral to sports medicine given         From  visit on 6/8/2022 (reviewed notes today):    \"Patient with right flank pain for less than 2 weeks.    Recent bout of diverticulitis that presented with abdominal pain and right flank pain.  Reviewed CT from March 2022.  During this episode she had a elevated white count.  It would be unusual for diverticulitis to present with right flank pain.    Differential would also include renal stone pyelonephritis. Referred pain from gallstones  Of note MRI in the past did show slightly bulging disc T12- L1.  Symptoms did improve with ibuprofen     For work-up decision to check CBC.  Screen for elevated white count with recent diverticulitis.  Screen for anemia  Screen urinalysis to look for hematuria to suggest stone and also infection.  Comprehensive metabolic profile file screen renal function and   liver function tests with concern of gallbladder colic     Discussed with patient may not be able to find exact etiology for back pain.  Initial CBC and urine test are normal  Metabolic panel liver function test should be back tomorrow.    If these are normal, potentially could be related to her bulging disc noted on MRI.     I would like her to try ibuprofen     If elevated liver function tests or abnormal kidney function " "would recommend abdominal ultrasound     Another thought is that she did have x-ray suggesting constipation although usually it does not cause flank pain.  I did not do an x-ray at this visit     Recommend follow-up with primary in the next 1 to 2 weeks for recheck and decide further evaluation is needed  CC chart to PCP\"             Tobacco Cessation:   reports that she has been smoking cigarettes. She has smoked for the past 25.00 years. She has never used smokeless tobacco.          No follow-ups on file.    MD LAURA [facility]    Tobi Dawson is a 56 year old who presents for the following health issues      HPI     The pain has subsided a lot and traveled down to her hip bone. Has had symptoms since Sunday night. Sunday night was weak and feverish and slept all day yesterday and today she got up for work and felt so weak she stayed home. This is new. Denies cough, headache. Does have body aches. Temp was 101. She has not done a COVID test yet. Denies sore throat.     The hip bone pain is sporadic. Sometimes with walking, sometimes when she gets up from her chair. Right hip bone in the front. No redness, swelling or bruising. It will last for about a few seconds to 15 seconds. Also when she is lying down and goes to turn. No injury or change in activity. Insidious onset. Denies abdominal pain or tenderness.       From  visit on 6/8/2022:   \"Back Pain     Onset of symptoms was < 2 week(s) ago.  Location: right middle of back  Radiation: does not radiate  No injury  Worried about kidney area        Has hx back problems -reviewed chart has history of lumbar back pain and sacroiliac dysfunction  Also has lipomas and low back area with planned removal that was delayed due to COVID  Therapies to improve symptoms include: ibuprofen - has helped with this pain but took her last dose     \"    Review of Systems          Objective           Vitals:  No vitals were obtained " today due to virtual visit.    Physical Exam   alert and no distress  PSYCH: Alert and oriented times 3; coherent speech, normal   rate and volume, able to articulate logical thoughts, able   to abstract reason, no tangential thoughts, no hallucinations   or delusions  Her affect is normal  RESP: No cough, no audible wheezing, able to talk in full sentences  Remainder of exam unable to be completed due to telephone visits    Office Visit on 06/08/2022   Component Date Value Ref Range Status     Color Urine 06/08/2022 Yellow  Colorless, Straw, Light Yellow, Yellow Final     Appearance Urine 06/08/2022 Clear  Clear Final     Glucose Urine 06/08/2022 Negative  Negative mg/dL Final     Bilirubin Urine 06/08/2022 Negative  Negative Final     Ketones Urine 06/08/2022 Negative  Negative mg/dL Final     Specific Gravity Urine 06/08/2022 1.010  1.003 - 1.035 Final     Blood Urine 06/08/2022 Trace (A) Negative Final     pH Urine 06/08/2022 6.0  5.0 - 7.0 Final     Protein Albumin Urine 06/08/2022 Negative  Negative mg/dL Final     Urobilinogen Urine 06/08/2022 0.2  0.2, 1.0 E.U./dL Final     Nitrite Urine 06/08/2022 Negative  Negative Final     Leukocyte Esterase Urine 06/08/2022 Negative  Negative Final     Sodium 06/08/2022 138  133 - 144 mmol/L Final     Potassium 06/08/2022 4.8  3.4 - 5.3 mmol/L Final     Chloride 06/08/2022 106  94 - 109 mmol/L Final     Carbon Dioxide (CO2) 06/08/2022 30  20 - 32 mmol/L Final     Anion Gap 06/08/2022 2 (A) 3 - 14 mmol/L Final     Urea Nitrogen 06/08/2022 15  7 - 30 mg/dL Final     Creatinine 06/08/2022 0.65  0.52 - 1.04 mg/dL Final     Calcium 06/08/2022 9.1  8.5 - 10.1 mg/dL Final     Glucose 06/08/2022 87  70 - 99 mg/dL Final     Alkaline Phosphatase 06/08/2022 146  40 - 150 U/L Final     AST 06/08/2022 18  0 - 45 U/L Final     ALT 06/08/2022 30  0 - 50 U/L Final     Protein Total 06/08/2022 8.1  6.8 - 8.8 g/dL Final     Albumin 06/08/2022 4.1  3.4 - 5.0 g/dL Final     Bilirubin Total  06/08/2022 0.3  0.2 - 1.3 mg/dL Final     GFR Estimate 06/08/2022 >90  >60 mL/min/1.73m2 Final    Effective December 21, 2021 eGFRcr in adults is calculated using the 2021 CKD-EPI creatinine equation which includes age and gender (Noemi et al., [facility], DOI: 10.1056/GYBXyl1661212)     WBC Count 06/08/2022 8.0  4.0 - 11.0 10e3/uL Final     RBC Count 06/08/2022 4.48  3.80 - 5.20 10e6/uL Final     Hemoglobin 06/08/2022 13.2  11.7 - 15.7 g/dL Final     Hematocrit 06/08/2022 41.6  35.0 - 47.0 % Final     MCV 06/08/2022 93  78 - 100 fL Final     MCH 06/08/2022 29.5  26.5 - 33.0 pg Final     MCHC 06/08/2022 31.7  31.5 - 36.5 g/dL Final     RDW 06/08/2022 13.2  10.0 - 15.0 % Final     Platelet Count 06/08/2022 316  150 - 450 10e3/uL Final     % Neutrophils 06/08/2022 50  % Final     % Lymphocytes 06/08/2022 37  % Final     % Monocytes 06/08/2022 10  % Final     % Eosinophils 06/08/2022 2  % Final     % Basophils 06/08/2022 0  % Final     % Immature Granulocytes 06/08/2022 1  % Final     Absolute Neutrophils 06/08/2022 4.0  1.6 - 8.3 10e3/uL Final     Absolute Lymphocytes 06/08/2022 2.9  0.8 - 5.3 10e3/uL Final     Absolute Monocytes 06/08/2022 0.8  0.0 - 1.3 10e3/uL Final     Absolute Eosinophils 06/08/2022 0.2  0.0 - 0.7 10e3/uL Final     Absolute Basophils 06/08/2022 0.0  0.0 - 0.2 10e3/uL Final     Absolute Immature Granulocytes 06/08/2022 0.1  <=0.4 10e3/uL Final     RBC Urine 06/08/2022 0-2  0-2 /HPF /HPF Final     WBC Urine 06/08/2022 0-5  0-5 /HPF /HPF Final          Phone call duration: 14 minutes

## 2022-06-15 ENCOUNTER — TELEPHONE (OUTPATIENT)
Dept: NURSING | Facility: CLINIC | Age: 57
End: 2022-06-15
Payer: COMMERCIAL

## 2022-06-15 NOTE — TELEPHONE ENCOUNTER
Coronavirus (COVID-19) Notification    Lab Result   Lab test 2019-nCoV rRt-PCR OR SARS-COV-2 PCR    Nasopharyngeal AND/OR Oropharyngeal swab is NEGATIVE for 2019-nCoV RNA [OR] SARS-COV-2 RNA (COVID-19) RNA    Your result was negative. This means that we didn't find the virus that causes COVID-19 in your sample. A test may show negative when you do actually have the virus. This can happen when the virus is in the early stages of infection, before you feel illness symptoms.    If you have symptoms  Stay home and away from others (self-isolate) until you meet ALL of the guidelines below:    You've had no fever--and no medicine that reduces fever--for 1 full day (24 hours). And      Your other symptoms have gotten better. For example, your cough or breathing has improved. And   ? At least 10 days have passed since your symptoms started. (If you've been told by a doctor that you have a weak immune system, wait 20 days.)     During this time    Stay home. Don't go to work, school or anywhere else.     Stay in your own room, including for meals. Use your own bathroom if you can.    Stay away from others in your home. No hugging, kissing or shaking hands. No visitors.    Clean  high touch  surfaces often (doorknobs, counters, handles, etc.). Use a household cleaning spray or wipes. You can find a full list on the EPA website at www.epa.gov/pesticide-registration/list-n-disinfectants-use-against-sars-cov-2.    Cover your mouth and nose with a mask, tissue or other face covering to avoid spreading germs.    Wash your hands and face often with soap and water.    Going back to work  Check with your employer for any guidelines to follow for going back to work.  You are sent a letter for your Employer which will serve as formal document notice that you, the employee, tested negative for COVID-19, as of the testing date shown above.    If your symptoms worsen or other concerning symptoms, contact PCP, oncare or consider returning  to Emergency Dept.    Where can I get more information?     [facility]: [email or web address]    Coronavirus Basics: www.health[email or web address]./diseases/coronavirus/basics.html    [facility] Hotline ([phone])    Stefanie Titus LPN

## 2022-07-16 DIAGNOSIS — G47.00 INSOMNIA, UNSPECIFIED TYPE: ICD-10-CM

## 2022-07-19 RX ORDER — TRAZODONE HYDROCHLORIDE 50 MG/1
TABLET, FILM COATED ORAL
Qty: 120 TABLET | Refills: 0 | Status: SHIPPED | OUTPATIENT
Start: 2022-07-19 | End: 2022-09-15

## 2022-07-19 NOTE — TELEPHONE ENCOUNTER
Serotonin Modulators Passed 07/16/2022 01:09 PM   Protocol Details  Recent (12 mo) or future (30 days) visit within the authorizing provider's specialty    Medication is active on med list    Patient is age 18 or older    No active pregnancy on record    No positive pregnancy test in past 12 months

## 2022-08-15 ENCOUNTER — OFFICE VISIT (OUTPATIENT)
Dept: URGENT CARE | Facility: URGENT CARE | Age: 57
End: 2022-08-15
Payer: COMMERCIAL

## 2022-08-15 VITALS
OXYGEN SATURATION: 98 % | SYSTOLIC BLOOD PRESSURE: 162 MMHG | TEMPERATURE: 97.8 F | DIASTOLIC BLOOD PRESSURE: 74 MMHG | HEART RATE: 55 BPM

## 2022-08-15 DIAGNOSIS — R10.9 LEFT SIDED ABDOMINAL PAIN OF UNKNOWN CAUSE: ICD-10-CM

## 2022-08-15 DIAGNOSIS — N39.0 URINARY TRACT INFECTION WITHOUT HEMATURIA, SITE UNSPECIFIED: Primary | ICD-10-CM

## 2022-08-15 LAB
ALBUMIN UR-MCNC: NEGATIVE MG/DL
APPEARANCE UR: CLEAR
BACTERIA #/AREA URNS HPF: ABNORMAL /HPF
BILIRUB UR QL STRIP: NEGATIVE
COLOR UR AUTO: YELLOW
GLUCOSE UR STRIP-MCNC: NEGATIVE MG/DL
HGB UR QL STRIP: ABNORMAL
KETONES UR STRIP-MCNC: NEGATIVE MG/DL
LEUKOCYTE ESTERASE UR QL STRIP: NEGATIVE
NITRATE UR QL: NEGATIVE
PH UR STRIP: 6 [PH] (ref 5–7)
RBC #/AREA URNS AUTO: ABNORMAL /HPF
SP GR UR STRIP: 1.01 (ref 1–1.03)
SQUAMOUS #/AREA URNS AUTO: ABNORMAL /LPF
UROBILINOGEN UR STRIP-ACNC: 0.2 E.U./DL
WBC #/AREA URNS AUTO: ABNORMAL /HPF

## 2022-08-15 PROCEDURE — 87086 URINE CULTURE/COLONY COUNT: CPT | Performed by: PHYSICIAN ASSISTANT

## 2022-08-15 PROCEDURE — 99214 OFFICE O/P EST MOD 30 MIN: CPT | Performed by: PHYSICIAN ASSISTANT

## 2022-08-15 PROCEDURE — 81001 URINALYSIS AUTO W/SCOPE: CPT

## 2022-08-15 RX ORDER — METHOCARBAMOL 750 MG/1
750 TABLET, FILM COATED ORAL 4 TIMES DAILY PRN
Qty: 30 TABLET | Refills: 0 | Status: SHIPPED | OUTPATIENT
Start: 2022-08-15 | End: 2023-05-10

## 2022-08-15 RX ORDER — NAPROXEN 500 MG/1
500 TABLET ORAL 2 TIMES DAILY WITH MEALS
Qty: 30 TABLET | Refills: 3 | Status: SHIPPED | OUTPATIENT
Start: 2022-08-15 | End: 2023-05-10

## 2022-08-16 ENCOUNTER — OFFICE VISIT (OUTPATIENT)
Dept: PEDIATRICS | Facility: CLINIC | Age: 57
End: 2022-08-16
Attending: PHYSICIAN ASSISTANT
Payer: COMMERCIAL

## 2022-08-16 VITALS
DIASTOLIC BLOOD PRESSURE: 83 MMHG | OXYGEN SATURATION: 99 % | RESPIRATION RATE: 18 BRPM | SYSTOLIC BLOOD PRESSURE: 131 MMHG | WEIGHT: 129 LBS | TEMPERATURE: 98.5 F | BODY MASS INDEX: 22.85 KG/M2 | HEART RATE: 68 BPM

## 2022-08-16 DIAGNOSIS — Z86.19 HX OF COLD SORES: ICD-10-CM

## 2022-08-16 DIAGNOSIS — R10.9 LEFT FLANK PAIN: Primary | ICD-10-CM

## 2022-08-16 DIAGNOSIS — N39.0 URINARY TRACT INFECTION WITHOUT HEMATURIA, SITE UNSPECIFIED: ICD-10-CM

## 2022-08-16 LAB
ALBUMIN SERPL BCG-MCNC: 4.3 G/DL (ref 3.5–5.2)
ALP SERPL-CCNC: 151 U/L (ref 35–104)
ALT SERPL W P-5'-P-CCNC: 18 U/L (ref 10–35)
ANION GAP SERPL CALCULATED.3IONS-SCNC: 7 MMOL/L (ref 7–15)
AST SERPL W P-5'-P-CCNC: 20 U/L (ref 10–35)
BASOPHILS # BLD AUTO: 0 10E3/UL (ref 0–0.2)
BASOPHILS NFR BLD AUTO: 1 %
BILIRUB SERPL-MCNC: 0.3 MG/DL
BUN SERPL-MCNC: 14.2 MG/DL (ref 6–20)
CALCIUM SERPL-MCNC: 9.4 MG/DL (ref 8.6–10)
CHLORIDE SERPL-SCNC: 102 MMOL/L (ref 98–107)
CREAT SERPL-MCNC: 0.71 MG/DL (ref 0.51–0.95)
CRP SERPL-MCNC: <3 MG/L
DEPRECATED HCO3 PLAS-SCNC: 30 MMOL/L (ref 22–29)
EOSINOPHIL # BLD AUTO: 0.2 10E3/UL (ref 0–0.7)
EOSINOPHIL NFR BLD AUTO: 2 %
ERYTHROCYTE [DISTWIDTH] IN BLOOD BY AUTOMATED COUNT: 12.8 % (ref 10–15)
GFR SERPL CREATININE-BSD FRML MDRD: >90 ML/MIN/1.73M2
GLUCOSE SERPL-MCNC: 103 MG/DL (ref 70–99)
HCT VFR BLD AUTO: 43.1 % (ref 35–47)
HGB BLD-MCNC: 13.5 G/DL (ref 11.7–15.7)
IMM GRANULOCYTES # BLD: 0.1 10E3/UL
IMM GRANULOCYTES NFR BLD: 1 %
LIPASE SERPL-CCNC: 34 U/L (ref 13–60)
LYMPHOCYTES # BLD AUTO: 2.5 10E3/UL (ref 0.8–5.3)
LYMPHOCYTES NFR BLD AUTO: 31 %
MCH RBC QN AUTO: 30.1 PG (ref 26.5–33)
MCHC RBC AUTO-ENTMCNC: 31.3 G/DL (ref 31.5–36.5)
MCV RBC AUTO: 96 FL (ref 78–100)
MONOCYTES # BLD AUTO: 0.8 10E3/UL (ref 0–1.3)
MONOCYTES NFR BLD AUTO: 10 %
NEUTROPHILS # BLD AUTO: 4.5 10E3/UL (ref 1.6–8.3)
NEUTROPHILS NFR BLD AUTO: 55 %
NRBC # BLD AUTO: 0 10E3/UL
NRBC BLD AUTO-RTO: 0 /100
PLATELET # BLD AUTO: 337 10E3/UL (ref 150–450)
POTASSIUM SERPL-SCNC: 4.2 MMOL/L (ref 3.4–5.3)
PROT SERPL-MCNC: 7.8 G/DL (ref 6.4–8.3)
RBC # BLD AUTO: 4.49 10E6/UL (ref 3.8–5.2)
SODIUM SERPL-SCNC: 139 MMOL/L (ref 136–145)
WBC # BLD AUTO: 8 10E3/UL (ref 4–11)

## 2022-08-16 PROCEDURE — 99215 OFFICE O/P EST HI 40 MIN: CPT | Performed by: FAMILY MEDICINE

## 2022-08-16 PROCEDURE — 36415 COLL VENOUS BLD VENIPUNCTURE: CPT | Performed by: FAMILY MEDICINE

## 2022-08-16 PROCEDURE — 85025 COMPLETE CBC W/AUTO DIFF WBC: CPT | Performed by: FAMILY MEDICINE

## 2022-08-16 PROCEDURE — 83690 ASSAY OF LIPASE: CPT | Performed by: FAMILY MEDICINE

## 2022-08-16 PROCEDURE — 86140 C-REACTIVE PROTEIN: CPT | Performed by: FAMILY MEDICINE

## 2022-08-16 PROCEDURE — 80053 COMPREHEN METABOLIC PANEL: CPT | Performed by: FAMILY MEDICINE

## 2022-08-16 RX ORDER — VALACYCLOVIR HYDROCHLORIDE 1 G/1
2000 TABLET, FILM COATED ORAL 2 TIMES DAILY
Qty: 4 TABLET | Refills: 2 | Status: SHIPPED | OUTPATIENT
Start: 2022-08-16 | End: 2023-03-28

## 2022-08-16 NOTE — PROGRESS NOTES
Assessment & Plan     Urinary tract infection without hematuria, site unspecified  - Referral to Acute and Diagnostic Services (Day of diagnostic / First order acute)    Left flank pain  - sodium chloride (PF) 0.9% PF flush 3 mL  - CBC with platelets differential  - CRP inflammation  - Comprehensive metabolic panel  - Lipase  - IV access  - CBC with platelets differential  - CRP inflammation  - Comprehensive metabolic panel  - Lipase    Urine micro collected yesterday and twice prior over the last 5 months shows no RBCs. Suspicion for kidney stone is low at this time. Lab work unremarkable this in addition to the absence of abdominal pain during these episodes we came to a shared decision to not do a CT scan. Patient will monitor symptoms if not better in 5-7 days return to be evaluated. Suspect possible mild muscular strain, PRN otc analgesics for discomfort    Hx of cold sores  Refills provided at patient's request as she gets 1-2 episodes per year, no active presentation. Discussed using valtrex rather than acyclovir as it is also generic with more convenient dosing, patient is agreeable.     45 minutes spent on the date of the encounter doing chart review, history and exam, documentation and further activities per the note.       Leonel Bush MD   [facility]    Tobi Dawson is a 56 year old female who presents to clinic today for the following health issues:  Chief Complaint   Patient presents with     Flank Pain     Left sided  flank pain X 1 week     HPI    Present for 1 week now -- no clear aggravating factors or alleviating factors. Sporadic and is short-lived. Moderate pain. No hx of back injuries. No weakness/numbness of lower extremities. No blood seen in urine. No urinary abnormalities. No hx of kidney stone/infection.     Hx of diverticulitis which was more obvious and discomforting. She has no vomiting/diarrhea. Normal appetite    She is an active smoker about 2  packs/week      Patient Active Problem List    Diagnosis Date Noted     History of colonic polyps 11/11/2020     Priority: Medium     Insomnia, unspecified type 11/11/2020     Priority: Medium     Abnormal finding on MRI of brain 10/03/2016     Priority: Medium     2 enhancing foci are seen in the skull, one in the left  occipital bone measuring 9 mm and another in the left parietal bone  measuring 10 mm 10/2016    Plan for repeat MRI in 6 months       SI (sacroiliac) joint dysfunction and pain - BILATERAL 05/13/2014     Priority: Medium     Loss of hair 11/21/2013     Priority: Medium     Low back pain 09/26/2013     Priority: Medium     Microscopic hematuria 09/06/2013     Priority: Medium     Shoulder pain 07/11/2013     Priority: Medium     Labral tear of shoulder 07/11/2013     Priority: Medium     Anxiety 04/11/2013     Priority: Medium     Cold sore 08/15/2011     Priority: Medium     Hypothyroidism 12/09/2010     Priority: Medium     CARDIOVASCULAR SCREENING; LDL GOAL LESS THAN 160 05/09/2010     Priority: Medium     Current Outpatient Medications   Medication     acetaminophen (TYLENOL) 500 MG tablet     acyclovir (ZOVIRAX) 5 % external cream     bacitracin 500 UNIT/GM external ointment     bisacodyl (DULCOLAX) 5 MG EC tablet     cyclobenzaprine (FLEXERIL) 10 MG tablet     ibuprofen (ADVIL/MOTRIN) 800 MG tablet     ibuprofen (ADVIL/MOTRIN) 800 MG tablet     levothyroxine (SYNTHROID/LEVOTHROID) 100 MCG tablet     levothyroxine (SYNTHROID/LEVOTHROID) 112 MCG tablet     methocarbamol (ROBAXIN) 750 MG tablet     Multiple Vitamins-Minerals (MULTIVITAL PO)     naproxen (NAPROSYN) 500 MG tablet     polyethylene glycol (GOLYTELY) 236 g suspension     traZODone (DESYREL) 50 MG tablet     valACYclovir (VALTREX) 1000 mg tablet     varenicline (CHANTIX RICARDO) 0.5 MG X 11 & 1 MG X 42 tablet     Current Facility-Administered Medications   Medication     sodium chloride (PF) 0.9% PF flush 3 mL         Objective    /83  (BP Location: Left arm, Patient Position: Chair, Cuff Size: Adult Regular)   Pulse 68   Temp 98.5  F (36.9  C) (Oral)   Resp 18   Wt 58.5 kg (129 lb)   LMP 05/14/2008   SpO2 99%   BMI 22.85 kg/m    Physical Exam   GEN: no distress  Back: flexion to 70 degrees, extension to 20 degrees, straight leg test modified negative bilaterally   CVA: no tenderness with percussion  CV: RRR no m/r/g  Abd: no pain with palpation, non-obese    Results for orders placed or performed in visit on 08/16/22   CRP inflammation     Status: Normal   Result Value Ref Range    CRP Inflammation <3.00 <5.00 mg/L   Comprehensive metabolic panel     Status: Abnormal   Result Value Ref Range    Sodium 139 136 - 145 mmol/L    Potassium 4.2 3.4 - 5.3 mmol/L    Creatinine 0.71 0.51 - 0.95 mg/dL    Urea Nitrogen 14.2 6.0 - 20.0 mg/dL    Chloride 102 98 - 107 mmol/L    Carbon Dioxide (CO2) 30 (H) 22 - 29 mmol/L    Anion Gap 7 7 - 15 mmol/L    Glucose 103 (H) 70 - 99 mg/dL    Calcium 9.4 8.6 - 10.0 mg/dL    Protein Total 7.8 6.4 - 8.3 g/dL    Albumin 4.3 3.5 - 5.2 g/dL    Bilirubin Total 0.3 <=1.2 mg/dL    Alkaline Phosphatase 151 (H) 35 - 104 U/L    AST 20 10 - 35 U/L    ALT 18 10 - 35 U/L    GFR Estimate >90 >60 mL/min/1.73m2   Lipase     Status: Normal   Result Value Ref Range    Lipase 34 13 - 60 U/L   CBC with platelets and differential     Status: Abnormal   Result Value Ref Range    WBC Count 8.0 4.0 - 11.0 10e3/uL    RBC Count 4.49 3.80 - 5.20 10e6/uL    Hemoglobin 13.5 11.7 - 15.7 g/dL    Hematocrit 43.1 35.0 - 47.0 %    MCV 96 78 - 100 fL    MCH 30.1 26.5 - 33.0 pg    MCHC 31.3 (L) 31.5 - 36.5 g/dL    RDW 12.8 10.0 - 15.0 %    Platelet Count 337 150 - 450 10e3/uL    % Neutrophils 55 %    % Lymphocytes 31 %    % Monocytes 10 %    % Eosinophils 2 %    % Basophils 1 %    % Immature Granulocytes 1 %    NRBCs per 100 WBC 0 <1 /100    Absolute Neutrophils 4.5 1.6 - 8.3 10e3/uL    Absolute Lymphocytes 2.5 0.8 - 5.3 10e3/uL    Absolute  Monocytes 0.8 0.0 - 1.3 10e3/uL    Absolute Eosinophils 0.2 0.0 - 0.7 10e3/uL    Absolute Basophils 0.0 0.0 - 0.2 10e3/uL    Absolute Immature Granulocytes 0.1 <=0.4 10e3/uL    Absolute NRBCs 0.0 10e3/uL   CBC with platelets differential     Status: Abnormal    Narrative    The following orders were created for panel order CBC with platelets differential.  Procedure                               Abnormality         Status                     ---------                               -----------         ------                     CBC with platelets and d...[[id number]]  Abnormal            Final result                 Please view results for these tests on the individual orders.             The use of Dragon/PowerMic dictation services may have been used to construct the content in this note; any grammatical or spelling errors are non-intentional. Please contact the author of this note directly if you are in need of any clarification.

## 2022-08-16 NOTE — PATIENT INSTRUCTIONS
Your blood work today was reassuring and does not suggest evidence of kidney injury or an infection that would explain your symptoms      If you develop new symptoms please seek medical attention right away      If no improvement in your symptoms in the next 5-7 days please call your doctor's office.

## 2022-08-17 LAB — BACTERIA UR CULT: NORMAL

## 2022-08-17 NOTE — PROGRESS NOTES
Assessment & Plan     Urinary tract infection without hematuria, site unspecified    UA does not appear to be a valid infection  Urine culture pending  Fluids, rest    - UA macro with reflex to Microscopic and Culture - Clinc Collect  - Urine Microscopic  - Urine Culture Aerobic Bacterial - lab collect; Future  - Referral to Acute and Diagnostic Services (Day of diagnostic / First order acute); Future  - Urine Culture Aerobic Bacterial - lab collect    Left sided abdominal pain of unknown cause    This is likely musculoskeletal but patients pain has been over 1 week and is persisting.  She would like CT scan to rule out abnormalities  Trial course of naprosyn and robaxin  Patient referred to the [facility] tomorrow for CT scan of abdomen to rule colitis, stone  - naproxen (NAPROSYN) 500 MG tablet; Take 1 tablet (500 mg) by mouth 2 times daily (with meals)  - methocarbamol (ROBAXIN) 750 MG tablet; Take 1 tablet (750 mg) by mouth 4 times daily as needed    Review of external notes as documented elsewhere in note         Nicotine/Tobacco Cessation:  She reports that she has been smoking cigarettes. She has smoked for the past 25.00 years. She has never used smokeless tobacco.  Nicotine/Tobacco Cessation Plan:         CONSULTATION/REFERRAL to [facility]    No follow-ups on file.    Tushar Tabares, [facility], PA-C  [facility] is a 56 year old, presenting for the following health issues:  Pain (Mid left side of body, started about a week ago and was pretty mild but seems to be getting worse, dull aching pain )      MICKEY Dawson LAURA Malloy, 56 year old, female presents to the urgent care today with:   Pain (Mid left side of body, started about a week ago and was pretty mild but seems to be getting worse, dull aching pain )      Review of Systems   Constitutional, HEENT, cardiovascular, pulmonary, GI, , musculoskeletal, neuro, skin, endocrine and psych systems are negative, except as otherwise  noted.      Objective    BP (!) 162/74 (BP Location: Right arm, Patient Position: Sitting, Cuff Size: Adult Regular)   Pulse 55   Temp 97.8  F (36.6  C)   LMP 05/14/2008   SpO2 98%   There is no height or weight on file to calculate BMI.  Physical Exam   GENERAL: healthy, alert and no distress  EYES: Eyes grossly normal to inspection, PERRL and conjunctivae and sclerae normal  HENT: ear canals and TM's normal, nose and mouth without ulcers or lesions  NECK: no adenopathy, no asymmetry, masses, or scars and thyroid normal to palpation  RESP: lungs clear to auscultation - no rales, rhonchi or wheezes  CV: regular rate and rhythm, normal S1 S2, no S3 or S4, no murmur, click or rub, no peripheral edema and peripheral pulses strong  ABDOMEN: tenderness LUQ  MS: Positive for side tenderness below ribs for 1 week  SKIN: no suspicious lesions or rashes  NEURO: Normal strength and tone, mentation intact and speech normal  PSYCH: mentation appears normal, affect normal/bright    Results for orders placed or performed in visit on 08/15/22   UA macro with reflex to Microscopic and Culture - Clinc Collect     Status: Abnormal    Specimen: Urine, Midstream   Result Value Ref Range    Color Urine Yellow Colorless, Straw, Light Yellow, Yellow    Appearance Urine Clear Clear    Glucose Urine Negative Negative mg/dL    Bilirubin Urine Negative Negative    Ketones Urine Negative Negative mg/dL    Specific Gravity Urine 1.010 1.003 - 1.035    Blood Urine Trace (A) Negative    pH Urine 6.0 5.0 - 7.0    Protein Albumin Urine Negative Negative mg/dL    Urobilinogen Urine 0.2 0.2, 1.0 E.U./dL    Nitrite Urine Negative Negative    Leukocyte Esterase Urine Negative Negative   Urine Microscopic     Status: Abnormal   Result Value Ref Range    Bacteria Urine Few (A) None Seen /HPF    RBC Urine 0-2 0-2 /HPF /HPF    WBC Urine 0-5 0-5 /HPF /HPF    Squamous Epithelials Urine Few (A) None Seen /LPF    Narrative    Urine Culture not indicated    Urine Culture Aerobic Bacterial - lab collect     Status: None (Preliminary result)    Specimen: Urine, Midstream   Result Value Ref Range    Culture No growth, less than 1 day                  .  ..

## 2022-08-25 ENCOUNTER — LAB (OUTPATIENT)
Dept: LAB | Facility: CLINIC | Age: 57
End: 2022-08-25
Payer: COMMERCIAL

## 2022-08-25 DIAGNOSIS — E03.9 HYPOTHYROIDISM, UNSPECIFIED TYPE: ICD-10-CM

## 2022-08-25 PROCEDURE — 84439 ASSAY OF FREE THYROXINE: CPT

## 2022-08-25 PROCEDURE — 36415 COLL VENOUS BLD VENIPUNCTURE: CPT

## 2022-08-25 PROCEDURE — 84443 ASSAY THYROID STIM HORMONE: CPT

## 2022-08-25 RX ORDER — LEVOTHYROXINE SODIUM 100 UG/1
100 TABLET ORAL DAILY
Qty: 14 TABLET | Refills: 0 | Status: SHIPPED | OUTPATIENT
Start: 2022-08-25 | End: 2023-04-05

## 2022-08-25 NOTE — TELEPHONE ENCOUNTER
[facility] preferred pharmacy.  Pt just had blood drawn at [facility] today.

## 2022-08-25 NOTE — TELEPHONE ENCOUNTER
90 days of 100 mcg sent on 12/20/21 to [facility] on [address]  90 days of 112 mcg sent on 12/13/21 to [facility] on [address].    Thyroid last addressed in a visit on 11/11/20 with [place] where it is noted to continue 112 mcg.  Last visit was virtual with [place] 6/14/22    TSH   Date Value Ref Range Status   12/09/2021 0.16 (L) 0.40 - 4.00 mU/L Final   11/11/2020 3.26 0.40 - 4.00 mU/L Final     Pt did not answer my call to clarify current pharmacy and to schedule lab appt - there are openings yet this week. Left message to call back and ask to speak with an available triage nurse.    Routing lab order to provider;14 days 100 mcg pended as last TSH was low and lab is urgent given unknown dosing.    WILLIAM Valdez RN  [facility]

## 2022-08-25 NOTE — TELEPHONE ENCOUNTER
Patient called her pharmacy to get filled and they told her that they have not filled medication for her since October of last year patient said that cant be see's been taking the medication up to now and has only a couple pills left. As we were talking I asked what her strength was and she did not no because she just poured her new bottle into her old, so now she is thinking that she has been also taking the old strength mixed in with the new strength

## 2022-08-26 DIAGNOSIS — E03.9 HYPOTHYROIDISM, UNSPECIFIED TYPE: ICD-10-CM

## 2022-08-26 LAB
T4 FREE SERPL-MCNC: 1.2 NG/DL (ref 0.76–1.46)
TSH SERPL DL<=0.005 MIU/L-ACNC: 0.29 MU/L (ref 0.4–4)

## 2022-08-29 ENCOUNTER — TELEPHONE (OUTPATIENT)
Dept: FAMILY MEDICINE | Facility: CLINIC | Age: 57
End: 2022-08-29

## 2022-08-29 DIAGNOSIS — E03.9 HYPOTHYROIDISM, UNSPECIFIED TYPE: Primary | ICD-10-CM

## 2022-08-29 NOTE — TELEPHONE ENCOUNTER
Left message on patient's voicemail to call back and speak with a triage nurse.     LIZETH GarciaN RN  [facility]

## 2022-08-29 NOTE — TELEPHONE ENCOUNTER
RN -- Eunice's thyroid test was abnormal again. Please find out from her what dose of levothyroxine she is taking and how often. She has been inconsistent in the past, so I do not want to make assumptions. The lab results indicate her current dose is too high. Send response to me so I can fill the correct dose. Thanks! Alyse Mcclendon M.D.          Recent Results (from the past 240 hour(s))   TSH with free T4 reflex    Collection Time: 08/25/22  2:24 PM   Result Value Ref Range    TSH 0.29 (L) 0.40 - 4.00 mU/L   T4 free    Collection Time: 08/25/22  2:24 PM   Result Value Ref Range    Free T4 1.20 0.76 - 1.46 ng/dL

## 2022-08-30 RX ORDER — LEVOTHYROXINE SODIUM 100 UG/1
TABLET ORAL
Qty: 14 TABLET | Refills: 0 | OUTPATIENT
Start: 2022-08-30

## 2022-08-30 NOTE — TELEPHONE ENCOUNTER
"Duplicate.   Spoke with patient regarding dosing. Patient stated that she is taking the \"prescribed dose faithfully every day.\"    Refill sent in by Dr. Mcclendon on 8/25/2022 for 14 tablets..   "

## 2022-08-30 NOTE — TELEPHONE ENCOUNTER
Dr. Mcclendon -    Spoke with patient regarding dosing of the Levothyroxine. Patient states that she is taking it faithfully every day.     LIZETH McclendonN RN  [facility]

## 2022-09-01 NOTE — TELEPHONE ENCOUNTER
And what dose dose is the current pill that she is taking? Have her check the bottle please. Thanks! flexible sigmoidoscopy

## 2022-09-01 NOTE — TELEPHONE ENCOUNTER
Dr. Mcclendon-Patient stated she no longer has the bottle.    Triage-Patient does not want call back until after 1630.    Writer called patient to clarify current Levothyroxine dose.    Per patient:  1. Knows dosage was upped then had a blood test and it was fine, used RX already had previously and wants an increase in dosage and will come back for lab recheck  2. Frustrated with difficulty calling back to speak with an RN and wait times    Writer apologized for phone inadequacies.    Thank you!  LIZETH SingletaryN, RN  [facility]

## 2022-09-06 RX ORDER — LEVOTHYROXINE SODIUM 100 UG/1
100 TABLET ORAL DAILY
Qty: 90 TABLET | Refills: 1 | Status: SHIPPED | OUTPATIENT
Start: 2022-09-06 | End: 2023-05-10

## 2022-09-06 NOTE — TELEPHONE ENCOUNTER
I sent refills of levothyroxine 100mcg daily. I would like to have her return for recheck thyroid test in 3 -6 months given her thyroid test was still on the edge of overcorrection. Thanks,     Alyse Mcclendon M.D.

## 2022-09-06 NOTE — TELEPHONE ENCOUNTER
I tried calling pt, left generic message stating medication was sent in and to please return for labs in 3-6 mo, call us with any questions    LIZETH GarciaN RN  [facility]

## 2022-09-06 NOTE — TELEPHONE ENCOUNTER
Triage to call pt back with message below at 1630 or after    LIZETH GarciaN RN  [facility]

## 2022-10-20 ENCOUNTER — NURSE TRIAGE (OUTPATIENT)
Dept: FAMILY MEDICINE | Facility: CLINIC | Age: 57
End: 2022-10-20

## 2022-10-20 NOTE — TELEPHONE ENCOUNTER
Patient reports doing home COVID test with negative results  Temp during call was 97.6  Patient will try taking Ibuprofen and a shower to see if that helps her feel better.  Will call in the AM for a Virtual Visit with first available in the system if not feeling better or will go to Urgent Care tomorrow.  Current wait at [facility] is 3 hours.  Sapna Posada RN  [facility]    Reason for Disposition    Patient wants to be seen    Fever present > 3 days (72 hours)    Additional Information    Negative: Difficult to awaken or acting confused (e.g., disoriented, slurred speech)    Negative: Pale cold skin and very weak (can't stand)    Negative: Difficulty breathing and bluish (or gray) lips or face    Negative: New-onset rash with purple (or blood-colored) spots or dots    Negative: Sounds like a life-threatening emergency to the triager    Negative: Fever onset within 24 hours of receiving vaccine    Negative: Fever within 14 days of COVID-19 Exposure    Negative: Pregnant    Negative: Postpartum (from 0 to 6 weeks after delivery)    Negative: Headache and stiff neck (can't touch chin to chest)    Negative: Difficulty breathing    Negative: IV Drug Use (IVDU)    Negative: Fever > 103 F (39.4 C)    Negative: Fever > 100.0 F (37.8 C) and indwelling urinary catheter (e.g., Mckeon, coude)    Negative: Fever > 100.4 F (38.0 C) and has port (portacath), central line, or PICC line    Negative: Drinking very little and has signs of dehydration (e.g., no urine > 12 hours, very dry mouth, very lightheaded)    Negative: Patient sounds very sick or weak to the triager    Negative: Fever > 101 F (38.3 C) and over 60 years of age    Negative: Fever > 100.0 F (37.8 C) and diabetes mellitus or a weak immune system (e.g., HIV positive, chemotherapy, splenectomy)    Negative: Fever > 100.0 F (37.8 C) and bedridden (e.g., nursing home patient, stroke, chronic illness, recovering from surgery)    Negative: Fever >  "100.4 F (38.0 C) and surgery in the past month    Negative: Transplant patient (e.g., liver, heart, lung, kidney)    Negative: Widespread rash and cause unknown    Negative: Severe chills (i.e., feeling extremely cold WITH shaking chills)    Answer Assessment - Initial Assessment Questions  1. TEMPERATURE: \"What is the most recent temperature?\"  \"How was it measured?\"       Not taken  2. ONSET: \"When did the fever start?\"       3 days ago  3. CHILLS: \"Do you have chills?\" If yes: \"How bad are they?\"  (e.g., none, mild, moderate, severe)    - NONE: no chills    - MILD: feeling cold    - MODERATE: feeling very cold, some shivering (feels better under a thick blanket)    - SEVERE: feeling extremely cold with shaking chills (general body shaking, rigors; even under a thick blanket)       Yes,   4. OTHER SYMPTOMS: \"Do you have any other symptoms besides the fever?\"  (e.g., abdomen pain, cough, diarrhea, earache, headache, sore throat, urination pain)      moderate  5. CAUSE: If there are no symptoms, ask: \"What do you think is causing the fever?\"       Influenza  6. CONTACTS: \"Does anyone else in the family have an infection?\"      No - lives alone  7. TREATMENT: \"What have you done so far to treat this fever?\" (e.g., medications)      Tried Ibuprofen, seemed to bring it down a little  8. IMMUNOCOMPROMISE: \"Do you have of the following: diabetes, HIV positive, splenectomy, cancer chemotherapy, chronic steroid treatment, transplant patient, etc.\"      No to all  9. PREGNANCY: \"Is there any chance you are pregnant?\" \"When was your last menstrual period?\"      no  10. TRAVEL: \"Have you traveled out of the country in the last month?\" (e.g., travel history, exposures)        No.    Protocols used: FEVER-A-[place]      "

## 2022-10-21 NOTE — TELEPHONE ENCOUNTER
Provider Response to 2nd Level Triage Request    I have reviewed the RN documentation. My recommendation is:  I agree with your plan  Alyse Mcclendon M.D.

## 2022-11-10 DIAGNOSIS — G47.00 INSOMNIA, UNSPECIFIED TYPE: ICD-10-CM

## 2022-11-11 NOTE — TELEPHONE ENCOUNTER
Passes protocol but pt has not been seen in person since 7/19/21.      Last Written Prescription Date:  9/15/22  Last Fill Quantity: 120,  # refills: 0   Last office visit: 6/14/22 virtual  with prescribing provider:  House   Future Office Visit:      WILLIAM Valdez RN  [facility]

## 2022-11-14 RX ORDER — TRAZODONE HYDROCHLORIDE 50 MG/1
TABLET, FILM COATED ORAL
Qty: 120 TABLET | Refills: 0 | Status: SHIPPED | OUTPATIENT
Start: 2022-11-14 | End: 2023-01-13

## 2022-12-01 ENCOUNTER — OFFICE VISIT (OUTPATIENT)
Dept: URGENT CARE | Facility: URGENT CARE | Age: 57
End: 2022-12-01
Payer: COMMERCIAL

## 2022-12-01 VITALS
RESPIRATION RATE: 16 BRPM | BODY MASS INDEX: 22.85 KG/M2 | WEIGHT: 129 LBS | OXYGEN SATURATION: 100 % | DIASTOLIC BLOOD PRESSURE: 83 MMHG | TEMPERATURE: 97.9 F | SYSTOLIC BLOOD PRESSURE: 125 MMHG | HEART RATE: 68 BPM

## 2022-12-01 DIAGNOSIS — R07.0 THROAT PAIN: Primary | ICD-10-CM

## 2022-12-01 DIAGNOSIS — J10.1 INFLUENZA B: ICD-10-CM

## 2022-12-01 DIAGNOSIS — R52 BODY ACHES: ICD-10-CM

## 2022-12-01 DIAGNOSIS — R05.9 COUGH, UNSPECIFIED TYPE: ICD-10-CM

## 2022-12-01 LAB
DEPRECATED S PYO AG THROAT QL EIA: NEGATIVE
FLUAV AG SPEC QL IA: NEGATIVE
FLUBV AG SPEC QL IA: POSITIVE
GROUP A STREP BY PCR: NOT DETECTED
SARS-COV-2 RNA RESP QL NAA+PROBE: NEGATIVE

## 2022-12-01 PROCEDURE — U0005 INFEC AGEN DETEC AMPLI PROBE: HCPCS | Performed by: PHYSICIAN ASSISTANT

## 2022-12-01 PROCEDURE — U0003 INFECTIOUS AGENT DETECTION BY NUCLEIC ACID (DNA OR RNA); SEVERE ACUTE RESPIRATORY SYNDROME CORONAVIRUS 2 (SARS-COV-2) (CORONAVIRUS DISEASE [COVID-19]), AMPLIFIED PROBE TECHNIQUE, MAKING USE OF HIGH THROUGHPUT TECHNOLOGIES AS DESCRIBED BY CMS-2020-01-R: HCPCS | Performed by: PHYSICIAN ASSISTANT

## 2022-12-01 PROCEDURE — 87651 STREP A DNA AMP PROBE: CPT | Performed by: PHYSICIAN ASSISTANT

## 2022-12-01 PROCEDURE — 87804 INFLUENZA ASSAY W/OPTIC: CPT | Performed by: PHYSICIAN ASSISTANT

## 2022-12-01 PROCEDURE — 99214 OFFICE O/P EST MOD 30 MIN: CPT | Mod: CS | Performed by: PHYSICIAN ASSISTANT

## 2022-12-01 RX ORDER — BENZONATATE 200 MG/1
200 CAPSULE ORAL 3 TIMES DAILY PRN
Qty: 21 CAPSULE | Refills: 0 | Status: SHIPPED | OUTPATIENT
Start: 2022-12-01 | End: 2022-12-08

## 2022-12-01 RX ORDER — OSELTAMIVIR PHOSPHATE 75 MG/1
75 CAPSULE ORAL 2 TIMES DAILY
Qty: 10 CAPSULE | Refills: 0 | Status: SHIPPED | OUTPATIENT
Start: 2022-12-01 | End: 2022-12-06

## 2022-12-01 NOTE — LETTER
[facility]  [address] [address]  [phone]      December 1, 2022    RE:  Eunice Malloy                                                                                                                                                       [address]   [place] [address]            To whom it may concern:    Eunice Malloy was seen in the urgent care today for influenza B.  She can return to work when her fever has resolved.         Sincerely,        HEATH Middleton, PA-C    [facility]

## 2023-01-17 DIAGNOSIS — G47.00 INSOMNIA, UNSPECIFIED TYPE: ICD-10-CM

## 2023-01-19 RX ORDER — TRAZODONE HYDROCHLORIDE 50 MG/1
TABLET, FILM COATED ORAL
Qty: 120 TABLET | Refills: 0 | OUTPATIENT
Start: 2023-01-19

## 2023-01-19 NOTE — TELEPHONE ENCOUNTER
Duplicate. Ordered 1/13/2023.    Estrella Mei, BSN RN  [facility]

## 2023-01-25 ENCOUNTER — OFFICE VISIT (OUTPATIENT)
Dept: URGENT CARE | Facility: URGENT CARE | Age: 58
End: 2023-01-25
Payer: COMMERCIAL

## 2023-01-25 VITALS
DIASTOLIC BLOOD PRESSURE: 64 MMHG | TEMPERATURE: 98.6 F | BODY MASS INDEX: 24.62 KG/M2 | WEIGHT: 139 LBS | RESPIRATION RATE: 20 BRPM | OXYGEN SATURATION: 96 % | HEART RATE: 83 BPM | SYSTOLIC BLOOD PRESSURE: 107 MMHG

## 2023-01-25 DIAGNOSIS — R52 GENERALIZED BODY ACHES: Primary | ICD-10-CM

## 2023-01-25 DIAGNOSIS — B00.1 COLD SORE: ICD-10-CM

## 2023-01-25 LAB — SARS-COV-2 RNA RESP QL NAA+PROBE: NEGATIVE

## 2023-01-25 PROCEDURE — U0005 INFEC AGEN DETEC AMPLI PROBE: HCPCS | Performed by: PHYSICIAN ASSISTANT

## 2023-01-25 PROCEDURE — 99213 OFFICE O/P EST LOW 20 MIN: CPT | Mod: CS | Performed by: PHYSICIAN ASSISTANT

## 2023-01-25 PROCEDURE — U0003 INFECTIOUS AGENT DETECTION BY NUCLEIC ACID (DNA OR RNA); SEVERE ACUTE RESPIRATORY SYNDROME CORONAVIRUS 2 (SARS-COV-2) (CORONAVIRUS DISEASE [COVID-19]), AMPLIFIED PROBE TECHNIQUE, MAKING USE OF HIGH THROUGHPUT TECHNOLOGIES AS DESCRIBED BY CMS-2020-01-R: HCPCS | Performed by: PHYSICIAN ASSISTANT

## 2023-01-25 RX ORDER — ACYCLOVIR 50 MG/G
CREAM TOPICAL
Qty: 5 G | Refills: 3 | Status: SHIPPED | OUTPATIENT
Start: 2023-01-25 | End: 2023-05-10

## 2023-01-25 RX ORDER — FLUTICASONE PROPIONATE 50 MCG
1 SPRAY, SUSPENSION (ML) NASAL DAILY
Qty: 11.1 ML | Refills: 0 | Status: SHIPPED | OUTPATIENT
Start: 2023-01-25 | End: 2023-05-10

## 2023-01-25 NOTE — LETTER
[facility]  [address] [address]  Phone: [phone]    January 25, 2023        Eunice Malloy  [address]   [place] [address]          To whom it may concern:    RE: Eunice Malloy    Patient was seen and treated today at our clinic and missed work.  Please excuse absences tomorrow as well.    Please contact me for questions or concerns.      Sincerely,        Demetri Abebe PA-C

## 2023-01-25 NOTE — PATIENT INSTRUCTIONS
1.  Plenty of fluids, rest, warm compresses on face  2.  Mucinex twice daily for at least 4 days  3.  Avis Pot 1x in the morning 1x at night (SALINE MIST SPRAY IS AN ACCEPTABLE, THOUGH NOT AS EFFECTIVE REPLACEMENT)  4.  Benadryl (diphenhydramine) at bedtime   5.  Either Claritin (Loratadine), Allegra (Fexofenadine), or Zyrtec (Cetirizine) in the day  6.  Flonase (Fluticasone) 2x each nostril twice a day for two weeks, then once each nostril once a day       Please let us know if symptoms persist, or worsen.  Fever and focal pain may be a sign of a bacterial infection which may need antibiotic treatment

## 2023-01-25 NOTE — PROGRESS NOTES
SUBJECTIVE:  Eunice Malloy is a 57 year old female here with concerns about sinus infection.  She states onset of symptoms were 5 day(s) ago.  She has had maxillary, frontal pressure. Course of illness is same. Severity moderate  Current and Associated symptoms: nasal congestion, facial pain/pressure, headache and post-nasal drainage  Predisposing factors include none. Recent treatment has included: Antihistamine, Decongestants and OTC meds    Past Medical History:   Diagnosis Date     Anxiety 4/11/2013     Cold sore 8/15/2011     Depression      Hypothyroidism 12/9/2010     Tobacco abuse      Social History     Tobacco Use     Smoking status: Some Days     Years: 25.00     Types: Cigarettes     Smokeless tobacco: Never     Tobacco comments:     pack = 2 days    Substance Use Topics     Alcohol use: Yes     Alcohol/week: 0.0 standard drinks     Comment: rare       ROS:  Review of systems negative except as stated above.    OBJECTIVE:  /64   Pulse 83   Temp 98.6  F (37  C) (Tympanic)   Resp 20   Wt 63 kg (139 lb)   LMP 05/14/2008   SpO2 96%   BMI 24.62 kg/m    Exam:GENERAL APPEARANCE: healthy, alert and no distress  EYES: EOMI,  PERRL, conjunctiva clear  HENT: ear canals and TM's normal.  Nose and mouth without ulcers, erythema or lesions  RESP: lungs clear to auscultation - no rales, rhonchi or wheezes  CV: regular rates and rhythm, normal S1 S2, no murmur noted  NEURO: Normal strength and tone, sensory exam grossly normal,  normal speech and mentation  SKIN: no suspicious lesions or rashes      No results found for any visits on 01/25/23.    ASSESSMENT:  (R52) Generalized body aches  (primary encounter diagnosis)  Comment: likely viral syndrome   Plan: Symptomatic COVID-19 Virus (Coronavirus) by PCR        Nose, fluticasone (FLONASE) 50 MCG/ACT nasal         spray      (B00.1) Cold sore  Plan: acyclovir (ZOVIRAX) 5 % external cream         Patient Instructions   1.  Plenty of fluids, rest, warm  compresses on face  2.  Mucinex twice daily for at least 4 days  3.  Avis Pot 1x in the morning 1x at night (SALINE MIST SPRAY IS AN ACCEPTABLE, THOUGH NOT AS EFFECTIVE REPLACEMENT)  4.  Benadryl (diphenhydramine) at bedtime   5.  Either Claritin (Loratadine), Allegra (Fexofenadine), or Zyrtec (Cetirizine) in the day  6.  Flonase (Fluticasone) 2x each nostril twice a day for two weeks, then once each nostril once a day       Please let us know if symptoms persist, or worsen.  Fever and focal pain may be a sign of a bacterial infection which may need antibiotic treatment

## 2023-01-26 ENCOUNTER — TELEPHONE (OUTPATIENT)
Dept: URGENT CARE | Facility: URGENT CARE | Age: 58
End: 2023-01-26
Payer: COMMERCIAL

## 2023-01-26 NOTE — TELEPHONE ENCOUNTER
Central Prior Authorization Team   Phone: [phone]      PRIOR AUTHORIZATION DENIED    Medication: acyclovir (ZOVIRAX) 5 % external cream - EPA DENIED      Denial Date: 1/25/2023    Denial Rational:       Appeal Information:

## 2023-01-26 NOTE — TELEPHONE ENCOUNTER
Left message notifying patient of prior auth denied for acyclovir cream. Advised she contact her PCP to discuss alternatives.

## 2023-02-19 ENCOUNTER — OFFICE VISIT (OUTPATIENT)
Dept: URGENT CARE | Facility: URGENT CARE | Age: 58
End: 2023-02-19
Payer: COMMERCIAL

## 2023-02-19 VITALS
RESPIRATION RATE: 16 BRPM | OXYGEN SATURATION: 97 % | SYSTOLIC BLOOD PRESSURE: 123 MMHG | HEART RATE: 83 BPM | DIASTOLIC BLOOD PRESSURE: 76 MMHG

## 2023-02-19 DIAGNOSIS — J01.90 ACUTE SINUSITIS WITH SYMPTOMS > 10 DAYS: Primary | ICD-10-CM

## 2023-02-19 DIAGNOSIS — J34.89 NASAL VESTIBULITIS: ICD-10-CM

## 2023-02-19 PROCEDURE — 99214 OFFICE O/P EST MOD 30 MIN: CPT | Performed by: PHYSICIAN ASSISTANT

## 2023-02-19 RX ORDER — IBUPROFEN 800 MG/1
800 TABLET, FILM COATED ORAL EVERY 8 HOURS PRN
Qty: 30 TABLET | Refills: 0 | Status: SHIPPED | OUTPATIENT
Start: 2023-02-19 | End: 2023-02-26

## 2023-02-20 NOTE — PATIENT INSTRUCTIONS
Patient was educated on the natural course of condition.  Take medications as prescribed for nasal vestibulitis and sinusitis. Side effects may include stomachache or diarrhea. Continue using Flonase nasal spray. Conservative measures discussed including increased fluids, nasal saline irrigation (neti pot), warm steamy shower, cough suppressants, expectorants (Mucinex), decongestants (Pseudofed), and analgesics (Tylenol and/or Ibuprofen). See your primary care provider if symptoms worsen or do not improve in 7 days. Seek emergency care if you develop fever over 103 or shortness of breath.

## 2023-02-20 NOTE — PROGRESS NOTES
URGENT CARE VISIT:    SUBJECTIVE:   Eunice Malloy is a 57 year old female presenting with a chief complaint of right nostril infection, headache, and nasal congestion.  Onset was a few days ago for nasal infection and over 2 weeks for other symptoms.  She denies the following symptoms: cough - productive and shortness of breath  Course of illness is same.    Treatment measures tried include Flonase nasal spray and hydrocortisone cream with no relief of symptoms.  Predisposing factors include None.    PMH:   Past Medical History:   Diagnosis Date     Anxiety 4/11/2013     Cold sore 8/15/2011     Depression      Hypothyroidism 12/9/2010     Tobacco abuse      Allergies: Sulfa drugs   Medications:   Current Outpatient Medications   Medication Sig Dispense Refill     acetaminophen (TYLENOL) 500 MG tablet Take 1-2 tablets (500-1,000 mg) by mouth every 6 hours as needed for pain 60 tablet 0     acyclovir (ZOVIRAX) 5 % external cream Apply topically 5 times daily 5 g 3     amoxicillin-clavulanate (AUGMENTIN) 875-125 MG tablet Take 1 tablet by mouth 2 times daily for 7 days 14 tablet 0     fluticasone (FLONASE) 50 MCG/ACT nasal spray Spray 1 spray into both nostrils daily 11.1 mL 0     ibuprofen (ADVIL/MOTRIN) 800 MG tablet Take 1 tablet (800 mg) by mouth every 8 hours as needed for moderate pain (4-6) 30 tablet 0     ibuprofen (ADVIL/MOTRIN) 800 MG tablet Take 1 tablet (800 mg) by mouth every 8 hours as needed for moderate pain 30 tablet 0     ibuprofen (ADVIL/MOTRIN) 800 MG tablet TAKE 1 TABLET BY MOUTH EVERY 8 HOURS AS NEEDED FOR MODERATE PAIN 90 tablet 0     levothyroxine (SYNTHROID/LEVOTHROID) 100 MCG tablet Take 1 tablet (100 mcg) by mouth daily 90 tablet 1     levothyroxine (SYNTHROID/LEVOTHROID) 100 MCG tablet Take 1 tablet (100 mcg) by mouth daily 14 tablet 0     levothyroxine (SYNTHROID/LEVOTHROID) 112 MCG tablet ONE TAB BY MOUTH DAILY. 90 tablet 0     Multiple Vitamins-Minerals (MULTIVITAL PO) Take 1 tablet by  mouth       naproxen (NAPROSYN) 500 MG tablet Take 1 tablet (500 mg) by mouth 2 times daily (with meals) 30 tablet 3     polyethylene glycol (GOLYTELY) 236 g suspension Take as directed in patient instructions.  One day before exam fill jug with powder and one gallon of water.  At 6:00pm drink 8oz glass of mixture every 15 min until the jug is half empty.  Store the rest in the refrigerator.  Day of exam: 6 hours prior to exam drink the other half of mixture. 4000 mL 0     traZODone (DESYREL) 50 MG tablet TAKE 1 TO 2 TABLETS BY MOUTH AT BEDTIME AS NEEDED FOR SLEEP 120 tablet 0     valACYclovir (VALTREX) 1000 mg tablet Take 2 tablets (2,000 mg) by mouth 2 times daily For one day. 12 tablet 11     varenicline (CHANTIX RICARDO) 0.5 MG X 11 & 1 MG X 42 tablet Take 0.5 mg tab daily for 3 days, THEN 0.5 mg tab twice daily for 4 days, THEN 1 mg twice daily. 53 tablet 1     acyclovir (ZOVIRAX) 5 % external cream Apply topically 5 times daily (Patient not taking: Reported on 1/25/2023) 5 g 11     bacitracin 500 UNIT/GM external ointment Apply topically 2 times daily (Patient not taking: Reported on 1/25/2023) 15 g 0     bisacodyl (DULCOLAX) 5 MG EC tablet Take 2 tablets by mouth at bedtime 2 days prior to your procedure.  Take 2 tablets by mouth at 3:00pm one day prior to your procedure. (Patient not taking: Reported on 1/25/2023) 4 tablet 0     cyclobenzaprine (FLEXERIL) 10 MG tablet Take 0.5-1 tablets (5-10 mg) by mouth 3 times daily as needed for muscle spasms (Patient not taking: Reported on 1/25/2023) 30 tablet 0     methocarbamol (ROBAXIN) 750 MG tablet Take 1 tablet (750 mg) by mouth 4 times daily as needed (Patient not taking: Reported on 1/25/2023) 30 tablet 0     valACYclovir (VALTREX) 1000 mg tablet Take 2 tablets (2,000 mg) by mouth 2 times daily for 1 day 4 tablet 2     Social History:   Social History     Tobacco Use     Smoking status: Some Days     Years: 25.00     Types: Cigarettes     Smokeless tobacco: Never      Tobacco comments:     pack = 2 days    Substance Use Topics     Alcohol use: Yes     Alcohol/week: 0.0 standard drinks     Comment: rare       ROS:  General: negative  Skin: negative  Eyes: negative  Ears/Nose/Throat: nasal congestion, right nostril pain  Respiratory: No shortness of breath, dyspnea on exertion, cough, or hemoptysis  Cardiovascular: negative  Gastrointestinal: negative  Genitourinary: negative  Musculoskeletal: negative  Neurologic: negative      OBJECTIVE:  /76   Pulse 83   Resp 16   LMP 05/14/2008   SpO2 97%   GENERAL APPEARANCE: healthy, alert and no distress  EYES: EOMI,  PERRL, conjunctiva clear  HENT: ear canals and TM's normal.  Mouth without ulcers, erythema or lesions. Erythema with honey colored crust on right nostril  NECK: supple, nontender, no lymphadenopathy  RESP: lungs clear to auscultation - no rales, rhonchi or wheezes  CV: regular rates and rhythm, normal S1 S2, no murmur noted  SKIN: no suspicious lesions or rashes      ASSESSMENT:    ICD-10-CM    1. Acute sinusitis with symptoms > 10 days  J01.90 amoxicillin-clavulanate (AUGMENTIN) 875-125 MG tablet     ibuprofen (ADVIL/MOTRIN) 800 MG tablet      2. Nasal vestibulitis  J34.89           PLAN:  Patient Instructions   Patient was educated on the natural course of condition.  Take medications as prescribed for nasal vestibulitis and sinusitis. Side effects may include stomachache or diarrhea. Continue using Flonase nasal spray. Conservative measures discussed including increased fluids, nasal saline irrigation (neti pot), warm steamy shower, cough suppressants, expectorants (Mucinex), decongestants (Pseudofed), and analgesics (Tylenol and/or Ibuprofen). See your primary care provider if symptoms worsen or do not improve in 7 days. Seek emergency care if you develop fever over 103 or shortness of breath.    Patient verbalized understanding and is agreeable to plan. The patient was discharged ambulatory and in stable  condition.    GRAEME Bond...................  2/19/2023   6:51 PM

## 2023-03-21 DIAGNOSIS — R52 GENERALIZED BODY ACHES: ICD-10-CM

## 2023-03-23 RX ORDER — FLUTICASONE PROPIONATE 50 MCG
SPRAY, SUSPENSION (ML) NASAL
Qty: 16 G | OUTPATIENT
Start: 2023-03-23

## 2023-03-28 ENCOUNTER — TELEPHONE (OUTPATIENT)
Dept: FAMILY MEDICINE | Facility: CLINIC | Age: 58
End: 2023-03-28
Payer: COMMERCIAL

## 2023-03-28 DIAGNOSIS — G47.00 INSOMNIA, UNSPECIFIED TYPE: ICD-10-CM

## 2023-03-28 DIAGNOSIS — E03.9 HYPOTHYROIDISM, UNSPECIFIED TYPE: ICD-10-CM

## 2023-03-28 DIAGNOSIS — Z86.19 HX OF COLD SORES: ICD-10-CM

## 2023-03-28 NOTE — TELEPHONE ENCOUNTER
Dr. Mcclendon-Please review, sign if agree and may close encounter.  Patient does not need call back unless issue with request for increased Valtrex dispense.    Call received from patient:  1. Will be out of Levothyroxine 100 mcg next week-is she due for updated thyroid lab?  2. Would like increased dispense of Valtrex-7 day supply per refill    Informed patient:  1. She is overdue for thyroid lab check  2. Will request #28 Valtrex order be sent from Dr. Mcclendon    Patient verbalized understanding and in agreement with plan.    Lab appt scheduled for thyroid check on 3/29/23.  Visit date, time and location confirmed with patient.    Thank you!  LIZETH SingletaryN, RN-[facility]

## 2023-03-29 ENCOUNTER — LAB (OUTPATIENT)
Dept: LAB | Facility: CLINIC | Age: 58
End: 2023-03-29
Payer: COMMERCIAL

## 2023-03-29 ENCOUNTER — DOCUMENTATION ONLY (OUTPATIENT)
Dept: LAB | Facility: CLINIC | Age: 58
End: 2023-03-29

## 2023-03-29 DIAGNOSIS — E03.9 HYPOTHYROIDISM, UNSPECIFIED TYPE: ICD-10-CM

## 2023-03-29 DIAGNOSIS — E03.9 HYPOTHYROIDISM, UNSPECIFIED TYPE: Primary | ICD-10-CM

## 2023-03-29 DIAGNOSIS — B00.1 COLD SORE: ICD-10-CM

## 2023-03-29 LAB
T4 FREE SERPL-MCNC: 2.05 NG/DL (ref 0.9–1.7)
TSH SERPL DL<=0.005 MIU/L-ACNC: 0.14 UIU/ML (ref 0.3–4.2)

## 2023-03-29 PROCEDURE — 84439 ASSAY OF FREE THYROXINE: CPT

## 2023-03-29 PROCEDURE — 36415 COLL VENOUS BLD VENIPUNCTURE: CPT

## 2023-03-29 PROCEDURE — 84443 ASSAY THYROID STIM HORMONE: CPT

## 2023-03-29 RX ORDER — VALACYCLOVIR HYDROCHLORIDE 1 G/1
2000 TABLET, FILM COATED ORAL 2 TIMES DAILY
Qty: 12 TABLET | Refills: 3 | Status: SHIPPED | OUTPATIENT
Start: 2023-03-29 | End: 2024-04-22

## 2023-03-29 RX ORDER — TRAZODONE HYDROCHLORIDE 50 MG/1
TABLET, FILM COATED ORAL
Qty: 120 TABLET | Refills: 0 | Status: SHIPPED | OUTPATIENT
Start: 2023-03-29 | End: 2023-05-10

## 2023-03-29 NOTE — PROGRESS NOTES
Eunice Malloy has an upcoming lab appointment:    Future Appointments   Date Time Provider Department Center   3/29/2023 11:30 AM CS LAB [facility]     Patient is scheduled for the following lab(s): Pt is requesting labs. Please order if necessary, thank you    There is no order available. Please review and place either future orders or HMPO (Review of Health Maintenance Protocol Orders), as appropriate.    Health Maintenance Due   Topic     ANNUAL REVIEW OF HM ORDERS      Maci Matt

## 2023-03-29 NOTE — TELEPHONE ENCOUNTER
She agrees to below dosing, only gets cold sores this time of year and doesn't feel she needs daily    She also wants acylovir cream for her cold sores as well and wants a refill on the trazodone as well (I do not see the cream ointment on her med list)    Desire Banks RN, BSN  [facility]

## 2023-03-29 NOTE — TELEPHONE ENCOUNTER
Please let her know that the treatment for a recurrent cold sore is 2000mg twice daily for just one day before I send refills for her (not a 7 day treatment). In general, I would suggest a prescription for 12 tablets with several refills so she has enough to treat 3 episodes in one bottle, then can have further refills if needed. If frequent episodes of cold sore and she wants to try suppressive therapy (taking valtrex every day to prevent episodes), please have her schedule a virtual visit with me.   I placed a TSH order for her.     Alyse Mcclendon M.D.

## 2023-04-05 ENCOUNTER — TELEPHONE (OUTPATIENT)
Dept: FAMILY MEDICINE | Facility: CLINIC | Age: 58
End: 2023-04-05
Payer: COMMERCIAL

## 2023-04-05 DIAGNOSIS — E03.9 HYPOTHYROIDISM, UNSPECIFIED TYPE: Primary | ICD-10-CM

## 2023-04-05 NOTE — TELEPHONE ENCOUNTER
RN -- please let pt know that the thyroid test was abnormal, suggesting pt needs a lower dose of levothyroxine. Pt is currently on 100mcg daily. Please confirm with Eunice that she has been taking the 100mcg dose (not an old bottle of the 112mcg dose or taking 100mcg more frequently than recommended). If she has indeed been taking the 100mcg levothyroxine once daily, I would recommend reducing the dose to levothyroxine 88mcg daily and return for lab visit in 8 weeks for TSH. Please make sure that lab visit is scheduled before the conversation has ended. Thanks!! Alyse Mcclendon M.D.     TSH   Date Value Ref Range Status   03/29/2023 0.14 (L) 0.30 - 4.20 uIU/mL Final   08/25/2022 0.29 (L) 0.40 - 4.00 mU/L Final   11/11/2020 3.26 0.40 - 4.00 mU/L Final

## 2023-04-06 NOTE — TELEPHONE ENCOUNTER
Writer attempted to call patient. Unable to leave message due to voice mail box is full.     Message:   1. TSH level abnormal  2. Levothyroxine needs to be lowered to 88 mcg  3. Verify that patient is taking as prescribed.   4. Schedule lab appointment with patient on phone    LIZETH ElkinsN RN  [facility]

## 2023-04-10 RX ORDER — LEVOTHYROXINE SODIUM 88 UG/1
88 TABLET ORAL DAILY
Qty: 90 TABLET | Refills: 0 | Status: SHIPPED | OUTPATIENT
Start: 2023-04-10 | End: 2023-05-10

## 2023-04-10 NOTE — TELEPHONE ENCOUNTER
Left message on machine to call back  Ask to speak to any triage nurse, let them know it's a return call.  Sapna Posada RN  [facility]

## 2023-04-10 NOTE — TELEPHONE ENCOUNTER
PT called back.  She has been taking the 100 mcg daily.  Sent in the 88 mcg dose.    Pt wanted the lab appt at [facility].  Sent call back to CS to make the lab appt in 8 weeks.]    Confirmed next appts with PCP.  JUAN DAVID Sanchez

## 2023-04-13 DIAGNOSIS — E03.9 HYPOTHYROIDISM, UNSPECIFIED TYPE: ICD-10-CM

## 2023-04-13 RX ORDER — LEVOTHYROXINE SODIUM 88 UG/1
TABLET ORAL
Qty: 90 TABLET | Refills: 0 | OUTPATIENT
Start: 2023-04-13

## 2023-04-13 NOTE — TELEPHONE ENCOUNTER
Note to pharmacy- Given at , needs visit with primary provider for ongoing RX.  Sue Padilla RN    
04-13    140  |  106  |  9   ----------------------------<  113<H>  4.0   |  24  |  0.79    Ca    8.8      13 Apr 2023 10:07  Phos  4.1     04-13  Mg     1.8     04-13

## 2023-05-10 ENCOUNTER — HOSPITAL ENCOUNTER (OUTPATIENT)
Dept: MAMMOGRAPHY | Facility: CLINIC | Age: 58
Discharge: HOME | End: 2023-05-10
Attending: FAMILY MEDICINE | Admitting: FAMILY MEDICINE
Payer: COMMERCIAL

## 2023-05-10 ENCOUNTER — OFFICE VISIT (OUTPATIENT)
Dept: FAMILY MEDICINE | Facility: CLINIC | Age: 58
End: 2023-05-10
Payer: COMMERCIAL

## 2023-05-10 VITALS
RESPIRATION RATE: 14 BRPM | HEIGHT: 64 IN | DIASTOLIC BLOOD PRESSURE: 74 MMHG | HEART RATE: 67 BPM | BODY MASS INDEX: 22.36 KG/M2 | WEIGHT: 131 LBS | SYSTOLIC BLOOD PRESSURE: 112 MMHG | OXYGEN SATURATION: 99 % | TEMPERATURE: 97.2 F

## 2023-05-10 DIAGNOSIS — L65.9 HAIR LOSS: ICD-10-CM

## 2023-05-10 DIAGNOSIS — R09.81 NASAL CONGESTION: ICD-10-CM

## 2023-05-10 DIAGNOSIS — M79.18 MYOFASCIAL PAIN ON RIGHT SIDE: ICD-10-CM

## 2023-05-10 DIAGNOSIS — Z12.31 VISIT FOR SCREENING MAMMOGRAM: ICD-10-CM

## 2023-05-10 DIAGNOSIS — F17.200 TOBACCO DEPENDENCE SYNDROME: ICD-10-CM

## 2023-05-10 DIAGNOSIS — B00.1 COLD SORE: ICD-10-CM

## 2023-05-10 DIAGNOSIS — G47.00 INSOMNIA, UNSPECIFIED TYPE: ICD-10-CM

## 2023-05-10 DIAGNOSIS — E03.9 HYPOTHYROIDISM, UNSPECIFIED TYPE: Primary | ICD-10-CM

## 2023-05-10 PROCEDURE — 99214 OFFICE O/P EST MOD 30 MIN: CPT | Performed by: FAMILY MEDICINE

## 2023-05-10 PROCEDURE — 77067 SCR MAMMO BI INCL CAD: CPT

## 2023-05-10 RX ORDER — LEVOTHYROXINE SODIUM 88 UG/1
88 TABLET ORAL DAILY
Qty: 90 TABLET | Refills: 3 | Status: SHIPPED | OUTPATIENT
Start: 2023-05-10 | End: 2023-11-30

## 2023-05-10 RX ORDER — TRAZODONE HYDROCHLORIDE 50 MG/1
TABLET, FILM COATED ORAL
Qty: 120 TABLET | Refills: 3 | Status: SHIPPED | OUTPATIENT
Start: 2023-05-10 | End: 2024-01-25

## 2023-05-10 RX ORDER — FLUTICASONE PROPIONATE 50 MCG
2 SPRAY, SUSPENSION (ML) NASAL DAILY
Qty: 11.1 ML | Refills: 11 | Status: SHIPPED | OUTPATIENT
Start: 2023-05-10

## 2023-05-10 RX ORDER — CYCLOBENZAPRINE HCL 10 MG
5-10 TABLET ORAL 3 TIMES DAILY PRN
Qty: 30 TABLET | Refills: 0 | Status: SHIPPED | OUTPATIENT
Start: 2023-05-10 | End: 2024-04-22

## 2023-05-10 RX ORDER — ACYCLOVIR 50 MG/G
CREAM TOPICAL
Qty: 5 G | Refills: 3 | Status: SHIPPED | OUTPATIENT
Start: 2023-05-10 | End: 2024-02-26

## 2023-05-10 RX ORDER — IBUPROFEN 800 MG/1
800 TABLET, FILM COATED ORAL EVERY 8 HOURS PRN
Qty: 30 TABLET | Refills: 0 | Status: SHIPPED | OUTPATIENT
Start: 2023-05-10

## 2023-05-10 ASSESSMENT — PAIN SCALES - GENERAL: PAINLEVEL: NO PAIN (0)
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
## 2023-05-10 NOTE — PROGRESS NOTES
"  {PROVIDER CHARTING PREFERENCE:996099}    Tobi Dawson is a 57 year old, presenting for the following health issues:  Follow Up ( Checking up since last visit )        5/10/2023     8:56 AM   Additional Questions   Roomed by delmy shaver   Accompanied by none         5/10/2023     8:56 AM   Patient Reported Additional Medications   Patient reports taking the following new medications none     History of Present Illness       Reason for visit:  Check up    She eats 0-1 servings of fruits and vegetables daily.She consumes 0 sweetened beverage(s) daily.She exercises with enough effort to increase her heart rate 20 to 29 minutes per day.  She exercises with enough effort to increase her heart rate 3 or less days per week.   She is taking medications regularly.       papsmear         {SUPERLIST (Optional):733052}  {additonal problems for provider to add (Optional):646352}      Review of Systems   {ROS COMP (Optional):183372}      Objective    /74 (BP Location: Right arm, Patient Position: Chair, Cuff Size: Adult Regular)   Pulse 67   Temp 97.2  F (36.2  C) (Temporal)   Resp 14   Ht 1.626 m (5' 4\")   Wt 59.4 kg (131 lb)   LMP 05/14/2008   SpO2 99%   BMI 22.49 kg/m    Body mass index is 22.49 kg/m .  Physical Exam   {Exam List (Optional):450084}    {Diagnostic Test Results (Optional):582842}    {AMBULATORY ATTESTATION (Optional):905458}            "

## 2023-05-10 NOTE — PATIENT INSTRUCTIONS
Start flonase 2 sprays each nostril daily. Use every day for at least 2 weeks. If not improving, schedule a visit with the ENT specialist.

## 2023-05-19 NOTE — TELEPHONE ENCOUNTER
Dr. Mcclendon -- call from pt requesting to change from Valtrex to acyclovir 400 mg tablets. Pt is wondering if you would be OK making this change? Valtrex tablets are too big.    Please review and advise. Pharmacy is loaded.    Thank you  Jeannie Glaser, RN, BSN   [facility]       
Left message for patient to call back and speak with a nurse.    Jeannie Glaser RN, BSN   [facility]     
Reason for Call:  Other Medication change     Detailed comments: Patient would like a call back to talk about a possible change in medications     Phone Number Patient can be reached at: Home number on file [phone] (home)    Best Time: Any      Can we leave a detailed message on this number? YES    Call taken on 1/10/2019 at 2:11 PM by Demetrio Espinosa      
Return call to patient left detailed message with provider response and plan     Signed Prescriptions:                        Disp   Refills    acyclovir (ZOVIRAX) 400 MG tablet          15 tab*0        Sig: Take 1 tablet (400 mg) by mouth every 8 hours for 5           days  Authorizing Provider: JOSH SERNA      Closing encounter - no further actions needed at this time    Laura Vázquze RN    
She can take acyclovir 400mg three times a day for 5 days for herpes outbreak. I sent a prescription.  Alyse Mcclendon M.D.        
-3

## 2023-05-21 ENCOUNTER — HEALTH MAINTENANCE LETTER (OUTPATIENT)
Age: 58
End: 2023-05-21

## 2023-06-05 ENCOUNTER — VIRTUAL VISIT (OUTPATIENT)
Dept: INTERNAL MEDICINE | Facility: CLINIC | Age: 58
End: 2023-06-05
Payer: COMMERCIAL

## 2023-06-05 ENCOUNTER — LAB (OUTPATIENT)
Dept: LAB | Facility: CLINIC | Age: 58
End: 2023-06-05
Payer: COMMERCIAL

## 2023-06-05 DIAGNOSIS — R35.0 URINARY FREQUENCY: Primary | ICD-10-CM

## 2023-06-05 DIAGNOSIS — R35.0 URINARY FREQUENCY: ICD-10-CM

## 2023-06-05 LAB
ALBUMIN UR-MCNC: NEGATIVE MG/DL
APPEARANCE UR: CLEAR
BACTERIA #/AREA URNS HPF: ABNORMAL /HPF
BILIRUB UR QL STRIP: NEGATIVE
COLOR UR AUTO: YELLOW
GLUCOSE UR STRIP-MCNC: NEGATIVE MG/DL
HGB UR QL STRIP: ABNORMAL
KETONES UR STRIP-MCNC: NEGATIVE MG/DL
LEUKOCYTE ESTERASE UR QL STRIP: NEGATIVE
NITRATE UR QL: NEGATIVE
PH UR STRIP: 5.5 [PH] (ref 5–7)
RBC #/AREA URNS AUTO: ABNORMAL /HPF
SP GR UR STRIP: 1.02 (ref 1–1.03)
SQUAMOUS #/AREA URNS AUTO: ABNORMAL /LPF
UROBILINOGEN UR STRIP-ACNC: 0.2 E.U./DL
WBC #/AREA URNS AUTO: ABNORMAL /HPF

## 2023-06-05 PROCEDURE — 81001 URINALYSIS AUTO W/SCOPE: CPT

## 2023-06-05 PROCEDURE — 99441 PR PHYSICIAN TELEPHONE EVALUATION 5-10 MIN: CPT | Mod: 95 | Performed by: INTERNAL MEDICINE

## 2023-06-05 NOTE — ASSESSMENT & PLAN NOTE
Patient reports increased urinary frequency over the last month.  Had new associated suprapubic pain 2 days ago that was worrisome.  This has since resolved with use of oils and ibuprofen.  Discussed with patient that it as the frequency has been going on for a long time it could represent a UTI or could be something else going on like atrophic vaginitis, elevated blood sugar, other pelvic floor dysfunction.  -Check UA  -If concerning for infection will treat with 5 days of Macrobid  -If the UA is clean, we will have her follow-up with PCP for further testing

## 2023-06-05 NOTE — PROGRESS NOTES
Eunice is a 57 year old who is being evaluated via a billable telephone visit.      What phone number would you like to be contacted at? [phone]  How would you like to obtain your AVS? Jose  Distant Location (provider location):  On-site    Assessment & Plan   Problem List Items Addressed This Visit        Other    Urinary frequency - Primary     Patient reports increased urinary frequency over the last month.  Had new associated suprapubic pain 2 days ago that was worrisome.  This has since resolved with use of oils and ibuprofen.  Discussed with patient that it as the frequency has been going on for a long time it could represent a UTI or could be something else going on like atrophic vaginitis, elevated blood sugar, other pelvic floor dysfunction.  -Check UA  -If concerning for infection will treat with 5 days of Macrobid  -If the UA is clean, we will have her follow-up with PCP for further testing         Relevant Orders    UA Macroscopic with reflex to Microscopic and Culture        Ordering of each unique test     FUTURE APPOINTMENTS:       - Follow-up as needed    Celestina Sapna Aguiar MD  [facility]   Eunice is a 57 year old, presenting for the following health issues:  UTI        6/5/2023    12:31 PM   Additional Questions   Roomed by Amber DEVI CMA     HPI     Genitourinary - Female  Onset/Duration: 06/03/2023  Description:   Painful urination (Dysuria): No           Frequency: YES  Blood in urine (Hematuria): No  Delay in urine (Hesitency): No  Progression of Symptoms:  same  Accompanying Signs & Symptoms:  Fever/chills: No  Flank pain: No  Nausea and vomiting: No  Vaginal symptoms: none  Abdominal/Pelvic Pain: YES- right above pelvic bone  History:   History of frequent UTI s: No  History of kidney stones: YES- they said I did awhile back but I don't I think I do it was a long time ago  Sexually Active: No  Possibility of pregnancy: No  Precipitating or  alleviating factors: None  Therapies tried and outcome: ibuprofen but not today     Increased urinary frequency, over the last month or 2.  She says she did not think to talk about this at her visit with her PCP a month ago.  No dysuria, urgency, hematuria.  What was new and concerning was pain above pelvic bone and below belly button, on Saturday.  This did get better with holistic oils and ibuprofen. No back or flank pain. No fever or chills. Has not had UTI recently.     Review of Systems   Constitutional, HEENT, cardiovascular, pulmonary, gi and gu systems are negative, except as otherwise noted.      Objective           Vitals:  No vitals were obtained today due to virtual visit.    Physical Exam   healthy, alert and no distress  PSYCH: Alert and oriented times 3; coherent speech, normal   rate and volume, able to articulate logical thoughts, able   to abstract reason, no tangential thoughts, no hallucinations   or delusions  Her affect is normal  RESP: No cough, no audible wheezing, able to talk in full sentences  Remainder of exam unable to be completed due to telephone visits    No results found for this or any previous visit (from the past 24 hour(s)).          Phone call duration: 8 minutes

## 2023-06-09 ENCOUNTER — LAB (OUTPATIENT)
Dept: LAB | Facility: CLINIC | Age: 58
End: 2023-06-09
Payer: COMMERCIAL

## 2023-06-09 DIAGNOSIS — L65.9 HAIR LOSS: ICD-10-CM

## 2023-06-09 LAB
FERRITIN SERPL-MCNC: 266 NG/ML (ref 11–328)
IRON BINDING CAPACITY (ROCHE): 284 UG/DL (ref 240–430)
IRON SATN MFR SERPL: 24 % (ref 15–46)
IRON SERPL-MCNC: 69 UG/DL (ref 37–145)
T4 FREE SERPL-MCNC: 1.29 NG/DL (ref 0.9–1.7)
TSH SERPL DL<=0.005 MIU/L-ACNC: 8.53 UIU/ML (ref 0.3–4.2)

## 2023-06-09 PROCEDURE — 83540 ASSAY OF IRON: CPT

## 2023-06-09 PROCEDURE — 82728 ASSAY OF FERRITIN: CPT

## 2023-06-09 PROCEDURE — 84439 ASSAY OF FREE THYROXINE: CPT

## 2023-06-09 PROCEDURE — 84443 ASSAY THYROID STIM HORMONE: CPT

## 2023-06-09 PROCEDURE — 83550 IRON BINDING TEST: CPT

## 2023-06-09 PROCEDURE — 36415 COLL VENOUS BLD VENIPUNCTURE: CPT

## 2023-06-16 ENCOUNTER — OFFICE VISIT (OUTPATIENT)
Dept: URGENT CARE | Facility: URGENT CARE | Age: 58
End: 2023-06-16
Payer: COMMERCIAL

## 2023-06-16 VITALS
RESPIRATION RATE: 16 BRPM | OXYGEN SATURATION: 99 % | SYSTOLIC BLOOD PRESSURE: 112 MMHG | BODY MASS INDEX: 22.49 KG/M2 | DIASTOLIC BLOOD PRESSURE: 70 MMHG | TEMPERATURE: 97 F | WEIGHT: 131 LBS | HEART RATE: 63 BPM

## 2023-06-16 DIAGNOSIS — J01.90 ACUTE SINUSITIS WITH COEXISTING CONDITION REQUIRING PROPHYLACTIC TREATMENT: Primary | ICD-10-CM

## 2023-06-16 DIAGNOSIS — R07.0 THROAT PAIN: ICD-10-CM

## 2023-06-16 LAB
DEPRECATED S PYO AG THROAT QL EIA: NEGATIVE
GROUP A STREP BY PCR: NOT DETECTED

## 2023-06-16 PROCEDURE — 87651 STREP A DNA AMP PROBE: CPT | Performed by: FAMILY MEDICINE

## 2023-06-16 PROCEDURE — 99213 OFFICE O/P EST LOW 20 MIN: CPT | Performed by: FAMILY MEDICINE

## 2023-06-16 NOTE — PROGRESS NOTES
SUBJECTIVE: Eunice Malloy is a 57 year old female here with concerns about sinus infection.  She states onset  of symptoms were greater than 10 days with sinus pressure and drainage.  Course of illness is worsening.    Severity: moderate  Current and Associated symptoms: cold symptoms  Predisposing factors include none.   Recent treatment has included: OTC's    Past Medical History:   Diagnosis Date     Anxiety 4/11/2013     Cold sore 8/15/2011     Depression      Hypothyroidism 12/9/2010     Tobacco abuse      Allergies   Allergen Reactions     Sulfa Antibiotics Rash     Social History     Tobacco Use     Smoking status: Some Days     Years: 25.00     Types: Cigarettes     Smokeless tobacco: Never     Tobacco comments:     pack = 2 days    Vaping Use     Vaping status: Never Used   Substance Use Topics     Alcohol use: Yes     Alcohol/week: 0.0 standard drinks of alcohol     Comment: rare       ROS:   no rash  no vomiting    OBJECTIVE:  /70   Pulse 63   Temp 97  F (36.1  C) (Tympanic)   Resp 16   Wt 59.4 kg (131 lb)   LMP 05/14/2008   SpO2 99%   BMI 22.49 kg/m    NAD  EYES: clear, no mattering  EARS: TM's clear, no redness/buldging, canals clear, no swelling/redness  NOSE: discolored discharge daniela. Nares  THROAT: clear, no erythema, no exudate  SINUS: maxillary tenderness with palpation  LUNGS: CTAB, no rhonchi/wheeze/rales      ICD-10-CM    1. Acute sinusitis with coexisting condition requiring prophylactic treatment  J01.90 amoxicillin-clavulanate (AUGMENTIN) 875-125 MG tablet      2. Throat pain  R07.0 Streptococcus A Rapid Screen w/Reflex to PCR     Group A Streptococcus PCR Throat Swab          Follow up with primary clinic if not improving

## 2023-06-21 ENCOUNTER — TELEPHONE (OUTPATIENT)
Dept: FAMILY MEDICINE | Facility: CLINIC | Age: 58
End: 2023-06-21
Payer: COMMERCIAL

## 2023-06-21 DIAGNOSIS — E03.9 HYPOTHYROIDISM, UNSPECIFIED TYPE: Primary | ICD-10-CM

## 2023-06-21 NOTE — TELEPHONE ENCOUNTER
RN -- please let pt know that the thyroid test was abnormal, however, it looks like she may have been ill when the test was completed, which can affect the results.  I recommend she remain on the current dose and follow-up for a lab visit in 3 months  Alyse Mcclendon M.D.     TSH   Date Value Ref Range Status   06/09/2023 8.53 (H) 0.30 - 4.20 uIU/mL Final   08/25/2022 0.29 (L) 0.40 - 4.00 mU/L Final   11/11/2020 3.26 0.40 - 4.00 mU/L Final

## 2023-06-22 NOTE — TELEPHONE ENCOUNTER
Writer called and left message on patient's voicemail to call back and speak with a triage nurse.    LIZETH ElkinsN RN  [facility]

## 2023-06-22 NOTE — TELEPHONE ENCOUNTER
Patient returned call.   Writer gave patient from Dr. Mcclendon related to thyroid test results and rechecking in 3 months with the lab.   Patient stated understanding.     LIZETH ElkinsN JUAN DAVID  [facility]

## 2023-06-23 ENCOUNTER — TELEPHONE (OUTPATIENT)
Dept: FAMILY MEDICINE | Facility: CLINIC | Age: 58
End: 2023-06-23
Payer: COMMERCIAL

## 2023-06-23 DIAGNOSIS — R09.89 SINUS SYMPTOM: Primary | ICD-10-CM

## 2023-06-23 DIAGNOSIS — F17.200 TOBACCO DEPENDENCE SYNDROME: ICD-10-CM

## 2023-08-10 NOTE — TELEPHONE ENCOUNTER
Pt called clinic back.  She was given this message.  She will await the result message or need for additional blood sample.    GGT lab looks competed.  Routing to pcp.  JUAN DAVID Sanchez     Oral Minoxidil Pregnancy And Lactation Text: This medication should only be used when clearly needed if you are pregnant, attempting to become pregnant or breast feeding.

## 2023-10-12 ENCOUNTER — OFFICE VISIT (OUTPATIENT)
Dept: FAMILY MEDICINE | Facility: CLINIC | Age: 58
End: 2023-10-12
Payer: COMMERCIAL

## 2023-10-12 VITALS
RESPIRATION RATE: 18 BRPM | OXYGEN SATURATION: 98 % | WEIGHT: 126.3 LBS | TEMPERATURE: 97.4 F | HEIGHT: 62 IN | DIASTOLIC BLOOD PRESSURE: 77 MMHG | BODY MASS INDEX: 23.24 KG/M2 | HEART RATE: 77 BPM | SYSTOLIC BLOOD PRESSURE: 128 MMHG

## 2023-10-12 DIAGNOSIS — Z23 NEED FOR PROPHYLACTIC VACCINATION AND INOCULATION AGAINST INFLUENZA: ICD-10-CM

## 2023-10-12 DIAGNOSIS — E03.9 HYPOTHYROIDISM, UNSPECIFIED TYPE: Primary | ICD-10-CM

## 2023-10-12 LAB
T4 FREE SERPL-MCNC: 1.23 NG/DL (ref 0.9–1.7)
TSH SERPL DL<=0.005 MIU/L-ACNC: 4.3 UIU/ML (ref 0.3–4.2)

## 2023-10-12 PROCEDURE — 36415 COLL VENOUS BLD VENIPUNCTURE: CPT | Performed by: FAMILY MEDICINE

## 2023-10-12 PROCEDURE — 84443 ASSAY THYROID STIM HORMONE: CPT | Performed by: FAMILY MEDICINE

## 2023-10-12 PROCEDURE — 90686 IIV4 VACC NO PRSV 0.5 ML IM: CPT | Performed by: FAMILY MEDICINE

## 2023-10-12 PROCEDURE — 99214 OFFICE O/P EST MOD 30 MIN: CPT | Mod: 25 | Performed by: FAMILY MEDICINE

## 2023-10-12 PROCEDURE — 90471 IMMUNIZATION ADMIN: CPT | Performed by: FAMILY MEDICINE

## 2023-10-12 PROCEDURE — 84439 ASSAY OF FREE THYROXINE: CPT | Performed by: FAMILY MEDICINE

## 2023-10-12 RX ORDER — LEVOTHYROXINE SODIUM 100 UG/1
100 TABLET ORAL DAILY
Qty: 90 TABLET | Refills: 0 | Status: SHIPPED | OUTPATIENT
Start: 2023-10-12 | End: 2023-11-30

## 2023-10-12 ASSESSMENT — PAIN SCALES - GENERAL: PAINLEVEL: NO PAIN (0)

## 2023-10-12 NOTE — PROGRESS NOTES
"  {PROVIDER CHARTING PREFERENCE:015847}    Tobi Dawson is a 58 year old, presenting for the following health issues:  Constipation      10/12/2023     9:16 AM   Additional Questions   Roomed by Nichelle LANZA   Accompanied by self       History of Present Illness       Reason for visit:  Irregular bowl movement & sleeping    She eats 2-3 servings of fruits and vegetables daily.She consumes 0 sweetened beverage(s) daily.She exercises with enough effort to increase her heart rate 10 to 19 minutes per day.  She exercises with enough effort to increase her heart rate 3 or less days per week.   She is taking medications regularly.       {MA/LPN/RN Pre-Provider Visit Orders- hCG/UA/Strep (Optional):167134}  {SUPERLIST (Optional):171222}  {additonal problems for provider to add (Optional):661977}      Review of Systems   {ROS COMP (Optional):887353}      Objective    /77 (BP Location: Right arm, Patient Position: Sitting, Cuff Size: Adult Regular)   Pulse 77   Temp 97.4  F (36.3  C) (Temporal)   Resp 18   Ht 1.582 m (5' 2.3\")   Wt 57.3 kg (126 lb 4.8 oz)   LMP 05/14/2008   SpO2 98%   BMI 22.88 kg/m    Body mass index is 22.88 kg/m .  Physical Exam   {Exam List (Optional):471977}    {Diagnostic Test Results (Optional):319765}    {AMBULATORY ATTESTATION (Optional):641977}              "

## 2023-10-12 NOTE — PATIENT INSTRUCTIONS
Stop the 88mcg levothyroxine and start the 100mcg dose of levothyroxine.   Schedule a lab visit in 6 weeks to recheck your thyroid.   See me a few days after your lab visit (we can do a video visit)  In the meantime, start taking metamucil daily. You can also use miralax, a gentle laxative, daily until you are having normal soft stools.
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
## 2023-10-24 ENCOUNTER — TELEPHONE (OUTPATIENT)
Dept: FAMILY MEDICINE | Facility: CLINIC | Age: 58
End: 2023-10-24
Payer: COMMERCIAL

## 2023-10-24 NOTE — TELEPHONE ENCOUNTER
Please let her know that I strongly recommend she start taking miralax daily. She can try a tap water enema if needed as well. Her thyroid medication was just adjusted. It can take some time for the body to adjust. She does not need a colonoscopy yet. Let me know how it goes after she has been doing miralax for the next week or two. Alyse Mcclendon M.D.          TSH   Date Value Ref Range Status   10/12/2023 4.30 (H) 0.30 - 4.20 uIU/mL Final   08/25/2022 0.29 (L) 0.40 - 4.00 mU/L Final   11/11/2020 3.26 0.40 - 4.00 mU/L Final

## 2023-10-24 NOTE — TELEPHONE ENCOUNTER
Order/Referral Request    Who is requesting: Patient     Orders being requested: Colonoscopy Referall    Reason service is needed/diagnosis: patient stated she is overdue and is having constipation issues that have been going on for weeks     When are orders needed by: ASAP     Has this been discussed with Provider: No    Does patient have a preference on a Group/Provider/Facility? [facility]     Does patient have an appointment scheduled?: yes 12/13/23    Where to send orders: Place orders within Epic    Could we send this information to you in [facility] or would you prefer to receive a phone call?:   Phone [phone]

## 2023-10-24 NOTE — TELEPHONE ENCOUNTER
DR Lee,    Pt asking for colonoscopy order.     5/21/21 last colonoscopy and advice was to repeat in 5 year       I did speak to pt:    Constipation not improved with metamucil. Hasn't tried miralax yet    No bleeding with BMs . Sometime some abd pain before BM . Has to strain and stool is firm, stool every other day    I did advise she try the miralax    Do you want to order a colonoscopy  Order pended      (Patient has given permission to leave detailed message on voice mail if no answer on phone.)

## 2023-10-24 NOTE — TELEPHONE ENCOUNTER
On 10-/12 / 23 pt had visit with DR Mcclendon and constipation discussed and she was advised metamucil daily or miralax daily until stools normal    5/21/21 last colonoscopy and advice was to repeat in 5 years     Attempted to reach, unable. Left message  to call back. Would like to ask her if she has had any improvement in her constipation or any new bowel symptoms    Desire Banks RN

## 2023-10-24 NOTE — TELEPHONE ENCOUNTER
Detailed message left on her voice mail with Dr Mcclendon's comments and an invitation to call back to triage RN if questions still    Desire Banks RN, BSN  [facility]

## 2023-11-24 ENCOUNTER — LAB (OUTPATIENT)
Dept: LAB | Facility: CLINIC | Age: 58
End: 2023-11-24
Payer: COMMERCIAL

## 2023-11-24 DIAGNOSIS — E03.9 HYPOTHYROIDISM, UNSPECIFIED TYPE: ICD-10-CM

## 2023-11-24 LAB
T4 FREE SERPL-MCNC: 0.6 NG/DL (ref 0.9–1.7)
TSH SERPL DL<=0.005 MIU/L-ACNC: 29.7 UIU/ML (ref 0.3–4.2)

## 2023-11-24 PROCEDURE — 84439 ASSAY OF FREE THYROXINE: CPT

## 2023-11-24 PROCEDURE — 84443 ASSAY THYROID STIM HORMONE: CPT

## 2023-11-24 PROCEDURE — 36415 COLL VENOUS BLD VENIPUNCTURE: CPT

## 2023-11-27 ENCOUNTER — TELEPHONE (OUTPATIENT)
Dept: FAMILY MEDICINE | Facility: CLINIC | Age: 58
End: 2023-11-27
Payer: COMMERCIAL

## 2023-11-27 DIAGNOSIS — E03.9 HYPOTHYROIDISM, UNSPECIFIED TYPE: Primary | ICD-10-CM

## 2023-11-27 NOTE — TELEPHONE ENCOUNTER
RN -- please let pt know that the thyroid test was abnormal. I increased her levothyroxine dose from 88mcg daily to 100mcg daily on 10/12/23. Oddly, her TSH has increased and her thyroid hormone has decreased since that time. Please confirm she has been taking the 100mcg levothyroxine every day. Please let me know her response.     If she has been taking the correct dose every day, then I would like to check her thyroid again in 4-6 weeks.     If she has not been taking the correct dose every day, then have her do so and return for a recheck in 6 weeks.        Alyse Mcclendon M.D.     No results found for any visits on 11/27/23.      TSH   Date Value Ref Range Status   11/24/2023 29.70 (H) 0.30 - 4.20 uIU/mL Final   08/25/2022 0.29 (L) 0.40 - 4.00 mU/L Final   11/11/2020 3.26 0.40 - 4.00 mU/L Final

## 2023-11-30 ENCOUNTER — VIRTUAL VISIT (OUTPATIENT)
Dept: FAMILY MEDICINE | Facility: CLINIC | Age: 58
End: 2023-11-30
Payer: COMMERCIAL

## 2023-11-30 DIAGNOSIS — R45.89 DEPRESSED MOOD: ICD-10-CM

## 2023-11-30 DIAGNOSIS — F41.9 ANXIETY: ICD-10-CM

## 2023-11-30 DIAGNOSIS — E03.9 HYPOTHYROIDISM, UNSPECIFIED TYPE: Primary | ICD-10-CM

## 2023-11-30 PROCEDURE — 99214 OFFICE O/P EST MOD 30 MIN: CPT | Mod: VID | Performed by: FAMILY MEDICINE

## 2023-11-30 RX ORDER — LEVOTHYROXINE SODIUM 100 UG/1
100 TABLET ORAL DAILY
Qty: 90 TABLET | Refills: 0 | Status: SHIPPED | OUTPATIENT
Start: 2023-11-30 | End: 2024-04-22
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
## 2023-12-04 ENCOUNTER — PATIENT OUTREACH (OUTPATIENT)
Dept: CARE COORDINATION | Facility: CLINIC | Age: 58
End: 2023-12-04
Payer: COMMERCIAL
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
## 2023-12-06 ENCOUNTER — PATIENT OUTREACH (OUTPATIENT)
Dept: FAMILY MEDICINE | Facility: CLINIC | Age: 58
End: 2023-12-06
Payer: COMMERCIAL
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
## 2023-12-15 ENCOUNTER — OFFICE VISIT (OUTPATIENT)
Dept: BEHAVIORAL HEALTH | Facility: CLINIC | Age: 58
End: 2023-12-15
Payer: COMMERCIAL

## 2023-12-15 DIAGNOSIS — F43.22 ADJUSTMENT DISORDER WITH ANXIOUS MOOD: Primary | ICD-10-CM

## 2023-12-15 PROCEDURE — 90837 PSYTX W PT 60 MINUTES: CPT | Performed by: SOCIAL WORKER

## 2023-12-19 ASSESSMENT — COLUMBIA-SUICIDE SEVERITY RATING SCALE - C-SSRS
ATTEMPT LIFETIME: NO
TOTAL  NUMBER OF ABORTED OR SELF INTERRUPTED ATTEMPTS LIFETIME: NO
TOTAL  NUMBER OF INTERRUPTED ATTEMPTS LIFETIME: NO
1. HAVE YOU WISHED YOU WERE DEAD OR WISHED YOU COULD GO TO SLEEP AND NOT WAKE UP?: NO
6. HAVE YOU EVER DONE ANYTHING, STARTED TO DO ANYTHING, OR PREPARED TO DO ANYTHING TO END YOUR LIFE?: NO
2. HAVE YOU ACTUALLY HAD ANY THOUGHTS OF KILLING YOURSELF?: NO
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
## 2024-01-24 DIAGNOSIS — G47.00 INSOMNIA, UNSPECIFIED TYPE: ICD-10-CM

## 2024-01-24 RX ORDER — TRAZODONE HYDROCHLORIDE 50 MG/1
TABLET, FILM COATED ORAL
Qty: 120 TABLET | Refills: 3 | Status: CANCELLED | OUTPATIENT
Start: 2024-01-24

## 2024-01-24 NOTE — TELEPHONE ENCOUNTER
Per patient pharmacy says she no longer has refills on file of pended Trazodone-sign if agreeable-thanks!

## 2024-01-25 DIAGNOSIS — G47.00 INSOMNIA, UNSPECIFIED TYPE: ICD-10-CM

## 2024-01-25 RX ORDER — TRAZODONE HYDROCHLORIDE 50 MG/1
TABLET, FILM COATED ORAL
Qty: 120 TABLET | Refills: 3 | Status: SHIPPED | OUTPATIENT
Start: 2024-01-25 | End: 2024-04-22

## 2024-01-27 DIAGNOSIS — E03.9 HYPOTHYROIDISM, UNSPECIFIED TYPE: ICD-10-CM

## 2024-01-29 RX ORDER — LEVOTHYROXINE SODIUM 100 UG/1
100 TABLET ORAL DAILY
Qty: 90 TABLET | Refills: 0 | OUTPATIENT
Start: 2024-01-29

## 2024-02-23 DIAGNOSIS — B00.1 COLD SORE: ICD-10-CM

## 2024-02-26 RX ORDER — ACYCLOVIR 50 MG/G
CREAM TOPICAL
Qty: 5 G | Refills: 3 | Status: SHIPPED | OUTPATIENT
Start: 2024-02-26 | End: 2024-04-22

## 2024-03-13 ENCOUNTER — TELEPHONE (OUTPATIENT)
Dept: FAMILY MEDICINE | Facility: CLINIC | Age: 59
End: 2024-03-13

## 2024-03-13 NOTE — TELEPHONE ENCOUNTER
I could see her on Monday April 22 at arrive at 1:40 for 2:00 visit for 40 minutes, okay to use approval required slot. Alyse Mcclendon M.D.

## 2024-03-13 NOTE — TELEPHONE ENCOUNTER
Reason for Call:  Appointment Request    Patient requesting this type of appt:  Preventive     Requested provider: Alyse Mcclendon    Reason patient unable to be scheduled: Not within requested timeframe    When does patient want to be seen/preferred time:  April 1st or later date in April     Comments: Insurance renewed and does not start until April 1st, so pt had to reschedule 3/20/24 appointment, but does not want to wait until June for next available.     Could we send this information to you in [employer] or would you prefer to receive a phone call?:   Patient would prefer a phone call   Okay to leave a detailed message?: Yes at Cell number on file:    Telephone Information:   Mobile [phone]       Call taken on 3/13/2024 at 9:08 AM by Jennifer Casillas

## 2024-04-22 ENCOUNTER — TELEPHONE (OUTPATIENT)
Dept: FAMILY MEDICINE | Facility: CLINIC | Age: 59
End: 2024-04-22

## 2024-04-22 ENCOUNTER — OFFICE VISIT (OUTPATIENT)
Dept: FAMILY MEDICINE | Facility: CLINIC | Age: 59
End: 2024-04-22
Payer: COMMERCIAL

## 2024-04-22 VITALS
SYSTOLIC BLOOD PRESSURE: 119 MMHG | RESPIRATION RATE: 16 BRPM | OXYGEN SATURATION: 98 % | HEIGHT: 62 IN | HEART RATE: 56 BPM | TEMPERATURE: 97.5 F | BODY MASS INDEX: 23.15 KG/M2 | DIASTOLIC BLOOD PRESSURE: 67 MMHG | WEIGHT: 125.8 LBS

## 2024-04-22 DIAGNOSIS — R21 RASH AND NONSPECIFIC SKIN ERUPTION: ICD-10-CM

## 2024-04-22 DIAGNOSIS — E03.9 HYPOTHYROIDISM, UNSPECIFIED TYPE: ICD-10-CM

## 2024-04-22 DIAGNOSIS — Z00.00 ROUTINE GENERAL MEDICAL EXAMINATION AT A HEALTH CARE FACILITY: Primary | ICD-10-CM

## 2024-04-22 DIAGNOSIS — Z86.19 HX OF COLD SORES: ICD-10-CM

## 2024-04-22 DIAGNOSIS — N95.8 GENITOURINARY SYNDROME OF MENOPAUSE: ICD-10-CM

## 2024-04-22 DIAGNOSIS — D17.30 LIPOMA OF SKIN AND SUBCUTANEOUS TISSUE: ICD-10-CM

## 2024-04-22 DIAGNOSIS — G47.00 INSOMNIA, UNSPECIFIED TYPE: ICD-10-CM

## 2024-04-22 DIAGNOSIS — R45.89 DEPRESSED MOOD: ICD-10-CM

## 2024-04-22 DIAGNOSIS — Z13.220 LIPID SCREENING: ICD-10-CM

## 2024-04-22 DIAGNOSIS — Z12.4 CERVICAL CANCER SCREENING: ICD-10-CM

## 2024-04-22 DIAGNOSIS — B00.1 COLD SORE: ICD-10-CM

## 2024-04-22 DIAGNOSIS — F41.9 ANXIETY: ICD-10-CM

## 2024-04-22 DIAGNOSIS — M79.18 MYOFASCIAL PAIN ON RIGHT SIDE: ICD-10-CM

## 2024-04-22 DIAGNOSIS — M54.50 BILATERAL LOW BACK PAIN WITHOUT SCIATICA, UNSPECIFIED CHRONICITY: ICD-10-CM

## 2024-04-22 DIAGNOSIS — Z86.0100 HISTORY OF COLONIC POLYPS: ICD-10-CM

## 2024-04-22 DIAGNOSIS — F17.200 TOBACCO DEPENDENCE SYNDROME: ICD-10-CM

## 2024-04-22 DIAGNOSIS — R35.0 URINARY FREQUENCY: ICD-10-CM

## 2024-04-22 PROCEDURE — 90471 IMMUNIZATION ADMIN: CPT | Performed by: FAMILY MEDICINE

## 2024-04-22 PROCEDURE — 84443 ASSAY THYROID STIM HORMONE: CPT | Performed by: FAMILY MEDICINE

## 2024-04-22 PROCEDURE — 99214 OFFICE O/P EST MOD 30 MIN: CPT | Mod: 25 | Performed by: FAMILY MEDICINE

## 2024-04-22 PROCEDURE — 87624 HPV HI-RISK TYP POOLED RSLT: CPT | Performed by: FAMILY MEDICINE

## 2024-04-22 PROCEDURE — 90480 ADMN SARSCOV2 VAC 1/ONLY CMP: CPT | Performed by: FAMILY MEDICINE

## 2024-04-22 PROCEDURE — 91320 SARSCV2 VAC 30MCG TRS-SUC IM: CPT | Performed by: FAMILY MEDICINE

## 2024-04-22 PROCEDURE — 90677 PCV20 VACCINE IM: CPT | Performed by: FAMILY MEDICINE

## 2024-04-22 PROCEDURE — 80053 COMPREHEN METABOLIC PANEL: CPT | Performed by: FAMILY MEDICINE

## 2024-04-22 PROCEDURE — G0145 SCR C/V CYTO,THINLAYER,RESCR: HCPCS | Performed by: FAMILY MEDICINE

## 2024-04-22 PROCEDURE — 99396 PREV VISIT EST AGE 40-64: CPT | Mod: 25 | Performed by: FAMILY MEDICINE

## 2024-04-22 PROCEDURE — 80061 LIPID PANEL: CPT | Performed by: FAMILY MEDICINE

## 2024-04-22 PROCEDURE — 36415 COLL VENOUS BLD VENIPUNCTURE: CPT | Performed by: FAMILY MEDICINE

## 2024-04-22 RX ORDER — VALACYCLOVIR HYDROCHLORIDE 1 G/1
2000 TABLET, FILM COATED ORAL 2 TIMES DAILY
Qty: 12 TABLET | Refills: 3 | Status: SHIPPED | OUTPATIENT
Start: 2024-04-22

## 2024-04-22 RX ORDER — LEVOTHYROXINE SODIUM 100 UG/1
100 TABLET ORAL DAILY
Qty: 90 TABLET | Refills: 3 | Status: SHIPPED | OUTPATIENT
Start: 2024-04-22

## 2024-04-22 RX ORDER — TRAZODONE HYDROCHLORIDE 50 MG/1
TABLET, FILM COATED ORAL
Qty: 180 TABLET | Refills: 3 | Status: SHIPPED | OUTPATIENT
Start: 2024-04-22

## 2024-04-22 RX ORDER — VARENICLINE TARTRATE 0.5 (11)-1
KIT ORAL
Qty: 53 EACH | Refills: 1 | Status: SHIPPED | OUTPATIENT
Start: 2024-04-22

## 2024-04-22 RX ORDER — FLUTICASONE PROPIONATE 50 MCG
2 SPRAY, SUSPENSION (ML) NASAL DAILY
Qty: 11.1 ML | Refills: 11 | Status: CANCELLED | OUTPATIENT
Start: 2024-04-22

## 2024-04-22 RX ORDER — ACYCLOVIR 50 MG/G
CREAM TOPICAL
Qty: 5 G | Refills: 3 | Status: SHIPPED | OUTPATIENT
Start: 2024-04-22

## 2024-04-22 RX ORDER — ACYCLOVIR 50 MG/G
CREAM TOPICAL
Qty: 5 G | Refills: 3 | Status: CANCELLED | OUTPATIENT
Start: 2024-04-22

## 2024-04-22 RX ORDER — FEXOFENADINE HYDROCHLORIDE 180 MG/1
180 TABLET, FILM COATED ORAL DAILY
COMMUNITY
Start: 2024-04-01

## 2024-04-22 RX ORDER — ESTRADIOL 0.1 MG/G
CREAM VAGINAL
Qty: 42.5 G | Refills: 3 | Status: SHIPPED | OUTPATIENT
Start: 2024-04-22

## 2024-04-22 RX ORDER — CYCLOBENZAPRINE HCL 10 MG
5-10 TABLET ORAL 3 TIMES DAILY PRN
Qty: 90 TABLET | Refills: 0 | Status: SHIPPED | OUTPATIENT
Start: 2024-04-22

## 2024-04-22 RX ORDER — TRIAMCINOLONE ACETONIDE 1 MG/G
CREAM TOPICAL 2 TIMES DAILY
Qty: 15 G | Refills: 0 | Status: SHIPPED | OUTPATIENT
Start: 2024-04-22 | End: 2024-06-04

## 2024-04-22 SDOH — HEALTH STABILITY: PHYSICAL HEALTH: ON AVERAGE, HOW MANY DAYS PER WEEK DO YOU ENGAGE IN MODERATE TO STRENUOUS EXERCISE (LIKE A BRISK WALK)?: 1 DAY

## 2024-04-22 ASSESSMENT — SOCIAL DETERMINANTS OF HEALTH (SDOH): HOW OFTEN DO YOU GET TOGETHER WITH FRIENDS OR RELATIVES?: NEVER

## 2024-04-22 ASSESSMENT — PAIN SCALES - GENERAL: PAINLEVEL: MILD PAIN (3)

## 2024-04-22 NOTE — NURSING NOTE
Prior to immunization administration, verified patients identity using patient s name and date of birth. Please see Immunization Activity for additional information.     Screening Questionnaire for Adult Immunization    Are you sick today?   No   Do you have allergies to medications, food, a vaccine component or latex?   No   Have you ever had a serious reaction after receiving a vaccination?   No   Do you have a long-term health problem with heart, lung, kidney, or metabolic disease (e.g., diabetes), asthma, a blood disorder, no spleen, complement component deficiency, a cochlear implant, or a spinal fluid leak?  Are you on long-term aspirin therapy?   No   Do you have cancer, leukemia, HIV/AIDS, or any other immune system problem?   No   Do you have a parent, brother, or sister with an immune system problem?   No   In the past 3 months, have you taken medications that affect  your immune system, such as prednisone, other steroids, or anticancer drugs; drugs for the treatment of rheumatoid arthritis, Crohn s disease, or psoriasis; or have you had radiation treatments?   No   Have you had a seizure, or a brain or other nervous system problem?   No   During the past year, have you received a transfusion of blood or blood    products, or been given immune (gamma) globulin or antiviral drug?   No   For women: Are you pregnant or is there a chance you could become       pregnant during the next month?   No   Have you received any vaccinations in the past 4 weeks?   No     Immunization questionnaire answers were all negative.      Patient instructed to remain in clinic for 15 minutes afterwards, and to report any adverse reactions.     Screening performed by Nelida Zamudio MA on 4/22/2024 at 2:52 PM.

## 2024-04-22 NOTE — PATIENT INSTRUCTIONS
Schedule with Dr. Sary Song again or one of her partners if you want to have lipoma removal (as discussed with her in 2021).   After you quit smoking, if your sinuses are not improving schedule a follow up with the ENT.   Schedule with PT for your back pain . Let me know if you want a referral to a different location.    Your mammogram is due in May.     Preventive Care Advice   This is general advice given by our system to help you stay healthy. However, your care team may have specific advice just for you. Please talk to your care team about your preventive care needs.  Nutrition  Eat 5 or more servings of fruits and vegetables each day.  Try wheat bread, brown rice and whole grain pasta (instead of white bread, rice, and pasta).  Get enough calcium and vitamin D. Check the label on foods and aim for 100% of the RDA (recommended daily allowance).  Lifestyle  Exercise at least 150 minutes each week   (30 minutes a day, 5 days a week).  Do muscle strengthening activities 2 days a week. These help control your weight and prevent disease.  No smoking.  Wear sunscreen to prevent skin cancer.  Have a dental exam and cleaning every 6 months.  Yearly exams  See your health care team every year to talk about:  Any changes in your health.  Any medicines your care team has prescribed.  Preventive care, family planning, and ways to prevent chronic diseases.  Shots (vaccines)   HPV shots (up to age 26), if you've never had them before.  Hepatitis B shots (up to age 59), if you've never had them before.  COVID-19 shot: Get this shot when it's due.  Flu shot: Get a flu shot every year.  Tetanus shot: Get a tetanus shot every 10 years.  Pneumococcal, hepatitis A, and RSV shots: Ask your care team if you need these based on your risk.  Shingles shot (for age 50 and up).  General health tests  Diabetes screening:  Starting at age 35, Get screened for diabetes at least every 3 years.  If you are younger than age 35, ask your  care team if you should be screened for diabetes.  Cholesterol test: At age 39, start having a cholesterol test every 5 years, or more often if advised.  Bone density scan (DEXA): At age 50, ask your care team if you should have this scan for osteoporosis (brittle bones).  Hepatitis C: Get tested at least once in your life.  STIs (sexually transmitted infections)  Before age 24: Ask your care team if you should be screened for STIs.  After age 24: Get screened for STIs if you're at risk. You are at risk for STIs (including HIV) if:  You are sexually active with more than one person.  You don't use condoms every time.  You or a partner was diagnosed with a sexually transmitted infection.  If you are at risk for HIV, ask about PrEP medicine to prevent HIV.  Get tested for HIV at least once in your life, whether you are at risk for HIV or not.  Cancer screening tests  Cervical cancer screening: If you have a cervix, begin getting regular cervical cancer screening tests at age 21. Most people who have regular screenings with normal results can stop after age 65. Talk about this with your provider.  Breast cancer scan (mammogram): If you've ever had breasts, begin having regular mammograms starting at age 40. This is a scan to check for breast cancer.  Colon cancer screening: It is important to start screening for colon cancer at age 45.  Have a colonoscopy test every 10 years (or more often if you're at risk) Or, ask your provider about stool tests like a FIT test every year or Cologuard test every 3 years.  To learn more about your testing options, visit: [email or web address].  For help making a decision, visit: https://bit.ly/hc81026.  Prostate cancer screening test: If you have a prostate and are age 55 to 69, ask your provider if you would benefit from a yearly prostate cancer screening test.  Lung cancer screening: If you are a current or former smoker age 50 to 80, ask your care team if ongoing lung  cancer screenings are right for you.  For informational purposes only. Not to replace the advice of your health care provider. Copyright   2023 [facility]. All rights reserved. Clinically reviewed by the [facility]. SMARTworks 333628 - REV 01/24.    Relationships for Good Health  Relationships are important for our health and happiness. Social isolation, loneliness and lack of support are bad for your health. Studies show that loneliness can harm health and limit your life span as much as high blood pressure and smoking.   Take some time to reflect on your relationships. Then answer these questions:  Are there people in your life that cause you stress or drain your energy? What can you do to set limits?  ________________________________________________________________________________________________________________________________________________________________________________________________________________________________________________________________________________________________________________________________________________  Who do you enjoy spending time with? Who can you go to for support?  ________________________________________________________________________________________________________________________________________________________________________________________________________________________________________________________________________________________________________________________________________________  What can you do to improve your relationships with others?  __________________________________________________________________________________________________________________________________________________________________________________________________________________  ______________________________________________________________________________________________________________________________  What do you like most about your relationships  with others?  ________________________________________________________________________________________________________________________________________________________________________________________________________________________________________________________________________________________________________________________________________________  My goal: ______________________________________________________________________  I will ______________________________________________________________________________________________________________________________________________________________________________________________    For informational purposes only. Not to replace the advice of your health care provider. Copyright   2018 [facility]. All rights reserved. Clinically reviewed by [facility]  Team. WO Funding 286709 - Rev 04/21.    Learning About Stress  What is stress?     Stress is your body's response to a hard situation. Your body can have a physical, emotional, or mental response. Stress is a fact of life for most people, and it affects everyone differently. What causes stress for you may not be stressful for someone else.  A lot of things can cause stress. You may feel stress when you go on a job interview, take a test, or run a race. This kind of short-term stress is normal and even useful. It can help you if you need to work hard or react quickly. For example, stress can help you finish an important job on time.  Long-term stress is caused by ongoing stressful situations or events. Examples of long-term stress include long-term health problems, ongoing problems at work, or conflicts in your family. Long-term stress can harm your health.  How does stress affect your health?  When you are stressed, your body responds as though you are in danger. It makes hormones that speed up your heart, make you breathe faster, and give you a burst of energy. This is called the fight-or-flight  stress response. If the stress is over quickly, your body goes back to normal and no harm is done.  But if stress happens too often or lasts too long, it can have bad effects. Long-term stress can make you more likely to get sick, and it can make symptoms of some diseases worse. If you tense up when you are stressed, you may develop neck, shoulder, or low back pain. Stress is linked to high blood pressure and heart disease.  Stress also harms your emotional health. It can make you peres, tense, or depressed. Your relationships may suffer, and you may not do well at work or school.  What can you do to manage stress?  You can try these things to help manage stress:   Do something active. Exercise or activity can help reduce stress. Walking is a great way to get started. Even everyday activities such as housecleaning or yard work can help.  Try yoga or bj chi. These techniques combine exercise and meditation. You may need some training at first to learn them.  Do something you enjoy. For example, listen to music or go to a movie. Practice your hobby or do volunteer work.  Meditate. This can help you relax, because you are not worrying about what happened before or what may happen in the future.  Do guided imagery. Imagine yourself in any setting that helps you feel calm. You can use online videos, books, or a teacher to guide you.  Do breathing exercises. For example:  From a standing position, bend forward from the waist with your knees slightly bent. Let your arms dangle close to the floor.  Breathe in slowly and deeply as you return to a standing position. Roll up slowly and lift your head last.  Hold your breath for just a few seconds in the standing position.  Breathe out slowly and bend forward from the waist.  Let your feelings out. Talk, laugh, cry, and express anger when you need to. Talking with supportive friends or family, a counselor, or a xavier leader about your feelings is a healthy way to relieve  "stress. Avoid discussing your feelings with people who make you feel worse.  Write. It may help to write about things that are bothering you. This helps you find out how much stress you feel and what is causing it. When you know this, you can find better ways to cope.  What can you do to prevent stress?  You might try some of these things to help prevent stress:  Manage your time. This helps you find time to do the things you want and need to do.  Get enough sleep. Your body recovers from the stresses of the day while you are sleeping.  Get support. Your family, friends, and community can make a difference in how you experience stress.  Limit your news feed. Avoid or limit time on social media or news that may make you feel stressed.  Do something active. Exercise or activity can help reduce stress. Walking is a great way to get started.  Where can you learn more?  Go to https://www.[employer].net/patiented  Enter N032 in the search box to learn more about \"Learning About Stress.\"  Current as of: October 24, 2023               Content Version: 14.0    8333-7401 [employer].   Care instructions adapted under license by your healthcare professional. If you have questions about a medical condition or this instruction, always ask your healthcare professional. [employer] disclaims any warranty or liability for your use of this information.      "

## 2024-04-22 NOTE — PROGRESS NOTES
Preventive Care Visit  [facility]  Alyse Mcclendon MD,  Family Medicine  Apr 22, 2024      Assessment & Plan       ICD-10-CM    1. Routine general medical examination at a health care facility  Z00.00 Comprehensive metabolic panel (BMP + Alb, Alk Phos, ALT, AST, Total. Bili, TP)     Comprehensive metabolic panel (BMP + Alb, Alk Phos, ALT, AST, Total. Bili, TP)      2. Hypothyroidism, unspecified type  E03.9 levothyroxine (SYNTHROID/LEVOTHROID) 100 MCG tablet     TSH with free T4 reflex     TSH with free T4 reflex      3. Insomnia, unspecified type  G47.00 traZODone (DESYREL) 50 MG tablet      4. Depressed mood  R45.89       5. Anxiety  F41.9       6. Tobacco dependence syndrome  F17.200 varenicline (CHANTIX RICARDO) 0.5 MG X 11 & 1 MG X 42 tablet      7. Cold sore  B00.1 acyclovir (ZOVIRAX) 5 % external cream      8. History of colonic polyps  Z86.010       9. Urinary frequency  R35.0       10. Myofascial pain on right side  M79.18 cyclobenzaprine (FLEXERIL) 10 MG tablet      11. Hx of cold sores  Z86.19 valACYclovir (VALTREX) 1000 mg tablet      12. Cervical cancer screening  Z12.4 Pap Screen with HPV - recommended age 30 - 65 years     HPV Hold (Lab Only)     HPV High Risk Types DNA Cervical      13. Lipid screening  Z13.220 Lipid panel reflex to direct LDL Non-fasting     Lipid panel reflex to direct LDL Non-fasting      14. Rash and nonspecific skin eruption  R21 triamcinolone (KENALOG) 0.1 % external cream      15. Lipoma of skin and subcutaneous tissue  D17.30 Adult General Surg Referral      16. Genitourinary syndrome of menopause  N95.8 estradiol (ESTRACE) 0.1 MG/GM vaginal cream      17. Bilateral low back pain without sciatica, unspecified chronicity  M54.50 Physical Therapy  Referral          Routine general medical examination at a health care facility     Mammogram completed 5/10/23. Will schedule repeat for May  Colon cancer screening: has history of polyps. Rpeat due  in 2026  Pap with HPV cotesting completed today   Immunizations: PCV-20 (smoker), hep B vaccine, shingrix.     Hypothyroidism, unspecified type   Doing well taking her levothyroxine daily - not missing doses.   Continues levothyroxine 100mcg daily   Check TSH today     Insomnia, unspecified type     Continues trazodone 50-100mg nightly - refilled today      Depressed mood  Anxiety     Saw Shaan Vergara 12/15/23      Tobacco dependence syndrome     Has used chantix in the past and wants to try again.     History of colonic polyps    colonoscopy due in 2026     Urinary frequency  GSM   Start vaginal estrogen cream       Hx of cold sores  Refilled valtrex and acyclovir per her request - likes to have acyclovir on hand if at work.     Skin rash   Dry skin with itch  Will start triamcinolonec ream. Already using emollient, which seems to help some.     Lipomas  Feels like they are causing back pain and wants them removed. Re-referred to general surgery    Back pain  Recommended PT - referral provided  Patient Instructions   Schedule with Dr. Sary Song again or one of her partners if you want to have lipoma removal (as discussed with her in 2021).   After you quit smoking, if your sinuses are not improving schedule a follow up with the ENT.   Schedule with PT for your back pain . Let me know if you want a referral to a different location.    Your mammogram is due in May.     Preventive Care Advice   This is general advice given by our system to help you stay healthy. However, your care team may have specific advice just for you. Please talk to your care team about your preventive care needs.  Nutrition  Eat 5 or more servings of fruits and vegetables each day.  Try wheat bread, brown rice and whole grain pasta (instead of white bread, rice, and pasta).  Get enough calcium and vitamin D. Check the label on foods and aim for 100% of the RDA (recommended daily allowance).  Lifestyle  Exercise at least 150 minutes  each week   (30 minutes a day, 5 days a week).  Do muscle strengthening activities 2 days a week. These help control your weight and prevent disease.  No smoking.  Wear sunscreen to prevent skin cancer.  Have a dental exam and cleaning every 6 months.  Yearly exams  See your health care team every year to talk about:  Any changes in your health.  Any medicines your care team has prescribed.  Preventive care, family planning, and ways to prevent chronic diseases.  Shots (vaccines)   HPV shots (up to age 26), if you've never had them before.  Hepatitis B shots (up to age 59), if you've never had them before.  COVID-19 shot: Get this shot when it's due.  Flu shot: Get a flu shot every year.  Tetanus shot: Get a tetanus shot every 10 years.  Pneumococcal, hepatitis A, and RSV shots: Ask your care team if you need these based on your risk.  Shingles shot (for age 50 and up).  General health tests  Diabetes screening:  Starting at age 35, Get screened for diabetes at least every 3 years.  If you are younger than age 35, ask your care team if you should be screened for diabetes.  Cholesterol test: At age 39, start having a cholesterol test every 5 years, or more often if advised.  Bone density scan (DEXA): At age 50, ask your care team if you should have this scan for osteoporosis (brittle bones).  Hepatitis C: Get tested at least once in your life.  STIs (sexually transmitted infections)  Before age 24: Ask your care team if you should be screened for STIs.  After age 24: Get screened for STIs if you're at risk. You are at risk for STIs (including HIV) if:  You are sexually active with more than one person.  You don't use condoms every time.  You or a partner was diagnosed with a sexually transmitted infection.  If you are at risk for HIV, ask about PrEP medicine to prevent HIV.  Get tested for HIV at least once in your life, whether you are at risk for HIV or not.  Cancer screening tests  Cervical cancer screening: If  you have a cervix, begin getting regular cervical cancer screening tests at age 21. Most people who have regular screenings with normal results can stop after age 65. Talk about this with your provider.  Breast cancer scan (mammogram): If you've ever had breasts, begin having regular mammograms starting at age 40. This is a scan to check for breast cancer.  Colon cancer screening: It is important to start screening for colon cancer at age 45.  Have a colonoscopy test every 10 years (or more often if you're at risk) Or, ask your provider about stool tests like a FIT test every year or Cologuard test every 3 years.  To learn more about your testing options, visit: https://www.[employer]/782641.pdf.  For help making a decision, visit: https://bit.[employer]OP [employer]/ta51562.  Prostate cancer screening test: If you have a prostate and are age 55 to 69, ask your provider if you would benefit from a yearly prostate cancer screening test.  Lung cancer screening: If you are a current or former smoker age 50 to 80, ask your care team if ongoing lung cancer screenings are right for you.  For informational purposes only. Not to replace the advice of your health care provider. Copyright   2023 [facility]. All rights reserved. Clinically reviewed by the [facility] Transitions Program. [employer] 581446 - REV 01/24.    Relationships for Good Health  Relationships are important for our health and happiness. Social isolation, loneliness and lack of support are bad for your health. Studies show that loneliness can harm health and limit your life span as much as high blood pressure and smoking.   Take some time to reflect on your relationships. Then answer these questions:  Are there people in your life that cause you stress or drain your energy? What can you do to set  limits?  ________________________________________________________________________________________________________________________________________________________________________________________________________________________________________________________________________________________________________________________________________________  Who do you enjoy spending time with? Who can you go to for support?  ________________________________________________________________________________________________________________________________________________________________________________________________________________________________________________________________________________________________________________________________________________  What can you do to improve your relationships with others?  __________________________________________________________________________________________________________________________________________________________________________________________________________________  ______________________________________________________________________________________________________________________________  What do you like most about your relationships with others?  ________________________________________________________________________________________________________________________________________________________________________________________________________________________________________________________________________________________________________________________________________________  My goal: ______________________________________________________________________  I will ______________________________________________________________________________________________________________________________________________________________________________________________    For informational purposes only. Not to replace the advice of your health care provider. Copyright   2018 [facility]. All rights reserved. Clinically reviewed by Bariatric Health  Team. [employer] 458885 - Rev 04/21.    Learning About Stress  What is stress?     Stress is your body's response to a hard situation. Your body can have a physical, emotional, or mental response. Stress is a fact of life for most people, and it affects everyone differently. What causes stress for you may not be stressful for someone else.  A lot of things can cause stress. You may feel stress when you go on a job interview, take a test, or run a race. This kind of short-term stress is normal and even useful. It can help you if you need to work hard or react quickly. For example, stress can help you finish an important job on time.  Long-term stress is caused by ongoing stressful situations or events. Examples of long-term stress include long-term health problems, ongoing problems at work, or conflicts in your family. Long-term stress can harm your health.  How does stress affect your health?  When you are stressed, your body responds as though you are in danger. It makes hormones that speed up your heart, make you breathe faster, and give you a burst of energy. This is called the fight-or-flight stress response. If the stress is over quickly, your body goes back to normal and no harm is done.  But if stress happens too often or lasts too long, it can have bad effects. Long-term stress can make you more likely to get sick, and it can make symptoms of some diseases worse. If you tense up when you are stressed, you may develop neck, shoulder, or low back pain. Stress is linked to high blood pressure and heart disease.  Stress also harms your emotional health. It can make you peres, tense, or depressed. Your relationships may suffer, and you may not do well at work or school.  What can you do to manage stress?  You can try these things to help manage stress:   Do something active. Exercise or activity can help reduce stress. Walking is  a great way to get started. Even everyday activities such as housecleaning or yard work can help.  Try yoga or bj chi. These techniques combine exercise and meditation. You may need some training at first to learn them.  Do something you enjoy. For example, listen to music or go to a movie. Practice your hobby or do volunteer work.  Meditate. This can help you relax, because you are not worrying about what happened before or what may happen in the future.  Do guided imagery. Imagine yourself in any setting that helps you feel calm. You can use online videos, books, or a teacher to guide you.  Do breathing exercises. For example:  From a standing position, bend forward from the waist with your knees slightly bent. Let your arms dangle close to the floor.  Breathe in slowly and deeply as you return to a standing position. Roll up slowly and lift your head last.  Hold your breath for just a few seconds in the standing position.  Breathe out slowly and bend forward from the waist.  Let your feelings out. Talk, laugh, cry, and express anger when you need to. Talking with supportive friends or family, a counselor, or a xavier leader about your feelings is a healthy way to relieve stress. Avoid discussing your feelings with people who make you feel worse.  Write. It may help to write about things that are bothering you. This helps you find out how much stress you feel and what is causing it. When you know this, you can find better ways to cope.  What can you do to prevent stress?  You might try some of these things to help prevent stress:  Manage your time. This helps you find time to do the things you want and need to do.  Get enough sleep. Your body recovers from the stresses of the day while you are sleeping.  Get support. Your family, friends, and community can make a difference in how you experience stress.  Limit your news feed. Avoid or limit time on social media or news that may make you feel stressed.  Do  "something active. Exercise or activity can help reduce stress. Walking is a great way to get started.  Where can you learn more?  Go to https://www.CloudPhysics.net/patiented  Enter N032 in the search box to learn more about \"Learning About Stress.\"  Current as of: October 24, 2023               Content Version: 14.0    4314-3318 [employer].   Care instructions adapted under license by your healthcare professional. If you have questions about a medical condition or this instruction, always ask your healthcare professional. [employer] disclaims any warranty or liability for your use of this information.           Counseling  Appropriate preventive services were discussed with this patient, including applicable screening as appropriate for fall prevention, nutrition, physical activity, Tobacco-use cessation, weight loss and cognition.  Checklist reviewing preventive services available has been given to the patient.  Reviewed patient's diet, addressing concerns and/or questions.   She is at risk for lack of exercise and has been provided with information to increase physical activity for the benefit of her well-being.   Patient is at risk for social isolation and has been provided with information about the benefit of social connection.   She is at risk for psychosocial distress and has been provided with information to reduce risk.            Tobi Dawson is a 58 year old, presenting for the following:  Physical and Gyn Exam        4/22/2024     1:41 PM   Additional Questions   Roomed by Tracey BARR   Accompanied by self        Health Care Directive  Patient does not have a Health Care Directive or Living Will: Discussed advance care planning with patient; however, patient declined at this time.    HPI   Bumpy skin colored dry rash on her right fourth finger. Itches sometimes. Tried bacitracin, hydrocortisone cream. Has not gone away.     Wants to start chantix again. Works well for her. "     Would like lipomas removed. Saw specialist for this in the past.     Sinuses - sounds like she has a cold all of the time. Every morning she is so stuffy. It is a thick white phlegm. Last visit was in January. She got a medication for a sore in her nose and it is improving.     Takes zinc, fish oil, hair skin and nail supplement, horsetail, collagen, ashwaganda. Still noticing hair loss.     Has been taking the 100mcg levothyroxine every day.     Pain in her back continues. Ibuprofen 800mg does not seem to help.          4/22/2024   General Health   How would you rate your overall physical health? Good   Feel stress (tense, anxious, or unable to sleep) Only a little   (!) STRESS CONCERN      4/22/2024   Nutrition   Three or more servings of calcium each day? Yes   Diet: Regular (no restrictions)   How many servings of fruit and vegetables per day? (!) 2-3   How many sweetened beverages each day? 0-1         4/22/2024   Exercise   Days per week of moderate/strenous exercise 1 day   (!) EXERCISE CONCERN      4/22/2024   Social Factors   Frequency of gathering with friends or relatives Never   Worry food won't last until get money to buy more No   Food not last or not have enough money for food? No   Do you have housing?  Yes   Are you worried about losing your housing? No   Lack of transportation? No   Unable to get utilities (heat,electricity)? No   (!) SOCIAL CONNECTIONS CONCERN      4/22/2024   Fall Risk   Fallen 2 or more times in the past year? No   Trouble with walking or balance? No          4/22/2024   Dental   Dentist two times every year? Yes         4/22/2024   TB Screening   Were you born outside of the [place]? No         Today's PHQ-2 Score:       4/22/2024     1:34 PM   PHQ-2 ( 1999 Pfizer)   Q1: Little interest or pleasure in doing things 0   Q2: Feeling down, depressed or hopeless 1   PHQ-2 Score 1   Q1: Little interest or pleasure in doing things Not at all   Q2: Feeling down, depressed or hopeless  Several days   PHQ-2 Score 1           4/22/2024   Substance Use   Alcohol more than 3/day or more than 7/wk No   Do you use any other substances recreationally? No     Social History     Tobacco Use    Smoking status: Some Days     Types: Cigarettes    Smokeless tobacco: Never    Tobacco comments:     pack = 2 days    Vaping Use    Vaping status: Never Used   Substance Use Topics    Alcohol use: Yes     Alcohol/week: 0.0 standard drinks of alcohol     Comment: rare    Drug use: No           5/10/2023   LAST FHS-7 RESULTS   1st degree relative breast or ovarian cancer No   Any relative bilateral breast cancer No   Any male have breast cancer No   Any ONE woman have BOTH breast AND ovarian cancer No   Any woman with breast cancer before 50yrs No   2 or more relatives with breast AND/OR ovarian cancer No   2 or more relatives with breast AND/OR bowel cancer No        Mammogram Screening - Mammogram every 1-2 years updated in Health Maintenance based on mutual decision making        4/22/2024   STI Screening   New sexual partner(s) since last STI/HIV test? No     History of abnormal Pap smear: NO - age 30-65 PAP every 5 years with negative HPV co-testing recommended        Latest Ref Rng & Units 4/22/2024     2:28 PM 7/11/2016     4:59 PM 7/11/2016    12:00 AM   PAP / HPV   PAP  Negative for Intraepithelial Lesion or Malignancy (NILM)      PAP (Historical)    NIL    HPV 16 DNA Negative Negative  Negative     HPV 18 DNA Negative Negative  Negative     Other HR HPV Negative Negative  Negative       ASCVD Risk   The 10-year ASCVD risk score (Adelso PEMBERTON, et al., 2019) is: 5.8%    Values used to calculate the score:      Age: 58 years      Sex: Female      Is Non- : No      Diabetic: No      Tobacco smoker: Yes      Systolic Blood Pressure: 119 mmHg      Is BP treated: No      HDL Cholesterol: 62 mg/dL      Total Cholesterol: 235 mg/dL        Reviewed and updated as needed this visit by  "Provider                    Past Medical History:   Diagnosis Date    Anxiety 4/11/2013    Cold sore 8/15/2011    Depression     Hypothyroidism 12/9/2010    Tobacco abuse      Past Surgical History:   Procedure Laterality Date    COLONOSCOPY N/A 5/21/2021    Procedure: COLONOSCOPY;  Surgeon: Efren Lawler MD;  [place] [facility] [place]    HC EXCIS PRIMARY GANGLION WRIST      TONSILLECTOMY  as child            Objective    Exam  /67 (BP Location: Right arm, Patient Position: Sitting, Cuff Size: Adult Regular)   Pulse 56   Temp 97.5  F (36.4  C) (Temporal)   Resp 16   Ht 1.58 m (5' 2.21\")   Wt 57.1 kg (125 lb 12.8 oz)   LMP 05/14/2008   SpO2 98%   BMI 22.86 kg/m     Estimated body mass index is 22.86 kg/m  as calculated from the following:    Height as of this encounter: 1.58 m (5' 2.21\").    Weight as of this encounter: 57.1 kg (125 lb 12.8 oz).    Physical Exam  GENERAL: alert and no distress  EYES: Eyes grossly normal to inspection, PERRL and conjunctivae and sclerae normal  HENT: ear canals and TM's normal, nose and mouth without ulcers or lesions  NECK: no adenopathy, no asymmetry, masses, or scars  RESP: lungs clear to auscultation - no rales, rhonchi or wheezes  CV: regular rate and rhythm, normal S1 S2, no S3 or S4, no murmur, click or rub, no peripheral edema  ABDOMEN: soft, nontender, no hepatosplenomegaly, no masses and bowel sounds normal  MS: no gross musculoskeletal defects noted, no edema  SKIN: no suspicious lesions or rashes  NEURO: Normal strength and tone, mentation intact and speech normal  PSYCH: mentation appears normal, affect normal/bright        Signed Electronically by: Alyse Mcclendon MD     "

## 2024-04-22 NOTE — TELEPHONE ENCOUNTER
Medication Question or Refill        What medication are you calling about (include dose and sig)?: estradiol (ESTRACE) 0.1 MG/GM vaginal cream     Preferred Pharmacy:       [id number] - [place], [place] - [address] AT [address] [place] [address] & [facility]  [address] [place] [address]  Phone: [phone] Fax: [phone]      Controlled Substance Agreement on file:   CSA -- Patient Level:    CSA: None found at the patient level.       Who prescribed the medication?: Dr. Mcclendon     Do you need a refill? No    When did you use the medication last? Have not started yet    Patient offered an appointment? No    Do you have any questions or concerns?  Yes: patient stated rx is very expensive for her. However she can save $80 if the dosage is changed. Pt is requesting PCP to changed rx dosage to 0.04 mg instead of 0.1 mg       Could we send this information to you in [place] or would you prefer to receive a phone call?:   Patient would prefer a phone call         LIZETH Lizarraga CemN RN  [facility]

## 2024-04-23 LAB
ALBUMIN SERPL BCG-MCNC: 4.6 G/DL (ref 3.5–5.2)
ALP SERPL-CCNC: 184 U/L (ref 40–150)
ALT SERPL W P-5'-P-CCNC: 21 U/L (ref 0–50)
ANION GAP SERPL CALCULATED.3IONS-SCNC: 9 MMOL/L (ref 7–15)
AST SERPL W P-5'-P-CCNC: 22 U/L (ref 0–45)
BILIRUB SERPL-MCNC: 0.2 MG/DL
BUN SERPL-MCNC: 15.1 MG/DL (ref 8–23)
CALCIUM SERPL-MCNC: 9.5 MG/DL (ref 8.6–10)
CHLORIDE SERPL-SCNC: 101 MMOL/L (ref 98–107)
CHOLEST SERPL-MCNC: 235 MG/DL
CREAT SERPL-MCNC: 0.74 MG/DL (ref 0.51–0.95)
DEPRECATED HCO3 PLAS-SCNC: 28 MMOL/L (ref 22–29)
EGFRCR SERPLBLD CKD-EPI 2021: >90 ML/MIN/1.73M2
FASTING STATUS PATIENT QL REPORTED: NO
GLUCOSE SERPL-MCNC: 91 MG/DL (ref 70–99)
HDLC SERPL-MCNC: 62 MG/DL
LDLC SERPL CALC-MCNC: 117 MG/DL
NONHDLC SERPL-MCNC: 173 MG/DL
POTASSIUM SERPL-SCNC: 4 MMOL/L (ref 3.4–5.3)
PROT SERPL-MCNC: 7.7 G/DL (ref 6.4–8.3)
SODIUM SERPL-SCNC: 138 MMOL/L (ref 135–145)
TRIGL SERPL-MCNC: 280 MG/DL
TSH SERPL DL<=0.005 MIU/L-ACNC: 0.67 UIU/ML (ref 0.3–4.2)

## 2024-04-23 NOTE — TELEPHONE ENCOUNTER
There is not a 0.04mg that I am aware of. Please tell her about [employer]. have her put the website goodrx.com on her phone, search for estradiol cream and pick the coupon for her pharmacy. It will be much cheaper. Then show that coupon on her phone to her pharmacist and they will give her the good rx price for the medication. Let me know if there are any issues with this-- if so, please ask her pharmacist to write down for her exactly what would be covered or less expensive so she can bring that in to me. Alyse Mcclendon M.D.

## 2024-04-25 LAB
BKR LAB AP GYN ADEQUACY: NORMAL
BKR LAB AP GYN INTERPRETATION: NORMAL
BKR LAB AP HPV REFLEX: NORMAL
BKR LAB AP PREVIOUS ABNORMAL: NORMAL
PATH REPORT.COMMENTS IMP SPEC: NORMAL
PATH REPORT.COMMENTS IMP SPEC: NORMAL
PATH REPORT.RELEVANT HX SPEC: NORMAL

## 2024-04-25 NOTE — TELEPHONE ENCOUNTER
Dr. Hakan Clarke does not cost offset a cream estradiol option, but does help offset costs for patches. I'll include a screenshot of all patch doses offered by [facility] below. Is there one that is a formulary dose equivalent to the previously prescribed cream? Patient would like to try a patch form for cost savings.    LIZETH AlonsoN, RN (she/her)  [facility] RN

## 2024-04-25 NOTE — TELEPHONE ENCOUNTER
REFERRAL INFORMATION:  Referring Provider: Dr. Alyse Mcclendon  Referring Clinic: [facility] - Primary Care  Reason for Visit/Diagnosis: Lipoma of skin and subcutaneous tissue back        FUTURE VISIT INFORMATION:  Appointment Date: 5/14/2024  Appointment Time: 9:30 AM     NOTES RECORD STATUS  DETAILS   OFFICE NOTE from Referring Provider Internal [facility]:  4/22/24 - PCC OV with Dr. Mcclendon   OFFICE NOTE from [facility]:  9/10/21, 5/10/18 - GEN SURG OV with Dr. Song   HOSPITAL DISCHARGE SUMMARY/ ED VISITS  N/A    OPERATIVE REPORT N/A    PERTINENT LABS Internal    IMAGING (CT, MRI, US, XR)  Internal [facility]:  3/24/22 - CT Abd/Pelvis  3/24/22 - XR KUB  9/17/21 - US Abdomen

## 2024-04-25 NOTE — TELEPHONE ENCOUNTER
Thanks for looking into this. The patches are systemic hormone therapy, which is not what we want. We want to start the vaginal estrogen. Vaginal estradiol cream (estrace) is available with goodrx.com coupon from multiple pharmacies for $30-40 for one month supply. It is available through [employer] for $22.48 for a one month supply. She would have to sign up through [employer] website to obtain through that site. Alyse Mcclendon M.D.

## 2024-04-29 LAB
HUMAN PAPILLOMA VIRUS 16 DNA: NEGATIVE
HUMAN PAPILLOMA VIRUS 18 DNA: NEGATIVE
HUMAN PAPILLOMA VIRUS FINAL DIAGNOSIS: NORMAL
HUMAN PAPILLOMA VIRUS OTHER HR: NEGATIVE

## 2024-04-30 ENCOUNTER — PATIENT OUTREACH (OUTPATIENT)
Dept: GASTROENTEROLOGY | Facility: CLINIC | Age: 59
End: 2024-04-30
Payer: COMMERCIAL

## 2024-05-03 NOTE — RESULT ENCOUNTER NOTE
The results of your recent lipid (cholesterol) profile were abnormal.    Here are the results:  Lab Results       Component                Value               Date                       CHOL                     235                 04/22/2024                 CHOL                     214                 05/04/2015            Lab Results       Component                Value               Date                       HDL                      62                  04/22/2024                 HDL                      101                 05/04/2015            Lab Results       Component                Value               Date                       LDL                      117                 04/22/2024                 LDL                      99                  05/04/2015            Lab Results       Component                Value               Date                       TRIG                     280                 04/22/2024                 TRIG                     69                  05/04/2015            Lab Results       Component                Value               Date                       CHOLHDLRATIO             2.1                 05/04/2015              Desired or goal levels are:  CHOLESTEROL: Desirable is less than 200.   HDL (Good Cholesterol): Desirable is greater than 40 (for men) greater than 50 (for women).  LDL (Bad Cholesterol): Desirable is less than 130 (or less than 100 if you have heart disease or diabetes). Borderline 130-160.  TRIGLYCERIDES: Desirable is less than 150.  Borderline is 150-200.    For high triglycerides, reduce the intake of jam, jelly, honey, alcohol, and/or coffee creamers  To help lower your LDL (bad cholesterol) you could start adding ground flax seed to your diet. The recommended dose is 2 heaping tablespoonfuls daily, you may want to start with 1 tablespoonful and increase to 2 heaping tablespoonfuls. You can mix or add ground flax seed to hot cereals, yogurt, applesauce, cottage  cheese or any sauce mixture that is your portion. Ground flax seed can be found in the baking aisle near the flour.          As you may know, an elevated cholesterol is one factor that increases your risk for heart disease and stroke. You can improve your cholesterol by controlling the amount and type of fat you eat and by increasing your daily activity level.    Here are some ways to improve your nutrition:  Eat less fat (especially butter, Crisco and other saturated fats)  Buy lean cuts of meat, reduce your portions of red meat or substitute poultry or fish  Use skim milk and low-fat dairy products  Eat no more than 4 egg yolks per week  Avoid fried or fast foods that are high in fat  Eat more fruits and vegetables      Also consider starting or increasing your aerobic activity. Aerobic activity is the best way to improve HDL (good) cholesterol. If this would be new to you, please talk with me first about what activities are safe for you.      Other lab results:    The testing of your blood sugar, kidney function, liver function and electrolytes was normal with the exception of the alkaline phosphatase.  Not fasting before the test can cause the result to be abnormal. Please schedule a follow up visit with me in a couple of months and come fasting so we can recheck the blood test at that time.      Your thyroid test was normal.     Please feel free to contact us with any questions or if you would like more information.    Alyse Mcclendon M.D.

## 2024-05-08 ENCOUNTER — OFFICE VISIT (OUTPATIENT)
Dept: URGENT CARE | Facility: URGENT CARE | Age: 59
End: 2024-05-08
Payer: COMMERCIAL

## 2024-05-08 VITALS
SYSTOLIC BLOOD PRESSURE: 111 MMHG | TEMPERATURE: 97.9 F | HEART RATE: 64 BPM | OXYGEN SATURATION: 97 % | RESPIRATION RATE: 14 BRPM | DIASTOLIC BLOOD PRESSURE: 75 MMHG

## 2024-05-08 DIAGNOSIS — R50.9 FEVER IN ADULT: ICD-10-CM

## 2024-05-08 DIAGNOSIS — J06.9 VIRAL URI WITH COUGH: ICD-10-CM

## 2024-05-08 DIAGNOSIS — J10.1 INFLUENZA B: Primary | ICD-10-CM

## 2024-05-08 PROBLEM — K57.92 DIVERTICULITIS: Status: ACTIVE | Noted: 2018-11-28

## 2024-05-08 PROBLEM — H81.10 BENIGN PAROXYSMAL POSITIONAL VERTIGO: Status: ACTIVE | Noted: 2018-11-28

## 2024-05-08 PROCEDURE — 87635 SARS-COV-2 COVID-19 AMP PRB: CPT | Performed by: NURSE PRACTITIONER

## 2024-05-08 PROCEDURE — 87804 INFLUENZA ASSAY W/OPTIC: CPT | Performed by: NURSE PRACTITIONER

## 2024-05-08 PROCEDURE — 99213 OFFICE O/P EST LOW 20 MIN: CPT | Performed by: NURSE PRACTITIONER

## 2024-05-08 PROCEDURE — 87651 STREP A DNA AMP PROBE: CPT | Performed by: NURSE PRACTITIONER

## 2024-05-08 NOTE — LETTER
May 8, 2024      Eunice Malloy  [address]   [facility] [address]        To Whom It May Concern:    Eunice Malloy  was seen on 05/08/2024.  Please excuse her  until 05/13/2024 due to illness.        Sincerely,        GHADA GARZA, CNP

## 2024-05-08 NOTE — PROGRESS NOTES
ICD-10-CM    1. Influenza B  J10.1       2. Fever in adult  R50.9 Streptococcus A Rapid Screen w/Reflex to PCR - Clinic Collect     Influenza A & B Antigen - Clinic Collect     Symptomatic COVID-19 Virus (Coronavirus) by PCR Nose     Group A Streptococcus PCR Throat Swab      3. Headache  R51.9 Streptococcus A Rapid Screen w/Reflex to PCR - Clinic Collect     Influenza A & B Antigen - Clinic Collect     Symptomatic COVID-19 Virus (Coronavirus) by PCR Nose     Group A Streptococcus PCR Throat Swab      4. Viral URI with cough  J06.9       Too late to start Tamiflu, will have patient treat symptoms. Rest.  Fluids.  Delsym for cough suppression at night. Tylenol or ibuprofen as needed for fever or pain.  Recheck in 10 days if symptoms have not improved, sooner if they worsen.  Decongestant as needed.    Red flag warning signs and when to go to the emergency room discussed.  Reviewed potential adverse reactions to medications.    Labs:  Results for orders placed or performed in visit on 05/08/24 (from the past 24 hour(s))   Streptococcus A Rapid Screen w/Reflex to PCR - Clinic Collect    Specimen: Throat; Swab   Result Value Ref Range    Group A Strep antigen Negative Negative   Influenza A & B Antigen - Clinic Collect    Specimen: Nose; Swab   Result Value Ref Range    Influenza A antigen Negative Negative    Influenza B antigen Positive (A) Negative    Narrative    Test results must be correlated with clinical data. If necessary, results should be confirmed by a molecular assay or viral culture.       SUBJECTIVE:   Eunice Malloy is a 58 year old female presenting with a chief complaint of   Chief Complaint   Patient presents with    Urgent Care     Myalgia, fever, chills  and HA x Sunday night    .  Nasal congestion,  body aches, fatigue. Took Ibuprofen 800 and Aleve D, and Tylenol      Review of systems is negative except for as noted in the HPI.    OBJECTIVE  /75   Pulse 64   Temp 97.9  F (36.6  C)  (Tympanic)   Resp 14   LMP 05/14/2008   SpO2 97%       GENERAL: Alert, mild distress  SKIN: skin is clear, no rash or abnormal pigmentation  HEAD: The head is normocephalic.   EYES: The eyes are normal. The conjunctivae and cornea normal.   NECK: The neck is supple and thyroid is normal, no masses; LYMPH NODES: No adenopathy  HENT: Bilateral tympanic membranes have fluid levels present but no erythema, mild pharyngeal erythema, nasal passages are swollen with clear rhinorrhea  LUNGS: The lung fields are clear to auscultation, no rales, rhonchi, wheezing or retractions  CV: Rhythm is regular. S1 and S2 are normal. No murmurs.  EXTREMITIES: Symmetric extremities no deformities    MICHAEL Montgomery, CNP  [facility] Provider    The use of [employer] dictation services may have been used to construct the content in this note; any grammatical or spelling errors are non-intentional. Please contact the author of this note directly if you are in need of any clarification.

## 2024-05-14 ENCOUNTER — OFFICE VISIT (OUTPATIENT)
Dept: SURGERY | Facility: CLINIC | Age: 59
End: 2024-05-14
Attending: FAMILY MEDICINE
Payer: COMMERCIAL

## 2024-05-14 ENCOUNTER — PRE VISIT (OUTPATIENT)
Dept: SURGERY | Facility: CLINIC | Age: 59
End: 2024-05-14

## 2024-05-14 VITALS
BODY MASS INDEX: 22.93 KG/M2 | HEART RATE: 76 BPM | HEIGHT: 62 IN | WEIGHT: 124.6 LBS | OXYGEN SATURATION: 97 % | SYSTOLIC BLOOD PRESSURE: 112 MMHG | DIASTOLIC BLOOD PRESSURE: 71 MMHG

## 2024-05-14 DIAGNOSIS — D17.30 LIPOMA OF SKIN AND SUBCUTANEOUS TISSUE: ICD-10-CM

## 2024-05-14 PROCEDURE — 99213 OFFICE O/P EST LOW 20 MIN: CPT | Performed by: STUDENT IN AN ORGANIZED HEALTH CARE EDUCATION/TRAINING PROGRAM

## 2024-05-14 RX ORDER — CEFAZOLIN SODIUM 2 G/50ML
2 SOLUTION INTRAVENOUS SEE ADMIN INSTRUCTIONS
OUTPATIENT
Start: 2024-05-14

## 2024-05-14 RX ORDER — CEFAZOLIN SODIUM 2 G/50ML
2 SOLUTION INTRAVENOUS
OUTPATIENT
Start: 2024-05-14

## 2024-05-14 ASSESSMENT — PAIN SCALES - GENERAL: PAINLEVEL: MILD PAIN (3)

## 2024-05-14 NOTE — PROGRESS NOTES
"HPI  The patient is a 57 yo F who presents for evaluation of multiple lower back masses consistent with lipomas.    She has had these lipomas for many years. She has not noted any recent growth but they do cause her positional discomfort. They cause pressure on her back when seated. She also associates the masses with a tingling sensation in her middle back. She denies drainage or symptoms of infection.    She does smoke, she says 6 cigarettes a day, is taking Chantix to try and quit. She does not have diabetes. She denies history of easy bruising or bleeding.    Vitals:     05/14/24 0923   BP: 112/71   BP Location: Left arm   Patient Position: Sitting   Cuff Size: Adult Regular   Pulse: 76   SpO2: 97%   Weight: 56.5 kg (124 lb 9.6 oz)   Height: 1.575 m (5' 2\")            Body mass index is 22.79 kg/m     There are multiple freely mobile subcutaneous lesions of her bilateral lower back consistent with lipomas on exam, at least 3 on the right and 3 on the left overlying the low back over the bilateral pelvis and sacrum.    A&P  This patient is a 57 yo F with multiple lower back soft tissues masses, consistent with likely underling lipomas. They are causing her symptoms and she desires excision. We discussed that due to the scattered and multiple number of lesions, we would plan to coordinate in preop for marking all the lesions, and then plan to place incisions so as to minimize the number of incisions required to excise the maximum number of lesions. Given multiple lesions, we will schedule her for MAC. We discussed risks, including bleeding, infection, scarification, seroma/hematoma. And, I counseled her on the increased risk of poor wound healing and infection given her smoking and encouraged her to continue attempts to quit. She has resources and is taking Chantix. She is amenable to proceed with excision.    Trung Ochoa MD  General Surgery      "

## 2024-05-14 NOTE — PATIENT INSTRUCTIONS
You met with Dr. Trung Ochoa.      Today's visit instructions:  Reminder:  Surgery Requirements  Your surgery will be at [facility] [place]- 5th Floor.  You will need to arrive 1.5  hours early.  You will need someone to drive you home (over 18 years old) and stay with you for 24 hours after the procedure.  You will need a preop physical with your regular doctor within 30 days of surgery- closer is always better.  Stop any blood thinners, vitamins, minerals, or herbal supplements 5 days before surgery.  If you are taking a prescribed blood thinner or weight loss medication please let us know for specific instructions.  Fasting- a nurse from [facility] will call you 1-2 days before surgery to confirm your procedure and tell you when to stop eating and drinking.   Wash with the soap given/mailed from the clinic the night before surgery and morning of surgery. See instructions in the Surgery Packet.  If you would like a procedure estimate please call Cost of Care at [phone] during the hours of 8 AM - 3 PM (option #2 for on-line request and option #3 for a representative).     If you have questions please contact Danelle RN or Bettye RN during regular clinic hours, Monday through Friday 7:30 AM - 4:00 PM, or you can contact us via [facility] at anytime.       If you have urgent needs after-hours, weekends, or holidays please call the hospital at [phone] and ask to speak with our on-call General Surgery Team.    Appointment [phone]  Nurse Advice (Danelle or Bettye): [phone]   Surgery Scheduler (Sienna): [phone]  Fax: [phone]    Mass/Lump Removal       Before the procedure:  Do not shave the area where the lump/mass is located.     Incision care   You may take a shower the day after surgery. Carefully wash your incision with soap and water. Do not submerge yourself in water (bath, whirlpool, hot tub, pool, lake) for 14 days after surgery.   Remove the gauze  bandage 1-2 days after surgery but leave the medical tape (Steri-Strips) or glue in place. These will loosen and fall off on their own 1-2 weeks after surgery.     Always wash your hands before touching your incisions or removing bandages.   It is not unusual to form a collection of fluid or blood under your incision that may feel firm or squishy- it can take several weeks to months for your body to reabsorb it.  At times, it may even drain.  If that should happen keep the area clean with soap, water,  and cover with a clean gauze dressing. You can change this daily or as needed.     Other medicines   Wait to start aspirin or blood thinners until the day after surgery. You can continue your regular medicines at your normal time the day after surgery.   Your pain medicine may cause constipation (hard, dry stools). To help with this, take the stool softener your doctor gave you or an over-the-counter stool softener or laxative. You can stop taking this when you are no longer taking pain medicine and your bowel movements are back to normal.      For pain or discomfort   Please try using over-the-counter medication, use acetaminophen (Tylenol) or ibuprofen (Advil, Motrin) as instructed on the box. Do not take Tylenol if it is in your narcotic pain medication.  Take the narcotic pain medicine your doctor gave you as needed and as instructed on the bottle if it was prescribed.   Use an ice pack on your surgical cut (incision) for 20 minutes at a time as needed for the first 24 hours. Be sure to protect your skin by putting a cloth between the ice pack and your skin.      Activities   No driving until you feel it s safe to do so. Don t drive while taking narcotic pain medicine.      Diet   You can eat your regular meals after surgery.      Results   You should know any test results about 5 to 7 business days after surgery       When to call the doctor   Call your doctor if you have:   A fever above 101 F (38.3 C) (taken  under the tongue), or a fever or chills lasting more than a day.   Redness at the incision site.   Any fluid or blood draining from the incision, especially if it smells bad.    Severe pain that doesn t improve with pain medicine.      We will call you 2 to 4 days after surgery to review this handout, answer questions and help arrange after-surgery care. If you have questions or concerns, please call [phone] during regular office hours. If you need to call after business hours, call [phone] and ask to page the surgeon on-call.     IMPORTANT:  Prior to your surgical procedure, a nurse will be contacting you to obtain a health history.   If they do not reach you by noon the day prior to your surgery, your surgery will be cancelled. If you have your Pre-Op Surgical Physical (H&P) with the Anesthesia Team (PAC), you are exempt from this call. Phone:  [phone] ([facility]) or [phone] ([place]).

## 2024-05-14 NOTE — NURSING NOTE
"Chief Complaint   Patient presents with    New Patient     Lipoma of skin and subcutaneous tissue of back       Vitals:    05/14/24 0923   BP: 112/71   BP Location: Left arm   Patient Position: Sitting   Cuff Size: Adult Regular   Pulse: 76   SpO2: 97%   Weight: 56.5 kg (124 lb 9.6 oz)   Height: 1.575 m (5' 2\")       Body mass index is 22.79 kg/m .                          John Vegas, EMT    "

## 2024-05-14 NOTE — NURSING NOTE
Pre and Post op Patient Education/Teaching Flowsheet  Relevant Diagnosis:  Lipoma (D17.30)  Teaching Topic:  Pre and post op teaching  Person(s) Involved in teaching:  Patient     Motivation Level:  Asks Questions:  Yes  Eager to Learn:  Yes  Cooperative:  Yes  Receptive (willing/able to accept information):  Yes  Any cultural factors/Yazidi beliefs that may influence understanding or compliance?  No    Patient/caregiver/family demonstrates understanding of the following:  Reason for the appointment, diagnosis, and treatment plan:  Yes  Patient demonstrates understanding of the following:  Pre-op bowel prep:  No  Post-op pain management recommendations (medications, ice compress, binder/athletic supporter (if applicable), etc.:  Yes  Inguinal hernia patients:  Post-op urinary retention- discussed signs/symptoms and visit to ER for Mckeon catheter placement and to stay in place for at least 48 hours:  NA  Restrictions:  NA  Medications to take the day of surgery:  Per PCP  Blood thinner medications discussed and when to stop (if applicable):  Yes  Wound care:  Yes  Diabetes medication management (if applicable):  Per PCP  Which situations necessitate calling provider and whom to contact:  Discussed how to contact the hospital, nurse, and clinic scheduling staff if necessary    Infection Prevention: Patient demonstrates understanding of the following:  Patient instructed on hand hygiene:  Yes  Surgical procedure site care will be taught and will be reviewed at the time of discharge  Signs and symptoms of infection taught:  Yes  Wound care reviewed and will be taught at the time of discharge  Central venous catheter care will be taught at the time of discharge (if applicable)    Post-op follow-up:  Instructional materials used/given/mailed:  Surgical logistics, post op teaching sheet, and surgical scrub    Logistics:  Date and time of surgery:  TBD  Location of surgery: [facility] [place]-  5th Floor  History and Physical and any other testing necessary prior to surgery:  Yes  Required time line for completion of History and Physical and any pre-op testing:  Yes  Discuss need for someone to drive patient home and stay with them for 24 hours:  Yes  Pre-op showering/scrub information with Surgical Scrub:  Yes  NPO Guidelines:  NPO per Anesthesia Guidelines

## 2024-05-14 NOTE — LETTER
"5/14/2024       RE: Eunice Malloy  [address]  [facility] [address]     Dear Colleague,    Thank you for referring your patient, Eunice Malloy, to the [facility] [place] at [facility]. Please see a copy of my visit note below.    HPI  The patient is a 57 yo F who presents for evaluation of multiple lower back masses consistent with lipomas.    She has had these lipomas for many years. She has not noted any recent growth but they do cause her positional discomfort. They cause pressure on her back when seated. She also associates the masses with a tingling sensation in her middle back. She denies drainage or symptoms of infection.    She does smoke, she says 6 cigarettes a day, is taking Chantix to try and quit. She does not have diabetes. She denies history of easy bruising or bleeding.    Vitals:     05/14/24 0923   BP: 112/71   BP Location: Left arm   Patient Position: Sitting   Cuff Size: Adult Regular   Pulse: 76   SpO2: 97%   Weight: 56.5 kg (124 lb 9.6 oz)   Height: 1.575 m (5' 2\")            Body mass index is 22.79 kg/m     There are multiple freely mobile subcutaneous lesions of her bilateral lower back consistent with lipomas on exam, at least 3 on the right and 3 on the left overlying the low back over the bilateral pelvis and sacrum.    A&P  This patient is a 57 yo F with multiple lower back soft tissues masses, consistent with likely underling lipomas. They are causing her symptoms and she desires excision. We discussed that due to the scattered and multiple number of lesions, we would plan to coordinate in preop for marking all the lesions, and then plan to place incisions so as to minimize the number of incisions required to excise the maximum number of lesions. Given multiple lesions, we will schedule her for MAC. We discussed risks, including bleeding, infection, scarification, seroma/hematoma. And, I " counseled her on the increased risk of poor wound healing and infection given her smoking and encouraged her to continue attempts to quit. She has resources and is taking Chantix. She is amenable to proceed with excision.          Again, thank you for allowing me to participate in the care of your patient.      Sincerely,    Trung Ochoa MD

## 2024-05-17 ENCOUNTER — TELEPHONE (OUTPATIENT)
Dept: SURGERY | Facility: CLINIC | Age: 59
End: 2024-05-17
Payer: COMMERCIAL

## 2024-05-17 PROBLEM — D17.30 LIPOMA OF SKIN AND SUBCUTANEOUS TISSUE: Status: ACTIVE | Noted: 2024-05-14

## 2024-05-17 NOTE — TELEPHONE ENCOUNTER
Patient is scheduled for surgery with Dr. Trung Ochoa    Spoke with: Eunice    Date of Surgery: 6/27/2024    Location: [facility]    Informed patient they will need an adult : Yes    Pre op with Provider n/a    H&P: Scheduled with PCP - Alyse Mcclendon MD    Additional imaging/appointments: n/a    Surgery packet: To be sent in the [place] mail     Additional comments: n/a        Sienna Mccullough on 5/17/2024 at 11:06 AM

## 2024-05-22 ENCOUNTER — TELEPHONE (OUTPATIENT)
Dept: FAMILY MEDICINE | Facility: CLINIC | Age: 59
End: 2024-05-22
Payer: COMMERCIAL

## 2024-05-22 NOTE — TELEPHONE ENCOUNTER
Lm on vm letting patient know that Dr Mcclendon okayed to see her on 5/30/2024 at check in time is 11:20

## 2024-05-22 NOTE — TELEPHONE ENCOUNTER
Spoke with patient and she prefers to have her pre-op with her PCP. Pt asked for me to send a message to provider asking for her to fit her in somewhere before 6/27/2024. Waiting for a respond back from provider

## 2024-05-22 NOTE — TELEPHONE ENCOUNTER
I can see her on Thursday May 30th at 11:20 arrival for 11:40 visit (okay to go into my lunch hour). Alyse Mcclendon M.D.

## 2024-05-22 NOTE — TELEPHONE ENCOUNTER
Reason for Call:  Appointment Request    Patient requesting this type of appt:  pre op/6/27/back/Don/Sandra     Requested provider: Alyse Mcclendon    Reason patient unable to be scheduled: Not within requested timeframe    When does patient want to be seen/preferred time:     Comments:     Could we send this information to you in [facility] or would you prefer to receive a phone call?:   Patient would prefer a phone call   Okay to leave a detailed message?: Yes at Cell number on file:    Telephone Information:   Mobile [phone]       Call taken on 5/22/2024 at 11:43 AM by Maria Luz Ayala

## 2024-06-03 DIAGNOSIS — R21 RASH AND NONSPECIFIC SKIN ERUPTION: ICD-10-CM

## 2024-06-04 RX ORDER — TRIAMCINOLONE ACETONIDE 1 MG/G
CREAM TOPICAL 2 TIMES DAILY
Qty: 15 G | Refills: 1 | Status: SHIPPED | OUTPATIENT
Start: 2024-06-04

## 2024-06-20 NOTE — TELEPHONE ENCOUNTER
This writer received a message that this patient cancelled her H&P for her upcoming surgery with Dr. Trung Ochoa on 6/27/2024. I called and spoke to the patient and she needs to cancel surgery at this time due to insurance. Patient stated she would call back to reschedule when she gets new insurance. Both parties expressed understanding.    Sienna Mccullough on 6/20/2024 at 9:11 AM

## 2024-11-13 ENCOUNTER — OFFICE VISIT (OUTPATIENT)
Dept: URGENT CARE | Facility: URGENT CARE | Age: 59
End: 2024-11-13
Payer: COMMERCIAL

## 2024-11-13 VITALS
TEMPERATURE: 97.8 F | RESPIRATION RATE: 20 BRPM | HEART RATE: 71 BPM | DIASTOLIC BLOOD PRESSURE: 82 MMHG | OXYGEN SATURATION: 97 % | SYSTOLIC BLOOD PRESSURE: 129 MMHG
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
## 2024-11-13 DIAGNOSIS — M54.50 LOW BACK PAIN, UNSPECIFIED BACK PAIN LATERALITY, UNSPECIFIED CHRONICITY, UNSPECIFIED WHETHER SCIATICA PRESENT: Primary | ICD-10-CM

## 2024-11-13 DIAGNOSIS — S61.452A CAT BITE OF LEFT HAND, INITIAL ENCOUNTER: Primary | ICD-10-CM

## 2024-11-13 PROCEDURE — 99213 OFFICE O/P EST LOW 20 MIN: CPT | Performed by: PHYSICIAN ASSISTANT

## 2024-11-13 NOTE — PROGRESS NOTES
URGENT CARE VISIT:    SUBJECTIVE:   Eunice Malloy is a 59 year old female who presents with a chief complaint of an animal bite on the left hand.  She was bitten by a cat yesterday. Cicumstances of bite: bathing cat.  Severity: bite with skin break.  Animal's immunizations up to date  Associated symptoms include redness noted, increasing pain . Tetanus status: last tetanus booster within 10 years    PMH:   Past Medical History:   Diagnosis Date    Anxiety 4/11/2013    Cold sore 8/15/2011    Depression     Hypothyroidism 12/9/2010    Tobacco abuse      Allergies: Sulfa antibiotics   Medications:   Current Outpatient Medications   Medication Sig Dispense Refill    acyclovir (ZOVIRAX) 5 % external cream Apply topically 5 times daily 5 g 3    amoxicillin-clavulanate (AUGMENTIN) 875-125 MG tablet Take 1 tablet by mouth 2 times daily for 7 days. 14 tablet 0    ibuprofen (ADVIL/MOTRIN) 800 MG tablet Take 1 tablet (800 mg) by mouth every 8 hours as needed for moderate pain 30 tablet 0    levothyroxine (SYNTHROID/LEVOTHROID) 100 MCG tablet Take 1 tablet (100 mcg) by mouth daily 90 tablet 3    Multiple Vitamins-Minerals (MULTIVITAL PO) Take 1 tablet by mouth      traZODone (DESYREL) 50 MG tablet TAKE 1-2 TABLETS BY MOUTH AT BEDTIME AS NEEDED FOR SLEEP 180 tablet 3    ALLERGY RELIEF 180 MG tablet Take 180 mg by mouth daily (Patient not taking: Reported on 11/13/2024)      cyclobenzaprine (FLEXERIL) 10 MG tablet Take 0.5-1 tablets (5-10 mg) by mouth 3 times daily as needed for muscle spasms (Patient not taking: Reported on 11/13/2024) 90 tablet 0    estradiol (ESTRACE) 0.1 MG/GM vaginal cream Insert 1g vaginally and apply small amount to the vaginal opening and vulva once nightly for two weeks, then twice weekly for maintenance. (Patient not taking: Reported on 11/13/2024) 42.5 g 3    fluticasone (FLONASE) 50 MCG/ACT nasal spray Spray 2 sprays into both nostrils daily (Patient not taking: Reported on 11/13/2024) 11.1 mL 11     triamcinolone (KENALOG) 0.1 % external cream APPLY TOPICALLY 2 TIMES DAILY. USE TWICE DAILY FOR UP TO 14 DAYS (Patient not taking: Reported on 11/13/2024) 15 g 1    valACYclovir (VALTREX) 1000 mg tablet Take 2 tablets (2,000 mg) by mouth 2 times daily Take 2 tablets (2,000 mg) by mouth 2 times daily for 1 day for cold sore (Patient not taking: Reported on 11/13/2024) 12 tablet 3    varenicline (CHANTIX RICARDO) 0.5 MG X 11 & 1 MG X 42 tablet Take 0.5 mg tab daily for 3 days, THEN 0.5 mg tab twice daily for 4 days, THEN 1 mg twice daily. Strength: 0.5 MG X 11 & 1 MG X 42 53 each 1    varenicline (CHANTIX RICARDO) 0.5 MG X 11 & 1 MG X 42 tablet Take 0.5 mg tab daily for 3 days, THEN 0.5 mg tab twice daily for 4 days, THEN 1 mg twice daily. (Patient not taking: Reported on 11/13/2024) 53 tablet 0     Social History:   Social History     Tobacco Use    Smoking status: Every Day     Types: Cigarettes    Smokeless tobacco: Never    Tobacco comments:     5 or 6 cpd   Substance Use Topics    Alcohol use: Yes     Alcohol/week: 0.0 standard drinks of alcohol     Comment: rare       ROS:  Review of systems negative except as stated above.    OBJECTIVE:  /82   Pulse 71   Temp 97.8  F (36.6  C) (Tympanic)   Resp 20   LMP 05/14/2008   SpO2 97%   GENERAL: healthy, alert no acute distress  SKIN: puncture wound of left palm with 3 cm of surrounding erythema  MS:extremities normal- no gross deformities noted,  FROM noted in all extremities  NEURO: Normal strength and tone, sensory exam grossly normal,  normal speech and mentation      ASSESSMENT:    ICD-10-CM    1. Cat bite of left hand, initial encounter  S61.452A amoxicillin-clavulanate (AUGMENTIN) 875-125 MG tablet    W55.01XA           PLAN:  Patient Instructions   Patient was educated on the natural course of injury.  Keep wound dry and clean. Wash area with soap and water. Watch for signs of increasing infection such as redness or purulent drainage. Conservative measures  discussed including over-the-counter Tylenol as needed for pain. See your primary care provider in 3 days if there is no improvement or sooner as needed. Seek emergency care if you develop fever, streaking, severe pain or rapidly spreading redness.     Patient verbalized understanding and is agreeable to plan. The patient was discharged ambulatory and in stable condition.    Orquidea Terry PA-C ....................  11/13/2024   11:14 AM

## 2024-11-13 NOTE — PATIENT INSTRUCTIONS
Patient was educated on the natural course of injury.  Keep wound dry and clean. Wash area with soap and water. Watch for signs of increasing infection such as redness or purulent drainage. Conservative measures discussed including over-the-counter Tylenol as needed for pain. See your primary care provider in 3 days if there is no improvement or sooner as needed. Seek emergency care if you develop fever, streaking, severe pain or rapidly spreading redness.

## 2024-11-14 RX ORDER — IBUPROFEN 800 MG/1
800 TABLET, FILM COATED ORAL EVERY 8 HOURS PRN
Qty: 30 TABLET | Refills: 0 | Status: SHIPPED | OUTPATIENT
Start: 2024-11-14

## 2024-11-20 ENCOUNTER — VIRTUAL VISIT (OUTPATIENT)
Dept: FAMILY MEDICINE | Facility: CLINIC | Age: 59
End: 2024-11-20
Payer: COMMERCIAL

## 2024-11-20 ENCOUNTER — NURSE TRIAGE (OUTPATIENT)
Dept: FAMILY MEDICINE | Facility: CLINIC | Age: 59
End: 2024-11-20

## 2024-11-20 DIAGNOSIS — R42 DIZZINESS: ICD-10-CM

## 2024-11-20 DIAGNOSIS — H81.10 BENIGN PAROXYSMAL POSITIONAL VERTIGO, UNSPECIFIED LATERALITY: Primary | ICD-10-CM

## 2024-11-20 RX ORDER — MECLIZINE HYDROCHLORIDE 25 MG/1
25 TABLET ORAL 3 TIMES DAILY PRN
Qty: 20 TABLET | Refills: 0 | Status: SHIPPED | OUTPATIENT
Start: 2024-11-20

## 2024-11-20 NOTE — PROGRESS NOTES
Eunice is a 59 year old who is being evaluated via a billable telephone visit.      Originating Location (pt. Location): unknown - cell phone call    Distant Location (provider location):  Off-site    Assessment & Plan       ICD-10-CM    1. Benign paroxysmal positional vertigo, unspecified laterality  H81.10 meclizine (ANTIVERT) 25 MG tablet      2. Dizziness  R42 meclizine (ANTIVERT) 25 MG tablet         Her symptoms are quite classic for BPPV.  Discussed pathophysiology and etiology of this condition.  Gave patient education information and discussed that she can review this on her AVS via [facility]al[facility]. Discussed that BPPV can be episodic, with most episodes self-limited.  I discussed option of vestibular rehab and Epley maneuver.  I gave her patient instructions on Epley maneuver but if that is not effective she'll notify clinic and I will refer her to vestibular rehab.     Patient instructed to contact clinic if symptoms persist, worsen, or do not resolve as anticipated.          Subjective   Eunice is a 59 year old, presenting for the following health issues:  No chief complaint on file.    HPI     Dizziness  Onset/Duration: last night  Description:   Do you feel faint: No  Does it feel like the surroundings (bed, room) are moving: YES  Unsteady/off balance: YES  Have you passed out or fallen: No  Accompanying Signs & Symptoms:  Heart palpitations or chest pain: No  Nausea, vomiting: No  Weakness or lack of coordination in arms or legs: No  Ringing in ears (Tinnitus): a few weeks ago  Hearing Loss: No  Precipitating factors:   Worse with activity: YES  Worse with head movement: YES  Alleviating factors:   Does staying in a fixed position give relief: YES  Therapies tried and outcome: has used meclizine in the past    Has had vertigo in the past  States she got meclizine from ER MD  in the past - from chart review this was in 11/2018  She still gets episodes once or twice a year and has used that meclizine  occasionally.  While sleeping last night she rolled over in bed and got sudden wave of spins.  Felt lightheaded this morning when she got up - sense of imbalance, not presyncope.  Spins when she moves.  Has had bad episodes in the past and became anxious about potential worsening.  Would like to get more meclizine.        Objective       Vitals:  No vitals were obtained today due to virtual visit.    Physical Exam   General: Alert and no distress //Respiratory: No audible wheeze, cough, or shortness of breath // Psychiatric:  Appropriate affect, tone, and pace of words      Phone call duration: 26 minutes  12:35 PM  1:01 PM  Signed Electronically by: Alyse Vazquez MD     - - -

## 2024-11-20 NOTE — TELEPHONE ENCOUNTER
"Primary vertigo sx include sensitivity movement and position changes, mild at present but worsening  Denies syncope, no severe nausea, denies SOB    Began triage process, but pt states, \"I'm at work, honey, I don't have time to do all that.\" Scheduled patient for telephone visit at 1230 today to discuss with a clinician and for any appropriate medication/tx recommendations.    MARCIN Alonso, RN (she/her)  [facility] RN      Reason for Disposition   Patient wants to be seen     Pt actually requested rx just be called in repeatedly, but writer explained this needed a visit of some sort and clinician review.    Additional Information   Negative: SEVERE difficulty breathing (e.g., struggling for each breath, speaks in single words)   Negative: Shock suspected (e.g., cold/pale/clammy skin, too weak to stand, low BP, rapid pulse)   Negative: Difficult to awaken or acting confused (e.g., disoriented, slurred speech)   Negative: Fainted, and still feels dizzy afterwards   Negative: Overdose (accidental or intentional) of medications   Negative: New neurologic deficit that is present now: * Weakness of the face, arm, or leg on one side of the body * Numbness of the face, arm, or leg on one side of the body * Loss of speech or garbled speech   Negative: Heart beating < 50 beats per minute OR > 140 beats per minute   Negative: Sounds like a life-threatening emergency to the triager   Negative: SEVERE dizziness (e.g., unable to stand, requires support to walk, feels like passing out now)   Negative: SEVERE headache or neck pain   Negative: Spinning or tilting sensation (vertigo) present now and one or more stroke risk factors (i.e., hypertension, diabetes mellitus, prior stroke/TIA, heart attack, age over 60) (Exception: Prior physician evaluation for this AND no different/worse than usual.)   Negative: Neurologic deficit that was brief (now gone), ANY of the following:* Weakness of the face, " arm, or leg on one side of the body* Numbness of the face, arm, or leg on one side of the body* Loss of speech or garbled speech   Negative: Loss of vision or double vision  (Exception: Similar to previous migraines.)   Negative: Extra heartbeats, irregular heart beating, or heart is beating very fast (i.e., 'palpitations')   Negative: Difficulty breathing   Negative: Drinking very little and dehydration suspected (e.g., no urine > 12 hours, very dry mouth, very lightheaded)   Negative: Follows bleeding (e.g., stomach, rectum, vagina)  (Exception: Became dizzy from sight of small amount blood.)   Negative: Patient sounds very sick or weak to the triager   Negative: Lightheadedness (dizziness) present now, after 2 hours of rest and fluids   Negative: Spinning or tilting sensation (vertigo) present now   Negative: Fever > 103 F (39.4 C)   Negative: Fever > 100.0 F (37.8 C) and has diabetes mellitus or a weak immune system (e.g., HIV positive, cancer chemotherapy, organ transplant, splenectomy, chronic steroids)   Negative: Vomiting occurs with dizziness   Negative: MODERATE dizziness (e.g., interferes with normal activities)  (Exception: Dizziness caused by heat exposure, sudden standing, or poor fluid intake.)    Protocols used: Dizziness-A-OH

## 2024-11-20 NOTE — TELEPHONE ENCOUNTER
Josh w/ RN team to work on request of Vetigo medication. Per patient has not been refilled in awhile, but she is at work having a flare-

## 2024-11-20 NOTE — Clinical Note
She wanted me to route this to you as an FYI.  We got off on the wrong foot because she thought her appointment was with you but all good in the end I think. Sera

## 2024-11-20 NOTE — TELEPHONE ENCOUNTER
Clinic RN-Please contact patient to triage and clarify med request.    Writer does not find medication for vertigo on active med list.    Thank you!  MARCIN Singletary, RN-[facility]

## 2024-11-20 NOTE — TELEPHONE ENCOUNTER
Writer called and left message on patient's voicemail to call back and speak with a triage nurse.      Triage:   Vertigo.     LIZETH BrannonN RN  [facility]

## 2025-02-13 NOTE — TELEPHONE ENCOUNTER
Writer called patient. Patient about to have virtual appointment with Dr. Mcclendon. Patient stated that she will address thyroid with Dr. Mcclendon at visit.     LIZETH ElkinsN JUAN DAVID  [facility]     [Patient] : Patient [Other: _____] : [unfilled] [FreeTextEntry1] : med management/ follow-up

## 2025-03-27 DIAGNOSIS — G47.00 INSOMNIA, UNSPECIFIED TYPE: ICD-10-CM

## 2025-03-27 RX ORDER — TRAZODONE HYDROCHLORIDE 50 MG/1
TABLET ORAL
Qty: 180 TABLET | Refills: 0 | Status: SHIPPED | OUTPATIENT
Start: 2025-03-27

## 2025-04-17 DIAGNOSIS — R21 RASH AND NONSPECIFIC SKIN ERUPTION: ICD-10-CM

## 2025-04-17 RX ORDER — TRIAMCINOLONE ACETONIDE 1 MG/G
CREAM TOPICAL 2 TIMES DAILY
Qty: 15 G | Refills: 1 | Status: SHIPPED | OUTPATIENT
Start: 2025-04-17

## 2025-04-17 NOTE — TELEPHONE ENCOUNTER
Patient called requesting refill for a medication that was prescribed a year ago. She stated Dr. Mcclendon prescribed it and it for rash on her hand.    Writer confirmed with patient that's it is triamcinolone (KENALOG) 0.1 % external cream. She stated she is getting the rash again on the the same hand (right hand). Writer advised patient that she should be seen again but she refused. Patient does not want to be seen. Only wants refill. Pended Rx and pharmacy.       Branden Murphy Cem Say, BSN RN  [facility]

## 2025-05-07 ENCOUNTER — TELEPHONE (OUTPATIENT)
Dept: FAMILY MEDICINE | Facility: CLINIC | Age: 60
End: 2025-05-07
Payer: COMMERCIAL

## 2025-05-07 DIAGNOSIS — M54.50 LOW BACK PAIN, UNSPECIFIED BACK PAIN LATERALITY, UNSPECIFIED CHRONICITY, UNSPECIFIED WHETHER SCIATICA PRESENT: ICD-10-CM

## 2025-05-07 NOTE — TELEPHONE ENCOUNTER
Patient requesting Ibuprofen 800MG 90 day supply, she states PCP has given this in the past and it is tied to her Lipoma. She is requesting to  end of day 5/8-please advise if VV is needed to complete-thanks!

## 2025-05-08 RX ORDER — IBUPROFEN 800 MG/1
800 TABLET, FILM COATED ORAL EVERY 8 HOURS PRN
Qty: 90 TABLET | Refills: 0 | Status: SHIPPED | OUTPATIENT
Start: 2025-05-08

## 2025-05-08 NOTE — TELEPHONE ENCOUNTER
Normal renal function as noted below. Ibuprofen refilled. Please let Eunice know. Alyse Mcclendon M.D.        Last Comprehensive Metabolic Panel:  Lab Results   Component Value Date     04/22/2024    POTASSIUM 4.0 04/22/2024    CHLORIDE 101 04/22/2024    CO2 28 04/22/2024    ANIONGAP 9 04/22/2024    GLC 91 04/22/2024    BUN 15.1 04/22/2024    CR 0.74 04/22/2024    GFRESTIMATED >90 04/22/2024    SHANIQUE 9.5 04/22/2024

## 2025-05-15 DIAGNOSIS — E03.9 HYPOTHYROIDISM, UNSPECIFIED TYPE: ICD-10-CM

## 2025-05-15 RX ORDER — LEVOTHYROXINE SODIUM 100 UG/1
100 TABLET ORAL DAILY
Qty: 90 TABLET | Refills: 3 | OUTPATIENT
Start: 2025-05-15

## 2025-05-15 NOTE — TELEPHONE ENCOUNTER
Patient called requesting further updates about their levothyroxine medication. Writer informed that there was no request for this but will send this request to refill team and PCP to review. Patient stated that she called a week ago to request for this and only has a couple of day left.

## 2025-05-24 ENCOUNTER — HEALTH MAINTENANCE LETTER (OUTPATIENT)
Age: 60
End: 2025-05-24

## 2025-06-18 DIAGNOSIS — Z86.19 HX OF COLD SORES: ICD-10-CM

## 2025-06-18 DIAGNOSIS — B00.1 COLD SORE: ICD-10-CM

## 2025-06-18 RX ORDER — VALACYCLOVIR HYDROCHLORIDE 1 G/1
2000 TABLET, FILM COATED ORAL 2 TIMES DAILY
Qty: 12 TABLET | Refills: 0 | Status: SHIPPED | OUTPATIENT
Start: 2025-06-18

## 2025-06-18 RX ORDER — ACYCLOVIR 50 MG/G
CREAM TOPICAL
Qty: 5 G | Refills: 0 | Status: SHIPPED | OUTPATIENT
Start: 2025-06-18

## 2025-06-18 NOTE — TELEPHONE ENCOUNTER
Patient called requesting what the update is on their medication request. She stated that her pharmacy had sent this request and needed PCP to sign off on this. Writer informed that there wasn't a request made from their pharmacy but will send it to refill team for further review.     Okay to call and leave detail .   [phone]

## 2025-07-02 ENCOUNTER — TELEPHONE (OUTPATIENT)
Dept: FAMILY MEDICINE | Facility: CLINIC | Age: 60
End: 2025-07-02
Payer: COMMERCIAL

## 2025-07-02 DIAGNOSIS — M54.50 LOW BACK PAIN, UNSPECIFIED BACK PAIN LATERALITY, UNSPECIFIED CHRONICITY, UNSPECIFIED WHETHER SCIATICA PRESENT: Primary | ICD-10-CM

## 2025-07-02 RX ORDER — LIDOCAINE 50 MG/G
OINTMENT TOPICAL PRN
Qty: 30.44 G | Refills: 3 | Status: SHIPPED | OUTPATIENT
Start: 2025-07-02

## 2025-07-02 NOTE — TELEPHONE ENCOUNTER
Dr. Mcclendon --    Please review and advise: Lidocaine ointment.   Pended below.     Patient requesting this medication instead of patches.   Patient does not have insurance right now so would like to pay out of pocket.     LIZETH BrannonN RN  [facility]

## 2025-07-02 NOTE — TELEPHONE ENCOUNTER
[employer] message sent to patient.  Ana PATIÑO BSN, PHN, RN-[facility]  [phone]

## 2025-07-26 ENCOUNTER — HEALTH MAINTENANCE LETTER (OUTPATIENT)
Age: 60
End: 2025-07-26

## (undated) DEVICE — GOWN IMPERVIOUS 2XL BLUE

## (undated) DEVICE — SOL WATER IRRIG 500ML BOTTLE 2F7113

## (undated) DEVICE — SUCTION MANIFOLD NEPTUNE 2 SYS 1 PORT 702-025-000

## (undated) DEVICE — KIT ENDO TURNOVER/PROCEDURE CARRY-ON 101822

## (undated) DEVICE — TUBING SUCTION 12"X1/4" N612

## (undated) DEVICE — SPECIMEN CONTAINER 3OZ W/FORMALIN 59901

## (undated) RX ORDER — FENTANYL CITRATE 50 UG/ML
INJECTION, SOLUTION INTRAMUSCULAR; INTRAVENOUS
Status: DISPENSED
Start: 2021-05-21